# Patient Record
Sex: FEMALE | Race: WHITE | NOT HISPANIC OR LATINO | Employment: OTHER | URBAN - METROPOLITAN AREA
[De-identification: names, ages, dates, MRNs, and addresses within clinical notes are randomized per-mention and may not be internally consistent; named-entity substitution may affect disease eponyms.]

---

## 2017-01-12 ENCOUNTER — APPOINTMENT (OUTPATIENT)
Dept: PHYSICAL THERAPY | Facility: REHABILITATION | Age: 82
End: 2017-01-12
Attending: NURSE PRACTITIONER
Payer: MEDICARE

## 2017-01-17 ENCOUNTER — APPOINTMENT (OUTPATIENT)
Dept: PHYSICAL THERAPY | Facility: REHABILITATION | Age: 82
End: 2017-01-17
Attending: NURSE PRACTITIONER
Payer: MEDICARE

## 2017-01-19 ENCOUNTER — APPOINTMENT (OUTPATIENT)
Dept: PHYSICAL THERAPY | Facility: REHABILITATION | Age: 82
End: 2017-01-19
Attending: NURSE PRACTITIONER
Payer: MEDICARE

## 2017-01-24 ENCOUNTER — APPOINTMENT (OUTPATIENT)
Dept: PHYSICAL THERAPY | Facility: REHABILITATION | Age: 82
End: 2017-01-24
Attending: NURSE PRACTITIONER
Payer: MEDICARE

## 2017-01-26 ENCOUNTER — APPOINTMENT (OUTPATIENT)
Dept: PHYSICAL THERAPY | Facility: REHABILITATION | Age: 82
End: 2017-01-26
Attending: NURSE PRACTITIONER
Payer: MEDICARE

## 2017-01-31 ENCOUNTER — APPOINTMENT (OUTPATIENT)
Dept: PHYSICAL THERAPY | Facility: REHABILITATION | Age: 82
End: 2017-01-31
Attending: NURSE PRACTITIONER
Payer: MEDICARE

## 2017-02-02 ENCOUNTER — APPOINTMENT (OUTPATIENT)
Dept: PHYSICAL THERAPY | Facility: REHABILITATION | Age: 82
End: 2017-02-02
Attending: NURSE PRACTITIONER
Payer: MEDICARE

## 2017-03-20 ENCOUNTER — TELEPHONE (OUTPATIENT)
Dept: MEDICAL GROUP | Facility: MEDICAL CENTER | Age: 82
End: 2017-03-20

## 2017-03-20 DIAGNOSIS — F33.1 MODERATE EPISODE OF RECURRENT MAJOR DEPRESSIVE DISORDER (HCC): ICD-10-CM

## 2017-03-20 NOTE — TELEPHONE ENCOUNTER
1. Caller Name: Herminia                                         Call Back Number: [phone]      Patient approves a detailed voicemail message: yes    Daughter called and is wondering if patient's zoloft can be increased to 1 tablet instead of 1/2 tablet. Patient is having mood swings again and is arguing and fighting frequently with daughter again. Please advise.

## 2017-03-20 NOTE — TELEPHONE ENCOUNTER
It is okay to go to a full 50 mg tablet and a prescription for #30 of the 50 mg tablets were sent to her pharmacy.

## 2017-05-09 ENCOUNTER — OFFICE VISIT (OUTPATIENT)
Dept: MEDICAL GROUP | Facility: MEDICAL CENTER | Age: 82
End: 2017-05-09
Payer: MEDICARE

## 2017-05-09 VITALS
HEART RATE: 77 BPM | DIASTOLIC BLOOD PRESSURE: 74 MMHG | HEIGHT: 65 IN | RESPIRATION RATE: 16 BRPM | WEIGHT: 151 LBS | OXYGEN SATURATION: 97 % | TEMPERATURE: 98.2 F | SYSTOLIC BLOOD PRESSURE: 122 MMHG | BODY MASS INDEX: 25.16 KG/M2

## 2017-05-09 DIAGNOSIS — M54.41 ACUTE BILATERAL LOW BACK PAIN WITH BILATERAL SCIATICA: ICD-10-CM

## 2017-05-09 DIAGNOSIS — M54.42 ACUTE BILATERAL LOW BACK PAIN WITH BILATERAL SCIATICA: ICD-10-CM

## 2017-05-09 DIAGNOSIS — F33.1 MODERATE EPISODE OF RECURRENT MAJOR DEPRESSIVE DISORDER (HCC): ICD-10-CM

## 2017-05-09 PROCEDURE — 99214 OFFICE O/P EST MOD 30 MIN: CPT | Performed by: NURSE PRACTITIONER

## 2017-05-09 PROCEDURE — G8419 CALC BMI OUT NRM PARAM NOF/U: HCPCS | Performed by: NURSE PRACTITIONER

## 2017-05-09 PROCEDURE — G8432 DEP SCR NOT DOC, RNG: HCPCS | Performed by: NURSE PRACTITIONER

## 2017-05-09 PROCEDURE — 1101F PT FALLS ASSESS-DOCD LE1/YR: CPT | Performed by: NURSE PRACTITIONER

## 2017-05-09 PROCEDURE — 1036F TOBACCO NON-USER: CPT | Performed by: NURSE PRACTITIONER

## 2017-05-09 PROCEDURE — 4040F PNEUMOC VAC/ADMIN/RCVD: CPT | Mod: 8P | Performed by: NURSE PRACTITIONER

## 2017-05-09 RX ORDER — HYDROCODONE BITARTRATE AND ACETAMINOPHEN 5; 325 MG/1; MG/1
1 TABLET ORAL 2 TIMES DAILY PRN
Qty: 50 TAB | Refills: 0 | Status: SHIPPED | OUTPATIENT
Start: 2017-05-09 | End: 2017-08-30

## 2017-05-09 RX ORDER — CIPROFLOXACIN 500 MG/1
500 TABLET, FILM COATED ORAL 2 TIMES DAILY
COMMUNITY
End: 2017-06-08

## 2017-05-09 ASSESSMENT — PATIENT HEALTH QUESTIONNAIRE - PHQ9: CLINICAL INTERPRETATION OF PHQ2 SCORE: 0

## 2017-05-09 ASSESSMENT — ENCOUNTER SYMPTOMS: BACK PAIN: 1

## 2017-05-09 NOTE — PROGRESS NOTES
Subjective:      Herminia Jones is a 83 y.o. female who presents with Back Pain            Back Pain    Herminia Jones is here today accompanied by her daughter for back complaints.      1. Acute bilateral low back pain with bilateral sciatica  Patient reports her symptoms started 8 days ago without any history of trauma. The pain affects her lower back bilaterally and sometimes radiates into her gluteal area and sometimes down her legs posteriorly to the knees. Pain is worse with getting up after sitting or certain movements. Her daughter is asking for something stronger for pain other than Tylenol which does not appear to be helping. She is limited on her anti-inflammatory use because of her age. She denies loss of bowel or bladder function. She denies weakness or numbness of extremities.    2. Cystocele, unspecified cystocele location  Patient had been referred to gynecology previously for complaints of what appeared to be a cystocele. She is working with the gynecologist to correct this and apparently may need surgery in the near future. She is currently on Cipro for possible urinary tract infection. She reports no confusion, fever or chills. She apparently had a urine test done through gynecology.    3. 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 She reports no side effects.    Current Outpatient Prescriptions   Medication Sig Dispense Refill   • ciprofloxacin (CIPRO) 500 MG Tab Take 500 mg by mouth 2 times a day.     • Acetaminophen (TYLENOL 8 HOUR ARTHRITIS PAIN PO) Take  by mouth.     • hydrocodone-acetaminophen (NORCO) 5-325 MG Tab per tablet Take 1 Tab by mouth 2 times a day as needed. 50 Tab 0   • sertraline (ZOLOFT) 50 MG Tab Take 1 Tab by mouth every day. 30 Tab 11     No current facility-administered medications for this visit.     Social History   Substance Use Topics   • Smoking status: Never  "Smoker    • Smokeless tobacco: Never Used   • Alcohol Use: No     Family History   Problem Relation Age of Onset   • Stroke Father    • Cancer Sister    • Cancer Brother    • Heart Disease Sister    • Cancer Brother      Past Medical History   Diagnosis Date   • Glaucoma    • Macular degeneration        Review of Systems   Musculoskeletal: Positive for back pain.   All other systems reviewed and are negative.         Objective:     /74 mmHg  Pulse 77  Temp(Src) 36.8 °C (98.2 °F)  Resp 16  Ht 1.651 m (5' 5\")  Wt 68.493 kg (151 lb)  BMI 25.13 kg/m2  SpO2 97%     Physical Exam   Constitutional: She is oriented to person, place, and time. She appears well-developed and well-nourished. No distress.   HENT:   Head: Normocephalic and atraumatic.   Right Ear: External ear normal.   Left Ear: External ear normal.   Nose: Nose normal.   Eyes: Right eye exhibits no discharge. Left eye exhibits no discharge.   Neck: Normal range of motion. Neck supple. No thyromegaly present.   Cardiovascular: Normal rate, regular rhythm and normal heart sounds.  Exam reveals no gallop and no friction rub.    No murmur heard.  Pulmonary/Chest: Effort normal and breath sounds normal. She has no wheezes. She has no rales.   Musculoskeletal: She exhibits no edema or tenderness.        Lumbar back: She exhibits decreased range of motion and pain. She exhibits no tenderness.   Patient demonstrates 5/5 strength of lower extremities bilaterally. No paresthesias.   Neurological: She is alert and oriented to person, place, and time. She displays normal reflexes.   Skin: Skin is warm and dry. No rash noted. She is not diaphoretic.   Psychiatric: She has a normal mood and affect. Her behavior is normal. Judgment and thought content normal.   Nursing note and vitals reviewed.              Assessment/Plan:     1. Acute bilateral low back pain with bilateral sciatica  I suspect patient's symptoms are sciatic and her cystocele may also be playing " a part in this. She will use Tylenol when the pain is not severe but they requested and received a prescription for Norco to use for the stronger pain. I explained that normally this should improve within the next 3 weeks. If it does not improve with treatment through gynecology and time, I recommended we do an MRI and refer her to physiatry. Advised her to go to the emergency room if she develops loss of bowel or bladder control or severe pain.  - hydrocodone-acetaminophen (NORCO) 5-325 MG Tab per tablet; Take 1 Tab by mouth 2 times a day as needed.  Dispense: 50 Tab; Refill: 0    2. Cystocele, unspecified cystocele location  Patient will be following with her gynecologist to see if surgery is needed. She is on Cipro.    3. Moderate episode of recurrent major depressive disorder (CMS-HCC)  Patient reports she is doing well with Zoloft and I will have her continue this.

## 2017-05-09 NOTE — Clinical Note
[facility]  LETY Kulkarni  [address]  [place] [address]  Fax: [phone]   Authorization for Release/Disclosure of   Protected Health Information   Name: HERMINIA JONES : 1934 SSN: [id number]   Address: [facility] [address] [place] [address] Phone:    [phone] (home)    I authorize the entity listed below to release/disclose the PHI below to:   [facility]/LETY Kulkarni and LETY Kulkarni   Provider or Entity Name:     Address   City, State, Zip   Phone:      Fax:     Reason for request: continuity of care   Information to be released:    [  ] LAST COLONOSCOPY,  including any PATH REPORT and follow-up  [  ] LAST FIT/COLOGUARD RESULT [  ] LAST DEXA  [  ] LAST MAMMOGRAM  [  ] LAST PAP  [  ] LAST LABS [  ] RETINA EXAM REPORT  [  ] IMMUNIZATION RECORDS  [  ] Release all info      [  ] Check here and initial the line next to each item to release ALL health information INCLUDING  _____ Care and treatment for drug and / or alcohol abuse  _____ HIV testing, infection status, or AIDS  _____ Genetic Testing    DATES OF SERVICE OR TIME PERIOD TO BE DISCLOSED: _____________  I understand and acknowledge that:  * This Authorization may be revoked at any time by you in writing, except if your health information has already been used or disclosed.  * Your health information that will be used or disclosed as a result of you signing this authorization could be re-disclosed by the recipient. If this occurs, your re-disclosed health information may no longer be protected by State or Federal laws.  * You may refuse to sign this Authorization. Your refusal will not affect your ability to obtain treatment.  * This Authorization becomes effective upon signing and will  on (date) __________.      If no date is indicated, this Authorization will  one (1) year from the signature date.    Name: Herminia Jones    Signature:   Date:     2017       PLEASE FAX REQUESTED  RECORDS BACK TO: [phone]

## 2017-05-09 NOTE — MR AVS SNAPSHOT
"Herminia Jones   2017 1:40 PM   Office Visit   MRN: [id number]    Department:  [address] [place] [address]   Dept Phone:  [phone]    Description:  Female : 1934   Provider:  LETY Kulkarni           Reason for Visit     Back Pain lower back pain going down legs       Allergies as of 2017     No Known Allergies      You were diagnosed with     Acute bilateral low back pain with bilateral sciatica   [6177222]       Cystocele, unspecified cystocele location   [6397601]         Vital Signs     Blood Pressure Pulse Temperature Respirations Height Weight    122/74 mmHg 77 36.8 °C (98.2 °F) 16 1.651 m (5' 5\") 68.493 kg (151 lb)    Body Mass Index Oxygen Saturation Smoking Status             25.13 kg/m2 97% Never Smoker          Basic Information     Date Of Birth Sex Race Ethnicity Preferred Language    1934 Female White Non- English      Problem List              ICD-10-CM Priority Class Noted - Resolved    Moderate episode of recurrent major depressive disorder (CMS-HCC) F33.1   2016 - Present    CKD (chronic kidney disease) N18.9   2016 - Present      Health Maintenance        Date Due Completion Dates    IMM DTaP/Tdap/Td Vaccine (1 - Tdap) 1953 ---    IMM ZOSTER VACCINE 1994 ---    IMM PNEUMOCOCCAL 65+ (ADULT) LOW/MEDIUM RISK SERIES (1 of 2 - PCV13) 1999 ---    BONE DENSITY 2021 (Declined)    Override on 2016: Patient Declined    COLONOSCOPY 2023 (Done)    Override on 2013: Done            Current Immunizations     No immunizations on file.      Below and/or attached are the medications your provider expects you to take. Review all of your home medications and newly ordered medications with your provider and/or pharmacist. Follow medication instructions as directed by your provider and/or pharmacist. Please keep your medication list with you and share with your provider. Update the information when medications are " discontinued, doses are changed, or new medications (including over-the-counter products) are added; and carry medication information at all times in the event of emergency situations     Allergies:  No Known Allergies          Medications  Valid as of: May 09, 2017 -  2:25 PM    Generic Name Brand Name Tablet Size Instructions for use    Acetaminophen   Take  by mouth.        Ciprofloxacin HCl (Tab) CIPRO 500 MG Take 500 mg by mouth 2 times a day.        Hydrocodone-Acetaminophen (Tab) NORCO 5-325 MG Take 1 Tab by mouth 2 times a day as needed.        Sertraline HCl (Tab) ZOLOFT 50 MG Take 1 Tab by mouth every day.        .                 Medicines prescribed today were sent to:     [facility]/PHARMACY #[id number] - [place], [place] - [address]    [address] [facility] [address]    Phone: [phone] Fax: [phone]    Open 24 Hours?: No      Medication refill instructions:       If your prescription bottle indicates you have medication refills left, it is not necessary to call your provider’s office. Please contact your pharmacy and they will refill your medication.    If your prescription bottle indicates you do not have any refills left, you may request refills at any time through one of the following ways: The online [employer] system (except Urgent Care), by calling your provider’s office, or by asking your pharmacy to contact your provider’s office with a refill request. Medication refills are processed only during regular business hours and may not be available until the next business day. Your provider may request additional information or to have a follow-up visit with you prior to refilling your medication.   *Please Note: Medication refills are assigned a new Rx number when refilled electronically. Your pharmacy may indicate that no refills were authorized even though a new prescription for the same medication is available at the pharmacy. Please request the medicine by name with the pharmacy  before contacting your provider for a refill.           Care at Hand Access Code: [email or web address]  Expires: 6/8/2017  2:25 PM    Care at Hand  A secure, online tool to manage your health information     [employer]’s Care at Hand® is a secure, online tool that connects you to your personalized health information from the privacy of your home -- day or night - making it very easy for you to manage your healthcare. Once the activation process is completed, you can even access your medical information using the Care at Hand scott, which is available for free in the Apple Scott store or Google Play store.     Care at Hand provides the following levels of access (as shown below):   My Chart Features   [facility] Primary Care Doctor [facility]  Specialists [facility]  Urgent  Care Non-[facility]  Primary Care  Doctor   Email your healthcare team securely and privately 24/7 X X X    Manage appointments: schedule your next appointment; view details of past/upcoming appointments X      Request prescription refills. X      View recent personal medical records, including lab and immunizations X X X X   View health record, including health history, allergies, medications X X X X   Read reports about your outpatient visits, procedures, consult and ER notes X X X X   See your discharge summary, which is a recap of your hospital and/or ER visit that includes your diagnosis, lab results, and care plan. X X       How to register for Care at Hand:  1. Go to  [email or web address].  2. Click on the Sign Up Now box, which takes you to the New Member Sign Up page. You will need to provide the following information:  a. Enter your Care at Hand Access Code exactly as it appears at the top of this page. (You will not need to use this code after you’ve completed the sign-up process. If you do not sign up before the expiration date, you must request a new code.)   b. Enter your date of birth.   c. Enter your home email address.   d. Click Submit, and follow the next screen’s  instructions.  3. Create a [employer]ust ID. This will be your [employer] login ID and cannot be changed, so think of one that is secure and easy to remember.  4. Create a [employer] password. You can change your password at any time.  5. Enter your Password Reset Question and Answer. This can be used at a later time if you forget your password.   6. Enter your e-mail address. This allows you to receive e-mail notifications when new information is available in [employer].  7. Click Sign Up. You can now view your health information.    For assistance activating your [employer] account, call [phone]

## 2017-05-16 ENCOUNTER — OFFICE VISIT (OUTPATIENT)
Dept: MEDICAL GROUP | Facility: MEDICAL CENTER | Age: 82
End: 2017-05-16
Payer: MEDICARE

## 2017-05-16 VITALS
OXYGEN SATURATION: 98 % | HEIGHT: 65 IN | HEART RATE: 76 BPM | RESPIRATION RATE: 16 BRPM | DIASTOLIC BLOOD PRESSURE: 70 MMHG | WEIGHT: 154 LBS | SYSTOLIC BLOOD PRESSURE: 116 MMHG | TEMPERATURE: 97.8 F | BODY MASS INDEX: 25.66 KG/M2

## 2017-05-16 DIAGNOSIS — R20.2 PARESTHESIA OF BOTH HANDS: ICD-10-CM

## 2017-05-16 DIAGNOSIS — F33.1 MODERATE EPISODE OF RECURRENT MAJOR DEPRESSIVE DISORDER (HCC): ICD-10-CM

## 2017-05-16 DIAGNOSIS — M54.41 ACUTE RIGHT-SIDED LOW BACK PAIN WITH RIGHT-SIDED SCIATICA: ICD-10-CM

## 2017-05-16 DIAGNOSIS — E78.5 DYSLIPIDEMIA: ICD-10-CM

## 2017-05-16 DIAGNOSIS — R73.01 IMPAIRED FASTING GLUCOSE: ICD-10-CM

## 2017-05-16 LAB
HBA1C MFR BLD: 6.7 % (ref ?–5.8)
INT CON NEG: NEGATIVE
INT CON POS: POSITIVE

## 2017-05-16 PROCEDURE — G8432 DEP SCR NOT DOC, RNG: HCPCS | Performed by: NURSE PRACTITIONER

## 2017-05-16 PROCEDURE — G8417 CALC BMI ABV UP PARAM F/U: HCPCS | Performed by: NURSE PRACTITIONER

## 2017-05-16 PROCEDURE — 99214 OFFICE O/P EST MOD 30 MIN: CPT | Performed by: NURSE PRACTITIONER

## 2017-05-16 PROCEDURE — 83036 HEMOGLOBIN GLYCOSYLATED A1C: CPT | Performed by: NURSE PRACTITIONER

## 2017-05-16 PROCEDURE — 4040F PNEUMOC VAC/ADMIN/RCVD: CPT | Mod: 8P | Performed by: NURSE PRACTITIONER

## 2017-05-16 PROCEDURE — 1036F TOBACCO NON-USER: CPT | Performed by: NURSE PRACTITIONER

## 2017-05-16 PROCEDURE — 1101F PT FALLS ASSESS-DOCD LE1/YR: CPT | Performed by: NURSE PRACTITIONER

## 2017-05-16 RX ORDER — LIDOCAINE 50 MG/G
1 PATCH TOPICAL EVERY 24 HOURS
Qty: 30 PATCH | Refills: 2 | Status: SHIPPED | OUTPATIENT
Start: 2017-05-16 | End: 2017-08-30 | Stop reason: SDUPTHER

## 2017-05-16 ASSESSMENT — ENCOUNTER SYMPTOMS
BACK PAIN: 1
TINGLING: 1

## 2017-05-16 ASSESSMENT — PATIENT HEALTH QUESTIONNAIRE - PHQ9: CLINICAL INTERPRETATION OF PHQ2 SCORE: 0

## 2017-05-16 NOTE — MR AVS SNAPSHOT
"        Herminia Jones   2017 9:20 AM   Office Visit   MRN: [id number]    Department:  [address] [place] [address]   Dept Phone:  [phone]    Description:  Female : 1934   Provider:  LETY Kulkarni           Reason for Visit     Follow-Up high glucose/ pain meds not working      Allergies as of 2017     No Known Allergies      You were diagnosed with     Impaired fasting glucose   [790.21.ICD-9-CM]       Dyslipidemia   [455928]       Acute right-sided low back pain with right-sided sciatica   [4761231]       Paresthesia of both hands   [4877325]         Vital Signs     Blood Pressure Pulse Temperature Respirations Height Weight    116/70 mmHg 76 36.6 °C (97.8 °F) 16 1.651 m (5' 5\") 69.854 kg (154 lb)    Body Mass Index Oxygen Saturation Smoking Status             25.63 kg/m2 98% Never Smoker          Basic Information     Date Of Birth Sex Race Ethnicity Preferred Language    1934 Female White Non- English      Your appointments     Aug 16, 2017  9:20 AM   Established Patient with LETY Kulkarni   [facility] [address]    [address]  [facility] [address]  [facility] [address] [phone]           You will be receiving a confirmation call a few days before your appointment from our automated call confirmation system.              Problem List              ICD-10-CM Priority Class Noted - Resolved    Moderate episode of recurrent major depressive disorder (CMS-HCC) F33.1   2016 - Present    CKD (chronic kidney disease) N18.9   2016 - Present    Impaired fasting glucose R73.01   2017 - Present    Dyslipidemia E78.5   2017 - Present    Acute right-sided low back pain with right-sided sciatica M54.41   2017 - Present      Health Maintenance        Date Due Completion Dates    IMM DTaP/Tdap/Td Vaccine (1 - Tdap) 1953 ---    IMM ZOSTER VACCINE 1994 ---    IMM PNEUMOCOCCAL 65+ (ADULT) LOW/MEDIUM RISK SERIES (1 of 2 - PCV13) " 1/18/1999 ---    BONE DENSITY 8/24/2021 8/24/2016 (Declined)    Override on 8/24/2016: Patient Declined    COLONOSCOPY 8/7/2023 8/7/2013 (Done)    Override on 8/7/2013: Done            Results     POCT  A1C      Component    Glycohemoglobin    6.7    Internal Control Negative    Negative    Internal Control Positive    Positive                        Current Immunizations     No immunizations on file.      Below and/or attached are the medications your provider expects you to take. Review all of your home medications and newly ordered medications with your provider and/or pharmacist. Follow medication instructions as directed by your provider and/or pharmacist. Please keep your medication list with you and share with your provider. Update the information when medications are discontinued, doses are changed, or new medications (including over-the-counter products) are added; and carry medication information at all times in the event of emergency situations     Allergies:  No Known Allergies          Medications  Valid as of: May 16, 2017 -  9:24 AM    Generic Name Brand Name Tablet Size Instructions for use    Acetaminophen   Take  by mouth.        Ciprofloxacin HCl (Tab) CIPRO 500 MG Take 500 mg by mouth 2 times a day.        Hydrocodone-Acetaminophen (Tab) NORCO 5-325 MG Take 1 Tab by mouth 2 times a day as needed.        Lidocaine (Patch) LIDODERM 5 % Apply 1 Patch to skin as directed every 24 hours.        MetFORMIN HCl (Tab) GLUCOPHAGE 500 MG Take 1 Tab by mouth every day.        Sertraline HCl (Tab) ZOLOFT 50 MG Take 1 Tab by mouth every day.        .                 Medicines prescribed today were sent to:     [facility]/PHARMACY #[id number] - [place], [place] - [address] [address] [facility]    [address] [facility] [address]    Phone: [phone] Fax: [phone]    Open 24 Hours?: No      Medication refill instructions:       If your prescription bottle indicates you have medication refills left, it is not necessary to  call your provider’s office. Please contact your pharmacy and they will refill your medication.    If your prescription bottle indicates you do not have any refills left, you may request refills at any time through one of the following ways: The online [employer] system (except Urgent Care), by calling your provider’s office, or by asking your pharmacy to contact your provider’s office with a refill request. Medication refills are processed only during regular business hours and may not be available until the next business day. Your provider may request additional information or to have a follow-up visit with you prior to refilling your medication.   *Please Note: Medication refills are assigned a new Rx number when refilled electronically. Your pharmacy may indicate that no refills were authorized even though a new prescription for the same medication is available at the pharmacy. Please request the medicine by name with the pharmacy before contacting your provider for a refill.        Your To Do List     Future Labs/Procedures Complete By Expires    MR-LUMBAR SPINE-W/O  As directed 11/16/2017      Referral     A referral request has been sent to our patient care coordination department. Please allow 3-5 business days for us to process this request and contact you either by phone or mail. If you do not hear from us by the 5th business day, please call us at [phone].           [employer] Access Code: [email or web address]  Expires: 6/8/2017  2:25 PM    [employer]  A secure, online tool to manage your health information     [employer]’s [employer]® is a secure, online tool that connects you to your personalized health information from the privacy of your home -- day or night - making it very easy for you to manage your healthcare. Once the activation process is completed, you can even access your medical information using the [employer] scott, which is available for free in the [employer] store or Google Play store.     [employer]  provides the following levels of access (as shown below):   My Chart Features   [facility] Primary Care Doctor [facility]  Specialists [facility]  Urgent  Care Non-[facility]  Primary Care  Doctor   Email your healthcare team securely and privately 24/7 X X X    Manage appointments: schedule your next appointment; view details of past/upcoming appointments X      Request prescription refills. X      View recent personal medical records, including lab and immunizations X X X X   View health record, including health history, allergies, medications X X X X   Read reports about your outpatient visits, procedures, consult and ER notes X X X X   See your discharge summary, which is a recap of your hospital and/or ER visit that includes your diagnosis, lab results, and care plan. X X       How to register for [employer]:  1. Go to  [email or web address].  2. Click on the Sign Up Now box, which takes you to the New Member Sign Up page. You will need to provide the following information:  a. Enter your [employer] Access Code exactly as it appears at the top of this page. (You will not need to use this code after you’ve completed the sign-up process. If you do not sign up before the expiration date, you must request a new code.)   b. Enter your date of birth.   c. Enter your home email address.   d. Click Submit, and follow the next screen’s instructions.  3. Create a [employer] ID. This will be your [employer] login ID and cannot be changed, so think of one that is secure and easy to remember.  4. Create a [employer] password. You can change your password at any time.  5. Enter your Password Reset Question and Answer. This can be used at a later time if you forget your password.   6. Enter your e-mail address. This allows you to receive e-mail notifications when new information is available in [employer].  7. Click Sign Up. You can now view your health information.    For assistance activating your [employer] account, call [phone]

## 2017-05-16 NOTE — PROGRESS NOTES
Subjective:      Herminia Jones is a 83 y.o. female who presents with Follow-Up            HPI Herminia Jones is a pleasant 83-year-old female here today accompanied by her daughter for follow-up on lab work as well as continuing pain of her back and questions regarding hand numbness.      1. Impaired fasting glucose  Patient recently did lab work for her gynecologist who recently treated her for UTI with Cipro as well as evaluating her for possible cystocele surgery in the future. I received a copy of the labs showing elevated fasting blood sugar 167. Patient is relatively new to this office so I do not have any lab work to compare it to. She reports no polydipsia or polyphagia.    2. Dyslipidemia  Patient's lab work through gynecology also showed a total cholesterol of 223 with mild elevation in LDL of 137 with a good HDL of 53. Thyroid levels were normal.    3. Acute right-sided low back pain with right-sided sciatica  Patient states she continues to have severe low back pain. This started about 2 weeks ago and affects mainly her right lower back and radiates into her right gluteal area. She was given a prescription for Norco because Tylenol was not helping. She is using this about twice a day since the pain started and she was seen here. She denies weakness or numbness of her extremities. She denies loss of bowel or bladder control. Symptoms are worse after getting up from sitting. Her daughter gave her some of her Lidoderm patch which was helpful.    4. Paresthesia of both hands  Patient reports today that she also has had some numbness of the fingers of her hands bilaterally for at least a year. She states this has never been checked.    5. Moderate episode of recurrent major depressive disorder (CMS-HCC)  Patient continues on Zoloft 50 mg which was recently filled for her and she feels this is helpful.    Current Outpatient Prescriptions   Medication Sig Dispense Refill   • metformin (GLUCOPHAGE) 500 MG  "Tab Take 1 Tab by mouth every day. 30 Tab 11   • lidocaine (LIDODERM) 5 % Patch Apply 1 Patch to skin as directed every 24 hours. 30 Patch 2   • ciprofloxacin (CIPRO) 500 MG Tab Take 500 mg by mouth 2 times a day.     • hydrocodone-acetaminophen (NORCO) 5-325 MG Tab per tablet Take 1 Tab by mouth 2 times a day as needed. 50 Tab 0   • sertraline (ZOLOFT) 50 MG Tab Take 1 Tab by mouth every day. 30 Tab 11   • Acetaminophen (TYLENOL 8 HOUR ARTHRITIS PAIN PO) Take  by mouth.       No current facility-administered medications for this visit.     Social History   Substance Use Topics   • Smoking status: Never Smoker    • Smokeless tobacco: Never Used   • Alcohol Use: No     Family History   Problem Relation Age of Onset   • Stroke Father    • Cancer Sister    • Cancer Brother    • Heart Disease Sister    • Cancer Brother      Past Medical History   Diagnosis Date   • Glaucoma    • Macular degeneration        Review of Systems   Musculoskeletal: Positive for back pain.   Neurological: Positive for tingling.   All other systems reviewed and are negative.         Objective:     /70 mmHg  Pulse 76  Temp(Src) 36.6 °C (97.8 °F)  Resp 16  Ht 1.651 m (5' 5\")  Wt 69.854 kg (154 lb)  BMI 25.63 kg/m2  SpO2 98%     Physical Exam   Constitutional: She is oriented to person, place, and time. She appears well-developed and well-nourished. No distress.   HENT:   Head: Normocephalic and atraumatic.   Right Ear: External ear normal.   Left Ear: External ear normal.   Nose: Nose normal.   Eyes: Right eye exhibits no discharge. Left eye exhibits no discharge.   Neck: Normal range of motion. Neck supple. No thyromegaly present.   Cardiovascular: Normal rate, regular rhythm and normal heart sounds.  Exam reveals no gallop and no friction rub.    No murmur heard.  Pulmonary/Chest: Effort normal and breath sounds normal. She has no wheezes. She has no rales.   Musculoskeletal: She exhibits no edema or tenderness.        Lumbar back: " She exhibits decreased range of motion and pain.   Neurological: She is alert and oriented to person, place, and time. She displays normal reflexes.   5/5 strength of lower extremities bilaterally.   Skin: Skin is warm and dry. No rash noted. She is not diaphoretic.   Psychiatric: She has a normal mood and affect. Her behavior is normal. Judgment and thought content normal.   Nursing note and vitals reviewed.              Assessment/Plan:     1. Impaired fasting glucose  Patient's hemoglobin A1c in the office today comes back elevated at 6.7. I will start patient on low-dose metformin 500 mg once a day. Her most recent GFR was 57. She does not want to check blood sugars but she is willing to do a hemoglobin A1c in 3 months. I told her if she watches her diet we may be able to take her off medicine in the future since she is not that high. She does need to watch her carbohydrates and I discussed this with her and her daughter. She will stop the medicine if she develops any side effects such as diarrhea or hypoglycemia.  - POCT  A1C  - metformin (GLUCOPHAGE) 500 MG Tab; Take 1 Tab by mouth every day.  Dispense: 30 Tab; Refill: 11    2. Dyslipidemia  Patient's LDL mildly elevated for an 83-year-old. I told her if it continues high and we need to continue treating her for impaired fasting glucose, we may wish to start a statin in the future.    3. Acute right-sided low back pain with right-sided sciatica  Back pain is persisting and patient is very uncomfortable. She does not appear to have cauda equina symptoms. She has Norco which she is using for the pain and I will add Lidoderm patch. I have referred patient to physiatry and we'll do an MRI assuming her pain does not improve over the next few weeks.  - MR-LUMBAR SPINE-W/O; Future  - REFERRAL TO PHYSIATRY (PMR)  - lidocaine (LIDODERM) 5 % Patch; Apply 1 Patch to skin as directed every 24 hours.  Dispense: 30 Patch; Refill: 2    4. Paresthesia of both hands  Patient  may need EMG studies for this complaint. It appears to have been present for a while.  - REFERRAL TO PHYSIATRY (PMR)    5. Moderate episode of recurrent major depressive disorder (CMS-HCC)  Patient states she is currently happy on Zoloft.

## 2017-05-25 ENCOUNTER — APPOINTMENT (OUTPATIENT)
Dept: RADIOLOGY | Facility: MEDICAL CENTER | Age: 82
End: 2017-05-25
Attending: NURSE PRACTITIONER
Payer: MEDICARE

## 2017-06-01 NOTE — H&P
HISTORY OF PRESENT ILLNESS:  The patient is an 83-year-old white female    4, para 4, status post hysterectomy, BSO whose chief complaint is   pelvic pain and pelvic pressure.  The patient was found to have stage IV   pelvic organ prolapse.  She has grade IV rectocele, grade IV cystocele.  The   patient has failed multiple pessaries to include a large Gellhorn pessary, she   failed a donut pessary and she failed a dish pessary.  The patient has been   treated in the past with pelvic floor rehabilitation by Dr. Helene Jamil.    PAST MEDICAL HISTORY:  Significant for macular degeneration, hyperlipidemia,   mild dementia, anxiety and depression.  The patient has a large capacity   bladder and underwent simple urodynamic testing, which revealed no genuine   stress urinary incontinence, no detrusor contractions, no evidence of ISD and   no stress urinary incontinence.  The patient was asked to drink 32 ounces and   voided 400 mL.  Residual volume was 400 mL.  Her first sensation was at 150   mL.  Her first desire to void was at 250 mL and she had a strong desire to   void at 400 mL.  Her provocative test revealed no leakage with coughing.  Her   standing stress test was negative and her Valsalva was negative both supine,   reclining and standing.  My impression is that the patient has no genuine   stress urinary incontinence.  She has a large capacity bladder.  The patient   has failed 3 pessaries due to a shortened vagina.  Significant for diabetes   mellitus type 2.    PAST SURGICAL HISTORY:  Positive for hysterectomy, BSO.    HABITS:  The patient does not smoke cigarettes.  She does not partake of   alcoholic beverages.  She has never participated in recreational drugs.  She   has never been treated for drug abuse.    FAMILY HISTORY:  Her sister has recently  secondary to breast cancer.    Her father is , age 80, and her mother is , age 78, reasons   unknown.    REVIEW OF SYSTEMS:   Patient complains of impaired eyesight, arthritis, change   in bowel habits, history of urinary tract infection and loss of urine on   coughing, laughing, sneezing and depression.  She notes a change in vision and   change in bowel habits.  She complains of frequent urination.  Her urinalysis   is negative for blood, glucose, nitrites, bilirubin and normal urobilinogen.    She has moderate leukocytes.  Her specific gravity is 1.010, pH of 5.  The   patient complains of joint pain and depression.    PHYSICAL EXAMINATION:  GENERAL:  This is a pleasant, white female who has a history of mild dementia   and is lovingly cared for by her daughter.  VITAL SIGNS:  She is 65 inches tall, 152 pounds, blood pressure 120/64, pulse   71, respirations 20, O2 sat 93%.  GENERAL:  This is an elderly white female who has some difficulty walking.  HEENT:  Head normocephalic without sign of trauma.  SKIN:  No rashes, changes, or cyanosis.  Nevi present.  NECK:  Trachea midline, no thyromegaly.  Decreased range of motion.  LUNGS:  Clear to auscultation.  Normal AP diameter.  HEART:  S1, S2 is normal.  No S3, S4.  No lifts, rubs or heaves.  MUSCULOSKELETAL:  Reveals back pain, no point tenderness, but most likely   degenerative disk disease.  ABDOMEN:  Flat.  Bowel sounds positive.  No hepatomegaly, no splenomegaly, no   tenderness, guarding, rigidity or rebound.  GENITALIA:  Female escutcheon.  Bartholin, urethral and [place]'s glands are   normal.  Lymphatic groin nodes not enlarged.  Urethra, no masses, tenderness,   or scarring.  Vaginal estrogen effect absent.  Bladder grade III cystocele.    Cervix not present, uterus not present status post hysterectomy and BSO.    Rectocele grade IV.  RECTAL:  Normal sphincter tone, hemorrhoids present, large hemorrhoids.  No   rectal mass.  Fecal occult blood is negative.    IMPRESSION:  1.  Grade III-grade IV pelvic organ prolapse.  2.  Status post hysterectomy.  3.  Cystocele, rectocele.  4.   Diabetes mellitus type 2.  5.  Failed pessaries -- failed Gellhorn, failed Donut, failed ring with   support.  6.  Status post history of pelvic floor rehabilitation by Dr. Helene Jamil.  7.  Hyperlipidemia.  8.  Macular degeneration.  9.  Atrophic vaginitis.  10.  Anxiety.  11.  Depression.    DISCUSSION:  I have had a long thorough discussion with the patient and with   her daughter regarding various approaches to care.  We discussed conservative   care, expectant care, treatment with pessaries, repeat pelvic floor   rehabilitation, Kiegel exercises.  The patient is retaining significant urine   with a total capacity of 800 mL.  We discussed colpocleisis.  The patient has   had a hysterectomy and BSO.  Following a long and thorough discussion   regarding the risk, benefits and alternatives to surgery, the patient elects   colpocleisis.  The patient's daughter agrees with her since we have failed   conservative measures.  We discussed the procedure in detail.  We also   discussed the potential of an anterior colporrhaphy, posterior colporrhaphy,   uterosacral suspension.  I have received a medical clearance from OLINDA Kulkarni.  Informed consent was received.  All the patient's questions were   discussed.  We discussed the serious and significant risks to include but not   limited to the risk of anesthesia, infection, bleeding, pneumonia, injury to   the bowel, bladder, ureter or pelvic vessels.  The patient has significant   comorbidities include diabetes mellitus type 2, which places her at increased   risk for potential infection.  All the patient's questions were answered to   her satisfaction.  Informed consent was received.  Informed consent form on 2   occasions was read, discussed, questions answered and signed.       ____________________________________     MD MAURICIO PARKER / FRANCESCA    DD:  05/31/2017 17:44:46  DT:  05/31/2017 20:10:16    D#:  [id number]  Job#:  [id number]    cc: PRITESH CAMARA  APN

## 2017-06-02 ENCOUNTER — TELEPHONE (OUTPATIENT)
Dept: MEDICAL GROUP | Facility: MEDICAL CENTER | Age: 82
End: 2017-06-02

## 2017-06-02 ENCOUNTER — HOSPITAL ENCOUNTER (OUTPATIENT)
Facility: MEDICAL CENTER | Age: 82
End: 2017-06-03
Attending: OBSTETRICS & GYNECOLOGY | Admitting: OBSTETRICS & GYNECOLOGY
Payer: MEDICARE

## 2017-06-02 ENCOUNTER — APPOINTMENT (OUTPATIENT)
Dept: RADIOLOGY | Facility: MEDICAL CENTER | Age: 82
End: 2017-06-02
Attending: OBSTETRICS & GYNECOLOGY
Payer: MEDICARE

## 2017-06-02 DIAGNOSIS — M48.061 SPINAL STENOSIS OF LUMBAR REGION: ICD-10-CM

## 2017-06-02 DIAGNOSIS — S32.10XA CLOSED FRACTURE OF SACRUM, UNSPECIFIED PORTION OF SACRUM, INITIAL ENCOUNTER (HCC): ICD-10-CM

## 2017-06-02 PROBLEM — N81.4 UTERINE PROLAPSE WITHOUT MENTION OF VAGINAL WALL PROLAPSE: Status: ACTIVE | Noted: 2017-06-02

## 2017-06-02 LAB
ALBUMIN SERPL BCP-MCNC: 3.9 G/DL (ref 3.2–4.9)
ALBUMIN/GLOB SERPL: 1 G/DL
ALP SERPL-CCNC: 113 U/L (ref 30–99)
ALT SERPL-CCNC: 20 U/L (ref 2–50)
ANION GAP SERPL CALC-SCNC: 11 MMOL/L (ref 0–11.9)
AST SERPL-CCNC: 21 U/L (ref 12–45)
BASOPHILS # BLD AUTO: 0.9 % (ref 0–1.8)
BASOPHILS # BLD: 0.08 K/UL (ref 0–0.12)
BILIRUB SERPL-MCNC: 0.5 MG/DL (ref 0.1–1.5)
BUN SERPL-MCNC: 17 MG/DL (ref 8–22)
CALCIUM SERPL-MCNC: 9.7 MG/DL (ref 8.5–10.5)
CHLORIDE SERPL-SCNC: 106 MMOL/L (ref 96–112)
CO2 SERPL-SCNC: 19 MMOL/L (ref 20–33)
CREAT SERPL-MCNC: 0.76 MG/DL (ref 0.5–1.4)
EKG IMPRESSION: NORMAL
EOSINOPHIL # BLD AUTO: 0.31 K/UL (ref 0–0.51)
EOSINOPHIL NFR BLD: 3.4 % (ref 0–6.9)
ERYTHROCYTE [DISTWIDTH] IN BLOOD BY AUTOMATED COUNT: 40.8 FL (ref 35.9–50)
GFR SERPL CREATININE-BSD FRML MDRD: >60 ML/MIN/1.73 M 2
GLOBULIN SER CALC-MCNC: 3.9 G/DL (ref 1.9–3.5)
GLUCOSE SERPL-MCNC: 109 MG/DL (ref 65–99)
HCT VFR BLD AUTO: 44.4 % (ref 37–47)
HGB BLD-MCNC: 15.1 G/DL (ref 12–16)
IMM GRANULOCYTES # BLD AUTO: 0.05 K/UL (ref 0–0.11)
IMM GRANULOCYTES NFR BLD AUTO: 0.5 % (ref 0–0.9)
LYMPHOCYTES # BLD AUTO: 3.93 K/UL (ref 1–4.8)
LYMPHOCYTES NFR BLD: 42.9 % (ref 22–41)
MCH RBC QN AUTO: 29.8 PG (ref 27–33)
MCHC RBC AUTO-ENTMCNC: 34 G/DL (ref 33.6–35)
MCV RBC AUTO: 87.7 FL (ref 81.4–97.8)
MONOCYTES # BLD AUTO: 0.6 K/UL (ref 0–0.85)
MONOCYTES NFR BLD AUTO: 6.6 % (ref 0–13.4)
NEUTROPHILS # BLD AUTO: 4.19 K/UL (ref 2–7.15)
NEUTROPHILS NFR BLD: 45.7 % (ref 44–72)
NRBC # BLD AUTO: 0 K/UL
NRBC BLD AUTO-RTO: 0 /100 WBC
PLATELET # BLD AUTO: 274 K/UL (ref 164–446)
PMV BLD AUTO: 9.4 FL (ref 9–12.9)
POTASSIUM SERPL-SCNC: 4.1 MMOL/L (ref 3.6–5.5)
PROT SERPL-MCNC: 7.8 G/DL (ref 6–8.2)
RBC # BLD AUTO: 5.06 M/UL (ref 4.2–5.4)
SODIUM SERPL-SCNC: 136 MMOL/L (ref 135–145)
WBC # BLD AUTO: 9.2 K/UL (ref 4.8–10.8)

## 2017-06-02 PROCEDURE — 160002 HCHG RECOVERY MINUTES (STAT): Performed by: OBSTETRICS & GYNECOLOGY

## 2017-06-02 PROCEDURE — 500901 HCHG PACKING, VAG 2 X-RAY: Performed by: OBSTETRICS & GYNECOLOGY

## 2017-06-02 PROCEDURE — 93005 ELECTROCARDIOGRAM TRACING: CPT | Performed by: OBSTETRICS & GYNECOLOGY

## 2017-06-02 PROCEDURE — 501200: Performed by: OBSTETRICS & GYNECOLOGY

## 2017-06-02 PROCEDURE — 160035 HCHG PACU - 1ST 60 MINS PHASE I: Performed by: OBSTETRICS & GYNECOLOGY

## 2017-06-02 PROCEDURE — 700101 HCHG RX REV CODE 250

## 2017-06-02 PROCEDURE — 700102 HCHG RX REV CODE 250 W/ 637 OVERRIDE(OP): Performed by: OBSTETRICS & GYNECOLOGY

## 2017-06-02 PROCEDURE — 88302 TISSUE EXAM BY PATHOLOGIST: CPT

## 2017-06-02 PROCEDURE — 502240 HCHG MISC OR SUPPLY RC 0272: Performed by: OBSTETRICS & GYNECOLOGY

## 2017-06-02 PROCEDURE — 71010 DX-CHEST-PORTABLE (1 VIEW): CPT

## 2017-06-02 PROCEDURE — 700111 HCHG RX REV CODE 636 W/ 250 OVERRIDE (IP)

## 2017-06-02 PROCEDURE — 93010 ELECTROCARDIOGRAM REPORT: CPT | Performed by: INTERNAL MEDICINE

## 2017-06-02 PROCEDURE — 501330 HCHG SET, CYSTO IRRIG TUBING: Performed by: OBSTETRICS & GYNECOLOGY

## 2017-06-02 PROCEDURE — 80053 COMPREHEN METABOLIC PANEL: CPT

## 2017-06-02 PROCEDURE — G0378 HOSPITAL OBSERVATION PER HR: HCPCS

## 2017-06-02 PROCEDURE — 85025 COMPLETE CBC W/AUTO DIFF WBC: CPT

## 2017-06-02 PROCEDURE — 160039 HCHG SURGERY MINUTES - EA ADDL 1 MIN LEVEL 3: Performed by: OBSTETRICS & GYNECOLOGY

## 2017-06-02 PROCEDURE — A4338 INDWELLING CATHETER LATEX: HCPCS | Performed by: OBSTETRICS & GYNECOLOGY

## 2017-06-02 PROCEDURE — 500892 HCHG PACK, PERI-GYN: Performed by: OBSTETRICS & GYNECOLOGY

## 2017-06-02 PROCEDURE — 501838 HCHG SUTURE GENERAL: Performed by: OBSTETRICS & GYNECOLOGY

## 2017-06-02 PROCEDURE — A9270 NON-COVERED ITEM OR SERVICE: HCPCS | Performed by: OBSTETRICS & GYNECOLOGY

## 2017-06-02 PROCEDURE — 160028 HCHG SURGERY MINUTES - 1ST 30 MINS LEVEL 3: Performed by: OBSTETRICS & GYNECOLOGY

## 2017-06-02 PROCEDURE — 502587 HCHG PACK, D&C: Performed by: OBSTETRICS & GYNECOLOGY

## 2017-06-02 PROCEDURE — 160048 HCHG OR STATISTICAL LEVEL 1-5: Performed by: OBSTETRICS & GYNECOLOGY

## 2017-06-02 PROCEDURE — 160036 HCHG PACU - EA ADDL 30 MINS PHASE I: Performed by: OBSTETRICS & GYNECOLOGY

## 2017-06-02 PROCEDURE — 160009 HCHG ANES TIME/MIN: Performed by: OBSTETRICS & GYNECOLOGY

## 2017-06-02 RX ORDER — FAMOTIDINE 20 MG/1
20 TABLET, FILM COATED ORAL EVERY 12 HOURS
Status: DISCONTINUED | OUTPATIENT
Start: 2017-06-02 | End: 2017-06-03 | Stop reason: HOSPADM

## 2017-06-02 RX ORDER — HYDROMORPHONE HYDROCHLORIDE 4 MG/1
4 TABLET ORAL
Status: DISCONTINUED | OUTPATIENT
Start: 2017-06-02 | End: 2017-06-03 | Stop reason: HOSPADM

## 2017-06-02 RX ORDER — SIMETHICONE 80 MG
80 TABLET,CHEWABLE ORAL EVERY 8 HOURS PRN
Status: DISCONTINUED | OUTPATIENT
Start: 2017-06-02 | End: 2017-06-03 | Stop reason: HOSPADM

## 2017-06-02 RX ORDER — LIDOCAINE HYDROCHLORIDE 10 MG/ML
INJECTION, SOLUTION INFILTRATION; PERINEURAL
Status: DISCONTINUED | OUTPATIENT
Start: 2017-06-02 | End: 2017-06-02 | Stop reason: HOSPADM

## 2017-06-02 RX ORDER — ONDANSETRON 2 MG/ML
4 INJECTION INTRAMUSCULAR; INTRAVENOUS EVERY 6 HOURS PRN
Status: DISCONTINUED | OUTPATIENT
Start: 2017-06-02 | End: 2017-06-03 | Stop reason: HOSPADM

## 2017-06-02 RX ORDER — SODIUM CHLORIDE, SODIUM LACTATE, POTASSIUM CHLORIDE, CALCIUM CHLORIDE 600; 310; 30; 20 MG/100ML; MG/100ML; MG/100ML; MG/100ML
1000 INJECTION, SOLUTION INTRAVENOUS
Status: COMPLETED | OUTPATIENT
Start: 2017-06-02 | End: 2017-06-02

## 2017-06-02 RX ORDER — MORPHINE SULFATE 10 MG/ML
6 INJECTION, SOLUTION INTRAMUSCULAR; INTRAVENOUS
Status: DISCONTINUED | OUTPATIENT
Start: 2017-06-02 | End: 2017-06-03 | Stop reason: HOSPADM

## 2017-06-02 RX ORDER — LIDOCAINE HYDROCHLORIDE 10 MG/ML
INJECTION, SOLUTION INFILTRATION; PERINEURAL
Status: COMPLETED
Start: 2017-06-02 | End: 2017-06-02

## 2017-06-02 RX ORDER — ZOLPIDEM TARTRATE 5 MG/1
5 TABLET ORAL NIGHTLY PRN
Status: DISCONTINUED | OUTPATIENT
Start: 2017-06-02 | End: 2017-06-03 | Stop reason: HOSPADM

## 2017-06-02 RX ORDER — BACITRACIN 50000 [IU]/1
INJECTION, POWDER, FOR SOLUTION INTRAMUSCULAR
Status: COMPLETED
Start: 2017-06-02 | End: 2017-06-02

## 2017-06-02 RX ORDER — BUPIVACAINE HYDROCHLORIDE AND EPINEPHRINE 2.5; 5 MG/ML; UG/ML
INJECTION, SOLUTION EPIDURAL; INFILTRATION; INTRACAUDAL; PERINEURAL
Status: COMPLETED
Start: 2017-06-02 | End: 2017-06-02

## 2017-06-02 RX ORDER — BUPIVACAINE HYDROCHLORIDE AND EPINEPHRINE 2.5; 5 MG/ML; UG/ML
INJECTION, SOLUTION EPIDURAL; INFILTRATION; INTRACAUDAL; PERINEURAL
Status: DISCONTINUED | OUTPATIENT
Start: 2017-06-02 | End: 2017-06-02 | Stop reason: HOSPADM

## 2017-06-02 RX ADMIN — SERTRALINE 50 MG: 50 TABLET, FILM COATED ORAL at 18:07

## 2017-06-02 RX ADMIN — FAMOTIDINE 20 MG: 20 TABLET, FILM COATED ORAL at 20:57

## 2017-06-02 RX ADMIN — SODIUM CHLORIDE, SODIUM LACTATE, POTASSIUM CHLORIDE, CALCIUM CHLORIDE 1000 ML: 600; 310; 30; 20 INJECTION, SOLUTION INTRAVENOUS at 12:19

## 2017-06-02 ASSESSMENT — PAIN SCALES - GENERAL
PAINLEVEL_OUTOF10: 0
PAINLEVEL_OUTOF10: 1

## 2017-06-02 NOTE — IP AVS SNAPSHOT
" Home Care Instructions                                                                                                                  Name:Herminia Jones  Medical Record Number:[id number]  CSN: [id number]    YOB: 1934   Age: 83 y.o.  Sex: female  HT:1.626 m (5' 4\") WT: 66.9 kg (147 lb 7.8 oz)          Admit Date: 6/2/2017     Discharge Date:   Today's Date: 6/3/2017  Attending Doctor:  Espinoza Bryan M.D.                  Allergies:  Review of patient's allergies indicates no known allergies.            Discharge Instructions       Discharge Instructions    Discharged to home by car with relative. Discharged via wheelchair, hospital escort: Yes.  Special equipment needed: Not Applicable    Be sure to schedule a follow-up appointment with your primary care doctor or any specialists as instructed.     Discharge Plan:        I understand that a diet low in cholesterol, fat, and sodium is recommended for good health. Unless I have been given specific instructions below for another diet, I accept this instruction as my diet prescription.   Other diet: Regular    Special Instructions:  See Dr. Bryan if fever develops over 100.4    · Is patient discharged on Warfarin / Coumadin?   No     · Is patient Post Blood Transfusion?  No        ACTIVITY: Rest and take it easy for the first 24 hours.  A responsible adult is recommended to remain with you during that time.  It is normal to feel sleepy.  We encourage you to not do anything that requires balance, judgment or coordination.    MILD FLU-LIKE SYMPTOMS ARE NORMAL. YOU MAY EXPERIENCE GENERALIZED MUSCLE ACHES, THROAT IRRITATION, HEADACHE AND/OR SOME NAUSEA.    FOR 24 HOURS DO NOT:  Drive, operate machinery or run household appliances.  Drink beer or alcoholic beverages.   Make important decisions or sign legal documents.    DIET: To avoid nausea, slowly advance diet as tolerated, avoiding spicy or greasy foods for the first day.  Add more substantial food " to your diet according to your physician's instructions.  Babies can be fed formula or breast milk as soon as they are hungry.  INCREASE FLUIDS AND FIBER TO AVOID CONSTIPATION.    SURGICAL DRESSING/BATHING: *MAY SHOWER TOMORROW.  NO TUB BATHS, HOT TUBS OR SWIMMING UNTIL APPROVED BY PHYSICIAN**    FOLLOW-UP APPOINTMENT:  A follow-up appointment should be arranged with your doctor in *FOLLOW UP WITH DR. HARRIS**; call to schedule.    You should CALL YOUR PHYSICIAN if you develop:  Fever greater than 101 degrees F.  Pain not relieved by medication, or persistent nausea or vomiting.  Excessive bleeding (blood soaking through dressing) or unexpected drainage from the wound.  Extreme redness or swelling around the incision site, drainage of pus or foul smelling drainage.  Inability to urinate or empty your bladder within 8 hours.  Problems with breathing or chest pain.    You should call 911 if you develop problems with breathing or chest pain.  If you are unable to contact your doctor or surgical center, you should go to the nearest emergency room or urgent care center.  Physician's telephone #: *DR. HARRIS [phone]**    If any questions arise, call your doctor.  If your doctor is not available, please feel free to call the Surgical Center at [phone].  The Center is open Monday through Friday from 7AM to 7PM.  You can also call the [place] HOTLINE open 24 hours/day, 7 days/week and speak to a nurse at [phone], or toll free at [phone].    A registered nurse may call you a few days after your surgery to see how you are doing after your procedure.    MEDICATIONS: Resume taking daily medication.  Take prescribed pain medication with food.  If no medication is prescribed, you may take non-aspirin pain medication if needed.  PAIN MEDICATION CAN BE VERY CONSTIPATING.  Take a stool softener or laxative such as senokot, pericolace, or milk of magnesia if needed.    If your physician has prescribed pain  medication that includes Acetaminophen (Tylenol), do not take additional Acetaminophen (Tylenol) while taking the prescribed medication.    Depression / Suicide Risk    As you are discharged from this [facility] facility, it is important to learn how to keep safe from harming yourself.    Recognize the warning signs:  · Abrupt changes in personality, positive or negative- including increase in energy   · Giving away possessions  · Change in eating patterns- significant weight changes-  positive or negative  · Change in sleeping patterns- unable to sleep or sleeping all the time   · Unwillingness or inability to communicate  · Depression  · Unusual sadness, discouragement and loneliness  · Talk of wanting to die  · Neglect of personal appearance   · Rebelliousness- reckless behavior  · Withdrawal from people/activities they love  · Confusion- inability to concentrate     If you or a loved one observes any of these behaviors or has concerns about self-harm, here's what you can do:  · Talk about it- your feelings and reasons for harming yourself  · Remove any means that you might use to hurt yourself (examples: pills, rope, extension cords, firearm)  · Get professional help from the community (Mental Health, Substance Abuse, psychological counseling)  · Do not be alone:Call your Safe Contact- someone whom you trust who will be there for you.  · Call your local CRISIS HOTLINE 645-3257 or 121-067-2448  · Call your local Children's Mobile Crisis Response Team [place] (156) 153-7857 or www.CrowdFlower  · Call the toll free National Suicide Prevention Hotlines   · National Suicide Prevention Lifeline 684-774-KYRE (2114)  National Hope Line Network 800-SUICIDE (204-6328)  [place], Care After  Refer to this sheet in the next few weeks. These instructions provide you with information on caring for yourself after your procedure. Your health care provider may also give you more specific instructions. Your  treatment has been planned according to current medical practices, but problems sometimes occur. Call your health care provider if you have any problems or questions after your procedure.  WHAT TO EXPECT AFTER THE PROCEDURE   After your procedure, it is typical to have the following:  Vaginal discomfort that can be relieved by pain medicines.  Vaginal spotting.  HOME CARE INSTRUCTIONS   Follow your health care provider's instructions regarding diet, rest, driving, and general activities.  Do not lift anything over 5 lb (2.3 kg) until your health care provider approves.  Only take over-the-counter or prescription medicines as directed by your health care provider.  Do not take aspirin. It can cause bleeding.  You may take a laxative as directed by your health care provider.  You may take warm sitz baths 2-3 times a day to reduce swelling and discomfort.  Fill the bathtub half full with warm water.  Sit in the water and open the drain a little.  Turn on the warm water to keep the tub half full. Keep the water running constantly.  Soak in the water for 15-20 minutes.  After the sitz bath, pat the affected area dry first.  Follow up with your health care provider as directed.  SEEK MEDICAL CARE IF:   You have a fever.  You begin to bleed from the wound area.  You develop bloody or painful urination.  You develop abdominal pain.  You have increasing pain in the affected area.  You have redness or swelling in the affected area.  You have heavy drainage or pus coming from the affected area.  You develop a rash.  You think the stitches (sutures) in your wound are breaking.  You have nausea or diarrhea.  You become constipated.  SEEK IMMEDIATE MEDICAL CARE IF:   You develop shortness of breath.  You develop leg or chest pain.  You faint.     This information is not intended to replace advice given to you by your health care provider. Make sure you discuss any questions you have with your health care provider.     Document  Released: 10/08/2014 Document Reviewed: 10/08/2014  eBillme Interactive Patient Education ©2016 [employer] Inc.  ·     Your appointments     Aug 16, 2017  9:20 AM   Established Patient with ELTY Kulkarni   [facility] [address]    [address]  [address]  [place] [id number] [phone]           You will be receiving a confirmation call a few days before your appointment from our automated call confirmation system.            Oct 12, 2017 10:15 AM   New Patient with Emeterio Herzog M.D.   [facility]SI[facility] (--)    [address]  [place] [address] [phone]           Please bring Photo ID, Insurance Cards, All Medication Bottles and copies of any legal documents (such as Living Will, Power of ) If speaking a language besides English please bring an adult . Please arrive 30 minutes prior for check in and registration. You will be receiving a confirmation call a few days before your appointment from our automated call confirmation system.              Follow-up Information     1. Follow up with Espinoza Bryan M.D. In 2 weeks.    Specialty:  Gynecology    Why:  As needed    Contact information    [address] [address]  [phone]           Discharge Medication Instructions:    Below are the medications your physician expects you to take upon discharge:    Review all your home medications and newly ordered medications with your doctor and/or pharmacist. Follow medication instructions as directed by your doctor and/or pharmacist.    Please keep your medication list with you and share with your physician.               Medication List      ASK your doctor about these medications        Instructions    Morning Afternoon Evening Bedtime    ciprofloxacin 500 MG Tabs   Commonly known as:  CIPRO        Take 500 mg by mouth 2 times a day.   Dose:  500 mg                        hydrocodone-acetaminophen 5-325 MG Tabs per tablet      Commonly known as:  NORCO        Take 1 Tab by mouth 2 times a day as needed.   Dose:  1 Tab                        lidocaine 5 % Ptch   Commonly known as:  LIDODERM        Apply 1 Patch to skin as directed every 24 hours.   Dose:  1 Patch                        metformin 500 MG Tabs   Last time this was given:  500 mg on 6/3/2017  7:25 AM   Commonly known as:  GLUCOPHAGE        Take 1 Tab by mouth every day.   Dose:  500 mg                        sertraline 50 MG Tabs   Last time this was given:  50 mg on 6/3/2017  7:25 AM   Commonly known as:  ZOLOFT        Take 1 Tab by mouth every day.   Dose:  50 mg                        TYLENOL 8 HOUR ARTHRITIS PAIN PO        Take  by mouth.                                Instructions           Diet / Nutrition:    Follow any diet instructions given to you by your doctor or the dietician, including how much salt (sodium) you are allowed each day.    If you are overweight, talk to your doctor about a weight reduction plan.    Activity:    Remain physically active following your doctor's instructions about exercise and activity.    Rest often.     Any time you become even a little tired or short of breath, SIT DOWN and rest.    Worsening Symptoms:    Report any of the following signs and symptoms to the doctor's office immediately:    *Pain of jaw, arm, or neck  *Chest pain not relieved by medication                               *Dizziness or loss of consciousness  *Difficulty breathing even when at rest   *More tired than usual                                       *Bleeding drainage or swelling of surgical site  *Swelling of feet, ankles, legs or stomach                 *Fever (>100ºF)  *Pink or blood tinged sputum  *Weight gain (3lbs/day or 5lbs /week)           *Shock from internal defibrillator (if applicable)  *Palpitations or irregular heartbeats                *Cool and/or numb extremities    Stroke Awareness    Common Risk Factors for Stroke include:    Age  Atrial  Fibrillation  Carotid Artery Stenosis  Diabetes Mellitus  Excessive alcohol consumption  High blood pressure  Overweight   Physical inactivity  Smoking    Warning signs and symptoms of a stroke include:    *Sudden numbness or weakness of the face, arm or leg (especially on one side of the body).  *Sudden confusion, trouble speaking or understanding.  *Sudden trouble seeing in one or both eyes.  *Sudden trouble walking, dizziness, loss of balance or coordination.Sudden severe headache with no known cause.    It is very important to get treatment quickly when a stroke occurs. If you experience any of the above warning signs, call 911 immediately.                   Disclaimer         Quit Smoking / Tobacco Use:    I understand the use of any tobacco products increases my chance of suffering from future heart disease or stroke and could cause other illnesses which may shorten my life. Quitting the use of tobacco products is the single most important thing I can do to improve my health. For further information on smoking / tobacco cessation call a Toll Free Quit Line at 1-231.522.8818 (*National Cancer [place]) or 1-537.851.1826 (American Lung Association) or you can access the web based program at www.lungusa.org.    [place] Tobacco Users Help Line:  (432) 208-7714       Toll Free: 1-487.924.9224  Quit Tobacco Program [facility] (417)434-8674    Crisis Hotline:    [place] Crisis Hotline:  3-624-RDLEZQU or 1-246.889.5502    [place] Crisis Hotline:    1-573.312.6272 or 578-242-5122    Discharge Survey:   Thank you for choosing [facility]. We hope we did everything we could to make your hospital stay a pleasant one. You may be receiving a phone survey and we would appreciate your time and participation in answering the questions. Your input is very valuable to us in our efforts to improve our service to our patients and their families.        My signature on this form indicates that:    1. I have  reviewed and understand the above information.  2. My questions regarding this information have been answered to my satisfaction.  3. I have formulated a plan with my discharge nurse to obtain my prescribed medications for home.                  Disclaimer         __________________________________                     __________       ________                       Patient Signature                                                 Date                    Time

## 2017-06-02 NOTE — IP AVS SNAPSHOT
6/3/2017    Herminia Jones  [address] [facility] [address]    Dear Herminia:    [facility] wants to ensure your discharge home is safe and you or your loved ones have had all of your questions answered regarding your care after you leave the hospital.    Below is a list of resources and contact information should you have any questions regarding your hospital stay, follow-up instructions, or active medical symptoms.    Questions or Concerns Regarding… Contact   Medical Questions Related to Your Discharge  (7 days a week, 8am-5pm) Contact a Nurse Care Coordinator   [phone]   Medical Questions Not Related to Your Discharge  (24 hours a day / 7 days a week)  Contact the Nurse Health Line   [phone]    Medications or Discharge Instructions Refer to your discharge packet   or contact your [facility] Primary Care Provider   [phone]   Follow-up Appointment(s) Schedule your appointment via StreetFire   or contact Scheduling [phone]   Billing Review your statement via StreetFire  or contact Billing [phone]   Medical Records Review your records via StreetFire   or contact Medical Records [phone]     You may receive a telephone call within two days of discharge. This call is to make certain you understand your discharge instructions and have the opportunity to have any questions answered. You can also easily access your medical information, test results and upcoming appointments via the StreetFire free online health management tool. You can learn more and sign up at [employer]/StreetFire. For assistance setting up your StreetFire account, please call [phone].    Once again, we want to ensure your discharge home is safe and that you have a clear understanding of any next steps in your care. If you have any questions or concerns, please do not hesitate to contact us, we are here for you. Thank you for choosing [facility] for your healthcare needs.    Sincerely,    Your [facility] Healthcare Team

## 2017-06-02 NOTE — IP AVS SNAPSHOT
Snowman Access Code: [email or web address]  Expires: 6/8/2017  2:25 PM    Your email address is not on file at [facility].  Email Addresses are required for you to sign up for Snowman, please contact [phone] to verify your personal information and to provide your email address prior to attempting to register for Snowman.    Herminia Jones  [address] [place] [address], [place] [address]    Snowman  A secure, online tool to manage your health information     [facility]’s Snowman® is a secure, online tool that connects you to your personalized health information from the privacy of your home -- day or night - making it very easy for you to manage your healthcare. Once the activation process is completed, you can even access your medical information using the Snowman scott, which is available for free in the Apple Scott store or Google Play store.     To learn more about Snowman, visit [email or web address]    There are two levels of access available (as shown below):   My Chart Features  [facility] Primary Care Doctor [facility]  Specialists [facility]  Urgent  Care Non-[facility] Primary Care Doctor   Email your healthcare team securely and privately 24/7 X X X    Manage appointments: schedule your next appointment; view details of past/upcoming appointments X      Request prescription refills. X      View recent personal medical records, including lab and immunizations X X X X   View health record, including health history, allergies, medications X X X X   Read reports about your outpatient visits, procedures, consult and ER notes X X X X   See your discharge summary, which is a recap of your hospital and/or ER visit that includes your diagnosis, lab results, and care plan X X  X     How to register for [employer]:  Once your e-mail address has been verified, follow the following steps to sign up for Snowman.     1. Go to  [email or web address]  2. Click on the Sign Up Now box, which takes you to the New Member Sign Up page. You  will need to provide the following information:  a. Enter your [employer] Access Code exactly as it appears at the top of this page. (You will not need to use this code after you’ve completed the sign-up process. If you do not sign up before the expiration date, you must request a new code.)   b. Enter your date of birth.   c. Enter your home email address.   d. Click Submit, and follow the next screen’s instructions.  3. Create a UShealthrecordt ID. This will be your RealtyAPX login ID and cannot be changed, so think of one that is secure and easy to remember.  4. Create a RealtyAPX password. You can change your password at any time.  5. Enter your Password Reset Question and Answer. This can be used at a later time if you forget your password.   6. Enter your e-mail address. This allows you to receive e-mail notifications when new information is available in RealtyAPX.  7. Click Sign Up. You can now view your health information.    For assistance activating your RealtyAPX account, call [phone]

## 2017-06-02 NOTE — OR NURSING
1507  RECEIVED PATIENT FROM OR.  REPORT FROM DR. CORNELL.  LMA IN PLACE.  RESPIRATIONS ARE EVEN AND UNLABORED.  IBRAHIM TO DD WITH BRIGHT, YELLOW URINE NOTED.  VAGINAL PACKING IS IN PLACE.    1510  LMA DC'D WITHOUT DIFFICULTY.    1525  PATIENT AWAKE.  DENIES PAIN.  TAKING PO WITHOUT DIFFICULTY.    1532  DAUGHTERS HAVEN AND TOM TO BEDSIDE.    1728  PATIENT TRANSFERRED TO [place] [address] BED ONE, VIA GURNEY, ON 02 4 LITERS PER NASAL CANNULA, WITH TRANSPORT.  ALL BELONGINGS WITH PATIENT.  DAUGHTER TOM ALONG FOR TRANSFER.  REPORT TO IVONNE LARA.

## 2017-06-02 NOTE — IP AVS SNAPSHOT
" <p align=\"LEFT\"><IMG SRC=\"//[email or web address]/blob$/[email or web address]\" alt=\"Image\" WIDTH=\"50%\" HEIGHT=\"200\" BORDER=\"\"></p>                   Name:Herminia Jones  Medical Record Number:[id number]  CSN: [id number]    YOB: 1934   Age: 83 y.o.  Sex: female  HT:1.626 m (5' 4\") WT: 66.9 kg (147 lb 7.8 oz)          Admit Date: 6/2/2017     Discharge Date:   Today's Date: 6/3/2017  Attending Doctor:  Espinoza Bryan M.D.                  Allergies:  Review of patient's allergies indicates no known allergies.          Your appointments     Aug 16, 2017  9:20 AM   Established Patient with LETY Kulkarni   [facility] [address]    [address] [facility] [address]  [facility] [address] [phone]           You will be receiving a confirmation call a few days before your appointment from our automated call confirmation system.            Oct 12, 2017 10:15 AM   New Patient with Emeterio Herzog M.D.   [facility] (--)    [address] [facility] [address]  [facility] [address] [phone]           Please bring Photo ID, Insurance Cards, All Medication Bottles and copies of any legal documents (such as Living Will, Power of ) If speaking a language besides English please bring an adult . Please arrive 30 minutes prior for check in and registration. You will be receiving a confirmation call a few days before your appointment from our automated call confirmation system.              Follow-up Information     1. Follow up with Espinoza Bryan M.D. In 2 weeks.    Specialty:  Gynecology    Why:  As needed    Contact information    [address] [id number] [phone]           Medication List      Ask your Physician about these medications        Instructions    ciprofloxacin 500 MG Tabs   Commonly known as:  CIPRO    Take 500 mg by mouth 2 times a day.   Dose:  500 mg       hydrocodone-acetaminophen 5-325 MG Tabs per tablet   Commonly known as:  NORCO    Take 1 " Tab by mouth 2 times a day as needed.   Dose:  1 Tab       lidocaine 5 % Ptch   Commonly known as:  LIDODERM    Apply 1 Patch to skin as directed every 24 hours.   Dose:  1 Patch       metformin 500 MG Tabs   Commonly known as:  GLUCOPHAGE    Take 1 Tab by mouth every day.   Dose:  500 mg       sertraline 50 MG Tabs   Commonly known as:  ZOLOFT    Take 1 Tab by mouth every day.   Dose:  50 mg       TYLENOL 8 HOUR ARTHRITIS PAIN PO    Take  by mouth.

## 2017-06-02 NOTE — OR SURGEON
Immediate Post-Operative Note      PreOp Diagnosis: Pelvic organ prolapse grade 4    PostOp Diagnosis: Pelvic organ prolapse grade 4 Grade 4 cystocele, rectocele and grade 4 enterocele    Procedure(s): COLPOCLESIS, posterior colporrhaphy, perineorrhaphy.   - Wound Class: Clean Contaminated  CYSTOSCOPY - Wound Class: Clean Contaminated    Surgeon(s):  JONATHAN Stanford M.D.    Anesthesiologist/Type of Anesthesia:  Anesthesiologist: Abhinav Fournier M.D./General    Surgical Staff:  Circulator: Harriett Welch R.N.  Scrub Person: Rosemarie Jain    Specimen: Vaginal epithelium    Estimated Blood Loss: 50 ml    Findings: Grade 4 cystocele, rectocele, enterocele    Complications: None        6/2/2017 3:15 PM Espinoza Bryan

## 2017-06-02 NOTE — TELEPHONE ENCOUNTER
I contacted and spoke with the PA at [facility] regarding patient's recent MRI of the lumbar spine done at [facility]. It was felt that patient can go forward with surgery but follow-up with them after this. I contacted patient and shared this information with her. She states she is having no loss of bowel control and back is actually feeling somewhat better and she has no weakness or numbness of the lower extremities.

## 2017-06-03 VITALS
SYSTOLIC BLOOD PRESSURE: 107 MMHG | DIASTOLIC BLOOD PRESSURE: 68 MMHG | RESPIRATION RATE: 17 BRPM | HEIGHT: 64 IN | OXYGEN SATURATION: 99 % | TEMPERATURE: 97.4 F | HEART RATE: 74 BPM | BODY MASS INDEX: 25.18 KG/M2 | WEIGHT: 147.49 LBS

## 2017-06-03 LAB
ANION GAP SERPL CALC-SCNC: 9 MMOL/L (ref 0–11.9)
BASOPHILS # BLD AUTO: 0.2 % (ref 0–1.8)
BASOPHILS # BLD: 0.04 K/UL (ref 0–0.12)
BUN SERPL-MCNC: 16 MG/DL (ref 8–22)
CALCIUM SERPL-MCNC: 9 MG/DL (ref 8.5–10.5)
CHLORIDE SERPL-SCNC: 106 MMOL/L (ref 96–112)
CO2 SERPL-SCNC: 23 MMOL/L (ref 20–33)
CREAT SERPL-MCNC: 0.87 MG/DL (ref 0.5–1.4)
EOSINOPHIL # BLD AUTO: 0.01 K/UL (ref 0–0.51)
EOSINOPHIL NFR BLD: 0.1 % (ref 0–6.9)
ERYTHROCYTE [DISTWIDTH] IN BLOOD BY AUTOMATED COUNT: 42 FL (ref 35.9–50)
GFR SERPL CREATININE-BSD FRML MDRD: >60 ML/MIN/1.73 M 2
GLUCOSE SERPL-MCNC: 128 MG/DL (ref 65–99)
HCT VFR BLD AUTO: 39.8 % (ref 37–47)
HGB BLD-MCNC: 13.5 G/DL (ref 12–16)
IMM GRANULOCYTES # BLD AUTO: 0.07 K/UL (ref 0–0.11)
IMM GRANULOCYTES NFR BLD AUTO: 0.4 % (ref 0–0.9)
LYMPHOCYTES # BLD AUTO: 2.34 K/UL (ref 1–4.8)
LYMPHOCYTES NFR BLD: 13.9 % (ref 22–41)
MCH RBC QN AUTO: 30.2 PG (ref 27–33)
MCHC RBC AUTO-ENTMCNC: 33.9 G/DL (ref 33.6–35)
MCV RBC AUTO: 89 FL (ref 81.4–97.8)
MONOCYTES # BLD AUTO: 1.2 K/UL (ref 0–0.85)
MONOCYTES NFR BLD AUTO: 7.1 % (ref 0–13.4)
NEUTROPHILS # BLD AUTO: 13.15 K/UL (ref 2–7.15)
NEUTROPHILS NFR BLD: 78.3 % (ref 44–72)
NRBC # BLD AUTO: 0 K/UL
NRBC BLD AUTO-RTO: 0 /100 WBC
PLATELET # BLD AUTO: 244 K/UL (ref 164–446)
PMV BLD AUTO: 9.4 FL (ref 9–12.9)
POTASSIUM SERPL-SCNC: 4.1 MMOL/L (ref 3.6–5.5)
RBC # BLD AUTO: 4.47 M/UL (ref 4.2–5.4)
SODIUM SERPL-SCNC: 138 MMOL/L (ref 135–145)
WBC # BLD AUTO: 16.8 K/UL (ref 4.8–10.8)

## 2017-06-03 PROCEDURE — G0378 HOSPITAL OBSERVATION PER HR: HCPCS

## 2017-06-03 PROCEDURE — 85025 COMPLETE CBC W/AUTO DIFF WBC: CPT

## 2017-06-03 PROCEDURE — A9270 NON-COVERED ITEM OR SERVICE: HCPCS | Performed by: OBSTETRICS & GYNECOLOGY

## 2017-06-03 PROCEDURE — 36415 COLL VENOUS BLD VENIPUNCTURE: CPT

## 2017-06-03 PROCEDURE — 80048 BASIC METABOLIC PNL TOTAL CA: CPT

## 2017-06-03 PROCEDURE — 700102 HCHG RX REV CODE 250 W/ 637 OVERRIDE(OP): Performed by: OBSTETRICS & GYNECOLOGY

## 2017-06-03 RX ADMIN — METFORMIN HYDROCHLORIDE 500 MG: 500 TABLET, FILM COATED ORAL at 07:25

## 2017-06-03 RX ADMIN — SERTRALINE 50 MG: 50 TABLET, FILM COATED ORAL at 07:25

## 2017-06-03 RX ADMIN — FAMOTIDINE 20 MG: 20 TABLET, FILM COATED ORAL at 07:25

## 2017-06-03 ASSESSMENT — LIFESTYLE VARIABLES
ALCOHOL_USE: NO
EVER_SMOKED: NEVER

## 2017-06-03 ASSESSMENT — PAIN SCALES - GENERAL
PAINLEVEL_OUTOF10: 0
PAINLEVEL_OUTOF10: 0

## 2017-06-03 NOTE — OP REPORT
DATE OF SERVICE:  06/02/2017    PREOPERATIVE DIAGNOSIS:  Grade IV pelvic organ prolapse.    POSTOPERATIVE DIAGNOSES:  Grade IV pelvic organ prolapse, grade IV cystocele,   grade IV rectocele, grade IV enterocele.    OPERATION:  Vaginal colpocleisis with posterior colporrhaphy and   perineorrhaphy and cystoscopy.    SURGEON:  Espinoza Bryan MD    ASSISTANT:  Marjan Lopez MD    ANESTHESIA:  General endotracheal.    ANESTHESIOLOGIST:  Abhinav Fournier MD    ESTIMATED BLOOD LOSS:  50 mL    DRAINS:  Mckoy to gravity.    ANTIBIOTICS:  Ancef 2 g IV piggyback prior to the operation, gentamicin 80 mg   IV piggyback prior to the operation.      Sequential stockings in place.    FINDINGS:  With the patient placed in the dorsal lithotomy position, the   patient demonstrated grade IV pelvic organ prolapse.  She had previously   undergone a hysterectomy and bilateral salpingo-oophorectomy by history.    There was a grade IV cystocele, grade IV rectocele, and grade IV enterocele.    Cystoscopy revealed bilateral jetting of fluorescent dye from the right   ureteral orifice and the left ureteral orifice.  The patient had undergone   simple urodynamic test prior to surgery revealing a large capacity bladder   holding 800 mL.  Rectal exam revealed no sutures in the rectum.    DESCRIPTION OF PROCEDURE:  Patient was taken to the operating room and placed   on the operating room table where general endotracheal anesthesia was   administered by Dr. Fournier.  The patient was placed in the modified dorsal   lithotomy position in [facility] with sequential stockings.  The patient   was prepped and draped in the usual sterile fashion.  A Mckoy catheter was put   in place.  A timeout was called.  The patient, the operation, her birthdate   and allergies were identified.  There was a grade IV pelvic organ prolapse   with the patient in the dorsal lithotomy position.  A weighted speculum was   placed in the vagina.  The Mckoy bulb was  palpated.  Approximately 1 cm below   the Mckoy bulb on the vaginal epithelium, an Allis was placed.  Allis clamps   were placed at the right and left apex of the vagina.  A suture of 2-0 Vicryl   was placed through the apex on the right side and the left side, and a fourth   Allis clamp was placed in the posterior vagina.  A rebecca was then formed   with the 4 Allis clamps.  The outer margins were marked with a suture on the   right and left side at 3 o'clock and 9 o'clock.  A rebecca shaped incision was   made between the clamps using the marking pen.  Marcaine 0.25% with   epinephrine, a total of 10 mL, was injected in the anterior vaginal wall, and   10 mL was injected in the posterior vaginal wall.  After a total of 20 mL of   0.25% Marcaine with epinephrine in the final aspect of the operation, 1%   lidocaine was used.  Using the [employer] #10 blade, the rebecca shaped   incision was marked.  Using Metzenbaum scissors and tenotomy scissors, the   anterior triangle was excised and then the posterior triangle was excised,   removing vaginal epithelium in the large triangle.  A 2-0 Vicryl was then   placed to approximate the anterior pubocervical fascia.  A 2-0 Vicryl was   placed to approximate in the midline the posterior rectovaginal fascia.  Using   2-0 Vicryl anteriorly, the sides of the triangle on the right and left   pyramid were approximated in the midline.  Separate sutures were placed at the   base of the triangle to approximate the vaginal walls.  Sutures were placed   in the posterior midline after the enterocele had been dissected free.  The   vagina was then approximated in the midline.  There was a low rectocele, and,   therefore, injection with 1% lidocaine was instilled in the midline.  A   perineorrhaphy was also performed.  In the last 3-4 cm, a midline incision was   made.  A rebecca shaped incision was made on the perineum.  The perineal   epithelium was excised, and the superior T  incision was taken to below where   the original colpocleisis was performed.  Excess vagina was removed after   dissecting the posterior rectocele.  A 2-0 Vicryl was used to approximate the   puborectal fascia.  Separate sutures were placed to approximate the vagina in   the posterior compartment.  Vicryl #0 was used to approximate the levator ani   in 3 separate sutures which were tied in the midline.  A running drop stitch   was then performed to tighten the perineorrhaphy.  Cystoscopy was performed   using a 70-degree cystoscope.  Following placement of D5W through the   cystoscope, urine was seen to jet from the right ureteral orifice and the left   ureteral orifice.  We waited, and fluorescein 1.5 mL that had been given IV   was seen to jet from the right ureteral orifice and the left ureteral orifice.    The cystoscope was removed, and the Mckoy catheter was placed.  The needle   and pack count were correct.  The patient was taken to recovery room in   satisfactory condition.      Prior to the operation, in my private office the patient had received informed   consent.  We discussed some serious and significant risks to include, but not   limited to, the risks of anesthesia, infection, bleeding, injury to bowel,   bladder, ureter, or pelvic vessels.  All the patient's questions had been   answered to her satisfaction prior to the operation.  Informed consent form   was read and signed.  Prior to the operation in the hospital, we reviewed the   operation and added anterior colporrhaphy, posterior colporrhaphy and possible   sacrospinous fixation, which was not performed.  Again, all the patient's   questions were answered.       ____________________________________     MD MAURICIO PARKER / FRANCESCA    DD:  06/02/2017 15:27:47  DT:  06/02/2017 18:28:07    D#:  [id number]  Job#:  [id number]    cc: SCOTT CASILLAS MD

## 2017-06-03 NOTE — PROGRESS NOTES
Bedside report received.  Assessment complete.  A&O x 4. Patient calls appropriately.  Numbness to fingers, baseline. Moves all extremities.   Patient up with stand by assist. Bed alarm in place.   Patient denies pain at this time.  Denies N&V. Tolerating full liquid diet.  Surgical incision to vaginal/sheri area, no drainage present. Vaginal packing in place.  Mckoy catheter removed per active order.  Patient denies SOB.  SCD's in place.  Review plan with of care with patient. Call light and personal belongings with in reach. Hourly rounding in place. All needs met at this time.

## 2017-06-03 NOTE — DISCHARGE SUMMARY
HISTORY OF PRESENT ILLNESS:  The patient is an 83-year-old white female    4, para 4, status post hysterectomy and BSO, whose chief complaint is   pelvic pain and pelvic pressure.  The patient was found to have a grade IV   cystocele, rectocele and enterocele with symptomatic uterine prolapse.  The   patient was treated conservatively with the Gellhorn pessary, dish pessary and   donut pessary.  Due to her previous surgery, have total rectal repair.  The   posterior cul-de-sac was shortened and pessaries could not be maintained.    PAST MEDICAL HISTORY:  Significant for macular degeneration, mild dementia,   hyperlipidemia, anxiety and depression.  The patient has a large capacity   bladder.  The patient underwent simple urodynamic testing with no genuine   stress urinary incontinence.  No detrusor instability, no ISD.  The patient   demonstrated genuine stress urinary incontinence.  Patient did not demonstrate   genuine stress urinary incontinence, but did have a large capacity bladder   with a large residual of 400 mL.    PAST SURGICAL HISTORY:  As noted, hysterectomy and BSO.    HABITS:  Patient does not smoke cigarettes nor does she partake of alcoholic   beverages.  The patient has never used recreational drugs.    FAMILY HISTORY:  The patient's sister was recently  secondary to   breast cancer.  Father is  at 80 years and her mother is  at   age 78 years, the reason for which is known to this patient.    REVIEW OF SYSTEMS:  She complains of impaired eyesight, arthritis, change in   bowel habit, history of urinary tract infections and loss of urine on   coughing, laughing, and sneezing.  This could not be demonstrated with a   simple urodynamic test.  Patient does not have urinary tract infection.    PHYSICAL EXAMINATION:  GENERAL:  This is a pleasant white female who is oriented x3.  GENITALIA:  Female escutcheon.  Bartholin, urethral and Tabiona's glands are   normal.  Atrophic  vulva.  LYMPHATIC:  Groin nodes not enlarged.  URETHRA:  No masses, tenderness, or scarring.  Vaginal estrogen effect is   absent.  There is actually grade IV cystocele under general endotracheal   anesthesia.  There is a grade IV rectocele and a large enterocele.  RECTAL:  Normal sphincter tone.    IMPRESSION:  1.  Grade IV pelvic organ prolapse, symptomatic, status post hysterectomy,   status post prolapsed rectum repair, diabetes mellitus type 2.  2.  Failed pessary.  3.  History of pelvic floor rehabilitation performed by Dr. Helene Jamil.  4.  Hyperlipidemia.  5.  Macular degeneration.  6.  Atrophic vaginitis.  7.  Anxiety.  8.  Depression.    HOSPITAL COURSE:  The patient was admitted to [facility].    She underwent exam under anesthesia, colpocleisis with posterior colporrhaphy   and perineorrhaphy and cystoscopy which revealed bilateral jetting of   fluorescein from the right ureteral orifice and left ureteral orifice.  The   patient did well postoperatively.  She will be discharged on Lortab 7.5 mg/325   mg 1 tab orally q. 4-6 hours p.r.n. pain.  Should she wish and _____   controlled by ibuprofen.  She can take ibuprofen 400 mg q. 6 hours p.r.n.   pain.  The patient was placed on ciprofloxacin due to an elevated white count   of 16,000.  Postop period, she will be placed on Cipro 500 mg 1 tab p.o.   b.i.d. for 3 days.  The patient was seen in my office in 2 weeks and 6 weeks.    The patient is asked to call should she have temperature of 104 degrees   Fahrenheit, severe lower abdominal pain, excessive vaginal bleeding, deep vein   thrombosis, blood clots to her legs or pain in her legs.  All the patient's   questions were answered.  I asked the patient to call should she have any   problems whatsoever.       ____________________________________     MD MAURICIO PARKER / FRANCESCA    DD:  06/03/2017 09:47:42  DT:  06/03/2017 11:11:55    D#:  [id number]  Job#:  [id number]    cc: PRITESH CAMARA  APN

## 2017-06-03 NOTE — PROGRESS NOTES
Patient received from PACU. Safety precautions in place. Mckoy hanging to gravity. Hourly rounding in place.

## 2017-06-03 NOTE — PROGRESS NOTES
Assumed care at 1900.     Pt is A&O x 4.  Pain 0/10.    Nausea denies  Tolerating Diet  full liquid  Wound vag/sheri area    Drainage none on pads, packing in place  Urine output: clear yellow to selby  LBM 6/1/17    Flatus: no  Bowel sounds: none yet  Heart: wnl  Lungs:  wnl     x1 assist to transfer  Mobility:  Sitting up in bed, watching TV  SCD's on    Bed alarm on    Bed in lowest position and locked.  Upper side rails up to assist pt with positioning.      Pt resting comfortably now.  Review plan of care with patient  Call light within reach  Hourly rounds in place  All needs met at this time

## 2017-06-03 NOTE — DISCHARGE INSTRUCTIONS
Discharge Instructions    Discharged to home by car with relative. Discharged via wheelchair, hospital escort: Yes.  Special equipment needed: Not Applicable    Be sure to schedule a follow-up appointment with your primary care doctor or any specialists as instructed.     Discharge Plan:        I understand that a diet low in cholesterol, fat, and sodium is recommended for good health. Unless I have been given specific instructions below for another diet, I accept this instruction as my diet prescription.   Other diet: Regular    Special Instructions:  See Dr. Harris if fever develops over 100.4    · Is patient discharged on Warfarin / Coumadin?   No     · Is patient Post Blood Transfusion?  No        ACTIVITY: Rest and take it easy for the first 24 hours.  A responsible adult is recommended to remain with you during that time.  It is normal to feel sleepy.  We encourage you to not do anything that requires balance, judgment or coordination.    MILD FLU-LIKE SYMPTOMS ARE NORMAL. YOU MAY EXPERIENCE GENERALIZED MUSCLE ACHES, THROAT IRRITATION, HEADACHE AND/OR SOME NAUSEA.    FOR 24 HOURS DO NOT:  Drive, operate machinery or run household appliances.  Drink beer or alcoholic beverages.   Make important decisions or sign legal documents.    DIET: To avoid nausea, slowly advance diet as tolerated, avoiding spicy or greasy foods for the first day.  Add more substantial food to your diet according to your physician's instructions.  Babies can be fed formula or breast milk as soon as they are hungry.  INCREASE FLUIDS AND FIBER TO AVOID CONSTIPATION.    SURGICAL DRESSING/BATHING: *MAY SHOWER TOMORROW.  NO TUB BATHS, HOT TUBS OR SWIMMING UNTIL APPROVED BY PHYSICIAN**    FOLLOW-UP APPOINTMENT:  A follow-up appointment should be arranged with your doctor in *FOLLOW UP WITH DR. HARRIS**; call to schedule.    You should CALL YOUR PHYSICIAN if you develop:  Fever greater than 101 degrees F.  Pain not relieved by medication, or  persistent nausea or vomiting.  Excessive bleeding (blood soaking through dressing) or unexpected drainage from the wound.  Extreme redness or swelling around the incision site, drainage of pus or foul smelling drainage.  Inability to urinate or empty your bladder within 8 hours.  Problems with breathing or chest pain.    You should call 911 if you develop problems with breathing or chest pain.  If you are unable to contact your doctor or surgical center, you should go to the nearest emergency room or urgent care center.  Physician's telephone #: *DR. HARRIS [phone]**    If any questions arise, call your doctor.  If your doctor is not available, please feel free to call the Surgical Center at [phone].  The Center is open Monday through Friday from 7AM to 7PM.  You can also call the [employer] HOTLINE open 24 hours/day, 7 days/week and speak to a nurse at [phone], or toll free at [phone].    A registered nurse may call you a few days after your surgery to see how you are doing after your procedure.    MEDICATIONS: Resume taking daily medication.  Take prescribed pain medication with food.  If no medication is prescribed, you may take non-aspirin pain medication if needed.  PAIN MEDICATION CAN BE VERY CONSTIPATING.  Take a stool softener or laxative such as senokot, pericolace, or milk of magnesia if needed.    If your physician has prescribed pain medication that includes Acetaminophen (Tylenol), do not take additional Acetaminophen (Tylenol) while taking the prescribed medication.    Depression / Suicide Risk    As you are discharged from this [facility] facility, it is important to learn how to keep safe from harming yourself.    Recognize the warning signs:  · Abrupt changes in personality, positive or negative- including increase in energy   · Giving away possessions  · Change in eating patterns- significant weight changes-  positive or negative  · Change in sleeping patterns- unable to sleep  or sleeping all the time   · Unwillingness or inability to communicate  · Depression  · Unusual sadness, discouragement and loneliness  · Talk of wanting to die  · Neglect of personal appearance   · Rebelliousness- reckless behavior  · Withdrawal from people/activities they love  · Confusion- inability to concentrate     If you or a loved one observes any of these behaviors or has concerns about self-harm, here's what you can do:  · Talk about it- your feelings and reasons for harming yourself  · Remove any means that you might use to hurt yourself (examples: pills, rope, extension cords, firearm)  · Get professional help from the community (Mental Health, Substance Abuse, psychological counseling)  · Do not be alone:Call your Safe Contact- someone whom you trust who will be there for you.  · Call your local CRISIS HOTLINE 398-0521 or 324-718-6393  · Call your local Children's Mobile Crisis Response Team [place] (376) 113-0006 or www.[employer]  · Call the toll free National Suicide Prevention Hotlines   · National Suicide Prevention Lifeline 912-082-XGQN (1129)  National Hope Line Network 800-SUICIDE (229-2220)  Colpocleisis, Care After  Refer to this sheet in the next few weeks. These instructions provide you with information on caring for yourself after your procedure. Your health care provider may also give you more specific instructions. Your treatment has been planned according to current medical practices, but problems sometimes occur. Call your health care provider if you have any problems or questions after your procedure.  WHAT TO EXPECT AFTER THE PROCEDURE   After your procedure, it is typical to have the following:  Vaginal discomfort that can be relieved by pain medicines.  Vaginal spotting.  HOME CARE INSTRUCTIONS   Follow your health care provider's instructions regarding diet, rest, driving, and general activities.  Do not lift anything over 5 lb (2.3 kg) until your health care provider  approves.  Only take over-the-counter or prescription medicines as directed by your health care provider.  Do not take aspirin. It can cause bleeding.  You may take a laxative as directed by your health care provider.  You may take warm sitz baths 2-3 times a day to reduce swelling and discomfort.  Fill the bathtub half full with warm water.  Sit in the water and open the drain a little.  Turn on the warm water to keep the tub half full. Keep the water running constantly.  Soak in the water for 15-20 minutes.  After the sitz bath, pat the affected area dry first.  Follow up with your health care provider as directed.  SEEK MEDICAL CARE IF:   You have a fever.  You begin to bleed from the wound area.  You develop bloody or painful urination.  You develop abdominal pain.  You have increasing pain in the affected area.  You have redness or swelling in the affected area.  You have heavy drainage or pus coming from the affected area.  You develop a rash.  You think the stitches (sutures) in your wound are breaking.  You have nausea or diarrhea.  You become constipated.  SEEK IMMEDIATE MEDICAL CARE IF:   You develop shortness of breath.  You develop leg or chest pain.  You faint.     This information is not intended to replace advice given to you by your health care provider. Make sure you discuss any questions you have with your health care provider.     Document Released: 10/08/2014 Document Reviewed: 10/08/2014  [employer] Interactive Patient Education ©2016 [employer]  ·

## 2017-06-03 NOTE — CARE PLAN
Problem: Safety  Goal: Will remain free from injury  Outcome: PROGRESSING AS EXPECTED  Patient free from accidental injury at this time. Patient calls appropriately. Safety precautions in place. Hourly rounding in place.     Problem: Fluid Volume:  Goal: Will maintain balanced intake and output  Outcome: PROGRESSING AS EXPECTED  Patient has adequate urine output. Patient encouraged to intake fluids.

## 2017-06-03 NOTE — PROGRESS NOTES
Patient discharged to home with daughter. All lines removed. Discharge instructions and prescription reviewed and understanding verbalized. Patient states they will fill prescriptions. Patient states they will return to emergency if discussed symptoms arise. Patient left via walking with this RN escort.

## 2017-06-08 ENCOUNTER — OFFICE VISIT (OUTPATIENT)
Dept: MEDICAL GROUP | Facility: MEDICAL CENTER | Age: 82
End: 2017-06-08
Payer: MEDICARE

## 2017-06-08 VITALS
TEMPERATURE: 97.2 F | HEIGHT: 64 IN | HEART RATE: 74 BPM | BODY MASS INDEX: 25.44 KG/M2 | DIASTOLIC BLOOD PRESSURE: 62 MMHG | SYSTOLIC BLOOD PRESSURE: 116 MMHG | WEIGHT: 149 LBS | OXYGEN SATURATION: 96 % | RESPIRATION RATE: 16 BRPM

## 2017-06-08 DIAGNOSIS — S32.10XA CLOSED FRACTURE OF SACRUM, UNSPECIFIED PORTION OF SACRUM, INITIAL ENCOUNTER (HCC): ICD-10-CM

## 2017-06-08 DIAGNOSIS — N81.4 UTERINE PROLAPSE WITHOUT MENTION OF VAGINAL WALL PROLAPSE: ICD-10-CM

## 2017-06-08 DIAGNOSIS — M54.41 ACUTE RIGHT-SIDED LOW BACK PAIN WITH RIGHT-SIDED SCIATICA: ICD-10-CM

## 2017-06-08 PROCEDURE — 0518F FALL PLAN OF CARE DOCD: CPT | Mod: 8P | Performed by: NURSE PRACTITIONER

## 2017-06-08 PROCEDURE — G8432 DEP SCR NOT DOC, RNG: HCPCS | Performed by: NURSE PRACTITIONER

## 2017-06-08 PROCEDURE — 99213 OFFICE O/P EST LOW 20 MIN: CPT | Performed by: NURSE PRACTITIONER

## 2017-06-08 PROCEDURE — G8417 CALC BMI ABV UP PARAM F/U: HCPCS | Performed by: NURSE PRACTITIONER

## 2017-06-08 PROCEDURE — 4040F PNEUMOC VAC/ADMIN/RCVD: CPT | Mod: 8P | Performed by: NURSE PRACTITIONER

## 2017-06-08 PROCEDURE — 1100F PTFALLS ASSESS-DOCD GE2>/YR: CPT | Performed by: NURSE PRACTITIONER

## 2017-06-08 PROCEDURE — 3288F FALL RISK ASSESSMENT DOCD: CPT | Mod: 8P | Performed by: NURSE PRACTITIONER

## 2017-06-08 PROCEDURE — 1036F TOBACCO NON-USER: CPT | Performed by: NURSE PRACTITIONER

## 2017-06-08 ASSESSMENT — PATIENT HEALTH QUESTIONNAIRE - PHQ9: CLINICAL INTERPRETATION OF PHQ2 SCORE: 0

## 2017-06-08 ASSESSMENT — ENCOUNTER SYMPTOMS: BACK PAIN: 1

## 2017-06-08 NOTE — PROGRESS NOTES
Subjective:      Herminia Jones is a 83 y.o. female who presents with Results            HPI Herminia Jones is here today accompanied by her daughter for follow-up post surgery and for continued back pain.      1. Acute right-sided low back pain with right-sided sciatica  Patient states she fell in December and had been having right-sided low back pain with occasional sciatica since then. She had an x-ray of the lumbar spine done in December which showed degenerative disc disease but no fractures. She did have an MRI done because of continuing complaints of pain and the results from [facility] done on 6/2/17 showed disc protrusions and disc bulging.    Patient reports she still has pain across her lower back which goes sometimes into her right leg but it actually is improved since she had her recent bladder surgery. She still has pain for which she will use Norco 5 mg about once a day. She was recently prescribed Lortab 7.5 mg after surgery but has not started these yet although they were filled. She denies loss of bowel control. She denies weakness or numbness in the extremities and is able to walk without assistive device. She was referred a few months ago to physiatry but does not have an appointment until October.    2. Closed fracture of sacrum, unspecified portion of sacrum, initial encounter (CMS-[facility])  On patient's recent MRI did show bilateral sacral fractures. I contacted [facility] [place] regarding the results and was recommended that after surgery she come in for evaluation and a urgent referral was placed. Apparently she has not made an appointment as of yet. She is not complaining of hip pain or instability. There was no fracturing of the femur and patient feels this probably occurred after her fall in December.    3. Uterine prolapse without mention of vaginal wall prolapse  Patient states she feels much better post surgery with Dr. Bryan.    Current Outpatient Prescriptions  "  Medication Sig Dispense Refill   • metformin (GLUCOPHAGE) 500 MG Tab Take 1 Tab by mouth every day. 30 Tab 11   • lidocaine (LIDODERM) 5 % Patch Apply 1 Patch to skin as directed every 24 hours. 30 Patch 2   • Acetaminophen (TYLENOL 8 HOUR ARTHRITIS PAIN PO) Take  by mouth.     • hydrocodone-acetaminophen (NORCO) 5-325 MG Tab per tablet Take 1 Tab by mouth 2 times a day as needed. 50 Tab 0   • sertraline (ZOLOFT) 50 MG Tab Take 1 Tab by mouth every day. 30 Tab 11     No current facility-administered medications for this visit.       Social History   Substance Use Topics   • Smoking status: Never Smoker    • Smokeless tobacco: Never Used   • Alcohol Use: No     Current Outpatient Prescriptions   Medication Sig Dispense Refill   • metformin (GLUCOPHAGE) 500 MG Tab Take 1 Tab by mouth every day. 30 Tab 11   • lidocaine (LIDODERM) 5 % Patch Apply 1 Patch to skin as directed every 24 hours. 30 Patch 2   • Acetaminophen (TYLENOL 8 HOUR ARTHRITIS PAIN PO) Take  by mouth.     • hydrocodone-acetaminophen (NORCO) 5-325 MG Tab per tablet Take 1 Tab by mouth 2 times a day as needed. 50 Tab 0   • sertraline (ZOLOFT) 50 MG Tab Take 1 Tab by mouth every day. 30 Tab 11     No current facility-administered medications for this visit.     Family History   Problem Relation Age of Onset   • Stroke Father    • Cancer Sister    • Cancer Brother    • Heart Disease Sister    • Cancer Brother      Past Medical History   Diagnosis Date   • Glaucoma    • Macular degeneration    • Arthritis    • Urinary incontinence    • Diabetes (CMS-[facility])    • Dental disorder    • Urinary bladder disorder    • Psychiatric problem      depression       Review of Systems   Musculoskeletal: Positive for back pain.   All other systems reviewed and are negative.         Objective:     /62 mmHg  Pulse 74  Temp(Src) 36.2 °C (97.2 °F)  Resp 16  Ht 1.626 m (5' 4.02\")  Wt 67.586 kg (149 lb)  BMI 25.56 kg/m2  SpO2 96%     Physical Exam   Constitutional: " She is oriented to person, place, and time. She appears well-developed and well-nourished. No distress.   HENT:   Head: Normocephalic and atraumatic.   Right Ear: External ear normal.   Left Ear: External ear normal.   Nose: Nose normal.   Eyes: Right eye exhibits no discharge. Left eye exhibits no discharge.   Neck: Normal range of motion. Neck supple. No thyromegaly present.   Cardiovascular: Normal rate, regular rhythm and normal heart sounds.  Exam reveals no gallop and no friction rub.    No murmur heard.  Pulmonary/Chest: Effort normal and breath sounds normal. She has no wheezes. She has no rales.   Musculoskeletal: She exhibits no edema or tenderness.   5/5 strength of lower extremities bilaterally. Patient points to her mid to right lower back as to where the pain occurs and sometimes down the right leg to the knee. She is able to ambulate stiffly but without assistance. She appears in no distress.   Neurological: She is alert and oriented to person, place, and time. She displays normal reflexes.   Skin: Skin is warm and dry. No rash noted. She is not diaphoretic.   Psychiatric: She has a normal mood and affect. Her behavior is normal. Judgment and thought content normal.   Nursing note and vitals reviewed.              Assessment/Plan:     1. Acute right-sided low back pain with right-sided sciatica  Patient was given the phone number for [facility] where she was recently referred to see if she can get in quicker. If they did not take her insurance we will keep her appointment with [facility] physiatry in October but I will try to get her in somewhere else sooner when daughter contacts me. She will continue with her pain medications.    2. Closed fracture of sacrum, unspecified portion of sacrum, initial encounter (CMS-[facility])  I spoke with the PA at [facility] and it was recommended she follow up after her surgery and we will try to facilitate this and referral had previously been placed.    3.  Uterine prolapse without mention of vaginal wall prolapse  Patient reports much improvement post surgery and no problems with fever or dysuria.

## 2017-06-08 NOTE — MR AVS SNAPSHOT
"        Herminia Jones   2017 9:00 AM   Office Visit   MRN: [id number]    Department:  [address] [place] [address]   Dept Phone:  [phone]    Description:  Female : 1934   Provider:  LETY Kulkarni           Reason for Visit     Results MRI       Allergies as of 2017     No Known Allergies      You were diagnosed with     Acute right-sided low back pain with right-sided sciatica   [1014034]       Closed fracture of sacrum, unspecified portion of sacrum, initial encounter (CMS-[facility])   [9283365]       Uterine prolapse without mention of vaginal wall prolapse   [618.1.ICD-9-CM]         Vital Signs     Blood Pressure Pulse Temperature Respirations Height Weight    116/62 mmHg 74 36.2 °C (97.2 °F) 16 1.626 m (5' 4.02\") 67.586 kg (149 lb)    Body Mass Index Oxygen Saturation Smoking Status             25.56 kg/m2 96% Never Smoker          Basic Information     Date Of Birth Sex Race Ethnicity Preferred Language    1934 Female White Non- English      Your appointments     Aug 16, 2017  9:20 AM   Established Patient with LETY Kulkarni   [facility] [address]    [address]  [address]  [facility] [address] [phone]           You will be receiving a confirmation call a few days before your appointment from our automated call confirmation system.            Oct 12, 2017 10:15 AM   New Patient with Emeterio Herzog M.D.   [facility] (--)    [address]  [facility] [address] [phone]           Please bring Photo ID, Insurance Cards, All Medication Bottles and copies of any legal documents (such as Living Will, Power of ) If speaking a language besides English please bring an adult . Please arrive 30 minutes prior for check in and registration. You will be receiving a confirmation call a few days before your appointment from our automated call confirmation system.              Problem List             "    ICD-10-CM Priority Class Noted - Resolved    Moderate episode of recurrent major depressive disorder (CMS-HCC) F33.1   12/13/2016 - Present    CKD (chronic kidney disease) N18.9   12/13/2016 - Present    Impaired fasting glucose R73.01   5/16/2017 - Present    Dyslipidemia E78.5   5/16/2017 - Present    Acute right-sided low back pain with right-sided sciatica M54.41   5/16/2017 - Present    Uterine prolapse without mention of vaginal wall prolapse N81.4   6/2/2017 - Present    Closed fracture of sacrum (CMS-HCC) S32.10XA   6/8/2017 - Present      Health Maintenance        Date Due Completion Dates    IMM DTaP/Tdap/Td Vaccine (1 - Tdap) 1/18/1953 ---    IMM ZOSTER VACCINE 1/18/1994 ---    IMM PNEUMOCOCCAL 65+ (ADULT) LOW/MEDIUM RISK SERIES (1 of 2 - PCV13) 1/18/1999 ---    BONE DENSITY 8/24/2021 8/24/2016 (Declined)    Override on 8/24/2016: Patient Declined    COLONOSCOPY 8/7/2023 8/7/2013 (Done)    Override on 8/7/2013: Done            Current Immunizations     No immunizations on file.      Below and/or attached are the medications your provider expects you to take. Review all of your home medications and newly ordered medications with your provider and/or pharmacist. Follow medication instructions as directed by your provider and/or pharmacist. Please keep your medication list with you and share with your provider. Update the information when medications are discontinued, doses are changed, or new medications (including over-the-counter products) are added; and carry medication information at all times in the event of emergency situations     Allergies:  No Known Allergies          Medications  Valid as of: June 08, 2017 -  9:38 AM    Generic Name Brand Name Tablet Size Instructions for use    Acetaminophen   Take  by mouth.        Hydrocodone-Acetaminophen (Tab) NORCO 5-325 MG Take 1 Tab by mouth 2 times a day as needed.        Lidocaine (Patch) LIDODERM 5 % Apply 1 Patch to skin as directed every 24 hours.         MetFORMIN HCl (Tab) GLUCOPHAGE 500 MG Take 1 Tab by mouth every day.        Sertraline HCl (Tab) ZOLOFT 50 MG Take 1 Tab by mouth every day.        .                 Medicines prescribed today were sent to:     [facility] #[id number] - [facility] [place] - [address]    [address] [facility] [address]    Phone: [phone] Fax: [phone]    Open 24 Hours?: No      Medication refill instructions:       If your prescription bottle indicates you have medication refills left, it is not necessary to call your provider’s office. Please contact your pharmacy and they will refill your medication.    If your prescription bottle indicates you do not have any refills left, you may request refills at any time through one of the following ways: The online [employer] system (except Urgent Care), by calling your provider’s office, or by asking your pharmacy to contact your provider’s office with a refill request. Medication refills are processed only during regular business hours and may not be available until the next business day. Your provider may request additional information or to have a follow-up visit with you prior to refilling your medication.   *Please Note: Medication refills are assigned a new Rx number when refilled electronically. Your pharmacy may indicate that no refills were authorized even though a new prescription for the same medication is available at the pharmacy. Please request the medicine by name with the pharmacy before contacting your provider for a refill.           [employer] Access Code: [email or web address]  Expires: 6/8/2017  2:25 PM    [employer]  A secure, online tool to manage your health information     [employer]’s [employer]® is a secure, online tool that connects you to your personalized health information from the privacy of your home -- day or night - making it very easy for you to manage your healthcare. Once the activation process is completed, you can even access your medical  information using the Link To Media scott, which is available for free in the Apple Scott store or Google Play store.     Link To Media provides the following levels of access (as shown below):   My Chart Features   [facility] Doctor [facility]  Specialists [facility]  Urgent  Care Non-[facility]  Primary Care  Doctor   Email your healthcare team securely and privately 24/7 X X X    Manage appointments: schedule your next appointment; view details of past/upcoming appointments X      Request prescription refills. X      View recent personal medical records, including lab and immunizations X X X X   View health record, including health history, allergies, medications X X X X   Read reports about your outpatient visits, procedures, consult and ER notes X X X X   See your discharge summary, which is a recap of your hospital and/or ER visit that includes your diagnosis, lab results, and care plan. X X       How to register for Link To Media:  1. Go to  [email or web address].  2. Click on the Sign Up Now box, which takes you to the New Member Sign Up page. You will need to provide the following information:  a. Enter your Link To Media Access Code exactly as it appears at the top of this page. (You will not need to use this code after you’ve completed the sign-up process. If you do not sign up before the expiration date, you must request a new code.)   b. Enter your date of birth.   c. Enter your home email address.   d. Click Submit, and follow the next screen’s instructions.  3. Create a Link To Media ID. This will be your Link To Media login ID and cannot be changed, so think of one that is secure and easy to remember.  4. Create a Link To Media password. You can change your password at any time.  5. Enter your Password Reset Question and Answer. This can be used at a later time if you forget your password.   6. Enter your e-mail address. This allows you to receive e-mail notifications when new information is available in Link To Media.  7. Click Sign Up. You can now  view your health information.    For assistance activating your [employer] account, call [phone]

## 2017-07-10 ENCOUNTER — TELEPHONE (OUTPATIENT)
Dept: MEDICAL GROUP | Facility: MEDICAL CENTER | Age: 82
End: 2017-07-10

## 2017-07-10 DIAGNOSIS — F33.1 MODERATE EPISODE OF RECURRENT MAJOR DEPRESSIVE DISORDER (HCC): ICD-10-CM

## 2017-07-10 RX ORDER — SERTRALINE HYDROCHLORIDE 100 MG/1
100 TABLET, FILM COATED ORAL DAILY
Qty: 30 TAB | Refills: 11 | Status: SHIPPED | OUTPATIENT
Start: 2017-07-10 | End: 2018-01-05 | Stop reason: SDUPTHER

## 2017-07-10 NOTE — TELEPHONE ENCOUNTER
1. Caller Name: Herminia Daughter                                            Call Back Number: [phone]    Patient approves a detailed voicemail message: N\A      I spoke with pt daughter Herminia, and she was wondering if you could increase the strength of the patient RX  for Sertraline  Because lately patient has been more depressed and mik according to daughter. i mention to her that this might need a visit....... Can you change the prescription without patient coming in to the office?

## 2017-07-14 ENCOUNTER — TELEPHONE (OUTPATIENT)
Dept: MEDICAL GROUP | Facility: MEDICAL CENTER | Age: 82
End: 2017-07-14

## 2017-07-14 DIAGNOSIS — M54.41 ACUTE RIGHT-SIDED LOW BACK PAIN WITH RIGHT-SIDED SCIATICA: ICD-10-CM

## 2017-07-14 NOTE — TELEPHONE ENCOUNTER
1. Caller Name: [facility]                                          Call Back Number: [phone]  [phone]      Patient approves a detailed voicemail message: yes    Called to asked if you could please send a referral/order  for physical therapy  To them. Thank you

## 2017-08-29 ENCOUNTER — APPOINTMENT (OUTPATIENT)
Dept: MEDICAL GROUP | Facility: MEDICAL CENTER | Age: 82
End: 2017-08-29
Payer: MEDICARE

## 2017-08-30 ENCOUNTER — OFFICE VISIT (OUTPATIENT)
Dept: MEDICAL GROUP | Facility: MEDICAL CENTER | Age: 82
End: 2017-08-30
Payer: MEDICARE

## 2017-08-30 VITALS
DIASTOLIC BLOOD PRESSURE: 64 MMHG | RESPIRATION RATE: 16 BRPM | OXYGEN SATURATION: 95 % | BODY MASS INDEX: 25.39 KG/M2 | SYSTOLIC BLOOD PRESSURE: 100 MMHG | WEIGHT: 148 LBS | HEART RATE: 84 BPM | TEMPERATURE: 98.9 F

## 2017-08-30 DIAGNOSIS — R73.01 IMPAIRED FASTING GLUCOSE: ICD-10-CM

## 2017-08-30 DIAGNOSIS — E78.5 DYSLIPIDEMIA: ICD-10-CM

## 2017-08-30 DIAGNOSIS — M54.41 ACUTE RIGHT-SIDED LOW BACK PAIN WITH RIGHT-SIDED SCIATICA: ICD-10-CM

## 2017-08-30 DIAGNOSIS — N81.4 UTERINE PROLAPSE WITHOUT MENTION OF VAGINAL WALL PROLAPSE: ICD-10-CM

## 2017-08-30 DIAGNOSIS — N18.2 CKD (CHRONIC KIDNEY DISEASE), STAGE 2 (MILD): ICD-10-CM

## 2017-08-30 PROCEDURE — 99214 OFFICE O/P EST MOD 30 MIN: CPT | Performed by: NURSE PRACTITIONER

## 2017-08-30 RX ORDER — LIDOCAINE 50 MG/G
1 PATCH TOPICAL EVERY 24 HOURS
Qty: 30 PATCH | Refills: 2 | Status: SHIPPED | OUTPATIENT
Start: 2017-08-30 | End: 2017-11-30

## 2017-08-30 ASSESSMENT — ENCOUNTER SYMPTOMS: BACK PAIN: 1

## 2017-08-30 NOTE — PROGRESS NOTES
Subjective:      Herminia Jones is a 83 y.o. female who presents with Follow-Up (routine f/u)            HPI Herminia JonesIs here today accompanied by her granddaughter and great great grandchild for follow-up on a number of issues.      1. Acute right-sided low back pain with right-sided sciatica  Patient had been complaining of much back pain at her last visit in May and she was referred to [facility] but never followed up. She reports that her back is feeling better and some of her pain may have been related to a uterine prolapse which was repaired. She states she still has pain in the lower back and has been using her daughter's Lidoderm patch for help. The pain is worse with prolonged bending or stooping. She denies weakness of the extremities or loss of bowel or bladder function.    2. CKD (chronic kidney disease), stage 2 (mild)  Patient has history of decreased GFR but it appears that her last 2 readings have been normal.    3. Impaired fasting glucose  Patient currently on metformin 500 mg once a day and her last hemoglobin A1c was in the upper 6 range. She reports no hypoglycemia.    4. Dyslipidemia  Cholesterol levels have only been slightly elevated and she is not on a statin. She is due for lab work.    5. Uterine prolapse without mention of vaginal wall prolapse  Patient reports that her pelvic pain is much improved since having uterine surgery and she is not having incontinence anymore.      Social History   Substance Use Topics   • Smoking status: Never Smoker   • Smokeless tobacco: Never Used   • Alcohol use No     Current Outpatient Prescriptions   Medication Sig Dispense Refill   • lidocaine (LIDODERM) 5 % Patch Apply 1 Patch to skin as directed every 24 hours. 30 Patch 2   • sertraline (ZOLOFT) 100 MG Tab Take 1 Tab by mouth every day. 30 Tab 11   • metformin (GLUCOPHAGE) 500 MG Tab Take 1 Tab by mouth every day. 30 Tab 11   • Acetaminophen (TYLENOL 8 HOUR ARTHRITIS PAIN PO) Take   by mouth.       No current facility-administered medications for this visit.      Past Medical History:   Diagnosis Date   • Arthritis    • Dental disorder    • Diabetes (CMS-HCC)    • Glaucoma    • Macular degeneration    • Psychiatric problem     depression   • Urinary bladder disorder    • Urinary incontinence      Family History   Problem Relation Age of Onset   • Stroke Father    • Cancer Sister    • Cancer Brother    • Heart Disease Sister    • Cancer Brother        Review of Systems   Musculoskeletal: Positive for back pain.   All other systems reviewed and are negative.         Objective:     /64   Pulse 84   Temp 37.2 °C (98.9 °F)   Resp 16   Wt 67.1 kg (148 lb)   SpO2 95%   BMI 25.39 kg/m²      Physical Exam   Constitutional: She is oriented to person, place, and time. She appears well-developed and well-nourished. No distress.   HENT:   Head: Normocephalic and atraumatic.   Right Ear: External ear normal.   Left Ear: External ear normal.   Nose: Nose normal.   Eyes: Right eye exhibits no discharge. Left eye exhibits no discharge.   Neck: Normal range of motion. Neck supple. No thyromegaly present.   Cardiovascular: Normal rate, regular rhythm and normal heart sounds.  Exam reveals no gallop and no friction rub.    No murmur heard.  Pulmonary/Chest: Effort normal and breath sounds normal. She has no wheezes. She has no rales.   Musculoskeletal: She exhibits no edema or tenderness.        Lumbar back: She exhibits decreased range of motion and pain.   Neurological: She is alert and oriented to person, place, and time. She displays normal reflexes.   Skin: Skin is warm and dry. No rash noted. She is not diaphoretic.   Psychiatric: She has a normal mood and affect. Her behavior is normal. Judgment and thought content normal.   Nursing note and vitals reviewed.              Assessment/Plan:     1. Acute right-sided low back pain with right-sided sciatica  Patient is doing well with her  granddaughters Lidoderm patch and I will try to get patient one prescription for herself to use. I did explain that this sometimes is not covered by insurance and she will use Tylenol otherwise. I also advised her to follow up with [facility] as I referred her 3 months ago and they will look at other methods to help treat her back pain. She states she was unable to afford the physical therapy co-pays.  - lidocaine (LIDODERM) 5 % Patch; Apply 1 Patch to skin as directed every 24 hours.  Dispense: 30 Patch; Refill: 2    2. CKD (chronic kidney disease), stage 2 (mild)  Most recent GFR readings have been normal and I will recheck levels.  - COMP METABOLIC PANEL; Future  - TSH; Future    3. Impaired fasting glucose  I would like to keep her hemoglobin A1c below 7.5 based on her age and comorbidities and it has been doing well up until recently.  - COMP METABOLIC PANEL; Future  - TSH; Future  - HEMOGLOBIN A1C; Future    4. Dyslipidemia  Cholesterol levels will be checked to see if she needs statin.  - LIPID PROFILE; Future    5. Uterine prolapse without mention of vaginal wall prolapse  Patient reports much improvement since surgery in June.

## 2017-11-30 ENCOUNTER — APPOINTMENT (OUTPATIENT)
Dept: RADIOLOGY | Facility: MEDICAL CENTER | Age: 82
DRG: 482 | End: 2017-11-30
Attending: EMERGENCY MEDICINE
Payer: MEDICARE

## 2017-11-30 ENCOUNTER — HOSPITAL ENCOUNTER (OUTPATIENT)
Dept: RADIOLOGY | Facility: MEDICAL CENTER | Age: 82
End: 2017-11-30
Attending: OBSTETRICS & GYNECOLOGY
Payer: MEDICARE

## 2017-11-30 ENCOUNTER — RESOLUTE PROFESSIONAL BILLING HOSPITAL PROF FEE (OUTPATIENT)
Dept: HOSPITALIST | Facility: MEDICAL CENTER | Age: 82
End: 2017-11-30
Payer: MEDICARE

## 2017-11-30 ENCOUNTER — HOSPITAL ENCOUNTER (INPATIENT)
Facility: MEDICAL CENTER | Age: 82
LOS: 3 days | DRG: 482 | End: 2017-12-03
Attending: EMERGENCY MEDICINE | Admitting: HOSPITALIST
Payer: MEDICARE
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
## 2017-11-30 DIAGNOSIS — S72.002A CLOSED FRACTURE OF NECK OF LEFT FEMUR, INITIAL ENCOUNTER (HCC): ICD-10-CM

## 2017-11-30 DIAGNOSIS — M84.352A STRESS FRACTURE OF LEFT FEMUR, INITIAL ENCOUNTER: ICD-10-CM

## 2017-11-30 LAB
ANION GAP SERPL CALC-SCNC: 11 MMOL/L (ref 0–11.9)
BASOPHILS # BLD AUTO: 0.8 % (ref 0–1.8)
BASOPHILS # BLD: 0.08 K/UL (ref 0–0.12)
BUN SERPL-MCNC: 20 MG/DL (ref 8–22)
CALCIUM SERPL-MCNC: 10.1 MG/DL (ref 8.5–10.5)
CHLORIDE SERPL-SCNC: 104 MMOL/L (ref 96–112)
CO2 SERPL-SCNC: 21 MMOL/L (ref 20–33)
CREAT SERPL-MCNC: 0.78 MG/DL (ref 0.5–1.4)
EOSINOPHIL # BLD AUTO: 0.44 K/UL (ref 0–0.51)
EOSINOPHIL NFR BLD: 4.4 % (ref 0–6.9)
ERYTHROCYTE [DISTWIDTH] IN BLOOD BY AUTOMATED COUNT: 46.2 FL (ref 35.9–50)
GFR SERPL CREATININE-BSD FRML MDRD: >60 ML/MIN/1.73 M 2
GLUCOSE SERPL-MCNC: 98 MG/DL (ref 65–99)
HCT VFR BLD AUTO: 45.2 % (ref 37–47)
HGB BLD-MCNC: 15.3 G/DL (ref 12–16)
IMM GRANULOCYTES # BLD AUTO: 0.08 K/UL (ref 0–0.11)
IMM GRANULOCYTES NFR BLD AUTO: 0.8 % (ref 0–0.9)
INR PPP: 1.02 (ref 0.87–1.13)
LYMPHOCYTES # BLD AUTO: 2.67 K/UL (ref 1–4.8)
LYMPHOCYTES NFR BLD: 26.8 % (ref 22–41)
MCH RBC QN AUTO: 29.2 PG (ref 27–33)
MCHC RBC AUTO-ENTMCNC: 33.8 G/DL (ref 33.6–35)
MCV RBC AUTO: 86.3 FL (ref 81.4–97.8)
MONOCYTES # BLD AUTO: 0.91 K/UL (ref 0–0.85)
MONOCYTES NFR BLD AUTO: 9.1 % (ref 0–13.4)
NEUTROPHILS # BLD AUTO: 5.8 K/UL (ref 2–7.15)
NEUTROPHILS NFR BLD: 58.1 % (ref 44–72)
NRBC # BLD AUTO: 0 K/UL
NRBC BLD AUTO-RTO: 0 /100 WBC
PLATELET # BLD AUTO: 222 K/UL (ref 164–446)
PMV BLD AUTO: 9.5 FL (ref 9–12.9)
POTASSIUM SERPL-SCNC: 4 MMOL/L (ref 3.6–5.5)
PROTHROMBIN TIME: 13.1 SEC (ref 12–14.6)
RBC # BLD AUTO: 5.24 M/UL (ref 4.2–5.4)
SODIUM SERPL-SCNC: 136 MMOL/L (ref 135–145)
WBC # BLD AUTO: 10 K/UL (ref 4.8–10.8)

## 2017-11-30 PROCEDURE — 93005 ELECTROCARDIOGRAM TRACING: CPT | Performed by: EMERGENCY MEDICINE

## 2017-11-30 PROCEDURE — 85025 COMPLETE CBC W/AUTO DIFF WBC: CPT

## 2017-11-30 PROCEDURE — 85610 PROTHROMBIN TIME: CPT

## 2017-11-30 PROCEDURE — 700111 HCHG RX REV CODE 636 W/ 250 OVERRIDE (IP): Performed by: EMERGENCY MEDICINE

## 2017-11-30 PROCEDURE — 73552 X-RAY EXAM OF FEMUR 2/>: CPT | Mod: LT

## 2017-11-30 PROCEDURE — 80048 BASIC METABOLIC PNL TOTAL CA: CPT

## 2017-11-30 PROCEDURE — 99223 1ST HOSP IP/OBS HIGH 75: CPT | Performed by: HOSPITALIST

## 2017-11-30 PROCEDURE — 770006 HCHG ROOM/CARE - MED/SURG/GYN SEMI*

## 2017-11-30 PROCEDURE — 99285 EMERGENCY DEPT VISIT HI MDM: CPT

## 2017-11-30 PROCEDURE — 72170 X-RAY EXAM OF PELVIS: CPT

## 2017-11-30 PROCEDURE — 36415 COLL VENOUS BLD VENIPUNCTURE: CPT

## 2017-11-30 PROCEDURE — 700105 HCHG RX REV CODE 258: Performed by: EMERGENCY MEDICINE

## 2017-11-30 PROCEDURE — 96374 THER/PROPH/DIAG INJ IV PUSH: CPT

## 2017-11-30 PROCEDURE — 71010 DX-CHEST-PORTABLE (1 VIEW): CPT

## 2017-11-30 PROCEDURE — 96361 HYDRATE IV INFUSION ADD-ON: CPT

## 2017-11-30 RX ORDER — SERTRALINE HYDROCHLORIDE 100 MG/1
100 TABLET, FILM COATED ORAL
Status: DISCONTINUED | OUTPATIENT
Start: 2017-12-01 | End: 2017-12-03 | Stop reason: HOSPADM

## 2017-11-30 RX ORDER — MORPHINE SULFATE 4 MG/ML
2 INJECTION, SOLUTION INTRAMUSCULAR; INTRAVENOUS ONCE
Status: COMPLETED | OUTPATIENT
Start: 2017-11-30 | End: 2017-11-30

## 2017-11-30 RX ORDER — BISACODYL 10 MG
10 SUPPOSITORY, RECTAL RECTAL
Status: DISCONTINUED | OUTPATIENT
Start: 2017-11-30 | End: 2017-12-03 | Stop reason: HOSPADM

## 2017-11-30 RX ORDER — OXYCODONE HYDROCHLORIDE 5 MG/1
2.5 TABLET ORAL
Status: DISCONTINUED | OUTPATIENT
Start: 2017-11-30 | End: 2017-12-01

## 2017-11-30 RX ORDER — MORPHINE SULFATE 4 MG/ML
2 INJECTION, SOLUTION INTRAMUSCULAR; INTRAVENOUS
Status: DISCONTINUED | OUTPATIENT
Start: 2017-11-30 | End: 2017-12-03 | Stop reason: HOSPADM

## 2017-11-30 RX ORDER — SODIUM CHLORIDE 9 MG/ML
1000 INJECTION, SOLUTION INTRAVENOUS ONCE
Status: COMPLETED | OUTPATIENT
Start: 2017-11-30 | End: 2017-12-01

## 2017-11-30 RX ORDER — HEPARIN SODIUM 5000 [USP'U]/ML
5000 INJECTION, SOLUTION INTRAVENOUS; SUBCUTANEOUS EVERY 8 HOURS
Status: DISCONTINUED | OUTPATIENT
Start: 2017-12-01 | End: 2017-12-01

## 2017-11-30 RX ORDER — OXYCODONE HYDROCHLORIDE 5 MG/1
5 TABLET ORAL
Status: DISCONTINUED | OUTPATIENT
Start: 2017-11-30 | End: 2017-12-01

## 2017-11-30 RX ORDER — POLYETHYLENE GLYCOL 3350 17 G/17G
1 POWDER, FOR SOLUTION ORAL
Status: DISCONTINUED | OUTPATIENT
Start: 2017-11-30 | End: 2017-12-03 | Stop reason: HOSPADM

## 2017-11-30 RX ORDER — ONDANSETRON 2 MG/ML
4 INJECTION INTRAMUSCULAR; INTRAVENOUS EVERY 4 HOURS PRN
Status: DISCONTINUED | OUTPATIENT
Start: 2017-11-30 | End: 2017-12-01

## 2017-11-30 RX ORDER — AMOXICILLIN 250 MG
2 CAPSULE ORAL 2 TIMES DAILY
Status: DISCONTINUED | OUTPATIENT
Start: 2017-12-01 | End: 2017-12-03 | Stop reason: HOSPADM

## 2017-11-30 RX ORDER — SODIUM CHLORIDE 9 MG/ML
INJECTION, SOLUTION INTRAVENOUS CONTINUOUS
Status: DISCONTINUED | OUTPATIENT
Start: 2017-12-01 | End: 2017-12-02

## 2017-11-30 RX ORDER — VITS A,C,E/LUTEIN/MINERALS 300MCG-200
1 TABLET ORAL DAILY
COMMUNITY
End: 2018-04-16

## 2017-11-30 RX ORDER — ACETAMINOPHEN 325 MG/1
650 TABLET ORAL EVERY 6 HOURS PRN
Status: DISCONTINUED | OUTPATIENT
Start: 2017-11-30 | End: 2017-12-03 | Stop reason: HOSPADM

## 2017-11-30 RX ORDER — ONDANSETRON 4 MG/1
4 TABLET, ORALLY DISINTEGRATING ORAL EVERY 4 HOURS PRN
Status: DISCONTINUED | OUTPATIENT
Start: 2017-11-30 | End: 2017-12-03 | Stop reason: HOSPADM

## 2017-11-30 RX ADMIN — SODIUM CHLORIDE 1000 ML: 9 INJECTION, SOLUTION INTRAVENOUS at 23:17

## 2017-11-30 RX ADMIN — MORPHINE SULFATE 2 MG: 4 INJECTION INTRAVENOUS at 23:17

## 2017-11-30 ASSESSMENT — PAIN SCALES - WONG BAKER: WONGBAKER_NUMERICALRESPONSE: HURTS JUST A LITTLE BIT

## 2017-11-30 ASSESSMENT — PAIN SCALES - GENERAL
PAINLEVEL_OUTOF10: 8
PAINLEVEL_OUTOF10: 2
PAINLEVEL_OUTOF10: 0

## 2017-12-01 ENCOUNTER — APPOINTMENT (OUTPATIENT)
Dept: RADIOLOGY | Facility: MEDICAL CENTER | Age: 82
DRG: 482 | End: 2017-12-01
Attending: ORTHOPAEDIC SURGERY
Payer: MEDICARE

## 2017-12-01 PROBLEM — E11.9 DIABETES MELLITUS, TYPE II (HCC): Status: ACTIVE | Noted: 2017-12-01

## 2017-12-01 PROBLEM — S72.90XA FEMUR FRACTURE (HCC): Status: ACTIVE | Noted: 2017-11-30

## 2017-12-01 PROBLEM — F32.A DEPRESSION: Status: ACTIVE | Noted: 2017-12-01

## 2017-12-01 LAB
EKG IMPRESSION: NORMAL
GLUCOSE BLD-MCNC: 122 MG/DL (ref 65–99)
GLUCOSE BLD-MCNC: 93 MG/DL (ref 65–99)
GLUCOSE BLD-MCNC: 99 MG/DL (ref 65–99)

## 2017-12-01 PROCEDURE — 160035 HCHG PACU - 1ST 60 MINS PHASE I: Performed by: ORTHOPAEDIC SURGERY

## 2017-12-01 PROCEDURE — 96361 HYDRATE IV INFUSION ADD-ON: CPT

## 2017-12-01 PROCEDURE — 700102 HCHG RX REV CODE 250 W/ 637 OVERRIDE(OP): Performed by: INTERNAL MEDICINE

## 2017-12-01 PROCEDURE — 160029 HCHG SURGERY MINUTES - 1ST 30 MINS LEVEL 4: Performed by: ORTHOPAEDIC SURGERY

## 2017-12-01 PROCEDURE — 700105 HCHG RX REV CODE 258: Performed by: HOSPITALIST

## 2017-12-01 PROCEDURE — 160048 HCHG OR STATISTICAL LEVEL 1-5: Performed by: ORTHOPAEDIC SURGERY

## 2017-12-01 PROCEDURE — 700102 HCHG RX REV CODE 250 W/ 637 OVERRIDE(OP): Performed by: ORTHOPAEDIC SURGERY

## 2017-12-01 PROCEDURE — 700112 HCHG RX REV CODE 229: Performed by: ORTHOPAEDIC SURGERY

## 2017-12-01 PROCEDURE — C1713 ANCHOR/SCREW BN/BN,TIS/BN: HCPCS | Performed by: ORTHOPAEDIC SURGERY

## 2017-12-01 PROCEDURE — 700111 HCHG RX REV CODE 636 W/ 250 OVERRIDE (IP)

## 2017-12-01 PROCEDURE — 770001 HCHG ROOM/CARE - MED/SURG/GYN PRIV*

## 2017-12-01 PROCEDURE — 700111 HCHG RX REV CODE 636 W/ 250 OVERRIDE (IP): Performed by: ORTHOPAEDIC SURGERY

## 2017-12-01 PROCEDURE — 73502 X-RAY EXAM HIP UNI 2-3 VIEWS: CPT | Mod: LT

## 2017-12-01 PROCEDURE — 160002 HCHG RECOVERY MINUTES (STAT): Performed by: ORTHOPAEDIC SURGERY

## 2017-12-01 PROCEDURE — 700102 HCHG RX REV CODE 250 W/ 637 OVERRIDE(OP): Performed by: HOSPITALIST

## 2017-12-01 PROCEDURE — 99233 SBSQ HOSP IP/OBS HIGH 50: CPT | Performed by: INTERNAL MEDICINE

## 2017-12-01 PROCEDURE — A9270 NON-COVERED ITEM OR SERVICE: HCPCS | Performed by: ORTHOPAEDIC SURGERY

## 2017-12-01 PROCEDURE — A6402 STERILE GAUZE <= 16 SQ IN: HCPCS | Performed by: ORTHOPAEDIC SURGERY

## 2017-12-01 PROCEDURE — 160036 HCHG PACU - EA ADDL 30 MINS PHASE I: Performed by: ORTHOPAEDIC SURGERY

## 2017-12-01 PROCEDURE — 160041 HCHG SURGERY MINUTES - EA ADDL 1 MIN LEVEL 4: Performed by: ORTHOPAEDIC SURGERY

## 2017-12-01 PROCEDURE — 160009 HCHG ANES TIME/MIN: Performed by: ORTHOPAEDIC SURGERY

## 2017-12-01 PROCEDURE — 500891 HCHG PACK, ORTHO MAJOR: Performed by: ORTHOPAEDIC SURGERY

## 2017-12-01 PROCEDURE — 501838 HCHG SUTURE GENERAL: Performed by: ORTHOPAEDIC SURGERY

## 2017-12-01 PROCEDURE — A9270 NON-COVERED ITEM OR SERVICE: HCPCS | Performed by: HOSPITALIST

## 2017-12-01 PROCEDURE — 0QH734Z INSERTION OF INTERNAL FIXATION DEVICE INTO LEFT UPPER FEMUR, PERCUTANEOUS APPROACH: ICD-10-PCS | Performed by: ORTHOPAEDIC SURGERY

## 2017-12-01 PROCEDURE — 82962 GLUCOSE BLOOD TEST: CPT

## 2017-12-01 DEVICE — SCREW CANNULATED 32MM THREAD 7.3MM X 85MM (3TX3=9): Type: IMPLANTABLE DEVICE | Site: HIP | Status: FUNCTIONAL

## 2017-12-01 DEVICE — SCREW CANNULATED 32MM THREAD 7.3MM X 90MM (3TX3=9): Type: IMPLANTABLE DEVICE | Site: HIP | Status: FUNCTIONAL

## 2017-12-01 DEVICE — SCREW CANNULATED 32MM THREAD 7.3MM X 80MM (3TX3=9): Type: IMPLANTABLE DEVICE | Site: HIP | Status: FUNCTIONAL

## 2017-12-01 RX ORDER — HALOPERIDOL 5 MG/ML
1 INJECTION INTRAMUSCULAR EVERY 6 HOURS PRN
Status: DISCONTINUED | OUTPATIENT
Start: 2017-12-01 | End: 2017-12-02

## 2017-12-01 RX ORDER — HYDROMORPHONE HYDROCHLORIDE 2 MG/ML
INJECTION, SOLUTION INTRAMUSCULAR; INTRAVENOUS; SUBCUTANEOUS
Status: COMPLETED
Start: 2017-12-01 | End: 2017-12-01

## 2017-12-01 RX ORDER — BISACODYL 10 MG
10 SUPPOSITORY, RECTAL RECTAL
Status: DISCONTINUED | OUTPATIENT
Start: 2017-12-01 | End: 2017-12-02

## 2017-12-01 RX ORDER — DOCUSATE SODIUM 100 MG/1
100 CAPSULE, LIQUID FILLED ORAL 2 TIMES DAILY
Status: DISCONTINUED | OUTPATIENT
Start: 2017-12-01 | End: 2017-12-03 | Stop reason: HOSPADM

## 2017-12-01 RX ORDER — DEXTROSE MONOHYDRATE 25 G/50ML
25 INJECTION, SOLUTION INTRAVENOUS
Status: DISCONTINUED | OUTPATIENT
Start: 2017-12-01 | End: 2017-12-02

## 2017-12-01 RX ORDER — KETOROLAC TROMETHAMINE 30 MG/ML
15 INJECTION, SOLUTION INTRAMUSCULAR; INTRAVENOUS EVERY 6 HOURS
Status: DISCONTINUED | OUTPATIENT
Start: 2017-12-01 | End: 2017-12-03 | Stop reason: HOSPADM

## 2017-12-01 RX ORDER — OXYCODONE HYDROCHLORIDE 10 MG/1
10 TABLET ORAL
Status: DISCONTINUED | OUTPATIENT
Start: 2017-12-01 | End: 2017-12-03 | Stop reason: HOSPADM

## 2017-12-01 RX ORDER — DEXAMETHASONE SODIUM PHOSPHATE 4 MG/ML
4 INJECTION, SOLUTION INTRA-ARTICULAR; INTRALESIONAL; INTRAMUSCULAR; INTRAVENOUS; SOFT TISSUE
Status: DISCONTINUED | OUTPATIENT
Start: 2017-12-01 | End: 2017-12-02

## 2017-12-01 RX ORDER — POLYETHYLENE GLYCOL 3350 17 G/17G
1 POWDER, FOR SOLUTION ORAL 2 TIMES DAILY PRN
Status: DISCONTINUED | OUTPATIENT
Start: 2017-12-01 | End: 2017-12-02

## 2017-12-01 RX ORDER — AMOXICILLIN 250 MG
1 CAPSULE ORAL
Status: DISCONTINUED | OUTPATIENT
Start: 2017-12-01 | End: 2017-12-02

## 2017-12-01 RX ORDER — AMOXICILLIN 250 MG
1 CAPSULE ORAL NIGHTLY
Status: DISCONTINUED | OUTPATIENT
Start: 2017-12-01 | End: 2017-12-03 | Stop reason: HOSPADM

## 2017-12-01 RX ORDER — ENEMA 19; 7 G/133ML; G/133ML
1 ENEMA RECTAL
Status: DISCONTINUED | OUTPATIENT
Start: 2017-12-01 | End: 2017-12-02

## 2017-12-01 RX ORDER — SCOLOPAMINE TRANSDERMAL SYSTEM 1 MG/1
1 PATCH, EXTENDED RELEASE TRANSDERMAL
Status: DISCONTINUED | OUTPATIENT
Start: 2017-12-01 | End: 2017-12-02

## 2017-12-01 RX ORDER — ACETAMINOPHEN 325 MG/1
650 TABLET ORAL EVERY 6 HOURS
Status: DISCONTINUED | OUTPATIENT
Start: 2017-12-01 | End: 2017-12-03 | Stop reason: HOSPADM

## 2017-12-01 RX ORDER — ONDANSETRON 2 MG/ML
4 INJECTION INTRAMUSCULAR; INTRAVENOUS EVERY 4 HOURS PRN
Status: DISCONTINUED | OUTPATIENT
Start: 2017-12-01 | End: 2017-12-03 | Stop reason: HOSPADM

## 2017-12-01 RX ORDER — OXYCODONE HYDROCHLORIDE 5 MG/1
5 TABLET ORAL
Status: DISCONTINUED | OUTPATIENT
Start: 2017-12-01 | End: 2017-12-03 | Stop reason: HOSPADM

## 2017-12-01 RX ORDER — HYDROMORPHONE HYDROCHLORIDE 2 MG/ML
0.5 INJECTION, SOLUTION INTRAMUSCULAR; INTRAVENOUS; SUBCUTANEOUS
Status: DISCONTINUED | OUTPATIENT
Start: 2017-12-01 | End: 2017-12-03 | Stop reason: HOSPADM

## 2017-12-01 RX ORDER — DIPHENHYDRAMINE HYDROCHLORIDE 50 MG/ML
25 INJECTION INTRAMUSCULAR; INTRAVENOUS EVERY 6 HOURS PRN
Status: DISCONTINUED | OUTPATIENT
Start: 2017-12-01 | End: 2017-12-02

## 2017-12-01 RX ORDER — HEPARIN SODIUM 5000 [USP'U]/ML
5000 INJECTION, SOLUTION INTRAVENOUS; SUBCUTANEOUS EVERY 8 HOURS
Status: DISCONTINUED | OUTPATIENT
Start: 2017-12-01 | End: 2017-12-01

## 2017-12-01 RX ADMIN — HYDROMORPHONE HYDROCHLORIDE 0.5 MG: 2 INJECTION INTRAMUSCULAR; INTRAVENOUS; SUBCUTANEOUS at 15:39

## 2017-12-01 RX ADMIN — ACETAMINOPHEN 650 MG: 325 TABLET, FILM COATED ORAL at 19:38

## 2017-12-01 RX ADMIN — OXYCODONE HYDROCHLORIDE 5 MG: 5 TABLET ORAL at 00:16

## 2017-12-01 RX ADMIN — SODIUM CHLORIDE: 9 INJECTION, SOLUTION INTRAVENOUS at 19:42

## 2017-12-01 RX ADMIN — HYDROMORPHONE HYDROCHLORIDE 0.5 MG: 2 INJECTION INTRAMUSCULAR; INTRAVENOUS; SUBCUTANEOUS at 15:27

## 2017-12-01 RX ADMIN — FENTANYL CITRATE 50 MCG: 50 INJECTION, SOLUTION INTRAMUSCULAR; INTRAVENOUS at 15:05

## 2017-12-01 RX ADMIN — FENTANYL CITRATE 50 MCG: 50 INJECTION, SOLUTION INTRAMUSCULAR; INTRAVENOUS at 14:58

## 2017-12-01 RX ADMIN — OXYCODONE HYDROCHLORIDE 5 MG: 5 TABLET ORAL at 07:46

## 2017-12-01 RX ADMIN — STANDARDIZED SENNA CONCENTRATE AND DOCUSATE SODIUM 2 TABLET: 8.6; 5 TABLET, FILM COATED ORAL at 09:24

## 2017-12-01 RX ADMIN — OXYCODONE HYDROCHLORIDE 5 MG: 5 TABLET ORAL at 11:09

## 2017-12-01 RX ADMIN — DOCUSATE SODIUM 100 MG: 100 CAPSULE ORAL at 21:14

## 2017-12-01 RX ADMIN — CEFAZOLIN SODIUM 1 G: 1 INJECTION, SOLUTION INTRAVENOUS at 21:14

## 2017-12-01 RX ADMIN — STANDARDIZED SENNA CONCENTRATE AND DOCUSATE SODIUM 1 TABLET: 8.6; 5 TABLET, FILM COATED ORAL at 21:14

## 2017-12-01 RX ADMIN — SERTRALINE 100 MG: 100 TABLET, FILM COATED ORAL at 22:53

## 2017-12-01 RX ADMIN — STANDARDIZED SENNA CONCENTRATE AND DOCUSATE SODIUM 2 TABLET: 8.6; 5 TABLET, FILM COATED ORAL at 02:14

## 2017-12-01 RX ADMIN — SODIUM CHLORIDE: 9 INJECTION, SOLUTION INTRAVENOUS at 02:14

## 2017-12-01 RX ADMIN — SERTRALINE 100 MG: 100 TABLET, FILM COATED ORAL at 02:14

## 2017-12-01 RX ADMIN — OXYCODONE HYDROCHLORIDE 5 MG: 5 TABLET ORAL at 19:38

## 2017-12-01 RX ADMIN — KETOROLAC TROMETHAMINE 15 MG: 30 INJECTION, SOLUTION INTRAMUSCULAR at 19:38

## 2017-12-01 RX ADMIN — OXYCODONE HYDROCHLORIDE 5 MG: 5 TABLET ORAL at 04:32

## 2017-12-01 ASSESSMENT — PAIN SCALES - GENERAL
PAINLEVEL_OUTOF10: 5
PAINLEVEL_OUTOF10: 6
PAINLEVEL_OUTOF10: 3
PAINLEVEL_OUTOF10: 0
PAINLEVEL_OUTOF10: 5
PAINLEVEL_OUTOF10: 6
PAINLEVEL_OUTOF10: 5
PAINLEVEL_OUTOF10: 5
PAINLEVEL_OUTOF10: 6
PAINLEVEL_OUTOF10: 5
PAINLEVEL_OUTOF10: 5
PAINLEVEL_OUTOF10: 0

## 2017-12-01 ASSESSMENT — ENCOUNTER SYMPTOMS
SPUTUM PRODUCTION: 0
NAUSEA: 0
CONSTIPATION: 0
ABDOMINAL PAIN: 0
SHORTNESS OF BREATH: 0
CHILLS: 0
BLOOD IN STOOL: 0
DIARRHEA: 0
BLURRED VISION: 0
PND: 0
FALLS: 1
HEADACHES: 0
HEMOPTYSIS: 0
DIZZINESS: 0
PALPITATIONS: 0
WEAKNESS: 1
DEPRESSION: 1
VOMITING: 0
HEARTBURN: 0
FLANK PAIN: 0
FEVER: 0
DOUBLE VISION: 0
COUGH: 0
DIAPHORESIS: 0

## 2017-12-01 ASSESSMENT — PAIN SCALES - WONG BAKER: WONGBAKER_NUMERICALRESPONSE: DOESN'T HURT AT ALL

## 2017-12-01 ASSESSMENT — LIFESTYLE VARIABLES
ALCOHOL_USE: NO
EVER_SMOKED: YES

## 2017-12-01 ASSESSMENT — PATIENT HEALTH QUESTIONNAIRE - PHQ9
SUM OF ALL RESPONSES TO PHQ9 QUESTIONS 1 AND 2: 0
2. FEELING DOWN, DEPRESSED, IRRITABLE, OR HOPELESS: NOT AT ALL
1. LITTLE INTEREST OR PLEASURE IN DOING THINGS: NOT AT ALL
SUM OF ALL RESPONSES TO PHQ QUESTIONS 1-9: 0

## 2017-12-01 NOTE — PROGRESS NOTES
[facility] Hospitalist Progress Note    Date of Service: 2017    Chief Complaint  83 y.o. female admitted 2017 with fall at home c/b left femoral fracture.     Interval Problem Update  Patient seen and evaluated on rounds.   Going to OR for surgical correction.   No acute complaints.   Pain is controlled.   All questions / concerns from patient / family perspective answered.     Consultants/Specialty  Orthopedics    Disposition  Home with [facility] vs SNF  Will place a SNF referral for choice evaluation        Review of Systems   Constitutional: Positive for malaise/fatigue. Negative for chills, diaphoresis and fever.   HENT: Negative for hearing loss and tinnitus.    Eyes: Negative for blurred vision and double vision.   Respiratory: Negative for cough, hemoptysis, sputum production and shortness of breath.    Cardiovascular: Negative for chest pain, palpitations and PND.   Gastrointestinal: Negative for abdominal pain, blood in stool, constipation, diarrhea, heartburn, melena, nausea and vomiting.   Genitourinary: Negative for dysuria, flank pain, frequency, hematuria and urgency.   Musculoskeletal: Positive for falls and joint pain.   Skin: Negative for itching and rash.   Neurological: Positive for weakness. Negative for dizziness and headaches.   Psychiatric/Behavioral: Positive for depression. Negative for suicidal ideas.      Physical Exam  Laboratory/Imaging   Hemodynamics  Temp (24hrs), Av.8 °C (98.3 °F), Min:36.3 °C (97.3 °F), Max:37.3 °C (99.1 °F)   Temperature: 37.2 °C (98.9 °F)  Pulse  Av.1  Min: 60  Max: 85    Blood Pressure : 129/59, NIBP: 125/80      Respiratory      Respiration: 16, Pulse Oximetry: 96 %             Fluids  No intake or output data in the 24 hours ending 17 1307    Nutrition  Orders Placed This Encounter   Procedures   • DIET NPO     Standing Status:   Standing     Number of Occurrences:   1     Order Specific Question:   Restrict to:     Answer:   Sips with  Medications [3]     Physical Exam   Constitutional: She is oriented to person, place, and time. She appears well-developed and well-nourished. No distress.   HENT:   Head: Normocephalic.   Mouth/Throat: Oropharynx is clear and moist. No oropharyngeal exudate.   Eyes: Conjunctivae are normal. Pupils are equal, round, and reactive to light. No scleral icterus.   Neck: No JVD present.   Cardiovascular: Normal rate, regular rhythm and normal heart sounds.  Exam reveals no gallop and no friction rub.    No murmur heard.  Pulmonary/Chest: Breath sounds normal. No stridor. No respiratory distress. She has no wheezes. She has no rales.   Abdominal: Soft. Bowel sounds are normal. She exhibits no distension. There is no tenderness. There is no rebound and no guarding.   Musculoskeletal: Normal range of motion. She exhibits edema, tenderness and deformity.   Left femoral fracture.    Neurological: She is alert and oriented to person, place, and time. No cranial nerve deficit.   Skin: Skin is warm and dry. She is not diaphoretic.   Psychiatric: She has a normal mood and affect. Her behavior is normal. Judgment and thought content normal.       Recent Labs      11/30/17   2308   WBC  10.0   RBC  5.24   HEMOGLOBIN  15.3   HEMATOCRIT  45.2   MCV  86.3   MCH  29.2   MCHC  33.8   RDW  46.2   PLATELETCT  222   MPV  9.5     Recent Labs      11/30/17   2308   SODIUM  136   POTASSIUM  4.0   CHLORIDE  104   CO2  21   GLUCOSE  98   BUN  20   CREATININE  0.78   CALCIUM  10.1     Recent Labs      11/30/17   2308   INR  1.02                  Assessment/Plan     Femur fracture (CMS-[facility])- (present on admission)   Assessment & Plan    Acute Left proximal femoral neck fracture.  Patient is NPO. Plans for surgical interventions in place for today.   Continue pain control.   Weight bearing status per orthopedics team  PT/OT  DVT PPx with SCD and SC lovenox  Post acute placement as clinically appropriate  Check vitamin D levels  Outpatient DEXA  with PCP following discharge          Depression- (present on admission)   Assessment & Plan    Sertraline        Diabetes mellitus, type II (CMS-HCC)- (present on admission)   Assessment & Plan    Controlled. No manifestations.   Holding oral HG agents.   SSI during hospital stay.             Reviewed items::  Labs reviewed, Medications reviewed and Radiology images reviewed  Mckoy catheter::  No Mckoy  DVT prophylaxis pharmacological::  Enoxaparin (Lovenox)  DVT prophylaxis - mechanical:  SCDs  Ulcer Prophylaxis::  Not indicated

## 2017-12-01 NOTE — CONSULTS
DATE OF SERVICE:  12/01/2017    HISTORY OF PRESENT ILLNESS:  The patient is an 83-year-old female, who has   history of diabetes, depression, and some possibly dementia, according to   daughter, she apparently fell last Sunday on some carpet holding a baby.  She   presents with increasing left hip pain.    PAST MEDICAL HISTORY:  Includes arthritis, diabetes, glaucoma, macular   degeneration, depression, possible dementia.    ALLERGIES:  No known drug allergies.    PAST SURGICAL HISTORY:  Includes abdominal hysterectomy.    MEDICATIONS:  Include metformin, multivitamin, Zoloft.    PHYSICAL EXAMINATION:  VITAL SIGNS:  Afebrile, vital signs stable, oxygen 90% on room air.  GENERAL:  She is alert.  CARDIOVASCULAR:  Regular rate and rhythm.  ABDOMEN:  Soft.  EXTREMITIES:  Left lower extremity, she is holding in a slightly flexed.    Distally, she does have good pedal pulses and neurovascularly intact distally.    RADIOGRAPHS:  Show evidence of an impacted femoral neck fracture, left hip.    IMPRESSION AND PLAN:  Left proximal femur impacted femoral neck fracture.    PLAN:  NPO after midnight.  We will anticipate a percutaneous screw fixation,   and I will pass this off to my orthopedic traumatology partner in the morning.       ____________________________________     MD JAY COLEY / FRANCESCA    DD:  12/01/2017 06:29:14  DT:  12/01/2017 06:54:30    D#:  [id number]  Job#:  [id number]   Yes

## 2017-12-01 NOTE — DISCHARGE PLANNING
TCN met with patient and daughter at bedside to discuss their transitional care options. Patient lives with her daughter who is herb to to care for her 24/7. TCN offered information on SNF, HH and OP therapy if needed. Currently patient and daughter feel they will be able to safely manage at home after surgery. They may be interested in HH pending therapy recommendations. TCN following as needed.

## 2017-12-01 NOTE — PROGRESS NOTES
Pt arrived to floor on gurney via pt transport. Pt A&Ox4.  Pt complaining of 5/10 pain to L hip, pt given pain medication in ED. Pt requesting to use the bedpan. Pt voided on bedpan, see flowsheets for details.      2 RN skin check performed with MARCO Perez.  Pt has blanchable redness to sacrum. Redness to R groin area, blanchable. No open wounds, bruises, or other skin problems noted.

## 2017-12-01 NOTE — THERAPY
Occupational Therapy Contact Note    OT order received, pt awaiting surgery. Will hold until post op and attempt as appropriate.    Meli Sales, OTR/L

## 2017-12-01 NOTE — CARE PLAN
Problem: Safety  Goal: Will remain free from injury  Outcome: PROGRESSING AS EXPECTED  Call light within reach, pt calls for assistance at all times. Bed alarm in place for safety. Bed in low position and locked, upper bedside rails up, proper mobility signs placed, personal possessions within reach, treaded socks on. Hourly rounding in place.          Problem: Venous Thromboembolism (VTW)/Deep Vein Thrombosis (DVT) Prevention:  Goal: Patient will participate in Venous Thrombosis (VTE)/Deep Vein Thrombosis (DVT)Prevention Measures  Outcome: PROGRESSING AS EXPECTED  SCD to RLE, pt refusing to wear SCD to LLE due to pain.

## 2017-12-01 NOTE — ED NOTES
Pt sent to ER for left femoral neck fx after GLF on Sunday. Pt tripped over a blanket. No LOC. Pt advised to return to triage nurse for any changes or concerns.

## 2017-12-01 NOTE — DISCHARGE PLANNING
Pt currently in surgery. SW to follow pt/therpay notes to determine if choice needs to be made for SNF or HH.

## 2017-12-01 NOTE — RESPIRATORY CARE
COPD EDUCATION by COPD CLINICAL EDUCATOR  12/1/2017 at 7:00 AM by Paola Rendon     Patient reviewed by COPD education team. Patient does not qualify for COPD program.

## 2017-12-01 NOTE — H&P
DATE OF ADMISSION:  11/30/2017.    PRIMARY CARE PHYSICIAN:  OLINDA Gerard.    CHIEF COMPLAINT:  Fall at home and pain.    HISTORY OF PRESENT ILLNESS:  Patient is an 83-year-old female who has had a   history of diabetes and depression and possibly dementia according to her   daughter, she presents to the hospital complaining of hip pain.  Apparently,   the patient had a fall on some carpet this past Sunday.  Since that time   period she has had decreased ambulation secondary to pain and finally she   decided to come to the emergency department because the pain was not   improving.  Here, she was found to have a left proximal femur neck fracture.    She denies any palpitations, lightheadedness prior to the event.    REVIEW OF SYSTEMS:  As per HPI.  All other systems have been reviewed and are   negative.    ALLERGIES:  No known drug allergies.    PAST MEDICAL HISTORY:  1.  Arthritis.  2.  Diabetes.  3.  Glaucoma.  4.  Macular degeneration.  5.  Urinary incontinence.  6.  Depression.  7.  Possible dementia.    FAMILY HISTORY:  Has been reviewed and is not pertinent to her current   presentation.    SOCIAL HISTORY:  Negative for tobacco, drugs, or alcohol use.    PAST SURGICAL HISTORY:  1.  She has had a cystoscopy.  2.  Abdominal hysterectomy.  3.  Tonsillectomy.  4.  Eye surgery.    HOME MEDICATIONS:  1.  Metformin 500 mg daily.  2.  Multivitamin daily.  3.  Zoloft 100 mg daily.    PHYSICAL EXAMINATION:  VITAL SIGNS:  Blood pressure is 121/67, pulse of 85, respirations 18,   temperature 37, oxygen saturation 90% on room air, and weight is 65.77   kilograms.  GENERAL:  Patient is pleasant, resting comfortably, in mild distress secondary   to pain.  HEENT:  Moist mucous membranes, oropharyngeal exudates.  EYES:  EOMI, PERRLA.  NECK:  No lymphadenopathy, no JVD.  CARDIOVASCULAR:  Regular rate and rhythm.  No murmurs.  LUNGS:  Clear to auscultation bilaterally.  No rales, rhonchi, or wheezes.  ABDOMEN:  Positive  bowel sounds, soft, nontender, nondistended.  NEUROLOGIC:  She is awake, alert, and oriented to person, place, time, and   situation.  MUSCULOSKELETAL:  She has left hip tenderness and decreased range of motion   secondary to pain.  EXTREMITIES:  No clubbing, cyanosis, or edema.    LABORATORY DATA:  WBC is 10, hemoglobin of 15.3, hematocrit 45.2, and   platelets 222,000.  Sodium 136, potassium 4, BUN is 20, and creatinine 0.78.    ASSESSMENT AND PLAN:  Patient is an 83-year-old female that presents with left   hip pain.  1.  Left proximal femur neck fracture.  Orthopedic surgery has been consulted   and plan is for surgery in the morning.  She will be kept n.p.o.  We will put   her on fall precautions.  We will treat her with intravenous fluids, and   intravenous and oral pain medication.  She will need physical therapy and   occupational therapy as well.  We will start her on deep venous thrombosis   prophylaxis also.  2.  History of depression.  3.  History of diabetes.  We are going to hold her metformin.  Her glucose   levels appear to be normal for now.  4.  She is a full code.       ____________________________________     MD EMILIA Syed / FRANCESCA    DD:  12/01/2017 01:15:19  DT:  12/01/2017 03:35:20    D#:  [id number]  Job#:  [id number]

## 2017-12-01 NOTE — ED PROVIDER NOTES
ED PROVIDER NOTE     Scribed for Mihir Tineo M.D. by Bhavesh Arreguin. 11/30/2017, 10:42 PM.    CHIEF COMPLAINT  Chief Complaint   Patient presents with   • Hip Pain       HPI    Primary care provider: LETY Kulkarni  Means of arrival: Walk in  History obtained from: Patient's mother  History limited by: None    Herminiawilmer Jones is a 83 y.o. female with a history of diabetes who presents with hip pain onset prior to arrival. The patient reports pain is worse on the left hip than the right. She was sent to the ED for evaluation of a left femoral neck fracture. The patient's daughter reports the patient was involved in a ground level fall 4 days ago. She tripped over a blanket while putting her grandson to bed. The patient landed on her side. She did not experience loss of consciousness or head trauma. The patient's daughter reports the patient has been taking Tylenol and Ibuprofen for pain since the fall. She states she is not on any blood thinners and has no history of DVT's. The patient claims she had surgery about 2 months ago for a prolapsed bladder. She denies vomiting or hematuria. The patient has no history of falls. She does not identify any alleviating factors, worsened with ambulation with her walker.    REVIEW OF SYSTEMS  Constitutional: Negative for fever or chills.   HENT: Negative for nosebleeds or sore throat.    Eyes: Negative for vision changes or discharge.   Respiratory: Negative for cough or shortness of breath.    Cardiovascular: Negative for chest pain or palpitations.   Gastrointestinal: Negative for nausea, vomiting, abdominal pain.   Genitourinary: Negative for dysuria or flank pain.   Musculoskeletal: Positive left hip pain, no back pain  Skin: Negative for itching or rash.   Neurological: Negative for sensory or motor changes.   Endo/Heme/Allergies: Negative for weight changes or hives.   Psychiatric/Behavioral: Negative for depression or suicidal ideas.   C.    PAST MEDICAL  "HISTORY   has a past medical history of Arthritis; Dental disorder; Diabetes (CMS-HCC); Glaucoma; Macular degeneration; Psychiatric problem; Urinary bladder disorder; and Urinary incontinence.    PAST FAMILY HISTORY  Family History   Problem Relation Age of Onset   • Stroke Father    • Cancer Sister    • Cancer Brother    • Heart Disease Sister    • Cancer Brother        SOCIAL HISTORY  Social History     Social History Main Topics   • Smoking status: Never Smoker   • Smokeless tobacco: Never Used   • Alcohol use No   • Drug use: No   • Sexual activity: Not Currently       SURGICAL HISTORY   has a past surgical history that includes abdominal hysterectomy total; lefort colpoclesis (6/2/2017); and cystoscopy (6/2/2017).    CURRENT MEDICATIONS  Home Medications     Reviewed by Terry Winn (Pharmacy Tech) on 11/30/17 at 2354  Med List Status: Complete   Medication Last Dose Status   metformin (GLUCOPHAGE) 500 MG Tab 11/29/2017 Active   Multiple Vitamins-Minerals (OCUVITE-LUTEIN) Tab 11/30/2017 Active   sertraline (ZOLOFT) 100 MG Tab 11/29/2017 Active                ALLERGIES  No Known Allergies    PHYSICAL EXAM  VITAL SIGNS: /67   Pulse 69   Temp 37 °C (98.6 °F)   Resp 18   Ht 1.626 m (5' 4\")   Wt 65.8 kg (145 lb)   SpO2 99%   BMI 24.89 kg/m²    Pulse ox interpretation: On room air, I interpret this pulse ox as normal.  Constitutional: Well developed, well nourished.Appears uncomfortable but she is in no acute distress.  HEENT: Normocephalic, atraumatic. Posterior pharynx clear, mucous membranes moist. No hemotympanum, Hutchins's, or raccoon's.  Eyes:  EOMI. Normal sclera.  Neck: Supple, Full range of motion, nontender.  Chest/Pulmonary: Clear to ausculation bilaterally, no wheezes or rhonchi.  Cardiovascular: Regular rate and rhythm, no murmur.   Abdomen: Soft, nontender, no rebound, guarding, or masses.  Back: No CVA tenderness, nontender midline along the C, T and L-spine, no step " offs.  Musculoskeletal: Left hip tenderness, ROM limited secondary to pain, No deformity, no edema. No bruising.   Neuro: Clear speech, normal coordination, cranial nerves II-XII grossly intact.  Psych: Normal mood and affect.  Skin: No rashes, warm and dry.    DIAGNOSTIC STUDIES / PROCEDURES    LABS & EKG  Results for orders placed or performed during the hospital encounter of 11/30/17   CBC WITH DIFFERENTIAL   Result Value Ref Range    WBC 10.0 4.8 - 10.8 K/uL    RBC 5.24 4.20 - 5.40 M/uL    Hemoglobin 15.3 12.0 - 16.0 g/dL    Hematocrit 45.2 37.0 - 47.0 %    MCV 86.3 81.4 - 97.8 fL    MCH 29.2 27.0 - 33.0 pg    MCHC 33.8 33.6 - 35.0 g/dL    RDW 46.2 35.9 - 50.0 fL    Platelet Count 222 164 - 446 K/uL    MPV 9.5 9.0 - 12.9 fL    Neutrophils-Polys 58.10 44.00 - 72.00 %    Lymphocytes 26.80 22.00 - 41.00 %    Monocytes 9.10 0.00 - 13.40 %    Eosinophils 4.40 0.00 - 6.90 %    Basophils 0.80 0.00 - 1.80 %    Immature Granulocytes 0.80 0.00 - 0.90 %    Nucleated RBC 0.00 /100 WBC    Neutrophils (Absolute) 5.80 2.00 - 7.15 K/uL    Lymphs (Absolute) 2.67 1.00 - 4.80 K/uL    Monos (Absolute) 0.91 (H) 0.00 - 0.85 K/uL    Eos (Absolute) 0.44 0.00 - 0.51 K/uL    Baso (Absolute) 0.08 0.00 - 0.12 K/uL    Immature Granulocytes (abs) 0.08 0.00 - 0.11 K/uL    NRBC (Absolute) 0.00 K/uL   BASIC METABOLIC PANEL   Result Value Ref Range    Sodium 136 135 - 145 mmol/L    Potassium 4.0 3.6 - 5.5 mmol/L    Chloride 104 96 - 112 mmol/L    Co2 21 20 - 33 mmol/L    Glucose 98 65 - 99 mg/dL    Bun 20 8 - 22 mg/dL    Creatinine 0.78 0.50 - 1.40 mg/dL    Calcium 10.1 8.5 - 10.5 mg/dL    Anion Gap 11.0 0.0 - 11.9   PROTHROMBIN TIME   Result Value Ref Range    PT 13.1 12.0 - 14.6 sec    INR 1.02 0.87 - 1.13   ESTIMATED GFR   Result Value Ref Range    GFR If African American >60 >60 mL/min/1.73 m 2    GFR If Non African American >60 >60 mL/min/1.73 m 2   EKG (ER)   Result Value Ref Range    Report       [facility] Emergency  Dept.    Test Date:  2017  Pt Name:    JAS LANE             Department: ER  MRN:        [id number]                      Room:       [facility]  Gender:     F                            Technician: [id number]  :        [id number]                   Requested By:MIHIR FALCON  Order #:    [id number]                    Reading MD: Mihir Falcon MD    Measurements  Intervals                                Axis  Rate:       146                          P:          76  DE:         132                          QRS:        92  QRSD:       154                          T:          262  QT:         316  QTc:        493    Interpretive Statements  Normal sinus rhythm, no STEMI or strain or dysrhythmia, computer incorrectly  calculated rate at 146 I count a rate of approximately 80  ARTIFACT IN LEAD(S) I,II,III,aVR,aVL,aVF,V1,V2,V3,V4,V5  Compared to ECG 2017 11:31:41      Electronically Signed On 2017 3:29:44 PST by Mihir Falcon MD         EKG  I personally reviewed the patient's EKG in the absence of a cardiologist and my findings are as follows:     Indication: Fall    I disagree with computer's interpretation  Interpretation: Rate: approximately 80, Rhythm: Sinus Rhythm, Intervals:  otherwise normal, ST Segments: No ST elevations or depression, No concerning inversion, Shaky baseline due to tremor.  Compared with prior EKG from 17 which indicates no change    Impression: Stable EKG    RADIOLOGY  DX-PELVIS-1 OR 2 VIEWS   Final Result      LEFT subcapital femoral neck fracture      DX-CHEST-PORTABLE (1 VIEW)   Final Result      1.  No acute cardiac or pulmonary abnormalities are identified.   2.  Emphysema   3.  Atherosclerosis          COURSE & MEDICAL DECISION MAKING    This is a 83 y.o. female who presents withLeft hip pain for the last 4 days after mechanical ground level fall. Outpatient x-ray shows left femoral neck fracture. Afebrile, vital signs stable.    Differential Diagnosis  includes but is not limited to:  Fracture, dislocation, contusion, anemia, dysrhythmia, neurovascular injury    ED Course:  10:52 PM - Patient seen and evaluated at bedside. Ordered PTT, BMP, and CBC, EKG, DX-Chest, DX-Pelvis, and DX-Femur. Treated with morphine 2mg. She was additionally given 1L NS for possible dehydration.    X-ray shows a left femoral neck fracture, no distal femoral fracture.    11:26 PM - [facility].    11:29 PM - Consult with Dr. Cano, Orthopedics,  who agrees to see the patient in the morning. Requested admission to medicine.    11:30 PM - Paged Hospitalist.    11:34 PM - Consult with Dr. Cantor, Hospitalist, who agrees to admit the patient.    Pain well-controlled after parenteral morphine, the patient is hemodynamically stable for transfer to the floor in guarded condition. Good pulse in both lower extremities, sensation intact, doubt neurovascular injury. Soft abdomen, no chest wall or thorax tenderness, doubt rib fracture, hemopneumothorax, or solid injury of the abdomen. Lab stable with no evidence of anemia, acidosis, or coagulopathy.      Medications   sertraline (ZOLOFT) tablet 100 mg (100 mg Oral Given 12/1/17 0214)   senna-docusate (PERICOLACE or SENOKOT S) 8.6-50 MG per tablet 2 Tab (2 Tabs Oral Given 12/1/17 0214)     And   polyethylene glycol/lytes (MIRALAX) PACKET 1 Packet (not administered)     And   magnesium hydroxide (MILK OF MAGNESIA) suspension 30 mL (not administered)     And   bisacodyl (DULCOLAX) suppository 10 mg (not administered)   NS infusion ( Intravenous New Bag 12/1/17 0214)   ondansetron (ZOFRAN) syringe/vial injection 4 mg (not administered)   ondansetron (ZOFRAN ODT) dispertab 4 mg (not administered)   oxycodone immediate-release (ROXICODONE) tablet 2.5 mg (not administered)     And   oxycodone immediate-release (ROXICODONE) tablet 5 mg (5 mg Oral Given 12/1/17 0016)     And   morphine (pf) 4 mg/ml injection 2 mg (not administered)   acetaminophen (TYLENOL)  tablet 650 mg (not administered)   heparin injection 5,000 Units (not administered)   NS infusion 1,000 mL (0 mL Intravenous Stopped 12/1/17 7742)   morphine (pf) 4 mg/ml injection 2 mg (2 mg Intravenous Given 11/30/17 0471)     FINAL IMPRESSION  1. Closed fracture of neck of left femur, initial encounter (CMS-[facility])    2. Fall, initial encounter      -ADMIT-    Pertinent Labs & Imaging studies reviewed and verified by myself, as well as nursing notes and the patient's past medical, family, and social histories (See chart for details).    Results, exam findings, clinical impression, presumed diagnosis, treatment options, and need for admit were discussed with the patient and family, and they verbalized understanding, agreed with, and appreciated the plan of care.    Bhvaesh ZAVALA ([place]), am scribing for, and in the presence of, Mihir Tieno M.D..    Electronically signed by: Bhavesh Arreguin ([place]), 11/30/2017    IMihir M.D. personally performed the services described in this documentation, as scribed by Bhavesh Arreguin in my presence, and it is both accurate and complete.    The note accurately reflects work and decisions made by me.  Mihir Tineo  12/1/2017  3:32 AM

## 2017-12-01 NOTE — ASSESSMENT & PLAN NOTE
Acute Left proximal femoral neck fracture.  Patient is NPO. Plans for surgical interventions in place for today.   Continue pain control.   Weight bearing status per orthopedics team  PT/OT  DVT PPx with SCD and SC lovenox  Post acute placement as clinically appropriate  Vitamin d supplementation  Outpatient DEXA with PCP following discharge

## 2017-12-01 NOTE — ED NOTES
Med rec completed per pt and daughter at bedside  Allergies reviewed - NKDA  No ABX in last 30 days

## 2017-12-02 LAB
25(OH)D3 SERPL-MCNC: 11 NG/ML (ref 30–100)
ANION GAP SERPL CALC-SCNC: 7 MMOL/L (ref 0–11.9)
BUN SERPL-MCNC: 14 MG/DL (ref 8–22)
CALCIUM SERPL-MCNC: 9.2 MG/DL (ref 8.5–10.5)
CHLORIDE SERPL-SCNC: 106 MMOL/L (ref 96–112)
CO2 SERPL-SCNC: 24 MMOL/L (ref 20–33)
CREAT SERPL-MCNC: 0.73 MG/DL (ref 0.5–1.4)
ERYTHROCYTE [DISTWIDTH] IN BLOOD BY AUTOMATED COUNT: 45.1 FL (ref 35.9–50)
GFR SERPL CREATININE-BSD FRML MDRD: >60 ML/MIN/1.73 M 2
GLUCOSE BLD-MCNC: 85 MG/DL (ref 65–99)
GLUCOSE BLD-MCNC: 88 MG/DL (ref 65–99)
GLUCOSE SERPL-MCNC: 94 MG/DL (ref 65–99)
HCT VFR BLD AUTO: 40.9 % (ref 37–47)
HGB BLD-MCNC: 13.8 G/DL (ref 12–16)
MAGNESIUM SERPL-MCNC: 2.1 MG/DL (ref 1.5–2.5)
MCH RBC QN AUTO: 29.3 PG (ref 27–33)
MCHC RBC AUTO-ENTMCNC: 33.7 G/DL (ref 33.6–35)
MCV RBC AUTO: 86.8 FL (ref 81.4–97.8)
PHOSPHATE SERPL-MCNC: 3.1 MG/DL (ref 2.5–4.5)
PLATELET # BLD AUTO: 215 K/UL (ref 164–446)
PMV BLD AUTO: 9.5 FL (ref 9–12.9)
POTASSIUM SERPL-SCNC: 3.8 MMOL/L (ref 3.6–5.5)
RBC # BLD AUTO: 4.71 M/UL (ref 4.2–5.4)
SODIUM SERPL-SCNC: 137 MMOL/L (ref 135–145)
WBC # BLD AUTO: 9.3 K/UL (ref 4.8–10.8)

## 2017-12-02 PROCEDURE — 97161 PT EVAL LOW COMPLEX 20 MIN: CPT | Performed by: PHYSICAL THERAPIST

## 2017-12-02 PROCEDURE — 99232 SBSQ HOSP IP/OBS MODERATE 35: CPT | Performed by: INTERNAL MEDICINE

## 2017-12-02 PROCEDURE — 700102 HCHG RX REV CODE 250 W/ 637 OVERRIDE(OP): Performed by: HOSPITALIST

## 2017-12-02 PROCEDURE — A9270 NON-COVERED ITEM OR SERVICE: HCPCS | Performed by: HOSPITALIST

## 2017-12-02 PROCEDURE — 85027 COMPLETE CBC AUTOMATED: CPT

## 2017-12-02 PROCEDURE — 700111 HCHG RX REV CODE 636 W/ 250 OVERRIDE (IP): Performed by: ORTHOPAEDIC SURGERY

## 2017-12-02 PROCEDURE — 700102 HCHG RX REV CODE 250 W/ 637 OVERRIDE(OP): Performed by: ORTHOPAEDIC SURGERY

## 2017-12-02 PROCEDURE — G8978 MOBILITY CURRENT STATUS: HCPCS | Mod: CJ | Performed by: PHYSICAL THERAPIST

## 2017-12-02 PROCEDURE — 36415 COLL VENOUS BLD VENIPUNCTURE: CPT

## 2017-12-02 PROCEDURE — A9270 NON-COVERED ITEM OR SERVICE: HCPCS | Performed by: ORTHOPAEDIC SURGERY

## 2017-12-02 PROCEDURE — 83735 ASSAY OF MAGNESIUM: CPT

## 2017-12-02 PROCEDURE — 80048 BASIC METABOLIC PNL TOTAL CA: CPT

## 2017-12-02 PROCEDURE — 84100 ASSAY OF PHOSPHORUS: CPT

## 2017-12-02 PROCEDURE — 700105 HCHG RX REV CODE 258: Performed by: HOSPITALIST

## 2017-12-02 PROCEDURE — 700102 HCHG RX REV CODE 250 W/ 637 OVERRIDE(OP): Performed by: INTERNAL MEDICINE

## 2017-12-02 PROCEDURE — 700112 HCHG RX REV CODE 229: Performed by: ORTHOPAEDIC SURGERY

## 2017-12-02 PROCEDURE — 82962 GLUCOSE BLOOD TEST: CPT

## 2017-12-02 PROCEDURE — 770001 HCHG ROOM/CARE - MED/SURG/GYN PRIV*

## 2017-12-02 PROCEDURE — 82306 VITAMIN D 25 HYDROXY: CPT

## 2017-12-02 PROCEDURE — A9270 NON-COVERED ITEM OR SERVICE: HCPCS | Performed by: INTERNAL MEDICINE

## 2017-12-02 PROCEDURE — G8979 MOBILITY GOAL STATUS: HCPCS | Mod: CI | Performed by: PHYSICAL THERAPIST

## 2017-12-02 RX ORDER — ERGOCALCIFEROL 1.25 MG/1
50000 CAPSULE ORAL
Status: DISCONTINUED | OUTPATIENT
Start: 2017-12-02 | End: 2017-12-03 | Stop reason: HOSPADM

## 2017-12-02 RX ADMIN — ACETAMINOPHEN 650 MG: 325 TABLET, FILM COATED ORAL at 06:14

## 2017-12-02 RX ADMIN — STANDARDIZED SENNA CONCENTRATE AND DOCUSATE SODIUM 2 TABLET: 8.6; 5 TABLET, FILM COATED ORAL at 09:18

## 2017-12-02 RX ADMIN — CEFAZOLIN SODIUM 1 G: 1 INJECTION, SOLUTION INTRAVENOUS at 06:14

## 2017-12-02 RX ADMIN — ACETAMINOPHEN 650 MG: 325 TABLET, FILM COATED ORAL at 02:03

## 2017-12-02 RX ADMIN — SODIUM CHLORIDE: 9 INJECTION, SOLUTION INTRAVENOUS at 09:19

## 2017-12-02 RX ADMIN — KETOROLAC TROMETHAMINE 15 MG: 30 INJECTION, SOLUTION INTRAMUSCULAR at 02:04

## 2017-12-02 RX ADMIN — ERGOCALCIFEROL 50000 UNITS: 1.25 CAPSULE ORAL at 11:33

## 2017-12-02 RX ADMIN — ENOXAPARIN SODIUM 40 MG: 100 INJECTION SUBCUTANEOUS at 02:04

## 2017-12-02 RX ADMIN — DOCUSATE SODIUM 100 MG: 100 CAPSULE ORAL at 09:18

## 2017-12-02 RX ADMIN — KETOROLAC TROMETHAMINE 15 MG: 30 INJECTION, SOLUTION INTRAMUSCULAR at 21:35

## 2017-12-02 RX ADMIN — SERTRALINE 100 MG: 100 TABLET, FILM COATED ORAL at 20:27

## 2017-12-02 RX ADMIN — ACETAMINOPHEN 650 MG: 325 TABLET, FILM COATED ORAL at 20:27

## 2017-12-02 RX ADMIN — ACETAMINOPHEN 650 MG: 325 TABLET, FILM COATED ORAL at 13:32

## 2017-12-02 RX ADMIN — KETOROLAC TROMETHAMINE 15 MG: 30 INJECTION, SOLUTION INTRAMUSCULAR at 16:23

## 2017-12-02 RX ADMIN — KETOROLAC TROMETHAMINE 15 MG: 30 INJECTION, SOLUTION INTRAMUSCULAR at 09:19

## 2017-12-02 ASSESSMENT — LIFESTYLE VARIABLES: DO YOU DRINK ALCOHOL: NO

## 2017-12-02 ASSESSMENT — PATIENT HEALTH QUESTIONNAIRE - PHQ9
SUM OF ALL RESPONSES TO PHQ9 QUESTIONS 1 AND 2: 0
1. LITTLE INTEREST OR PLEASURE IN DOING THINGS: NOT AT ALL
SUM OF ALL RESPONSES TO PHQ QUESTIONS 1-9: 0
2. FEELING DOWN, DEPRESSED, IRRITABLE, OR HOPELESS: NOT AT ALL

## 2017-12-02 ASSESSMENT — ENCOUNTER SYMPTOMS
HEARTBURN: 0
SHORTNESS OF BREATH: 0
WEAKNESS: 1
VOMITING: 0
DIARRHEA: 0
NAUSEA: 0
CONSTIPATION: 0
HEADACHES: 0
PND: 0
DIZZINESS: 0
BLURRED VISION: 0
DIAPHORESIS: 0
FLANK PAIN: 0
PALPITATIONS: 0
ABDOMINAL PAIN: 0
SPUTUM PRODUCTION: 0
FALLS: 1
DOUBLE VISION: 0
HEMOPTYSIS: 0
DEPRESSION: 1
BLOOD IN STOOL: 0
CHILLS: 0
FEVER: 0
COUGH: 0

## 2017-12-02 ASSESSMENT — COGNITIVE AND FUNCTIONAL STATUS - GENERAL
SUGGESTED CMS G CODE MODIFIER MOBILITY: CI
MOBILITY SCORE: 23
CLIMB 3 TO 5 STEPS WITH RAILING: A LITTLE

## 2017-12-02 ASSESSMENT — GAIT ASSESSMENTS
ASSISTIVE DEVICE: FRONT WHEEL WALKER
GAIT LEVEL OF ASSIST: SUPERVISED
DEVIATION: DECREASED HEEL STRIKE;DECREASED TOE OFF
DISTANCE (FEET): 400

## 2017-12-02 ASSESSMENT — PAIN SCALES - GENERAL
PAINLEVEL_OUTOF10: 0
PAINLEVEL_OUTOF10: 5

## 2017-12-02 ASSESSMENT — PAIN SCALES - WONG BAKER: WONGBAKER_NUMERICALRESPONSE: DOESN'T HURT AT ALL

## 2017-12-02 NOTE — CARE PLAN
Problem: Communication  Goal: The ability to communicate needs accurately and effectively will improve  Outcome: PROGRESSING AS EXPECTED  Reoriented pt to environment as needed; white board updated with [facility] staff and return time. Pt notified of hourly rounding and unit routine.    Appropriate signs in place at doorway for pt. Pt allowed time to ask questions; no questions at this time.    Problem: Mobility  Goal: Risk for activity intolerance will decrease  Outcome: PROGRESSING AS EXPECTED  Pt is 1A with FWW. Pt OOB on POD #0. Pt sitting up in bed and up in bathroom during NOC shift; pt tolerated it well. Rest periods encouraged routinely and when needed. Ambulation and range of motion promoted. Call light used appropriately.

## 2017-12-02 NOTE — PROGRESS NOTES
Family states plan is for pt to come home with them as they have some one there 24/7 as well as all equipment.

## 2017-12-02 NOTE — PROGRESS NOTES
Returned to room from surgery.  Awake, A&O, VSS.  O2 in place at 2lpm via NC.  Left hip dressing is CDI, ice pack in place.  Rates pain level as 3/10.  Denies nausea, diabetic diet order placed.  SCD placed to RLE.  Placed SCD to LLE but patient stated it increased the pain level and requested the SCD to be removed.  Family is at bedside, patient updated on POC, patient verbalizes understanding.

## 2017-12-02 NOTE — OP REPORT
DATE OF SERVICE:  12/01/2017    PREOPERATIVE DIAGNOSIS:  Left femoral neck fracture.    POSTOPERATIVE DIAGNOSIS:  Left femoral neck fracture.    PROCEDURE:  Closed reduction and percutaneous pin fixation, left femoral neck.    SURGEON:  Abhinav Orona MD.    ASSISTANT:  Lance Yusuf PA-C    ESTIMATED BLOOD LOSS:  None.    INDICATIONS:  This is an 83-year-old female status post a fall, during which   she sustained a minimally displaced femoral neck fracture.  Risks and benefits   of percutaneous pinning were discussed at length versus arthroplasty were   discussed which include, but not limited to bleeding, infection, neurovascular   damage, pain, stiffness, malunion, nonunion, DVT, PE, MI, stroke, and death.    They understand all these risks and wish to proceed.    DESCRIPTION OF PROCEDURE:  Patient was sedated with LMA anesthesia and   administered preoperative antibiotics.  She was placed on the fracture table   and the left hip was prepped and draped in the usual sterile fashion.  A 3 OIC   partially threaded 7-0 cannulated screws were inserted in an inverted   triangular fashion according to standardized techniques.  Good bite was   achieved on all screws.  All screws were checked and found of appropriate   length and out of the joint.  Wounds were irrigated, closed with staples.    Sterile dressings were applied.  Patient tolerated the procedure well.    POSTOPERATIVE PLAN:  The patient will be weightbearing as tolerated, admitted   under medicine service for perioperative antibiotics, DVT prophylaxis, and   pain control.       ____________________________________     ABHINAV ORONA MD [facility] / N[facility]    DD:  12/01/2017 14:23:45  DT:  12/01/2017 16:45:44    D#:  [id number]  Job#:  [id number]

## 2017-12-02 NOTE — PROGRESS NOTES
"Patient seen and examined    Blood pressure 117/52, pulse 72, temperature 36.5 °C (97.7 °F), resp. rate 16, height 1.626 m (5' 4\"), weight 65.8 kg (145 lb), SpO2 95 %, not currently breastfeeding.    Recent Labs      11/30/17   2308  12/02/17   0255   WBC  10.0  9.3   RBC  5.24  4.71   HEMOGLOBIN  15.3  13.8   HEMATOCRIT  45.2  40.9   MCV  86.3  86.8   MCH  29.2  29.3   MCHC  33.8  33.7   RDW  46.2  45.1   PLATELETCT  222  215   MPV  9.5  9.5       No acute distress  Dressing clean dry and intact  Neurovascularly intact    POD#1 S/P CRPP Left hip    Plan:  DVT Prophylaxis- TEDS/SCDs, Lovenox  Weight Bearing Status-WBAT  PT/OT  Antibiotics: 24 hrs post op  Case Coordination          "

## 2017-12-02 NOTE — CARE PLAN
Problem: Safety  Goal: Will remain free from falls    Intervention: Assess risk factors for falls  Fall risk assessment completed.       Problem: Pain Management  Goal: Pain level will decrease to patient's comfort goal    Intervention: Follow pain managment plan developed in collaboration with patient and Interdisciplinary Team  Medicated for pain per mar.

## 2017-12-02 NOTE — PROGRESS NOTES
Pt A&O x 4. Family at bedside during change of shift. PRN pain medication given per MAR. Pt on 2 L NC with no shortness of breath. LLE dressing DCI. Pt uses call light appropriately. Personal belongings and call light within reach. OOB on POD #2.  and SCDs on. Ice utilized. Discussed POC, safety, and mobility; pt has no questions at this time. Bed in lowest position with treaded socks on pt. Hourly rounding in place.

## 2017-12-02 NOTE — PROGRESS NOTES
Bedside report received from Kadi LARA.  Pt a/a/o with no c/o at this time, oxygen by NC, IV infusing as needed, dressing to lt hip is intact, fall precautions in place, pt calls for assist. Patient is ambulating with FWW.  Pt is planning for rehab/snf at this time.

## 2017-12-02 NOTE — PROGRESS NOTES
[facility] Hospitalist Progress Note    Date of Service: 2017    Chief Complaint  83 y.o. female admitted 2017 with fall at home c/b left femoral fracture.     Interval Problem Update  Patient seen and evaluated on rounds.   S/p surgical correction yesterday.   No acute complaints.   Pain is controlled.   All questions / concerns from patient / family perspective answered.   Post operative imaging discussed with patient / family    Consultants/Specialty  Orthopedics    Disposition  Home with [facility] vs SNF  Will place a SNF referral for choice evaluation        Review of Systems   Constitutional: Positive for malaise/fatigue. Negative for chills, diaphoresis and fever.   HENT: Negative for hearing loss and tinnitus.    Eyes: Negative for blurred vision and double vision.   Respiratory: Negative for cough, hemoptysis, sputum production and shortness of breath.    Cardiovascular: Negative for chest pain, palpitations and PND.   Gastrointestinal: Negative for abdominal pain, blood in stool, constipation, diarrhea, heartburn, melena, nausea and vomiting.   Genitourinary: Negative for dysuria, flank pain, frequency, hematuria and urgency.   Musculoskeletal: Positive for falls and joint pain.   Skin: Negative for itching and rash.   Neurological: Positive for weakness. Negative for dizziness and headaches.   Psychiatric/Behavioral: Positive for depression. Negative for suicidal ideas.      Physical Exam  Laboratory/Imaging   Hemodynamics  Temp (24hrs), Av.7 °C (98.1 °F), Min:36.2 °C (97.1 °F), Max:37.4 °C (99.4 °F)   Temperature: 36.6 °C (97.9 °F)  Pulse  Av  Min: 58  Max: 85 Heart Rate (Monitored): 82  Blood Pressure : 115/65, NIBP: 100/52      Respiratory      Respiration: 16, Pulse Oximetry: 90 %             Fluids    Intake/Output Summary (Last 24 hours) at 17 1013  Last data filed at 17 0600   Gross per 24 hour   Intake             3752 ml   Output                0 ml   Net             3752 ml        Nutrition  Orders Placed This Encounter   Procedures   • DIET ORDER     Standing Status:   Standing     Number of Occurrences:   1     Order Specific Question:   Diet:     Answer:   Diabetic [3]     Order Specific Question:   Texture/Fiber modifications:     Answer:   Dysphagia 3(Mechanical Soft)specify fluid consistency(question 6) [3]     Order Specific Question:   Consistency/Fluid modifications:     Answer:   Thin Liquids [3]     Physical Exam   Constitutional: She is oriented to person, place, and time. She appears well-developed and well-nourished. No distress.   HENT:   Head: Normocephalic.   Mouth/Throat: Oropharynx is clear and moist. No oropharyngeal exudate.   Eyes: Conjunctivae are normal. Pupils are equal, round, and reactive to light. No scleral icterus.   Neck: No JVD present.   Cardiovascular: Normal rate, regular rhythm and normal heart sounds.  Exam reveals no gallop and no friction rub.    No murmur heard.  Pulmonary/Chest: Breath sounds normal. No stridor. No respiratory distress. She has no wheezes. She has no rales.   Abdominal: Soft. Bowel sounds are normal. She exhibits no distension. There is no tenderness. There is no rebound and no guarding.   Musculoskeletal: Normal range of motion. She exhibits edema, tenderness and deformity.   Left femoral fracture.    Neurological: She is alert and oriented to person, place, and time. No cranial nerve deficit.   Skin: Skin is warm and dry. She is not diaphoretic.   Psychiatric: She has a normal mood and affect. Her behavior is normal. Judgment and thought content normal.       Recent Labs      11/30/17 2308 12/02/17   0255   WBC  10.0  9.3   RBC  5.24  4.71   HEMOGLOBIN  15.3  13.8   HEMATOCRIT  45.2  40.9   MCV  86.3  86.8   MCH  29.2  29.3   MCHC  33.8  33.7   RDW  46.2  45.1   PLATELETCT  222  215   MPV  9.5  9.5     Recent Labs      11/30/17 2308 12/02/17   0255   SODIUM  136  137   POTASSIUM  4.0  3.8   CHLORIDE  104  106   CO2  21   24   GLUCOSE  98  94   BUN  20  14   CREATININE  0.78  0.73   CALCIUM  10.1  9.2     Recent Labs      11/30/17   2308   INR  1.02                  Assessment/Plan     Femur fracture (CMS-HCC)- (present on admission)   Assessment & Plan    Acute Left proximal femoral neck fracture.  Patient is NPO. Plans for surgical interventions in place for today.   Continue pain control.   Weight bearing status per orthopedics team  PT/OT  DVT PPx with SCD and SC lovenox  Post acute placement as clinically appropriate  Vitamin d supplementation  Outpatient DEXA with PCP following discharge          Depression- (present on admission)   Assessment & Plan    Sertraline        Diabetes mellitus, type II (CMS-HCC)- (present on admission)   Assessment & Plan    Controlled. No manifestations.   Holding oral HG agents.   SSI during hospital stay.             Reviewed items::  Labs reviewed, Medications reviewed and Radiology images reviewed  Mckoy catheter::  No Mckoy  DVT prophylaxis pharmacological::  Enoxaparin (Lovenox)  DVT prophylaxis - mechanical:  SCDs  Ulcer Prophylaxis::  Not indicated

## 2017-12-02 NOTE — PROGRESS NOTES
Awake, A&O, VSS.  NPO since MN, awaiting surgery.  IVF infusing, medicated for pain.  SCD in place to RLE.  Using bedpan to void.  O2 in place at 2lpm.  Family is at bedside.  POC discussed, pt agrees and verbalizes understanding.  Hourly rounding in place, call light is within reach.  Assessment completed as charted.

## 2017-12-03 VITALS
BODY MASS INDEX: 26.38 KG/M2 | DIASTOLIC BLOOD PRESSURE: 65 MMHG | HEART RATE: 64 BPM | TEMPERATURE: 98.6 F | RESPIRATION RATE: 16 BRPM | OXYGEN SATURATION: 95 % | SYSTOLIC BLOOD PRESSURE: 120 MMHG | WEIGHT: 154.54 LBS | HEIGHT: 64 IN

## 2017-12-03 PROCEDURE — 90670 PCV13 VACCINE IM: CPT | Performed by: INTERNAL MEDICINE

## 2017-12-03 PROCEDURE — 99239 HOSP IP/OBS DSCHRG MGMT >30: CPT | Performed by: INTERNAL MEDICINE

## 2017-12-03 PROCEDURE — 97110 THERAPEUTIC EXERCISES: CPT

## 2017-12-03 PROCEDURE — 700111 HCHG RX REV CODE 636 W/ 250 OVERRIDE (IP): Performed by: ORTHOPAEDIC SURGERY

## 2017-12-03 PROCEDURE — G8989 SELF CARE D/C STATUS: HCPCS | Mod: CJ

## 2017-12-03 PROCEDURE — 90662 IIV NO PRSV INCREASED AG IM: CPT | Performed by: INTERNAL MEDICINE

## 2017-12-03 PROCEDURE — G8987 SELF CARE CURRENT STATUS: HCPCS | Mod: CJ

## 2017-12-03 PROCEDURE — 3E0234Z INTRODUCTION OF SERUM, TOXOID AND VACCINE INTO MUSCLE, PERCUTANEOUS APPROACH: ICD-10-PCS | Performed by: INTERNAL MEDICINE

## 2017-12-03 PROCEDURE — 700102 HCHG RX REV CODE 250 W/ 637 OVERRIDE(OP): Performed by: ORTHOPAEDIC SURGERY

## 2017-12-03 PROCEDURE — 700111 HCHG RX REV CODE 636 W/ 250 OVERRIDE (IP): Performed by: INTERNAL MEDICINE

## 2017-12-03 PROCEDURE — 97530 THERAPEUTIC ACTIVITIES: CPT

## 2017-12-03 PROCEDURE — A9270 NON-COVERED ITEM OR SERVICE: HCPCS | Performed by: ORTHOPAEDIC SURGERY

## 2017-12-03 PROCEDURE — 97165 OT EVAL LOW COMPLEX 30 MIN: CPT

## 2017-12-03 PROCEDURE — 90471 IMMUNIZATION ADMIN: CPT

## 2017-12-03 PROCEDURE — G8988 SELF CARE GOAL STATUS: HCPCS | Mod: CJ

## 2017-12-03 RX ORDER — ERGOCALCIFEROL 1.25 MG/1
50000 CAPSULE ORAL
Qty: 7 CAP | Refills: 0 | Status: SHIPPED | OUTPATIENT
Start: 2017-12-09 | End: 2017-12-08

## 2017-12-03 RX ORDER — HYDROCODONE BITARTRATE AND ACETAMINOPHEN 5; 325 MG/1; MG/1
1-2 TABLET ORAL EVERY 4 HOURS PRN
Qty: 20 TAB | Refills: 0 | Status: SHIPPED | OUTPATIENT
Start: 2017-12-03 | End: 2017-12-08

## 2017-12-03 RX ADMIN — KETOROLAC TROMETHAMINE 15 MG: 30 INJECTION, SOLUTION INTRAMUSCULAR at 10:50

## 2017-12-03 RX ADMIN — PNEUMOCOCCAL 13-VALENT CONJUGATE VACCINE 0.5 ML: 2.2; 2.2; 2.2; 2.2; 2.2; 4.4; 2.2; 2.2; 2.2; 2.2; 2.2; 2.2; 2.2 INJECTION, SUSPENSION INTRAMUSCULAR at 10:55

## 2017-12-03 RX ADMIN — INFLUENZA A VIRUSA/MICHIGAN/45/2015 X-275 (H1N1) ANTIGEN (FORMALDEHYDE INACTIVATED), INFLUENZA A VIRUS A/HONG KONG/4801/2014 X-263B (H3N2) ANTIGEN (FORMALDEHYDE INACTIVATED), AND INFLUENZA B VIRUS B/BRISBANE/60/2008 ANTIGEN (FORMALDEHYDE INACTIVATED) 0.5 ML: 60; 60; 60 INJECTION, SUSPENSION INTRAMUSCULAR at 10:57

## 2017-12-03 RX ADMIN — ENOXAPARIN SODIUM 40 MG: 100 INJECTION SUBCUTANEOUS at 02:25

## 2017-12-03 RX ADMIN — ACETAMINOPHEN 650 MG: 325 TABLET, FILM COATED ORAL at 08:42

## 2017-12-03 RX ADMIN — ACETAMINOPHEN 650 MG: 325 TABLET, FILM COATED ORAL at 02:25

## 2017-12-03 RX ADMIN — KETOROLAC TROMETHAMINE 15 MG: 30 INJECTION, SOLUTION INTRAMUSCULAR at 03:30

## 2017-12-03 ASSESSMENT — COGNITIVE AND FUNCTIONAL STATUS - GENERAL
SUGGESTED CMS G CODE MODIFIER DAILY ACTIVITY: CI
HELP NEEDED FOR BATHING: A LITTLE
DAILY ACTIVITIY SCORE: 23
MOBILITY SCORE: 23
CLIMB 3 TO 5 STEPS WITH RAILING: A LITTLE
SUGGESTED CMS G CODE MODIFIER MOBILITY: CI

## 2017-12-03 ASSESSMENT — GAIT ASSESSMENTS
DISTANCE (FEET): 300
DEVIATION: OTHER (COMMENT)
ASSISTIVE DEVICE: FRONT WHEEL WALKER
GAIT LEVEL OF ASSIST: SUPERVISED

## 2017-12-03 ASSESSMENT — ACTIVITIES OF DAILY LIVING (ADL): TOILETING: INDEPENDENT

## 2017-12-03 NOTE — DISCHARGE PLANNING
Medical Social Work    Referral:     Intervention: SW met with pt at bedside and she is agreeable to HH; pt made choice for [facility]. Referral faxed to [facility].    Plan: SW to remain available.

## 2017-12-03 NOTE — PROGRESS NOTES
Pt discharged per MD Ortiz orders. Discharge instructions reviewed with pt and all questions answered, signed copy in chart. Prescriptions given to pt.

## 2017-12-03 NOTE — DISCHARGE INSTRUCTIONS
*Follow up with Dr. Askew in 10-14 for suture removal  *Weight bearing as tolerated                 *Activity as tolerated  *Use assistive device for all activity  *Continue exercises provided by physical therapy  *Elevate leg as needed  *Ice as needed (20 minutes every 1-2 hours)  *Ok to shower with incision covered  *No soaking of the incision; no baths, hot tubs, or swimming until cleared by doctor         *No driving until cleared by doctor  *Take medications as prescribed by doctor  *Call doctor’s office with any questions or concerns     Discharge Instructions    Discharged to home by car with relative. Discharged via wheelchair, hospital escort: Yes.  Special equipment needed: Walker    Be sure to schedule a follow-up appointment with your primary care doctor or any specialists as instructed.     Discharge Plan:   Diet Plan: Discussed  Activity Level: Discussed  Confirmed Follow up Appointment: Patient to Call and Schedule Appointment  Confirmed Symptoms Management: Discussed  Medication Reconciliation Updated: Yes  Pneumococcal Vaccine Given - only chart on this line when given: Given (See MAR)  Influenza Vaccine Indication: Indicated: 65 years and older  Influenza Vaccine Given - only chart on this line when given: Influenza Vaccine Given (See MAR)    I understand that a diet low in cholesterol, fat, and sodium is recommended for good health. Unless I have been given specific instructions below for another diet, I accept this instruction as my diet prescription.   Other diet: regular    Special Instructions: Discharge instructions for the Orthopedic Patient    Follow up with Primary Care Physician within 2 weeks of discharge to home, regarding:  Review of medications and diagnostic testing.  Surveillance for medical complications.  Workup and treatment of osteoporosis, if appropriate.     -Is this a Joint Replacement patient? No    -Is this patient being discharged with medication to prevent blood  clots?  No    · Is patient discharged on Warfarin / Coumadin?   No     · Is patient Post Blood Transfusion?  No    Depression / Suicide Risk    As you are discharged from this [facility] facility, it is important to learn how to keep safe from harming yourself.    Recognize the warning signs:  · Abrupt changes in personality, positive or negative- including increase in energy   · Giving away possessions  · Change in eating patterns- significant weight changes-  positive or negative  · Change in sleeping patterns- unable to sleep or sleeping all the time   · Unwillingness or inability to communicate  · Depression  · Unusual sadness, discouragement and loneliness  · Talk of wanting to die  · Neglect of personal appearance   · Rebelliousness- reckless behavior  · Withdrawal from people/activities they love  · Confusion- inability to concentrate     If you or a loved one observes any of these behaviors or has concerns about self-harm, here's what you can do:  · Talk about it- your feelings and reasons for harming yourself  · Remove any means that you might use to hurt yourself (examples: pills, rope, extension cords, firearm)  · Get professional help from the community (Mental Health, Substance Abuse, psychological counseling)  · Do not be alone:Call your Safe Contact- someone whom you trust who will be there for you.  · Call your local CRISIS HOTLINE 581-3257 or 551-354-8167  · Call your local Children's Mobile Crisis Response Team [place] (752) 713-7147 or www.[employer]  · Call the toll free National Suicide Prevention Hotlines   · National Suicide Prevention Lifeline 057-750-SZZA (5759)  · National Hope Line Network 800-SUICIDE (640-2106)        Incision Care  An incision is when a surgeon cuts into your body. After surgery, the incision needs to be cared for properly to prevent infection.   HOW TO CARE FOR YOUR INCISION  · Take medicines only as directed by your health care provider.  · There are  many different ways to close and cover an incision, including stitches, skin glue, and adhesive strips. Follow your health care provider's instructions on:  ¨ Incision care.  ¨ Bandage (dressing) changes and removal.  ¨ Incision closure removal.  · Do not take baths, swim, or use a hot tub until your health care provider approves. You may shower as directed by your health care provider.  · Resume your normal diet and activities as directed.  · Use anti-itch medicine (such as an antihistamine) as directed by your health care provider. The incision may itch while it is healing. Do not pick or scratch at the incision.  · Drink enough fluid to keep your urine clear or pale yellow.  SEEK MEDICAL CARE IF:   · You have drainage, redness, swelling, or pain at your incision site.  · You have muscle aches, chills, or a general ill feeling.  · You notice a bad smell coming from the incision or dressing.  · Your incision edges separate after the sutures, staples, or skin adhesive strips have been removed.  · You have persistent nausea or vomiting.  · You have a fever.  · You are dizzy.  SEEK IMMEDIATE MEDICAL CARE IF:   · You have a rash.  · You faint.  · You have difficulty breathing.  MAKE SURE YOU:   · Understand these instructions.  · Will watch your condition.  · Will get help right away if you are not doing well or get worse.     This information is not intended to replace advice given to you by your health care provider. Make sure you discuss any questions you have with your health care provider.     Document Released: 07/07/2006 Document Revised: 01/08/2016 Document Reviewed: 02/11/2015  [employer] Interactive Patient Education ©2016 [employer]

## 2017-12-03 NOTE — THERAPY
"Physical Therapy Treatment completed.   Bed Mobility:  Supine to Sit: Supervised  Transfers: Sit to Stand: Supervised  Gait: Level Of Assist: Supervised with Front-Wheel Walker       Plan of Care: Patient with no further skilled PT needs in the acute care setting at this time and Patient demonstrates safety with mobility in this environment at this time.   Discharge Recommendations: Equipment: No Equipment Needed. Post-acute therapy Discharge to home with outpatient or home health for additional skilled therapy services.    Pt doing extremely well with functional mobility at this time. Pt performing all mobility at SPV level. Pt able to ambulate short community distances with FWW, SPV gait no overt LOB or gait deviations present. Pt educated on home exercise program. and reminded to use ice on L hip as needed/tolerated. Pt has met all goals for PT and no longer demonstrates need for skilled PT intervention while in the acute care setting. Pt clear for DC home    See \"Rehab Therapy-Acute\" Patient Summary Report for complete documentation.       "

## 2017-12-03 NOTE — CARE PLAN
Problem: Safety  Goal: Will remain free from injury    Intervention: Provide assistance with mobility  Pt is able to safely use FWW with standby assistance. Pt uses call light appropriately when needing assistance.       Problem: Pain Management  Goal: Pain level will decrease to patient's comfort goal    Intervention: Follow pain managment plan developed in collaboration with patient and Interdisciplinary Team  Pain well controlled with scheduled tylenol and toradol.

## 2017-12-03 NOTE — DISCHARGE SUMMARY
C O N F I D E N T I A L I N F O R M A T I O N   -------------------------------------------------------------------------------------------------------------------   DISCHARGE SUMMARY    Patient ID:  Herminia Jones  [id number]  83 y.o.female  1/18/1934    Admit date: 11/30/2017    Discharge date and time: 12/03/17    Admitting Physician: eDlia Cantor M.D.    Discharge Physician: Rocky Ortiz    CODE STATUS: FULL CODE    Admission Diagnoses: Left displaced femoral neck fracture (CMS-[facility])    Discharge Diagnoses:     Femur fracture (CMS-[facility])    Diabetes mellitus, type II (CMS-HCC)    Depression      Admission Condition: poor    Discharged Condition: good    Indication for Admission: Hip fracture    Hospital Course: 83 female admitted with LEFT subcapital femoral neck fracture post fall 11/30/2017. Patient was taken to the OR on 12/01/2017 by Dr Askew 12/01/2017 and Closed reduction and percutaneous pin fixation, left femoral neck performed. Post operatively patient was admitted to the hospital. Pain control achieved. Patient with strong family support and wanting to go home. Cleared by PT/OT and orthopedics for discharge home. Found to have vitamin d deficiency and aggressive replacement initiated. Recommend f/u PCP for ongoing monitoring of vitamin d levels and considering DEXA in outpatient setting. Patient very eager to be discharged home 12/03/17. Advised SW to arrange for [facility]. Discharge planning discussed with the patient. Patient to follow up orthopedics in outpatient clinic. Patient is discharged home in stable condition at this time.       Consults: otherpedic surgery    Significant Studies:   DX-PORTABLE FLUOROSCOPY < 1 HOUR   Final Result         Portable fluoroscopy utilized for 1 minute 12 seconds.      DX-HIP-UNILATERAL-W/O PELVIS-2/3 VIEWS LEFT   Final Result      Portable intraoperative imaging with findings as described above.      DX-PELVIS-1 OR 2 VIEWS   Final Result      LEFT subcapital  femoral neck fracture      DX-CHEST-PORTABLE (1 VIEW)   Final Result      1.  No acute cardiac or pulmonary abnormalities are identified.   2.  Emphysema   3.  Atherosclerosis          Procedures Performed While Hospitalized: None    Operations During Hospitalization: Closed reduction and percutaneous pin fixation, left femoral neck.    Disposition: Home    Patient Instructions: Given  Activity: activity as tolerated  Wound Care: as directed  Diet:   Orders Placed This Encounter   Procedures   • DIET ORDER     Standing Status:   Standing     Number of Occurrences:   1     Order Specific Question:   Diet:     Answer:   Diabetic [3]     Order Specific Question:   Texture/Fiber modifications:     Answer:   Ground Meat [4]     Order Specific Question:   Consistency/Fluid modifications:     Answer:   Thin Liquids [3]         Medications at discharge:   Herminia Jones   Home Medication Instructions DEDRA:[id number]    Printed on:12/03/17 9332   Medication Information                      hydrocodone-acetaminophen (NORCO) 5-325 MG Tab per tablet  Take 1-2 Tabs by mouth every four hours as needed (Hip pain).             metformin (GLUCOPHAGE) 500 MG Tab  Take 1 Tab by mouth every day.             Multiple Vitamins-Minerals (OCUVITE-LUTEIN) Tab  Take 1 tablet by mouth every day.             sertraline (ZOLOFT) 100 MG Tab  Take 1 Tab by mouth every day.             vitamin D, Ergocalciferol, (DRISDOL) 61698 units Cap capsule  Take 1 Cap by mouth every 7 days for 7 doses.                 Opioid prescription history checked: N/A. Limited supply given.     Follow-up with PCP / Orthopedics in 7 days.    Time spend preparing discharge: 35 minutes. This included face to face with the patient, medication reconciliation, care co ordination with RN / Orthopedics involved in patient care and discussion and co ordination with case management.     Signed:  Rocky Ortiz  12/3/2017  2:32 PM

## 2017-12-03 NOTE — CARE PLAN
Problem: Safety  Goal: Will remain free from injury    Intervention: Provide assistance with mobility  Call light within reach, pt calls for assistance at all times.  Bed in low position and locked, upper bedside rails, proper mobility signs placed, personal possessions within reach, treaded socks on.  Hourly rounding in place.                    Problem: Pain Management  Goal: Pain level will decrease to patient's comfort goal    Intervention: Follow pain managment plan developed in collaboration with patient and Interdisciplinary Team  Pain meds given as needed per orders.

## 2017-12-03 NOTE — THERAPY
"Occupational Therapy Evaluation completed.   Functional Status:  Modified independent to independent for self-care with FWW.  Safe to d/c home with family   Plan of Care: Patient with no further skilled OT needs in the acute care setting at this time  Discharge Recommendations:  Equipment: No Equipment Needed. Post-acute therapy Discharge to home with outpatient or home health for additional skilled therapy services.    See \"Rehab Therapy-Acute\" Patient Summary Report for complete documentation.    "

## 2017-12-03 NOTE — PROGRESS NOTES
"   Orthopaedic Progress Note    Interval changes:  Patient doing well post op  Cleared by ortho and therapy for DC home pending medicine clearance    ROS - Patient denies any new issues.  Pain well controlled.    Blood pressure 120/65, pulse 64, temperature 37 °C (98.6 °F), resp. rate 16, height 1.626 m (5' 4\"), weight 70.1 kg (154 lb 8.7 oz), SpO2 95 %, not currently breastfeeding.      Patient seen and examined  No acute distress  Breathing non labored  RRR  Left hip Surgical dressing is clean, dry, and intact. Patient clearly fires tibialis anterior, EHL, and gastrocnemius/soleus. Sensation is intact to light touch throughout superficial peroneal, deep peroneal, tibial, saphenous, and sural nerve distributions. Strong and palpable 2+ dorsalis pedis and posterior tibial pulses with capillary refill less than 2 seconds. No lower leg tenderness or discomfort.        Recent Labs      11/30/17   2308  12/02/17   0255   WBC  10.0  9.3   RBC  5.24  4.71   HEMOGLOBIN  15.3  13.8   HEMATOCRIT  45.2  40.9   MCV  86.3  86.8   MCH  29.2  29.3   MCHC  33.8  33.7   RDW  46.2  45.1   PLATELETCT  222  215   MPV  9.5  9.5       Active Hospital Problems    Diagnosis   • Femur fracture (CMS-[facility]) [S72.90XA]     Priority: High   • Diabetes mellitus, type II (CMS-[facility]) [E11.9]     Priority: Low   • Depression [F32.9]     Priority: Low       Assessment/Plan:  Cleared by ortho and therapy for DC home pending medicine clearance  POD#2 S/P:  Closed reduction and percutaneous pin fixation, left femoral neck.  Wt bearing status - WBAT  Wound care/Drains - dressing changed every other day by nursing  Future Procedures - none planned  Lovenox: Start 12/2, Duration-until ambulatory > 150'  Sutures/Staples out- 10-14 days post operatively  PT/OT-initiated  Antibiotics: completed  DVT Prophylaxis- TEDS/SCDs/Foot pumps  Mckoy-none  Case Coordination for Discharge Planning - Disposition home   "

## 2017-12-03 NOTE — PROGRESS NOTES
Pt A&O x 4. PRN pain medication given per MAR. Pt on RA with no shortness of breath. LLE dressing DCI. Pt uses call light appropriately. Personal belongings and call light within reach.  and SCDs on. Urinary frequency and urgency throughout NOC shift. OOB to bathroom several times SBA with FWW. Discussed POC, safety, and mobility; pt has no questions at this time. Bed in lowest position with treaded socks on pt. Hourly rounding in place.

## 2017-12-03 NOTE — FACE TO FACE
Face to Face Supporting Documentation - Home Health    The encounter with this patient was in whole or in part the primary reason for home health admission.    Date of encounter:   Patient:                    MRN:                       YOB: 2017  Herminia Jones  [id number]  1/18/1934     Home health to see patient for:  Skilled Nursing care for assessment, interventions & education, Wound Care, Registered dietitian consult, Medical social work consult, Home health aide, Physical Therapy evaluation and treatment, Occupational therapy evaluation and treatment and Speech Language Pathology evaluation and treatment    Skilled need for:  Surgical Aftercare Post hip surgery    Skilled nursing interventions to include:  Wound Care    Homebound status evidenced by:  Need the aid of supportive devices such as crutches, canes, wheelchairs or walkers, Require the use of special transportation or Needs the assistance of another person in order to leave the home. Leaving home requires a considerable and taxing effort. There is a normal inability to leave the home.    Community Physician to provide follow up care: LETY Kulkarni     Optional Interventions? No      I certify the face to face encounter for this home health care referral meets the CMS requirements and the encounter/clinical assessment with the patient was, in whole, or in part, for the medical condition(s) listed above, which is the primary reason for home health care. Based on my clinical findings: the service(s) are medically necessary, support the need for home health care, and the homebound criteria are met.  I certify that this patient has had a face to face encounter by myself.  Rocky Ortiz M.D. - NPI: [id number]

## 2017-12-03 NOTE — DISCHARGE PLANNING
Received choice form from [facility] at 1447.  Referral sent to [facility] on 12-03-17 at 1440.

## 2017-12-03 NOTE — CARE PLAN
"Problem: Venous Thromboembolism (VTW)/Deep Vein Thrombosis (DVT) Prevention:  Goal: Patient will participate in Venous Thrombosis (VTE)/Deep Vein Thrombosis (DVT)Prevention Measures  Outcome: PROGRESSING AS EXPECTED  SCDs on RLE; refused on LLE due to making pt \"uncomfortable\", education provided and pt continues to refuse. Lovenox used for pharmacologic prophylaxis. No s/s of DVT/VTE at this time. Will continue to monitor.     Problem: Pain Management  Goal: Pain level will decrease to patient's comfort goal  Outcome: PROGRESSING AS EXPECTED  Pt assessed for pain level routinely. Declines pain. Pt educated about around-the-clock px management and non-pharmacologic techniques. Pt verbalized understanding.      "

## 2017-12-08 ENCOUNTER — OFFICE VISIT (OUTPATIENT)
Dept: MEDICAL GROUP | Facility: MEDICAL CENTER | Age: 82
End: 2017-12-08
Payer: MEDICARE

## 2017-12-08 VITALS
DIASTOLIC BLOOD PRESSURE: 62 MMHG | WEIGHT: 142 LBS | SYSTOLIC BLOOD PRESSURE: 122 MMHG | RESPIRATION RATE: 16 BRPM | OXYGEN SATURATION: 92 % | BODY MASS INDEX: 24.37 KG/M2 | HEART RATE: 80 BPM

## 2017-12-08 DIAGNOSIS — Z91.81 RISK FOR FALLS: ICD-10-CM

## 2017-12-08 DIAGNOSIS — N18.2 STAGE 2 CHRONIC KIDNEY DISEASE: ICD-10-CM

## 2017-12-08 DIAGNOSIS — R09.A2 GLOBUS SENSATION: ICD-10-CM

## 2017-12-08 DIAGNOSIS — Z00.00 MEDICARE ANNUAL WELLNESS VISIT, SUBSEQUENT: ICD-10-CM

## 2017-12-08 DIAGNOSIS — N81.4 UTERINE PROLAPSE: ICD-10-CM

## 2017-12-08 DIAGNOSIS — E55.9 VITAMIN D DEFICIENCY: ICD-10-CM

## 2017-12-08 DIAGNOSIS — E11.9 TYPE 2 DIABETES MELLITUS WITHOUT COMPLICATION, WITHOUT LONG-TERM CURRENT USE OF INSULIN (HCC): ICD-10-CM

## 2017-12-08 DIAGNOSIS — S72.002D CLOSED FRACTURE OF NECK OF LEFT FEMUR WITH ROUTINE HEALING, SUBSEQUENT ENCOUNTER: ICD-10-CM

## 2017-12-08 DIAGNOSIS — E78.5 DYSLIPIDEMIA: ICD-10-CM

## 2017-12-08 DIAGNOSIS — F33.1 MODERATE EPISODE OF RECURRENT MAJOR DEPRESSIVE DISORDER (HCC): ICD-10-CM

## 2017-12-08 DIAGNOSIS — M89.9 BONE DISEASE: ICD-10-CM

## 2017-12-08 DIAGNOSIS — F33.42 RECURRENT MAJOR DEPRESSIVE DISORDER, IN FULL REMISSION (HCC): ICD-10-CM

## 2017-12-08 PROBLEM — R73.01 IMPAIRED FASTING GLUCOSE: Status: RESOLVED | Noted: 2017-05-16 | Resolved: 2017-12-08

## 2017-12-08 PROBLEM — F32.A DEPRESSION: Status: RESOLVED | Noted: 2017-12-01 | Resolved: 2017-12-08

## 2017-12-08 PROBLEM — M54.41 ACUTE RIGHT-SIDED LOW BACK PAIN WITH RIGHT-SIDED SCIATICA: Status: RESOLVED | Noted: 2017-05-16 | Resolved: 2017-12-08

## 2017-12-08 PROBLEM — S32.10XA CLOSED FRACTURE OF SACRUM (HCC): Status: RESOLVED | Noted: 2017-06-08 | Resolved: 2017-12-08

## 2017-12-08 PROCEDURE — 99213 OFFICE O/P EST LOW 20 MIN: CPT | Mod: 25 | Performed by: NURSE PRACTITIONER

## 2017-12-08 PROCEDURE — G0439 PPPS, SUBSEQ VISIT: HCPCS | Mod: 25 | Performed by: NURSE PRACTITIONER

## 2017-12-08 RX ORDER — RANITIDINE 150 MG/1
150 TABLET ORAL 2 TIMES DAILY
Qty: 60 TAB | Refills: 11 | Status: ON HOLD | OUTPATIENT
Start: 2017-12-08 | End: 2018-04-21

## 2017-12-08 RX ORDER — CHOLECALCIFEROL (VITAMIN D3) 1250 MCG
1 CAPSULE ORAL
Qty: 4 CAP | Refills: 2 | Status: SHIPPED | OUTPATIENT
Start: 2017-12-08 | End: 2018-02-28 | Stop reason: SDUPTHER

## 2017-12-08 ASSESSMENT — PATIENT HEALTH QUESTIONNAIRE - PHQ9: CLINICAL INTERPRETATION OF PHQ2 SCORE: 0

## 2017-12-08 NOTE — PROGRESS NOTES
CC: Patient is here today for annual Medicare wellness visit as well as hospital follow-up for hip fracture, vitamin D deficiency and new problem with globus sensation.    HPI:   Herminia presents today with the following.    1. Medicare annual wellness visit, subsequent  Screening performed below.    2. Closed fracture of neck of left femur with routine healing, subsequent encounter  The patient suffered a ground-level fall at home and went to the emergency room on November 30 and was found to have a left femoral neck fracture. She had subsequent surgical intervention and states she saw her surgeon yesterday for follow-up and suture removal. She states she is doing well with her walker. She is not scheduled for physical therapy until after she returns from a trip to [place] this month. She is having no pain or evidence of infection she states. She was scheduled for a bone density scan a few months ago but never followed through.    3. Bone disease  Patient was advised from the hospital to do a bone density scan to check for osteoporosis and started on medication if necessary.    4. Vitamin D deficiency  Patient's vitamin D levels were low at 11 and it appears she was started on 50,000 units of vitamin D daily for a week which she just stopped. She is wondering if she should take more medicine and she is not on calcium.    5. Globus sensation  Patient reports occasional episodes of a feeling like something is caught in her throat when she swallows. It does not happen on a regular basis and typically it resolves on its own. She has no associated nausea, vomiting or abdominal pain. She does not take anything for acid reflux.    6. Type 2 diabetes mellitus without complication, without long-term current use of insulin (CMS-[facility])  Previous hemoglobin A1c was in the 6 range on her once a day metformin. Her blood sugars in the hospital were running in the normal range and she is due for lab work.    7. Recurrent major  depressive disorder, in full remission (CMS-[facility])  Patient continues on Zoloft and daughter who was with patient today wants her to continue this.    8. Uterine prolapse without mention of vaginal wall prolapse  Patient reports no change in status.    9. Dyslipidemia  Cholesterol levels have been mildly elevated but not in need of medicine.    10. Stage 2 chronic kidney disease  Most recent GFR readings have been improved.    11. Risk for falls  Patient now using walker for mobility.      Depression Screening    Little interest or pleasure in doing things?  0 - not at all  Feeling down, depressed , or hopeless? 0 - not at all  Patient Health Questionnaire Score: 0     If depressive symptoms identified deferred to follow up visit unless specifically addressed in assessment and plan.    Interpretation of PHQ-9 Total Score   Score Severity   1-4 No Depression   5-9 Mild Depression   10-14 Moderate Depression   15-19 Moderately Severe Depression   20-27 Severe Depression    Screening for Cognitive Impairment    Three Minute Recall (apple, watch, tessy)  3/3    Draw clock face with all 12 numbers set to the hand to show 10 minutes past 11 o'clock  0    Cognitive concerns identified deferred for follow up unless specifically addressed in assessment and plan.    Fall Risk Assessment    Has the patient had two or more falls in the last year or any fall with injury in the last year?  Yes    Safety Assessment    Throw rugs on floor.  Yes  Handrails on all stairs.  Yes  Good lighting in all hallways.  Yes  Difficulty hearing.  No  Patient counseled about all safety risks that were identified.    Functional Assessment ADLs    Are there any barriers preventing you from cooking for yourself or meeting nutritional needs?  Yes.    Are there any barriers preventing you from driving safely or obtaining transportation?  Yes.    Are there any barriers preventing you from using a telephone or calling for help?  Yes.    Are there any  barriers preventing you from shopping?  Yes.    Are there any barriers preventing you from taking care of your own finances?  Yes.    Are there any barriers preventing you from managing your medications?  Yes.    Are currently engaging any exercise or physical activity?  Yes.       Health Maintenance Summary                DIABETES MONOFILAMENT / LE EXAM Overdue 7/18/1934     URINE ACR / MICROALBUMIN Overdue 1/18/1952     IMM DTaP/Tdap/Td Vaccine Overdue 1/18/1953     IMM ZOSTER VACCINE Overdue 1/18/1994     RETINAL SCREENING Overdue 6/13/2017      Done 6/13/2016 REFERRAL FOR RETINAL SCREENING EXAM    A1C SCREENING Overdue 11/16/2017      Done 5/16/2017 POCT A1C    FASTING LIPID PROFILE Next Due 5/9/2018      Done 5/9/2017 LIPID PROFILE    SERUM CREATININE Next Due 12/2/2018      Done 12/2/2017 BASIC METABOLIC PANEL     Patient has more history with this topic...    IMM PNEUMOCOCCAL 65+ (ADULT) LOW/MEDIUM RISK SERIES Next Due 12/3/2018      Done 12/3/2017 Imm Admin: Pneumococcal Conjugate Vaccine (Prevnar/PCV-13)    BONE DENSITY Next Due 8/24/2021      Patient Declined 8/24/2016     COLONOSCOPY Next Due 8/7/2023      Done 8/7/2013           Patient Care Team:  LETY Kulkarni as PCP - General (Family Medicine)  Vinny Delgado M.D. as Consulting Physician (Ophthalmology)        Patient Active Problem List    Diagnosis Date Noted   • Femur fracture (CMS-HCC) 11/30/2017     Priority: High   • Type 2 diabetes mellitus without complication, without long-term current use of insulin (CMS-HCC) 12/01/2017     Priority: Low   • Recurrent major depressive disorder, in full remission (CMS-HCC) 12/08/2017   • Vitamin D deficiency 12/08/2017   • Risk for falls 12/08/2017   • Uterine prolapse without mention of vaginal wall prolapse 06/02/2017   • Dyslipidemia 05/16/2017   • Moderate episode of recurrent major depressive disorder (CMS-HCC) 12/13/2016   • CKD (chronic kidney disease) 12/13/2016       Current Outpatient  Prescriptions   Medication Sig Dispense Refill   • Cholecalciferol (VITAMIN D3) 73944 units Cap Take 1 Each by mouth every 7 days. 4 Cap 2   • ranitidine (ZANTAC) 150 MG Tab Take 1 Tab by mouth 2 times a day. 60 Tab 11   • Multiple Vitamins-Minerals (OCUVITE-LUTEIN) Tab Take 1 tablet by mouth every day.     • sertraline (ZOLOFT) 100 MG Tab Take 1 Tab by mouth every day. 30 Tab 11   • metformin (GLUCOPHAGE) 500 MG Tab Take 1 Tab by mouth every day. 30 Tab 11     No current facility-administered medications for this visit.          Allergies as of 12/08/2017   • (No Known Allergies)        ROS: As per HPI.    /62   Pulse 80   Resp 16   Wt 64.4 kg (142 lb)   SpO2 92%   BMI 24.37 kg/m²     Physical Exam:  Gen:         Alert and oriented, No apparent distress.  Neck:        No Lymphadenopathy or Bruits.  Lungs:     Clear to auscultation bilaterally  CV:          Regular rate and rhythm. No murmurs, rubs or gallops.  Abd:         Soft non tender, non distended. Normal active bowel sounds.  No  Hepatosplenomegaly, No pulsatile masses.                   Ext:          No clubbing, cyanosis, edema.  MS:          Patient using walker and no other assistance for mobility.    Assessment and Plan.   83 y.o. female with the following issues.    1. Medicare annual wellness visit, subsequent  Annual wellness topics discussed, review of chronic medical problems completed    - Initial Wellness Visit - Includes PPPS ()    2. Closed fracture of neck of left femur with routine healing, subsequent encounter  Patient reports she is healing well post surgery and has seen her orthopedist for follow-up care. She will be going to physical therapy after she returns from a trip to [place] this month. I recommended getting up during the plane flight to move around. She is to do her exercises until she gets into physical therapy. She is not requesting pain medicine.    3. Bone disease  Patient will be sent for bone density scan to  evaluate if she needs bisphosphonates. I recommended she take calcium daily with her vitamin D weekly.  - DS-BONE DENSITY STUDY (DEXA); Future    4. Vitamin D deficiency  Patient will be switching to weekly vitamin D and then I will recheck levels in 3 months to see if she just needs to continue with daily low-dose.  - Cholecalciferol (VITAMIN D3) 79232 units Cap; Take 1 Each by mouth every 7 days.  Dispense: 4 Cap; Refill: 2    5. Globus sensation  I reviewed possible causes for this including stricture. She is changing insurances soon and does not want to go to GI at this point. I will have her try Zantac and if that does not work she will need a referral to gastroenterology for an EGD.  - ranitidine (ZANTAC) 150 MG Tab; Take 1 Tab by mouth 2 times a day.  Dispense: 60 Tab; Refill: 11    6. Type 2 diabetes mellitus without complication, without long-term current use of insulin (CMS-HCC)  I will recheck blood sugars with lab work ordered a few months ago. Her daughter states she will do it when she returns in January. She may be able to come off metformin in the future.    7. Recurrent major depressive disorder, in full remission (CMS-HCC)  Patient reports she is doing well with Zoloft and has no side effects.    8. Uterine prolapse without mention of vaginal wall prolapse  Patient to follow-up with gynecology or urology if needed.    9. Dyslipidemia  This is one of the lab work pending for her in January.    10. Stage 2 chronic kidney disease  Kidney function has been improved.    11. Risk for falls    - Patient identified as fall risk.  Appropriate orders and counseling given.

## 2018-01-05 DIAGNOSIS — F33.1 MODERATE EPISODE OF RECURRENT MAJOR DEPRESSIVE DISORDER (HCC): ICD-10-CM

## 2018-01-05 RX ORDER — SERTRALINE HYDROCHLORIDE 100 MG/1
150 TABLET, FILM COATED ORAL DAILY
Qty: 45 TAB | Refills: 11 | Status: SHIPPED | OUTPATIENT
Start: 2018-01-05 | End: 2018-04-16

## 2018-01-15 ENCOUNTER — APPOINTMENT (OUTPATIENT)
Dept: RADIOLOGY | Facility: MEDICAL CENTER | Age: 83
End: 2018-01-15
Attending: NURSE PRACTITIONER
Payer: MEDICARE

## 2018-02-26 DIAGNOSIS — R73.01 IMPAIRED FASTING GLUCOSE: ICD-10-CM

## 2018-02-28 ENCOUNTER — OFFICE VISIT (OUTPATIENT)
Dept: MEDICAL GROUP | Facility: MEDICAL CENTER | Age: 83
End: 2018-02-28
Payer: MEDICARE

## 2018-02-28 VITALS
OXYGEN SATURATION: 96 % | HEART RATE: 76 BPM | WEIGHT: 141 LBS | HEIGHT: 64 IN | RESPIRATION RATE: 16 BRPM | DIASTOLIC BLOOD PRESSURE: 72 MMHG | BODY MASS INDEX: 24.07 KG/M2 | SYSTOLIC BLOOD PRESSURE: 112 MMHG

## 2018-02-28 DIAGNOSIS — S72.002D CLOSED FRACTURE OF NECK OF LEFT FEMUR WITH ROUTINE HEALING, SUBSEQUENT ENCOUNTER: ICD-10-CM

## 2018-02-28 DIAGNOSIS — Z78.0 MENOPAUSE: ICD-10-CM

## 2018-02-28 DIAGNOSIS — E55.9 VITAMIN D DEFICIENCY: ICD-10-CM

## 2018-02-28 DIAGNOSIS — N81.10 CYSTOCELE, UNSPECIFIED (CODE): ICD-10-CM

## 2018-02-28 DIAGNOSIS — E11.9 TYPE 2 DIABETES MELLITUS WITHOUT COMPLICATION, WITHOUT LONG-TERM CURRENT USE OF INSULIN (HCC): ICD-10-CM

## 2018-02-28 LAB
HBA1C MFR BLD: 6.4 % (ref ?–5.8)
INT CON NEG: NEGATIVE
INT CON POS: POSITIVE

## 2018-02-28 PROCEDURE — 99213 OFFICE O/P EST LOW 20 MIN: CPT | Performed by: NURSE PRACTITIONER

## 2018-02-28 PROCEDURE — 83036 HEMOGLOBIN GLYCOSYLATED A1C: CPT | Performed by: NURSE PRACTITIONER

## 2018-02-28 RX ORDER — CHOLECALCIFEROL (VITAMIN D3) 1250 MCG
1 CAPSULE ORAL
Qty: 4 CAP | Refills: 2 | Status: SHIPPED | OUTPATIENT
Start: 2018-02-28 | End: 2018-06-19 | Stop reason: SDUPTHER

## 2018-02-28 NOTE — PROGRESS NOTES
Subjective:      Herminia Jones is a 84 y.o. female who presents with Follow-Up (6 month) and Medication Refill (vitamin D)        CC: Patient brought in today by her daughter for follow-up on diabetes and need for vitamin D replacement medicine.    HPI Herminia Jones      1. Type 2 diabetes mellitus without complication, without long-term current use of insulin (CMS-[facility])  Patient's hemoglobin A1c has been running well and she is only on low-dose metformin 500 mg daily. I had told her if her hemoglobin A1c drops below 6 we might be able to take her off medicine. However, it is currently 6.4 on medicine. GFR in December was greater than 60. She reports no hypoglycemia.    2. Vitamin D deficiency  Patient had vitamin D level below 10 and December and would like to continue on the weekly vitamin D. She states she is also taking calcium.    3. Cystocele, unspecified (CODE)  Patient apparently has bladder sling surgery scheduled for April. She had previous GYN surgery but apparently after her fall months ago and affected the outcome.    4. Menopause  I have been trying to get patient to do bone density scan because of her age and because of her recent femur fracture but her daughter thought she did not need to go forward with this because she was told the femur bone did not show osteoporosis.    5. Closed fracture of neck of left femur with routine healing, subsequent encounter  Patient reports she has had no problems with her left hip since having fever surgery repair.  Social History   Substance Use Topics   • Smoking status: Never Smoker   • Smokeless tobacco: Never Used   • Alcohol use No     Current Outpatient Prescriptions   Medication Sig Dispense Refill   • Cholecalciferol (VITAMIN D3) 28701 units Cap Take 1 Each by mouth every 7 days. 4 Cap 2   • metFORMIN (GLUCOPHAGE) 500 MG Tab TAKE 1 TAB BY MOUTH EVERY DAY. 30 Tab 7   • sertraline (ZOLOFT) 100 MG Tab Take 1.5 Tabs by mouth every day. 45 Tab 11   •  "ranitidine (ZANTAC) 150 MG Tab Take 1 Tab by mouth 2 times a day. 60 Tab 11   • Multiple Vitamins-Minerals (OCUVITE-LUTEIN) Tab Take 1 tablet by mouth every day.       No current facility-administered medications for this visit.      Past Medical History:   Diagnosis Date   • Arthritis    • Dental disorder    • Diabetes (CMS-HCC)    • Glaucoma    • Macular degeneration    • Psychiatric problem     depression   • Urinary bladder disorder    • Urinary incontinence      Family History   Problem Relation Age of Onset   • Stroke Father    • Cancer Sister    • Cancer Brother    • Heart Disease Sister    • Cancer Brother        Review of Systems   All other systems reviewed and are negative.         Objective:     /72   Pulse 76   Resp 16   Ht 1.626 m (5' 4\")   Wt 64 kg (141 lb)   SpO2 96%   BMI 24.20 kg/m²      Physical Exam   Constitutional: She is oriented to person, place, and time. She appears well-developed and well-nourished. No distress.   HENT:   Head: Normocephalic and atraumatic.   Right Ear: External ear normal.   Left Ear: External ear normal.   Nose: Nose normal.   Eyes: Right eye exhibits no discharge. Left eye exhibits no discharge.   Neck: Normal range of motion. Neck supple. No thyromegaly present.   Cardiovascular: Normal rate, regular rhythm and normal heart sounds.  Exam reveals no gallop and no friction rub.    No murmur heard.  Pulmonary/Chest: Effort normal and breath sounds normal. She has no wheezes. She has no rales.   Musculoskeletal: She exhibits no edema or tenderness.   Neurological: She is alert and oriented to person, place, and time. She displays normal reflexes.   Skin: Skin is warm and dry. No rash noted. She is not diaphoretic.   Psychiatric: She has a normal mood and affect. Her behavior is normal. Judgment and thought content normal.   Nursing note and vitals reviewed.              Assessment/Plan:     1. Type 2 diabetes mellitus without complication, without long-term " current use of insulin (CMS-[facility])  Hemoglobin A1c is 6.4 on low-dose metformin and GFR is normal so I will keep her on the medicine. Patient does eat a lot of bread and I advised her that if she can bring her blood sugars down by decreasing her carbohydrates, I may be able to take her off metformin in the future. She currently has no problems with the medicine.  - POCT  A1C    2. Vitamin D deficiency  I will have patient continue on vitamin D and then she should have retesting done in a few months to see if it needs to be continued.  - Cholecalciferol (VITAMIN D3) 92299 units Cap; Take 1 Each by mouth every 7 days.  Dispense: 4 Cap; Refill: 2    3. Cystocele, unspecified (CODE)  Patient scheduled for surgery with gynecology in April.    4. Menopause  I continue to recommend a bone density scan to see if patient has osteoporosis which contributed to her recent fracture. I explained about the use of bisphosphonate medication to prevent future fractures by building up the bones. She will continue on calcium and vitamin D.  - DS-BONE DENSITY STUDY (DEXA); Future    5. Closed fracture of neck of left femur with routine healing, subsequent encounter  Patient reports she has healed well and is able to walk without assistance. I would like to check bone density.  - DS-BONE DENSITY STUDY (DEXA); Future

## 2018-03-01 DIAGNOSIS — E55.9 VITAMIN D DEFICIENCY: ICD-10-CM

## 2018-03-01 RX ORDER — CHOLECALCIFEROL (VITAMIN D3) 1250 MCG
1 CAPSULE ORAL
Qty: 4 CAP | Refills: 2 | Status: SHIPPED | OUTPATIENT
Start: 2018-03-01 | End: 2018-04-16

## 2018-03-19 ENCOUNTER — HOSPITAL ENCOUNTER (OUTPATIENT)
Dept: RADIOLOGY | Facility: MEDICAL CENTER | Age: 83
End: 2018-03-19
Attending: NURSE PRACTITIONER
Payer: MEDICARE

## 2018-03-19 DIAGNOSIS — S72.002D CLOSED FRACTURE OF NECK OF LEFT FEMUR WITH ROUTINE HEALING, SUBSEQUENT ENCOUNTER: ICD-10-CM

## 2018-03-19 DIAGNOSIS — Z78.0 MENOPAUSE: ICD-10-CM

## 2018-03-19 PROCEDURE — 77080 DXA BONE DENSITY AXIAL: CPT

## 2018-04-10 ENCOUNTER — HOSPITAL ENCOUNTER (OUTPATIENT)
Dept: LAB | Facility: MEDICAL CENTER | Age: 83
End: 2018-04-10
Attending: NURSE PRACTITIONER
Payer: MEDICARE

## 2018-04-10 DIAGNOSIS — N18.2 STAGE 2 CHRONIC KIDNEY DISEASE: ICD-10-CM

## 2018-04-10 DIAGNOSIS — E78.5 DYSLIPIDEMIA: ICD-10-CM

## 2018-04-10 DIAGNOSIS — R73.01 IMPAIRED FASTING GLUCOSE: ICD-10-CM

## 2018-04-10 LAB
ALBUMIN SERPL BCP-MCNC: 4.3 G/DL (ref 3.2–4.9)
ALBUMIN/GLOB SERPL: 1.4 G/DL
ALP SERPL-CCNC: 41 U/L (ref 30–99)
ALT SERPL-CCNC: 12 U/L (ref 2–50)
ANION GAP SERPL CALC-SCNC: 5 MMOL/L (ref 0–11.9)
AST SERPL-CCNC: 15 U/L (ref 12–45)
BILIRUB SERPL-MCNC: 0.4 MG/DL (ref 0.1–1.5)
BUN SERPL-MCNC: 28 MG/DL (ref 8–22)
CALCIUM SERPL-MCNC: 10.1 MG/DL (ref 8.5–10.5)
CHLORIDE SERPL-SCNC: 104 MMOL/L (ref 96–112)
CHOLEST SERPL-MCNC: 297 MG/DL (ref 100–199)
CO2 SERPL-SCNC: 28 MMOL/L (ref 20–33)
CREAT SERPL-MCNC: 1.23 MG/DL (ref 0.5–1.4)
EST. AVERAGE GLUCOSE BLD GHB EST-MCNC: 140 MG/DL
GLOBULIN SER CALC-MCNC: 3 G/DL (ref 1.9–3.5)
GLUCOSE SERPL-MCNC: 127 MG/DL (ref 65–99)
HBA1C MFR BLD: 6.5 % (ref 0–5.6)
HDLC SERPL-MCNC: 63 MG/DL
LDLC SERPL CALC-MCNC: 188 MG/DL
POTASSIUM SERPL-SCNC: 4.3 MMOL/L (ref 3.6–5.5)
PROT SERPL-MCNC: 7.3 G/DL (ref 6–8.2)
SODIUM SERPL-SCNC: 137 MMOL/L (ref 135–145)
TRIGL SERPL-MCNC: 229 MG/DL (ref 0–149)
TSH SERPL DL<=0.005 MIU/L-ACNC: 4.03 UIU/ML (ref 0.38–5.33)

## 2018-04-10 PROCEDURE — 80053 COMPREHEN METABOLIC PANEL: CPT

## 2018-04-10 PROCEDURE — 84443 ASSAY THYROID STIM HORMONE: CPT

## 2018-04-10 PROCEDURE — 36415 COLL VENOUS BLD VENIPUNCTURE: CPT

## 2018-04-10 PROCEDURE — 80061 LIPID PANEL: CPT

## 2018-04-10 PROCEDURE — 83036 HEMOGLOBIN GLYCOSYLATED A1C: CPT

## 2018-04-16 ENCOUNTER — APPOINTMENT (OUTPATIENT)
Dept: ADMISSIONS | Facility: MEDICAL CENTER | Age: 83
End: 2018-04-16
Attending: OBSTETRICS & GYNECOLOGY
Payer: MEDICARE

## 2018-04-16 DIAGNOSIS — Z01.812 PRE-OPERATIVE LABORATORY EXAMINATION: ICD-10-CM

## 2018-04-16 DIAGNOSIS — Z01.810 PRE-OPERATIVE CARDIOVASCULAR EXAMINATION: ICD-10-CM

## 2018-04-16 LAB
APTT PPP: 26.1 SEC (ref 24.7–36)
BASOPHILS # BLD AUTO: 0.8 % (ref 0–1.8)
BASOPHILS # BLD: 0.08 K/UL (ref 0–0.12)
EKG IMPRESSION: NORMAL
EOSINOPHIL # BLD AUTO: 0.38 K/UL (ref 0–0.51)
EOSINOPHIL NFR BLD: 3.7 % (ref 0–6.9)
ERYTHROCYTE [DISTWIDTH] IN BLOOD BY AUTOMATED COUNT: 48.1 FL (ref 35.9–50)
HCT VFR BLD AUTO: 46.2 % (ref 37–47)
HGB BLD-MCNC: 15.4 G/DL (ref 12–16)
IMM GRANULOCYTES # BLD AUTO: 0.08 K/UL (ref 0–0.11)
IMM GRANULOCYTES NFR BLD AUTO: 0.8 % (ref 0–0.9)
INR PPP: 1.08 (ref 0.87–1.13)
LYMPHOCYTES # BLD AUTO: 3.45 K/UL (ref 1–4.8)
LYMPHOCYTES NFR BLD: 33.8 % (ref 22–41)
MCH RBC QN AUTO: 28.9 PG (ref 27–33)
MCHC RBC AUTO-ENTMCNC: 33.3 G/DL (ref 33.6–35)
MCV RBC AUTO: 86.7 FL (ref 81.4–97.8)
MONOCYTES # BLD AUTO: 0.74 K/UL (ref 0–0.85)
MONOCYTES NFR BLD AUTO: 7.2 % (ref 0–13.4)
NEUTROPHILS # BLD AUTO: 5.48 K/UL (ref 2–7.15)
NEUTROPHILS NFR BLD: 53.7 % (ref 44–72)
NRBC # BLD AUTO: 0 K/UL
NRBC BLD-RTO: 0 /100 WBC
PLATELET # BLD AUTO: 229 K/UL (ref 164–446)
PMV BLD AUTO: 9.7 FL (ref 9–12.9)
PROTHROMBIN TIME: 13.7 SEC (ref 12–14.6)
RBC # BLD AUTO: 5.33 M/UL (ref 4.2–5.4)
WBC # BLD AUTO: 10.2 K/UL (ref 4.8–10.8)

## 2018-04-16 PROCEDURE — 85025 COMPLETE CBC W/AUTO DIFF WBC: CPT

## 2018-04-16 PROCEDURE — 85730 THROMBOPLASTIN TIME PARTIAL: CPT

## 2018-04-16 PROCEDURE — 36415 COLL VENOUS BLD VENIPUNCTURE: CPT

## 2018-04-16 PROCEDURE — 93010 ELECTROCARDIOGRAM REPORT: CPT | Performed by: INTERNAL MEDICINE

## 2018-04-16 PROCEDURE — 85610 PROTHROMBIN TIME: CPT

## 2018-04-16 PROCEDURE — 93005 ELECTROCARDIOGRAM TRACING: CPT

## 2018-04-16 RX ORDER — SERTRALINE HYDROCHLORIDE 100 MG/1
150 TABLET, FILM COATED ORAL
COMMUNITY
End: 2018-06-11

## 2018-04-18 NOTE — H&P
HISTORY OF PRESENT ILLNESS:  The patient is an 84-year-old white female    4, para 4 whose chief complaint is loss of urine with coughing,   laughing, and sneezing.  The patient has a history of pelvic organ prolapse,   grade IV, status post hysterectomy, bilateral salpingo-oophorectomy.    PAST MEDICAL HISTORY:  Significant for diabetes mellitus type 2, history of   pelvic floor rehabilitation, hyperlipidemia, macular degeneration,   colpocleisis, atrophic vaginitis, retinopathy, mild dementia, anxiety and   depression.    PAST SURGICAL HISTORY:  Significant for hysterectomy, bilateral   salpingo-oophorectomy colpocleisis and perineorrhaphy.    ALLERGIES:  No known allergies.    CURRENT MEDICATION:  Sertraline 50 mg 1 tab orally daily, Premarin vaginal   cream 0.5 g twice weekly, Tylenol 1 tab orally daily.    HABITS:  The patient does not smoke cigarettes nor does she partake of   alcoholic beverages.  The patient has never used recreational drugs.    FAMILY HISTORY:  The patient's sister is  secondary to breast cancer.    Her father is  at age 80, and her mother is  age 78, reasons   unknown.    REVIEW OF SYSTEMS:  Patient complains of impaired eyesight, arthritis, change   in bowel habits, history of urinary tract infection, and loss of urine on   coughing, laughing and sneezing.  She complains of frequent urination.  Her   urinalysis is negative for urinary tract infection.    PHYSICAL EXAMINATION:  GENERAL:  This is a pleasant white female who has a history of mild dementia   and she is cared for by her daughter.  VITAL SIGNS:  The patient is 65 inches tall, 153 pounds, blood pressure   120/60, pulse 71, respirations 20, O2 sat on room air 93%.  HEENT:  This is an elderly white female.  Head normocephalic without sign of   trauma.  SKIN:  No rashes, changes, or cyanosis.  NECK:  Supple.  Full range of motion.  Trachea midline.  No thyromegaly.  LUNGS:  Clear to auscultation and  percussion.  HEART:  S1, S2 is normal.  No S3, S4.  No lifts, rubs or heaves.  BREASTS:  Reveal no lumps, masses, nipple discharge, supraclavicular   adenopathy, or axial adenopathy.  ABDOMEN:  Flat.  Bowel sounds positive.  No hepatomegaly.  No splenomegaly.    No tenderness, guarding, rigidity or rebound.  MUSCULOSKELETAL:  No joint pain, weakness or point tenderness.  GENITALIA:  Female escutcheon.  B[place]olin, urethral and [place] glands are   normal.  Lymphatic groin nodes are not enlarged.  Vagina:  Normal, no lesions.    Vaginal estrogen is absent.  Bladder:  No fullness, masses or tenderness.    Cystocele:  Grade III.  Cervix:  Not present.  Uterus:  Not present.    Rectocele:  Grade II.    LABORATORY DATA:  Urine is negative for blood glucose, trace protein, negative   nitrites, moderate leukocytes, negative ketones, negative bilirubin, normal   urobilinogen. Specific gravity 1.010 and pH 5.    IMPRESSION:  1.  Genuine stress urinary incontinence.  2.  Recurrent cystocele, _____ of the vaginal apex.    RECOMMENDATION:  We have discussed the risks, benefits, alternatives to   transobturator tape procedure, anterior colporrhaphy, and sacrospinous   fixation.  We discussed some serious and significant risks to include but not   limited to the risks of anesthesia, infection, bleeding, injury to the bowel,   bladder, ureter, or pelvic vessels.  We discussed the risk of deep vein   thrombosis, pulmonary emboli, and even death.  Informed consent was received.    All the patient's questions were answered to her satisfaction.  We described   transobturator tape procedure in detail, anterior colporrhaphy and   sacrospinous fixation in detail.       ____________________________________     MD MAURICIO PARKER / FRANCESCA    DD:  04/17/2018 16:38:49  DT:  04/17/2018 17:16:22    D#:  [id number]  Job#:  [id number]

## 2018-04-20 ENCOUNTER — HOSPITAL ENCOUNTER (OUTPATIENT)
Facility: MEDICAL CENTER | Age: 83
End: 2018-04-21
Attending: OBSTETRICS & GYNECOLOGY | Admitting: OBSTETRICS & GYNECOLOGY
Payer: MEDICARE

## 2018-04-20 LAB — GLUCOSE BLD-MCNC: 125 MG/DL (ref 65–99)

## 2018-04-20 PROCEDURE — 501838 HCHG SUTURE GENERAL: Performed by: OBSTETRICS & GYNECOLOGY

## 2018-04-20 PROCEDURE — A4338 INDWELLING CATHETER LATEX: HCPCS | Performed by: OBSTETRICS & GYNECOLOGY

## 2018-04-20 PROCEDURE — 87077 CULTURE AEROBIC IDENTIFY: CPT

## 2018-04-20 PROCEDURE — 96376 TX/PRO/DX INJ SAME DRUG ADON: CPT | Mod: XU

## 2018-04-20 PROCEDURE — 700102 HCHG RX REV CODE 250 W/ 637 OVERRIDE(OP)

## 2018-04-20 PROCEDURE — 82962 GLUCOSE BLOOD TEST: CPT

## 2018-04-20 PROCEDURE — 700111 HCHG RX REV CODE 636 W/ 250 OVERRIDE (IP)

## 2018-04-20 PROCEDURE — C1771 REP DEV, URINARY, W/SLING: HCPCS | Performed by: OBSTETRICS & GYNECOLOGY

## 2018-04-20 PROCEDURE — 700111 HCHG RX REV CODE 636 W/ 250 OVERRIDE (IP): Performed by: OBSTETRICS & GYNECOLOGY

## 2018-04-20 PROCEDURE — 160009 HCHG ANES TIME/MIN: Performed by: OBSTETRICS & GYNECOLOGY

## 2018-04-20 PROCEDURE — A9270 NON-COVERED ITEM OR SERVICE: HCPCS | Performed by: OBSTETRICS & GYNECOLOGY

## 2018-04-20 PROCEDURE — 500886 HCHG PACK, LAPAROSCOPY: Performed by: OBSTETRICS & GYNECOLOGY

## 2018-04-20 PROCEDURE — 87186 SC STD MICRODIL/AGAR DIL: CPT

## 2018-04-20 PROCEDURE — 501579 HCHG TROCAR, STEP 5MM: Performed by: OBSTETRICS & GYNECOLOGY

## 2018-04-20 PROCEDURE — 501577 HCHG TROCAR, STEP 11MM: Performed by: OBSTETRICS & GYNECOLOGY

## 2018-04-20 PROCEDURE — 700101 HCHG RX REV CODE 250

## 2018-04-20 PROCEDURE — 160029 HCHG SURGERY MINUTES - 1ST 30 MINS LEVEL 4: Performed by: OBSTETRICS & GYNECOLOGY

## 2018-04-20 PROCEDURE — 700105 HCHG RX REV CODE 258: Performed by: OBSTETRICS & GYNECOLOGY

## 2018-04-20 PROCEDURE — 500892 HCHG PACK, PERI-GYN: Performed by: OBSTETRICS & GYNECOLOGY

## 2018-04-20 PROCEDURE — 500854 HCHG NEEDLE, INSUFFLATION FOR STEP: Performed by: OBSTETRICS & GYNECOLOGY

## 2018-04-20 PROCEDURE — 87086 URINE CULTURE/COLONY COUNT: CPT

## 2018-04-20 PROCEDURE — 501330 HCHG SET, CYSTO IRRIG TUBING: Performed by: OBSTETRICS & GYNECOLOGY

## 2018-04-20 PROCEDURE — 160036 HCHG PACU - EA ADDL 30 MINS PHASE I: Performed by: OBSTETRICS & GYNECOLOGY

## 2018-04-20 PROCEDURE — 160035 HCHG PACU - 1ST 60 MINS PHASE I: Performed by: OBSTETRICS & GYNECOLOGY

## 2018-04-20 PROCEDURE — 500257: Performed by: OBSTETRICS & GYNECOLOGY

## 2018-04-20 PROCEDURE — G0378 HOSPITAL OBSERVATION PER HR: HCPCS

## 2018-04-20 PROCEDURE — 96374 THER/PROPH/DIAG INJ IV PUSH: CPT | Mod: XU

## 2018-04-20 PROCEDURE — A9270 NON-COVERED ITEM OR SERVICE: HCPCS

## 2018-04-20 PROCEDURE — 160041 HCHG SURGERY MINUTES - EA ADDL 1 MIN LEVEL 4: Performed by: OBSTETRICS & GYNECOLOGY

## 2018-04-20 PROCEDURE — 160002 HCHG RECOVERY MINUTES (STAT): Performed by: OBSTETRICS & GYNECOLOGY

## 2018-04-20 PROCEDURE — 700102 HCHG RX REV CODE 250 W/ 637 OVERRIDE(OP): Performed by: OBSTETRICS & GYNECOLOGY

## 2018-04-20 PROCEDURE — 110454 HCHG SHELL REV 250: Performed by: OBSTETRICS & GYNECOLOGY

## 2018-04-20 PROCEDURE — 160048 HCHG OR STATISTICAL LEVEL 1-5: Performed by: OBSTETRICS & GYNECOLOGY

## 2018-04-20 DEVICE — SLING TRANSOBTURATOR CURVED SYSTEM: Type: IMPLANTABLE DEVICE | Site: VAGINA | Status: FUNCTIONAL

## 2018-04-20 RX ORDER — ACETAMINOPHEN 325 MG/1
650 TABLET ORAL EVERY 6 HOURS
Status: DISCONTINUED | OUTPATIENT
Start: 2018-04-20 | End: 2018-04-21 | Stop reason: HOSPADM

## 2018-04-20 RX ORDER — SODIUM CHLORIDE 9 MG/ML
INJECTION, SOLUTION INTRAVENOUS CONTINUOUS
Status: DISCONTINUED | OUTPATIENT
Start: 2018-04-20 | End: 2018-04-21 | Stop reason: HOSPADM

## 2018-04-20 RX ORDER — GENTAMICIN SULFATE 40 MG/ML
INJECTION, SOLUTION INTRAMUSCULAR; INTRAVENOUS
Status: COMPLETED
Start: 2018-04-20 | End: 2018-04-20

## 2018-04-20 RX ORDER — BUPIVACAINE HYDROCHLORIDE AND EPINEPHRINE 2.5; 5 MG/ML; UG/ML
INJECTION, SOLUTION EPIDURAL; INFILTRATION; INTRACAUDAL; PERINEURAL
Status: DISCONTINUED | OUTPATIENT
Start: 2018-04-20 | End: 2018-04-20 | Stop reason: HOSPADM

## 2018-04-20 RX ORDER — ONDANSETRON 2 MG/ML
4 INJECTION INTRAMUSCULAR; INTRAVENOUS EVERY 6 HOURS PRN
Status: DISCONTINUED | OUTPATIENT
Start: 2018-04-20 | End: 2018-04-21 | Stop reason: HOSPADM

## 2018-04-20 RX ORDER — EPINEPHRINE 1 MG/ML
INJECTION INTRAMUSCULAR; INTRAVENOUS; SUBCUTANEOUS
Status: COMPLETED
Start: 2018-04-20 | End: 2018-04-20

## 2018-04-20 RX ORDER — CEFAZOLIN SODIUM 2 G/100ML
2 INJECTION, SOLUTION INTRAVENOUS EVERY 8 HOURS
Status: COMPLETED | OUTPATIENT
Start: 2018-04-20 | End: 2018-04-21

## 2018-04-20 RX ORDER — OXYCODONE HYDROCHLORIDE 5 MG/1
5 TABLET ORAL
Status: DISCONTINUED | OUTPATIENT
Start: 2018-04-20 | End: 2018-04-21 | Stop reason: HOSPADM

## 2018-04-20 RX ORDER — GENTAMICIN SULFATE 40 MG/ML
INJECTION, SOLUTION INTRAMUSCULAR; INTRAVENOUS
Status: DISCONTINUED | OUTPATIENT
Start: 2018-04-20 | End: 2018-04-20 | Stop reason: HOSPADM

## 2018-04-20 RX ORDER — CELECOXIB 200 MG/1
200 CAPSULE ORAL
Status: DISCONTINUED | OUTPATIENT
Start: 2018-04-21 | End: 2018-04-21 | Stop reason: HOSPADM

## 2018-04-20 RX ORDER — BUPIVACAINE HYDROCHLORIDE 2.5 MG/ML
INJECTION, SOLUTION EPIDURAL; INFILTRATION; INTRACAUDAL
Status: COMPLETED
Start: 2018-04-20 | End: 2018-04-20

## 2018-04-20 RX ORDER — SIMETHICONE 80 MG
80 TABLET,CHEWABLE ORAL EVERY 8 HOURS PRN
Status: DISCONTINUED | OUTPATIENT
Start: 2018-04-20 | End: 2018-04-21 | Stop reason: HOSPADM

## 2018-04-20 RX ORDER — FAMOTIDINE 20 MG/1
20 TABLET, FILM COATED ORAL 2 TIMES DAILY
Status: DISCONTINUED | OUTPATIENT
Start: 2018-04-20 | End: 2018-04-21 | Stop reason: HOSPADM

## 2018-04-20 RX ORDER — MORPHINE SULFATE 4 MG/ML
4 INJECTION, SOLUTION INTRAMUSCULAR; INTRAVENOUS
Status: DISCONTINUED | OUTPATIENT
Start: 2018-04-20 | End: 2018-04-21 | Stop reason: HOSPADM

## 2018-04-20 RX ADMIN — FENTANYL CITRATE 50 MCG: 50 INJECTION, SOLUTION INTRAMUSCULAR; INTRAVENOUS at 10:15

## 2018-04-20 RX ADMIN — SODIUM CHLORIDE: 9 INJECTION, SOLUTION INTRAVENOUS at 14:54

## 2018-04-20 RX ADMIN — FAMOTIDINE 20 MG: 20 TABLET, FILM COATED ORAL at 21:16

## 2018-04-20 RX ADMIN — ACETAMINOPHEN 650 MG: 325 TABLET, FILM COATED ORAL at 23:54

## 2018-04-20 RX ADMIN — CEFAZOLIN SODIUM 2 G: 2 INJECTION, SOLUTION INTRAVENOUS at 23:54

## 2018-04-20 RX ADMIN — CEFAZOLIN SODIUM 2 G: 2 INJECTION, SOLUTION INTRAVENOUS at 16:42

## 2018-04-20 RX ADMIN — SERTRALINE 150 MG: 100 TABLET, FILM COATED ORAL at 21:16

## 2018-04-20 RX ADMIN — METFORMIN HYDROCHLORIDE 500 MG: 500 TABLET, FILM COATED ORAL at 14:54

## 2018-04-20 RX ADMIN — ACETAMINOPHEN 650 MG: 325 TABLET, FILM COATED ORAL at 16:42

## 2018-04-20 RX ADMIN — HYDROCODONE BITARTRATE AND ACETAMINOPHEN 30 ML: 2.5; 108 SOLUTION ORAL at 10:00

## 2018-04-20 ASSESSMENT — PAIN SCALES - GENERAL
PAINLEVEL_OUTOF10: 2
PAINLEVEL_OUTOF10: 10
PAINLEVEL_OUTOF10: 2
PAINLEVEL_OUTOF10: 10
PAINLEVEL_OUTOF10: 7
PAINLEVEL_OUTOF10: 0
PAINLEVEL_OUTOF10: 5
PAINLEVEL_OUTOF10: 3
PAINLEVEL_OUTOF10: 0
PAINLEVEL_OUTOF10: 2
PAINLEVEL_OUTOF10: 2
PAINLEVEL_OUTOF10: 0
PAINLEVEL_OUTOF10: 0
PAINLEVEL_OUTOF10: 2
PAINLEVEL_OUTOF10: 0

## 2018-04-20 ASSESSMENT — PATIENT HEALTH QUESTIONNAIRE - PHQ9
3. TROUBLE FALLING OR STAYING ASLEEP OR SLEEPING TOO MUCH: NOT AT ALL
SUM OF ALL RESPONSES TO PHQ9 QUESTIONS 1 AND 2: 1
6. FEELING BAD ABOUT YOURSELF - OR THAT YOU ARE A FAILURE OR HAVE LET YOURSELF OR YOUR FAMILY DOWN: SEVERAL DAYS
8. MOVING OR SPEAKING SO SLOWLY THAT OTHER PEOPLE COULD HAVE NOTICED. OR THE OPPOSITE, BEING SO FIGETY OR RESTLESS THAT YOU HAVE BEEN MOVING AROUND A LOT MORE THAN USUAL: NOT AT ALL
9. THOUGHTS THAT YOU WOULD BE BETTER OFF DEAD, OR OF HURTING YOURSELF: NOT AT ALL
1. LITTLE INTEREST OR PLEASURE IN DOING THINGS: NOT AT ALL
4. FEELING TIRED OR HAVING LITTLE ENERGY: NOT AT ALL
2. FEELING DOWN, DEPRESSED, IRRITABLE, OR HOPELESS: SEVERAL DAYS
5. POOR APPETITE OR OVEREATING: NOT AT ALL
SUM OF ALL RESPONSES TO PHQ QUESTIONS 1-9: 2
7. TROUBLE CONCENTRATING ON THINGS, SUCH AS READING THE NEWSPAPER OR WATCHING TELEVISION: NOT AT ALL

## 2018-04-20 ASSESSMENT — LIFESTYLE VARIABLES
DO YOU DRINK ALCOHOL: NO
EVER_SMOKED: NEVER
ALCOHOL_USE: NO

## 2018-04-20 NOTE — OR NURSING
09-Received from OR via [place]. S/P bladder sling, A & P repair.  Bedside report received from RN. And Anesthesiologist.    1000-pain level 10/10 bilat lower extrem. Hydrocodone 15mg given for pain. Sips of water given.    1015-continues to complain of bilat leg pain 10/10.  Fent. 50mcg. Given. Daughter brought to bedside.    1100-b p 75/44, bolus given. Asymptomatic. Pain level 2/10. Resting soundly.    1130-bp 85/44    1145-bp down to 78/40. Dr. Harrell notified. Repeat fluid bolus of 500cc. Hold narcotics for now. Pt is asymptomatic. Resting soundly, denies pain.    1200-Dr. Bryan and Dr. Harrell to bedside. No new orders received.     1236-resting soundly. bp 83/63  Plan to admit. Waiting for room to be cleaned.    1314-report called to MARCO Mota.    1331-to Room T 403/1 via [facility] with transport.

## 2018-04-20 NOTE — CARE PLAN
Problem: Urinary Elimination:  Goal: Ability to reestablish a normal urinary elimination pattern will improve  Outcome: PROGRESSING SLOWER THAN EXPECTED  Mckoy in place for urologic surgery  Pt experiencing stress incontinence PTA  Bladder sling operation done with Dr Bryan 4/20    Problem: Safety  Goal: Will remain free from injury  Outcome: PROGRESSING AS EXPECTED  Bed low and locked, call light and belongings in reach, hourly rounding in place, high fall risk per anahi chong, bed alarm on, room near nurses station

## 2018-04-20 NOTE — PROGRESS NOTES
Pt on unit from PACU  Ambulated from gurney to bed with cane and 1 person assist  --------------------------------------------------------------  2 RN skin check complete:    Sacrum red but blanching   No other areas of redness or concern noted  --------------------------------------------------------------------  Admit profile complete   No vaccines indicated at this time    ---------------------------------------------------------------------  83 yo F  Full Code  NKA  Admit 4/20- urinary stress incontinence  4/20- bladder sling with Dr Bryan    Assessment done    A+Ox4    RA    Active BS x4  LBM 4/20 PTA  Orders to advance diet as tolerated to regular, clear liquid diet ordered    20g PIV in R FA, NS running at 10ml/hr    Foly in place for urologic surgery  Peripad in place for vaginal spotting    Bed low and locked, call light and belongings in reach, hourly rounding in place, pt calls appropriately for assistance    Discussed POC    All needs met at this time

## 2018-04-20 NOTE — OP REPORT
DATE OF SERVICE:  04/20/2018    PREOPERATIVE DIAGNOSES:  Genuine stress urinary incontinence, grade III   cystocele and rectocele.    POSTOPERATIVE DIAGNOSES:  Genuine stress urinary incontinence,  stricture of   the urethra, grade III cystocele, and rectocele, grade II.    OPERATION:  Transobturator tape procedure, [place] Obtryx, anterior   colporrhaphy, posterior colporrhaphy, Cystoscopy    SURGEON:  Espinoza Bryan MD    ASSISTANT:  Isabell Olguin MD    OBSERVOR:  Latia De La Cruz MD    ESTIMATED BLOOD LOSS:  100 mL OF Ancef 2 g IV piggyback prior to the   operation, gentamicin 80 mg and 1000 mL used as bladder irrigation.    DRAINS:  Mckoy to gravity.    PROPHYLAXIS:  Sequential stockings in place.    FINDINGS:  Exam under anesthesia revealed a grade III cystocele.  Urinalysis,   culture and sensitivity were obtained during the operation and sent for   evaluation and gentamicin 80 mg and 1000 mL normal saline were placed on 3   occasions within the bladder.  Cystoscopy revealed bilateral jetting of the   right ureteral orifice and left ureteral orifice.  There was no mesh in the   bladder.  There were no sutures in the bladder.  The bladder revealed no   chronic infection, but there was significant debris.  There was an anterior   urethral abrasion at 12  o'clock.    OPERATION:  The patient was taken to the operating room and placed on the   operating room table where general endotracheal anesthesia was administered.    The patient was placed in modified dorsal lithotomy position, prepped and   draped in the usual sterile fashion.  A time-out was called.  The patient and   the operation, her birthdate and allergies were identified and confirmed.    Following the time-out, exam under anesthesia was performed.  A weighted   speculum was placed in the vagina.  There is a grade III cystocele.  Prior to   the operation due to concern of cloudy urine, a cystoscopy was performed to   rule out any chronic  infection.  With Dr. De La Cruz, board certified urologist, present, we felt that the   procedure could be performed.  A urine culture was obtained and sent for   routine microscopic culture and sensitivity, gentamicin 80 mg and 1000 mL of   saline was instilled into the bladder.  The bladder was then emptied with the   Mckoy in place.  The bulb was palpated.  The mid urethral space was marked   with a marking pen.  The obturator space was palpated in the intracrural fold   on the left side below the adductor longus at the level of the clitoris and   marking of 1.5 cm was made on the right side and on the left side and the   intercrural fold at the level of the clitoral montague below the adductor longus.    Injection with 0.25% Marcaine with epinephrine was instilled and the   suburethral area and also in the intercrural fold where incisions were   previously marked.  A 1.5 cm incision below the adductor longus was made on   the right side and left side.  A 1.5 cm incision was made in the suburethral   space approximately 1.5 to 2 cm below the urethra.  Sharp dissection was   performed laterally to the region of the pubis placing the finger in the   incision on the patient's left side.  The [place] Obtryx needle was delivered   through the skin incision angled at 45 degrees and delivered through the   vaginal incision.  Care was made to make sure that the needle had not pierced   the vaginal sulcus.  The [place] Obtryx tape was attached and delivered through   the original incision.  The same procedure was carried out on the right side   with a finger in the incision.  The [place] Obtryx needle was delivered through   the skin obturator space at a 45-degree angle and into the vagina.  The mesh   was attached and delivered through the skin incision.  A #8 Hegar was placed   below the midpoint and the blue midline spacer was cut and removed.  The   cellophane was then delivered by placing tension upwards.  Extra    transobturator tape was cut at the level of the skin incision.  Cystoscopy was   again performed.  There was no transobturator tape within the vagina.  It was   noted that there was a small abrasion at 12 o'clock of the urethra secondary   to the stenosis.  The cystoscope was removed and the urethra was dilated using   a #8 urethral dilator.  The Mckoy catheter was then replaced.  Attention was   focused on the cystocele repair approximately 1 cm below the suburethral   repair.  An Allis was placed on the apex of the vagina.  Marcaine 0.25% with   epinephrine was instilled into the anterior vagina.  A linear incision was   made between the 2 Allis clamps and the cystocele was dissected sharply.  A   2-0 Vicryl was used to approximate the fascia.  Mattress sutures were placed   x4 reducing the cystocele.  A second level of mattress sutures were placed to   again approximate the fascia in the midline.  The vaginal skin incision was   closed with 2-0 Vicryl in a running suture.  Attention was placed on the   posterior colporrhaphy.  The perineum was injected with 0.25% Marcaine.  A   rebecac-shaped incision was made and the incision was T'd approximately 4 cm   superior to the introitus.  The rectocele was dissected free.  The fascia was   approximated in the midline using 2-0 Vicryl.  The vagina was closed with 2-0   Vicryl.  A running drop stitch was elected not to perform a sacrospinous   fixation due to the good reduction in the cystocele and to concern that the   angle of the vagina may be altered to actually to decrease the recurrence of   cystocele.  Cystoscopy was again performed.  Urine was seen to jet from the   right ureteral orifice and the left ureteral orifice.  There were no sutures   in the bladder.  There were no diverticula.  There are no calculi.  The   bladder was emptied and gentamicin 80 mL in 1000 mL of normal saline was   instilled into the bladder.  The Mckoy catheter was replaced and clamped  and   the clamp will be taken off in the recovery room allowing gentamicin to act   within the bladder locally.  A rectal exam was performed.  There were no   sutures in the rectum.  The needle and pack count were correct.  The patient   was taken to recovery room in satisfactory condition.  Prior to the operation   in my private office, we had discussed some serious and significant risks to   include but not limited to the risks of anesthesia, infection, bleeding,   injury to the bowel, bladder, ureter, or pelvic vessels.  Informed consent had   been received.  All the patient's questions were answered to her   satisfaction.  The questions of her family were also answered to their   satisfaction.  We will keep the patient overnight and continue IV antibiotics.    The patient will be discharged with a Mckoy catheter, which will be removed   in my office in 3 days.       ____________________________________     FELIPE HARRIS MD    RWR / NTS    DD:  04/20/2018 09:43:40  DT:  04/20/2018 10:57:44    D#:  [id number]  Job#:  [id number]    cc: Isabell Olguin MD, PRITESH PRAKASH

## 2018-04-20 NOTE — OR SURGEON
Immediate Post OP Note    PreOp Diagnosis: Stress urinary Incontinence, Cystocele, Rectocele    PostOp Diagnosis: BERNADETTE. Cystocele, Rectocele, Urethral stricture    Procedure(s):  BLADDER SLING FEMALE- TOT - Wound Class: Clean Contaminated  ANTERIOR REPAIR- COLPHORRHAPHY, Posterior colporrhaphy, Cystoscopy- Wound Class: Clean Contaminated- Wound Class: Clean Contaminated    Surgeon(s):  JONATHAN Stanford M.D. Christine Lajeunesse, M.D.    Anesthesiologist/Type of Anesthesia:  Anesthesiologist: Dean Harrell M.D./General    Surgical Staff:  Circulator: Mercedes Collier R.N.  Relief Scrub: Ita Escalona  Scrub Person: Mihir Richardson R.N.; Kay Landa; Giselle Ayala    Specimens removed if any:  * No specimens in log *    Estimated Blood Loss: 100 ml    Findings: Grade 3 cystocele, Grade 2 rectocele, Bilateral jetting of urine from the right and left ureter, no mesh or sutures in the bladder, stenotic urethra.    Complications: None        4/20/2018 9:55 AM Espinoza Bryan M.D.

## 2018-04-21 VITALS
BODY MASS INDEX: 23.32 KG/M2 | DIASTOLIC BLOOD PRESSURE: 47 MMHG | HEART RATE: 74 BPM | RESPIRATION RATE: 16 BRPM | HEIGHT: 65 IN | OXYGEN SATURATION: 96 % | SYSTOLIC BLOOD PRESSURE: 90 MMHG | WEIGHT: 139.99 LBS | TEMPERATURE: 98.2 F

## 2018-04-21 LAB
ANION GAP SERPL CALC-SCNC: 7 MMOL/L (ref 0–11.9)
APPEARANCE UR: CLEAR
BACTERIA #/AREA URNS HPF: ABNORMAL /HPF
BASOPHILS # BLD AUTO: 0.3 % (ref 0–1.8)
BASOPHILS # BLD: 0.05 K/UL (ref 0–0.12)
BILIRUB UR QL STRIP.AUTO: NEGATIVE
BUN SERPL-MCNC: 15 MG/DL (ref 8–22)
CALCIUM SERPL-MCNC: 8.9 MG/DL (ref 8.5–10.5)
CHLORIDE SERPL-SCNC: 105 MMOL/L (ref 96–112)
CO2 SERPL-SCNC: 25 MMOL/L (ref 20–33)
COLOR UR: YELLOW
CREAT SERPL-MCNC: 1.01 MG/DL (ref 0.5–1.4)
EOSINOPHIL # BLD AUTO: 0.16 K/UL (ref 0–0.51)
EOSINOPHIL NFR BLD: 1 % (ref 0–6.9)
EPI CELLS #/AREA URNS HPF: ABNORMAL /HPF
ERYTHROCYTE [DISTWIDTH] IN BLOOD BY AUTOMATED COUNT: 48.1 FL (ref 35.9–50)
GLUCOSE SERPL-MCNC: 137 MG/DL (ref 65–99)
GLUCOSE UR STRIP.AUTO-MCNC: NEGATIVE MG/DL
HCT VFR BLD AUTO: 42.9 % (ref 37–47)
HGB BLD-MCNC: 14 G/DL (ref 12–16)
HYALINE CASTS #/AREA URNS LPF: ABNORMAL /LPF
IMM GRANULOCYTES # BLD AUTO: 0.07 K/UL (ref 0–0.11)
IMM GRANULOCYTES NFR BLD AUTO: 0.4 % (ref 0–0.9)
KETONES UR STRIP.AUTO-MCNC: NEGATIVE MG/DL
LEUKOCYTE ESTERASE UR QL STRIP.AUTO: ABNORMAL
LYMPHOCYTES # BLD AUTO: 2.47 K/UL (ref 1–4.8)
LYMPHOCYTES NFR BLD: 15.4 % (ref 22–41)
MCH RBC QN AUTO: 28.2 PG (ref 27–33)
MCHC RBC AUTO-ENTMCNC: 32.6 G/DL (ref 33.6–35)
MCV RBC AUTO: 86.5 FL (ref 81.4–97.8)
MICRO URNS: ABNORMAL
MONOCYTES # BLD AUTO: 1.24 K/UL (ref 0–0.85)
MONOCYTES NFR BLD AUTO: 7.8 % (ref 0–13.4)
NEUTROPHILS # BLD AUTO: 12 K/UL (ref 2–7.15)
NEUTROPHILS NFR BLD: 75.1 % (ref 44–72)
NITRITE UR QL STRIP.AUTO: NEGATIVE
NRBC # BLD AUTO: 0 K/UL
NRBC BLD-RTO: 0 /100 WBC
PH UR STRIP.AUTO: 6.5 [PH]
PLATELET # BLD AUTO: 198 K/UL (ref 164–446)
PMV BLD AUTO: 9.5 FL (ref 9–12.9)
POTASSIUM SERPL-SCNC: 3.9 MMOL/L (ref 3.6–5.5)
PROT UR QL STRIP: NEGATIVE MG/DL
RBC # BLD AUTO: 4.96 M/UL (ref 4.2–5.4)
RBC # URNS HPF: ABNORMAL /HPF
RBC UR QL AUTO: ABNORMAL
RENAL EPI CELLS #/AREA URNS HPF: ABNORMAL /HPF
SODIUM SERPL-SCNC: 137 MMOL/L (ref 135–145)
SP GR UR STRIP.AUTO: 1
UROBILINOGEN UR STRIP.AUTO-MCNC: 0.2 MG/DL
WBC # BLD AUTO: 16 K/UL (ref 4.8–10.8)
WBC #/AREA URNS HPF: ABNORMAL /HPF

## 2018-04-21 PROCEDURE — A9270 NON-COVERED ITEM OR SERVICE: HCPCS | Performed by: OBSTETRICS & GYNECOLOGY

## 2018-04-21 PROCEDURE — 700102 HCHG RX REV CODE 250 W/ 637 OVERRIDE(OP): Performed by: OBSTETRICS & GYNECOLOGY

## 2018-04-21 PROCEDURE — 36415 COLL VENOUS BLD VENIPUNCTURE: CPT

## 2018-04-21 PROCEDURE — 80048 BASIC METABOLIC PNL TOTAL CA: CPT

## 2018-04-21 PROCEDURE — 81001 URINALYSIS AUTO W/SCOPE: CPT

## 2018-04-21 PROCEDURE — G0378 HOSPITAL OBSERVATION PER HR: HCPCS

## 2018-04-21 PROCEDURE — 85025 COMPLETE CBC W/AUTO DIFF WBC: CPT

## 2018-04-21 RX ORDER — CELECOXIB 200 MG/1
200 CAPSULE ORAL
Qty: 30 CAP | Refills: 1 | Status: SHIPPED | OUTPATIENT
Start: 2018-04-22 | End: 2018-06-19 | Stop reason: SDUPTHER

## 2018-04-21 RX ORDER — ACETAMINOPHEN 325 MG/1
650 TABLET ORAL EVERY 6 HOURS
Qty: 30 TAB | Refills: 0 | Status: SHIPPED | OUTPATIENT
Start: 2018-04-21 | End: 2018-07-24

## 2018-04-21 RX ORDER — ACETAMINOPHEN 325 MG/1
650 TABLET ORAL EVERY 4 HOURS PRN
COMMUNITY
End: 2018-07-24

## 2018-04-21 RX ORDER — CELECOXIB 200 MG/1
200 CAPSULE ORAL DAILY
COMMUNITY
End: 2018-07-24

## 2018-04-21 RX ADMIN — OXYCODONE HYDROCHLORIDE 5 MG: 5 TABLET ORAL at 05:15

## 2018-04-21 RX ADMIN — CELECOXIB 200 MG: 200 CAPSULE ORAL at 08:01

## 2018-04-21 RX ADMIN — METFORMIN HYDROCHLORIDE 500 MG: 500 TABLET, FILM COATED ORAL at 08:01

## 2018-04-21 RX ADMIN — FAMOTIDINE 20 MG: 20 TABLET, FILM COATED ORAL at 08:01

## 2018-04-21 RX ADMIN — ACETAMINOPHEN 650 MG: 325 TABLET, FILM COATED ORAL at 05:15

## 2018-04-21 ASSESSMENT — PAIN SCALES - GENERAL
PAINLEVEL_OUTOF10: 1
PAINLEVEL_OUTOF10: 0
PAINLEVEL_OUTOF10: 4

## 2018-04-21 ASSESSMENT — LIFESTYLE VARIABLES: EVER_SMOKED: NEVER

## 2018-04-21 NOTE — RESPIRATORY CARE
COPD EDUCATION by COPD CLINICAL EDUCATOR  4/21/2018 at 6:55 AM by Sachi Herrera     Patient reviewed by COPD education team. Patient does not qualify for COPD program.

## 2018-04-21 NOTE — DISCHARGE INSTRUCTIONS
Discharge Instructions    Discharged to home by car with relative. Discharged via wheelchair, hospital escort: Yes.  Special equipment needed: Cane    Be sure to schedule a follow-up appointment with your primary care doctor or any specialists as instructed.     Discharge Plan:   Diet Plan: Discussed  Activity Level: Discussed  Confirmed Follow up Appointment: Patient to Call and Schedule Appointment  Confirmed Symptoms Management: Discussed  Medication Reconciliation Updated: Yes  Influenza Vaccine Indication: Not indicated: Previously immunized this influenza season and > 8 years of age  Influenza Vaccine Given - only chart on this line when given: Influenza Vaccine Given (See MAR)    I understand that a diet low in cholesterol, fat, and sodium is recommended for good health. Unless I have been given specific instructions below for another diet, I accept this instruction as my diet prescription.   Other diet: bland    Special Instructions: see instructions    Is patient discharged on Warfarin / Coumadin?   No     Depression / Suicide Risk    As you are discharged from this [facility] facility, it is important to learn how to keep safe from harming yourself.    Recognize the warning signs:  Abrupt changes in personality, positive or negative- including increase in energy   Giving away possessions  Change in eating patterns- significant weight changes-  positive or negative  Change in sleeping patterns- unable to sleep or sleeping all the time   Unwillingness or inability to communicate  Depression  Unusual sadness, discouragement and loneliness  Talk of wanting to die  Neglect of personal appearance   Rebelliousness- reckless behavior  Withdrawal from people/activities they love  Confusion- inability to concentrate     If you or a loved one observes any of these behaviors or has concerns about self-harm, here's what you can do:  Talk about it- your feelings and reasons for harming yourself  Remove any means that  you might use to hurt yourself (examples: pills, rope, extension cords, firearm)  Get professional help from the community (Mental Health, Substance Abuse, psychological counseling)  Do not be alone:Call your Safe Contact- someone whom you trust who will be there for you.  Call your local CRISIS HOTLINE 858-3477 or 291-350-3276  Call your local Children's Mobile Crisis Response Team [place] (799) 482-7685 or www.[employer]  Call the toll free National Suicide Prevention Hotlines   National Suicide Prevention Lifeline 836-914-IDNL (3514)  National Hope Line Network 800-SUICIDE (892-6319)      ACTIVITY: Rest and take it easy for the first 24 hours.  A responsible adult is recommended to remain with you during that time.  It is normal to feel sleepy.  We encourage you to not do anything that requires balance, judgment or coordination.    MILD FLU-LIKE SYMPTOMS ARE NORMAL. YOU MAY EXPERIENCE GENERALIZED MUSCLE ACHES, THROAT IRRITATION, HEADACHE AND/OR SOME NAUSEA.    FOR 24 HOURS DO NOT:  Drive, operate machinery or run household appliances.  Drink beer or alcoholic beverages.   Make important decisions or sign legal documents.    SPECIAL INSTRUCTIONS: see sheet    DIET: To avoid nausea, slowly advance diet as tolerated, avoiding spicy or greasy foods for the first day.  Add more substantial food to your diet according to your physician's instructions.  Babies can be fed formula or breast milk as soon as they are hungry.  INCREASE FLUIDS AND FIBER TO AVOID CONSTIPATION.    SURGICAL DRESSING/BATHING: see insttructions    FOLLOW-UP APPOINTMENT:  A follow-up appointment should be arranged with your doctor in 2; call to schedule.    You should CALL YOUR PHYSICIAN if you develop:  Fever greater than 101 degrees F.  Pain not relieved by medication, or persistent nausea or vomiting.  Excessive bleeding (blood soaking through dressing) or unexpected drainage from the wound.  Extreme redness or swelling around the  incision site, drainage of pus or foul smelling drainage.  Inability to urinate or empty your bladder within 8 hours.  Problems with breathing or chest pain.    You should call 911 if you develop problems with breathing or chest pain.  If you are unable to contact your doctor or surgical center, you should go to the nearest emergency room or urgent care center.  Physician's telephone #: [phone]    If any questions arise, call your doctor.  If your doctor is not available, please feel free to call the Surgical Center at [phone].  The Center is open Monday through Friday from 7AM to 7PM.  You can also call the [employer] HOTLINE open 24 hours/day, 7 days/week and speak to a nurse at [phone], or toll free at [phone].    A registered nurse may call you a few days after your surgery to see how you are doing after your procedure.    MEDICATIONS: Resume taking daily medication.  Take prescribed pain medication with food.  If no medication is prescribed, you may take non-aspirin pain medication if needed.  PAIN MEDICATION CAN BE VERY CONSTIPATING.  Take a stool softener or laxative such as senokot, pericolace, or milk of magnesia if needed.    Prescription given for tylenol.  Last pain medication given at 08.    If your physician has prescribed pain medication that includes Acetaminophen (Tylenol), do not take additional Acetaminophen (Tylenol) while taking the prescribed medication.    Depression / Suicide Risk    As you are discharged from this [facility] facility, it is important to learn how to keep safe from harming yourself.    Recognize the warning signs:  · Abrupt changes in personality, positive or negative- including increase in energy   · Giving away possessions  · Change in eating patterns- significant weight changes-  positive or negative  · Change in sleeping patterns- unable to sleep or sleeping all the time   · Unwillingness or inability to communicate  · Depression  · Unusual  sadness, discouragement and loneliness  · Talk of wanting to die  · Neglect of personal appearance   · Rebelliousness- reckless behavior  · Withdrawal from people/activities they love  · Confusion- inability to concentrate     If you or a loved one observes any of these behaviors or has concerns about self-harm, here's what you can do:  · Talk about it- your feelings and reasons for harming yourself  · Remove any means that you might use to hurt yourself (examples: pills, rope, extension cords, firearm)  · Get professional help from the community (Mental Health, Substance Abuse, psychological counseling)  · Do not be alone:Call your Safe Contact- someone whom you trust who will be there for you.  · Call your local CRISIS HOTLINE 154-5031 or 025-065-0619  · Call your local Children's Mobile Crisis Response Team [place] (236) 880-0295 or www.23press  · Call the toll free National Suicide Prevention Hotlines   · National Suicide Prevention Lifeline 743-636-YHQF (9435)  · National Hope Line Network 800-SUICIDE (491-0563)

## 2018-04-21 NOTE — PROGRESS NOTES
UA to lab.  Ready for d/c.  Leelee to go with pt. Instructions to daughter.  rx to pt and daughter.. Has appt to return to dr. Juares. No bm yet.  Vs stable.

## 2018-04-21 NOTE — DISCHARGE SUMMARY
HISTORY OF PRESENT ILLNESS:  The patient is an 84-year-old white female    4, para 4 who underwent transobturator tape procedure, anterior   colporrhaphy, and posterior colporrhaphy for reasons of genuine stress urinary   incontinence.  The patient has a history of pelvic organ prolapse.  She was   found to have a grade III cystocele and grade II rectocele.    PAST MEDICAL HISTORY:  Significant for diabetes mellitus type 2,   hyperlipidemia, macular degeneration, retinopathy, mild dementia, anxiety, and   depression.    PAST SURGICAL HISTORY:  Significant for hysterectomy and BSO, colpocleisis,   and perineorrhaphy.    ALLERGIES:  No known allergies.    CURRENT MEDICATIONS:  Sertraline 50 mg 1 tab orally daily, Premarin vaginal   cream 0.5 mg twice weekly per vagina, and Tylenol 1 tab orally daily.    HABITS:  The patient does not smoke cigarettes nor does she partake of   alcoholic beverages.  The patient has never used recreational drugs.    FAMILY HISTORY:  Patient's sister is  secondary to breast cancer.  Her   father is  at age 80 years and her mother is  at age 78   years, reasons unknown.    REVIEW OF SYSTEMS:  The patient had complained of multiple urinary tract   infections, loss of urine on coughing, laughing, sneezing, and vulvar   irritation.  She complained of frequent urination.    PHYSICAL EXAMINATION:  GENERAL:  This is a pleasant elderly white female, in no apparent distress.  PERTINENT PHYSICAL FINDINGS:  There was a grade III cystocele and a grade II   rectocele.    LABORATORY DATA:  EKG, sinus rhythm, right bundle-branch block.    IMPRESSION:  Genuine stress urinary incontinence, recurrent cystocele, grade   III and rectocele, grade II.    HOSPITAL COURSE AND FOLLOWUP:  The patient underwent a transobturator tape   procedure with placement of a [place] Obtryx mesh.  She underwent an anterior   colporrhaphy and posterior colporrhaphy.  The patient had been placed on  Ancef   2 g IV piggyback.  The patient was observed overnight due to low blood   pressure.  She was continued on Ancef 2 g q. 8 hours due to cloudy.    Urinalysis for routine microscopic culture and sensitivity had been requested   and had been obtained at the initiation of the surgery.  The patient will be   discharged on Keflex 500 mg 1 tab p.o. q.i.d. for 7 days.  I am awaiting   results of the urine culture and sensitivity.  Prior to the operation in my   private office, we had discussed some serious and significant risks to   include, but not limited to the risk of anesthesia, infection, bleeding,   injury to the bowel, bladder, ureter, or pelvic, vein thrombosis, pulmonary   emboli, stroke, and even death.  Informed consent had been received.  All the   patient's questions had been answered to her satisfaction.       ____________________________________     MD MAURICIO PARKER / FRANCESCA    DD:  04/21/2018 09:24:15  DT:  04/21/2018 10:33:04    D#:  [id number]  Job#:  [id number]

## 2018-04-21 NOTE — PROGRESS NOTES
Report received.  Assumed Care.  Pt in bed, 403 1. AOx4, responds appropriately.  Denies pain, SOB.  Plan of care discussed.  Explained importance of calling before getting OOB and pt verbalizes understanding.  Call light and belongings within reach, treaded slipper socks on, bed alarm in use, bed in lowest position.  Will monitor frequently.

## 2018-04-21 NOTE — CARE PLAN
Problem: Urinary Elimination:  Goal: Ability to reestablish a normal urinary elimination pattern will improve    Intervention: Assess and monitor for signs and symptoms of urinary retention  Pt has Leelee, will continue to monitor out put and characteristics of urine through out shift.       Problem: Bowel/Gastric:  Goal: Normal bowel function is maintained or improved    Intervention: Educate patient and significant other/support system about diet, fluid intake, medications and activity to promote bowel function  Educated pt to increase fluid intake to promote bowel function.

## 2018-04-22 LAB
BACTERIA UR CULT: ABNORMAL
BACTERIA UR CULT: ABNORMAL
SIGNIFICANT IND 70042: ABNORMAL
SITE SITE: ABNORMAL
SOURCE SOURCE: ABNORMAL

## 2018-05-09 ENCOUNTER — PATIENT OUTREACH (OUTPATIENT)
Dept: HEALTH INFORMATION MANAGEMENT | Facility: OTHER | Age: 83
End: 2018-05-09

## 2018-05-09 NOTE — PROGRESS NOTES
1. Attempt #: 1 - DID NOT SPEAK DIRECTLY WITH THE PATIENT. SCHEDULED AWV WITH PT'S DAUGHTER    2. [facility] Verified: yes    3. Verify PCP: yes    4. Care Team Updated:       •   DME Company (gait device, O2, CPAP, etc.): NO       •   Other Specialists (eye doctor, derm, GYN, cardiology, endo, etc): NO    5.  Reviewed/Updated the following with patient:       •   Communication Preference Obtained? YES       •   Preferred Pharmacy? NO       •   Preferred Lab? NO       •   Family History (document living status of immediate family members and if + hx of cancer, diabetes, hypertension, hyperlipidemia, heart attack, stroke) NO    6. [employer] Activation: already active    7. [employer] [place]: no    8. Annual Wellness Visit Scheduling  Scheduling Status:Scheduled      9. Care Gap Scheduling (Attempt to Schedule EACH Overdue Care Gap!)     Health Maintenance Due   Topic Date Due   • DIABETES MONOFILAMENT / LE EXAM  [birth year]/18/1934   • URINE ACR / MICROALBUMIN  01/18/1952   • IMM DTaP/Tdap/Td Vaccine (1 - Tdap) 01/18/1953   • RETINAL SCREENING  06/13/2017        Scheduled patient for Annual Wellness Visit    10. Patient was advised: “This is a free wellness visit. The provider will screen for medical conditions to help you stay healthy. If you have other concerns to address you may be asked to discuss these at a separate visit or there may be an additional fee.”     11. Patient was informed to arrive 15 min prior to their scheduled appointment and bring in their medication bottles.

## 2018-06-11 ENCOUNTER — OFFICE VISIT (OUTPATIENT)
Dept: MEDICAL GROUP | Facility: MEDICAL CENTER | Age: 83
End: 2018-06-11
Payer: MEDICARE

## 2018-06-11 VITALS
SYSTOLIC BLOOD PRESSURE: 100 MMHG | DIASTOLIC BLOOD PRESSURE: 62 MMHG | WEIGHT: 143 LBS | TEMPERATURE: 97.7 F | HEART RATE: 78 BPM | HEIGHT: 64 IN | OXYGEN SATURATION: 93 % | RESPIRATION RATE: 16 BRPM | BODY MASS INDEX: 24.41 KG/M2

## 2018-06-11 DIAGNOSIS — R09.A2 GLOBUS SENSATION: ICD-10-CM

## 2018-06-11 DIAGNOSIS — E78.5 DYSLIPIDEMIA: ICD-10-CM

## 2018-06-11 DIAGNOSIS — E55.9 VITAMIN D DEFICIENCY: ICD-10-CM

## 2018-06-11 DIAGNOSIS — N18.2 STAGE 2 CHRONIC KIDNEY DISEASE: ICD-10-CM

## 2018-06-11 DIAGNOSIS — E11.9 TYPE 2 DIABETES MELLITUS WITHOUT COMPLICATION, WITHOUT LONG-TERM CURRENT USE OF INSULIN (HCC): ICD-10-CM

## 2018-06-11 DIAGNOSIS — Z91.81 RISK FOR FALLS: ICD-10-CM

## 2018-06-11 DIAGNOSIS — F33.42 RECURRENT MAJOR DEPRESSIVE DISORDER, IN FULL REMISSION (HCC): ICD-10-CM

## 2018-06-11 DIAGNOSIS — F33.41 RECURRENT MAJOR DEPRESSIVE DISORDER, IN PARTIAL REMISSION (HCC): ICD-10-CM

## 2018-06-11 DIAGNOSIS — N81.4 UTERINE PROLAPSE: ICD-10-CM

## 2018-06-11 DIAGNOSIS — Z00.00 MEDICARE ANNUAL WELLNESS VISIT, SUBSEQUENT: ICD-10-CM

## 2018-06-11 PROBLEM — S72.90XA FEMUR FRACTURE (HCC): Status: RESOLVED | Noted: 2017-11-30 | Resolved: 2018-06-11

## 2018-06-11 PROCEDURE — G0439 PPPS, SUBSEQ VISIT: HCPCS | Performed by: NURSE PRACTITIONER

## 2018-06-11 RX ORDER — SERTRALINE HYDROCHLORIDE 100 MG/1
200 TABLET, FILM COATED ORAL DAILY
Qty: 60 TAB | Refills: 11 | Status: SHIPPED | OUTPATIENT
Start: 2018-06-11 | End: 2018-07-24 | Stop reason: SDUPTHER

## 2018-06-11 RX ORDER — ATORVASTATIN CALCIUM 20 MG/1
20 TABLET, FILM COATED ORAL DAILY
Qty: 30 TAB | Refills: 11 | Status: SHIPPED | OUTPATIENT
Start: 2018-06-11 | End: 2018-12-06 | Stop reason: SDUPTHER

## 2018-06-11 RX ORDER — RANITIDINE 150 MG/1
150 TABLET ORAL 2 TIMES DAILY
COMMUNITY
End: 2018-06-19 | Stop reason: SDUPTHER

## 2018-06-11 ASSESSMENT — ACTIVITIES OF DAILY LIVING (ADL): BATHING_REQUIRES_ASSISTANCE: 0

## 2018-06-11 ASSESSMENT — ENCOUNTER SYMPTOMS: GENERAL WELL-BEING: GOOD

## 2018-06-11 ASSESSMENT — PATIENT HEALTH QUESTIONNAIRE - PHQ9
SUM OF ALL RESPONSES TO PHQ QUESTIONS 1-9: 3
CLINICAL INTERPRETATION OF PHQ2 SCORE: 1
5. POOR APPETITE OR OVEREATING: 0 - NOT AT ALL

## 2018-06-11 NOTE — PROGRESS NOTES
Chief Complaint   Patient presents with   • Annual Wellness Visit         HPI:  Herminia is a 84 y.o. here for Medicare Annual Wellness Visit      1. Medicare annual wellness visit, subsequent  Screening performed below.    2. Type 2 diabetes mellitus without complication, without long-term current use of insulin ([facility])  Patient's most recent hemoglobin A1c was in the mid 6 range and she continues with her metformin.    3. Stage 2 chronic kidney disease  GFR has remained stable in the 50s range.    4. Dyslipidemia  Patient not currently on a statin and her most recent cholesterol levels come back showing a very high LDL in the 170s range with elevated total cholesterol.    5. Uterine prolapse without mention of vaginal wall prolapse  Patient had recent surgery and states she does not have to follow up any longer with gynecology.    6. Recurrent major depressive disorder, in full remission ([facility])  Patient on Zoloft 150 mg daily but her daughter is requesting that the dosage be increased. She states patient gets angry very easily and still has depression issues. She has tolerated the 150 mg without issue.    7. Vitamin D deficiency  Patient using her over-the-counter vitamin D.    8. Risk for falls  Patient uses walker for mobility.      9. Globus sensation  Patient on ranitidine but her daughter notices that sometimes when she eats she will choke for a few seconds. Patient states is mainly with meats. She does not report any increased indigestion or abdominal pain. She denies cough or shortness of breath. She has no history of CVA.    Patient Active Problem List    Diagnosis Date Noted   • Femur fracture ([facility]) 11/30/2017     Priority: High   • Type 2 diabetes mellitus without complication, without long-term current use of insulin ([facility]) 12/01/2017     Priority: Low   • Recurrent major depressive disorder, in full remission ([facility]) 12/08/2017   • Vitamin D deficiency 12/08/2017   • Risk for falls 12/08/2017   • Uterine  prolapse without mention of vaginal wall prolapse 06/02/2017   • Dyslipidemia 05/16/2017   • Moderate episode of recurrent major depressive disorder (HCC) 12/13/2016   • CKD (chronic kidney disease) 12/13/2016       Current Outpatient Prescriptions   Medication Sig Dispense Refill   • raNITidine (ZANTAC) 150 MG Tab Take 150 mg by mouth 2 times a day.     • celecoxib (CELEBREX) 200 MG Cap Take 1 Cap by mouth every morning with breakfast. 30 Cap 1   • acetaminophen (TYLENOL) 325 MG Tab Take 650 mg by mouth every four hours as needed.     • Non Formulary Request every day. OCUVITE DROPS BOTH EYES DAILY     • CALCIUM PO Take 600 mg by mouth 2 Times a Day.     • metFORMIN (GLUCOPHAGE) 500 MG Tab Take 500 mg by mouth every bedtime.     • sertraline (ZOLOFT) 100 MG Tab Take 150 mg by mouth every bedtime.     • Cholecalciferol (VITAMIN D3) 65626 units Cap Take 1 Each by mouth every 7 days. (Patient taking differently: Take 1 Each by mouth every 7 days. Patient takes takes on Monday.) 4 Cap 2   • acetaminophen (TYLENOL) 325 MG Tab Take 2 Tabs by mouth every 6 hours. 30 Tab 0   • celecoxib (CELEBREX) 200 MG Cap Take 200 mg by mouth every day.       No current facility-administered medications for this visit.         Patient is taking medications as noted in medication list.  Current supplements as per medication list.     Allergies: Patient has no known allergies.    Current social contact/activities: Visits with friends and family     Is patient current with immunizations? No, due for TDAP. Patient is interested in receiving NONE today.    She  reports that she has never smoked. She has never used smokeless tobacco. She reports that she does not drink alcohol or use drugs.  Counseling given: Not Answered        DPA/Advanced directive: Patient has Living Will on file.     ROS:    Gait: Uses a cane   Ostomy: no   Other tubes: no   Amputations: no   Chronic oxygen use no   Last eye exam 1/2018   Wears hearing aids: no   :  Denies any urinary leakage during the last 6 months      Screening:        Depression Screening    Little interest or pleasure in doing things?  0 - not at all  Feeling down, depressed, or hopeless? 1 - several days  Trouble falling or staying asleep, or sleeping too much?  0 - not at all  Feeling tired or having little energy?  0 - not at all  Poor appetite or overeating?  0 - not at all  Feeling bad about yourself - or that you are a failure or have let yourself or your family down? 1 - several days  Trouble concentrating on things, such as reading the newspaper or watching television? 0 - not at all  Moving or speaking so slowly that other people could have noticed.  Or the opposite - being so fidgety or restless that you have been moving around a lot more than usual?  0 - not at all  Thoughts that you would be better off dead, or of hurting yourself?  1 - several days  Patient Health Questionnaire Score: 3      If depressive symptoms identified deferred to follow up visit unless specifically addressed in assessment and plan.    Interpretation of PHQ-9 Total Score   Score Severity   1-4 No Depression   5-9 Mild Depression   10-14 Moderate Depression   15-19 Moderately Severe Depression   20-27 Severe Depression      Screening for Cognitive Impairment    Three Minute Recall (leader, season, table)  3/3 Leader Season Table  Draw clock face with all 12 numbers and set the hands to show 10 past 11.  No    If cognitive concerns identified, deferred for follow up unless specifically addressed in assessment and plan.    Fall Risk Assessment    Has the patient had two or more falls in the last year or any fall with injury in the last year?  Yes  If fall risk identified, deferred for follow up unless specifically addressed in assessment and plan.      Safety Assessment    Throw rugs on floor.  No  Handrails on all stairs.  Yes  Good lighting in all hallways.  Yes  Difficulty hearing.  No  Patient counseled about all  safety risks that were identified.    Functional Assessment ADLs    Are there any barriers preventing you from cooking for yourself or meeting nutritional needs?  No.    Are there any barriers preventing you from driving safely or obtaining transportation?  Yes. Pt daughter does most of the driving  Are there any barriers preventing you from using a telephone or calling for help?  No.    Are there any barriers preventing you from shopping?  No.    Are there any barriers preventing you from taking care of your own finances?  No.    Are there any barriers preventing you from managing your medications?    No.    Are there any barriers preventing you from showering, bathing or dressing yourself?  No.    Are you currently engaging in any exercise or physical activity?  Yes.  Pt reports walking around the house  What is your perception of your health?  Good.    Health Maintenance Summary                DIABETES MONOFILAMENT / LE EXAM Overdue 7/18/1934     URINE ACR / MICROALBUMIN Overdue 1/18/1952     IMM DTaP/Tdap/Td Vaccine Overdue 1/18/1953     RETINAL SCREENING Overdue 6/13/2017      Done 6/13/2016 REFERRAL FOR RETINAL SCREENING EXAM    A1C SCREENING Next Due 10/10/2018      Done 4/10/2018 HEMOGLOBIN A1C      Patient has more history with this topic...    IMM PNEUMOCOCCAL 65+ (ADULT) LOW/MEDIUM RISK SERIES Next Due 12/3/2018      Done 12/3/2017 Imm Admin: Pneumococcal Conjugate Vaccine (Prevnar/PCV-13)    Annual Wellness Visit Next Due 12/9/2018      Done 12/8/2017 Visit Dx: Medicare annual wellness visit, subsequent    FASTING LIPID PROFILE Next Due 4/10/2019      Done 4/10/2018 LIPID PROFILE      Patient has more history with this topic...    SERUM CREATININE Next Due 4/21/2019      Done 4/21/2018 BASIC METABOLIC PANEL      Patient has more history with this topic...    BONE DENSITY Next Due 3/19/2023      Done 3/19/2018 DS-BONE DENSITY STUDY (DEXA)     Patient has more history with this topic...    COLONOSCOPY Next  Due 8/7/2023      Done 8/7/2013           Patient Care Team:  LEYT Kulkarni as PCP - General (Family Medicine)  Vinny Delgado M.D. as Consulting Physician (Ophthalmology)      Social History   Substance Use Topics   • Smoking status: Never Smoker   • Smokeless tobacco: Never Used   • Alcohol use No     Family History   Problem Relation Age of Onset   • Stroke Father    • Cancer Sister    • Cancer Brother    • Heart Disease Sister    • Cancer Brother      She  has a past medical history of Arthritis; Bowel habit changes; Dental disorder; Diabetes (HCC); Glaucoma; Gynecological disorder; Heart burn; Macular degeneration; Psychiatric problem; Snoring; Urinary bladder disorder; and Urinary incontinence.   Past Surgical History:   Procedure Laterality Date   • BLADDER SLING FEMALE N/A 4/20/2018    Procedure: BLADDER SLING FEMALE- TOT;  Surgeon: Espinoza Bryan M.D.;  Location: SURGERY SAME DAY [facility];  Service: Gynecology   • ANTERIOR REPAIR N/A 4/20/2018    Procedure: ANTERIOR REPAIR- COLPHORRHAPHY;  Surgeon: Espinoza Bryan M.D.;  Location: SURGERY SAME DAY [facility];  Service: Gynecology   • PELVISCOPY N/A 4/20/2018    Procedure: PELVISCOPY- FOR SACRAL SPINOUS FIXATION;  Surgeon: Espinoza Bryan M.D.;  Location: SURGERY SAME DAY [facility];  Service: Gynecology   • HIP CANNULATED SCREW Left 12/1/2017    Procedure: HIP CANNULATED SCREW;  Surgeon: Abhinav Askew M.D.;  Location: SURGERY [facility];  Service: Orthopedics   • LEFORT COLPOCLESIS  6/2/2017    Procedure: LEFORT COLPOCLESIS, perineorrhaphy, posterior colporraphy;  Surgeon: Espinoza Bryan M.D.;  Location: SURGERY SAME DAY [facility];  Service:    • CYSTOSCOPY  6/2/2017    Procedure: CYSTOSCOPY;  Surgeon: Espinoza Bryan M.D.;  Location: SURGERY SAME DAY [facility];  Service:    • ABDOMINAL HYSTERECTOMY TOTAL           Exam:     There were no vitals taken for this visit. There is no height or weight on file to  calculate BMI.    Hearing good.    Dentition upper and lower dentures  Alert, oriented in no acute distress.  Eye contact is good, speech goal directed, affect calm      Assessment and Plan. The following treatment and monitoring plan is recommended:          1. Medicare annual wellness visit, subsequent  Annual wellness topics discussed, review of chronic medical problems completed      2. Type 2 diabetes mellitus without complication, without long-term current use of insulin (HCC)  Patient will continue with same dosage of metformin and do repeat lab work in 3 months. Kidney function is above 45.  - Diabetic Monofilament LE Exam  - Microalbumin Creat Ratio Urine; Future  - HEMOGLOBIN A1C; Future    3. Stage 2 chronic kidney disease  Patient's GFR remains stable.    4. Dyslipidemia  I spoke with daughter about the pros and cons of Lipitor and with her diabetes and high LDL, I suggested we start this to prevent heart attack and stroke. She will start on 20 mg and only stop the medicine if she develops myalgias or other side effects. I will recheck labs in 3 months.  - atorvastatin (LIPITOR) 20 MG Tab; Take 1 Tab by mouth every day.  Dispense: 30 Tab; Refill: 11    5. Uterine prolapse without mention of vaginal wall prolapse  Apparently stable and no further follow-up needed with gynecology.      6. Vitamin D deficiency  Patient will continue with over-the-counter vitamin D    7. Risk for falls  Patient will use her walker.  - Patient identified as fall risk.  Appropriate orders and counseling given.    8. Recurrent major depressive disorder, in partial remission (HCC)  I increased patient's Zoloft to 200 mg per daughter's request but advised her to watch patient carefully and decreased down to 150 mg again if there are any side effects. She declined referral to psychiatry.  - sertraline (ZOLOFT) 100 MG Tab; Take 2 Tabs by mouth every day.  Dispense: 60 Tab; Refill: 11    10. Globus sensation  Will refer patient to  speech therapy to see if further evaluation is needed by ENT and she will continue on her ranitidine. She was advised to chew well to prevent choking.  - REFERRAL TO SPEECH THERAPY Reason for Therapy: Eval/Treat/Report    Services suggested: No services needed at this time  Health Care Screening recommendations as per orders if indicated.  Referrals offered: PT/OT/Nutrition counseling/Behavioral Health/Smoking cessation as per orders if indicated.    Discussion today about general wellness and lifestyle habits:    · Prevent falls and reduce trip hazards; Cautioned about securing or removing rugs.  · Have a working fire alarm and carbon monoxide detector;   · Engage in regular physical activity and social activities       Follow-up: No Follow-up on file.

## 2018-06-12 DIAGNOSIS — E11.9 TYPE 2 DIABETES MELLITUS WITHOUT COMPLICATION, WITHOUT LONG-TERM CURRENT USE OF INSULIN (HCC): ICD-10-CM

## 2018-06-19 DIAGNOSIS — E55.9 VITAMIN D DEFICIENCY: ICD-10-CM

## 2018-06-19 RX ORDER — RANITIDINE 150 MG/1
150 TABLET ORAL 2 TIMES DAILY
Qty: 60 TAB | Refills: 0 | Status: SHIPPED | OUTPATIENT
Start: 2018-06-19 | End: 2018-12-22 | Stop reason: SDUPTHER

## 2018-06-19 RX ORDER — CHOLECALCIFEROL (VITAMIN D3) 1250 MCG
1 CAPSULE ORAL
Qty: 4 CAP | Refills: 0 | Status: SHIPPED | OUTPATIENT
Start: 2018-06-19 | End: 2018-10-01 | Stop reason: SDUPTHER

## 2018-06-19 RX ORDER — CELECOXIB 200 MG/1
200 CAPSULE ORAL
Qty: 30 CAP | Refills: 0 | Status: SHIPPED | OUTPATIENT
Start: 2018-06-19 | End: 2019-10-04

## 2018-07-24 ENCOUNTER — OFFICE VISIT (OUTPATIENT)
Dept: MEDICAL GROUP | Facility: MEDICAL CENTER | Age: 83
End: 2018-07-24
Payer: MEDICARE

## 2018-07-24 VITALS
DIASTOLIC BLOOD PRESSURE: 68 MMHG | HEIGHT: 63 IN | SYSTOLIC BLOOD PRESSURE: 106 MMHG | TEMPERATURE: 97.3 F | HEART RATE: 83 BPM | BODY MASS INDEX: 25.34 KG/M2 | RESPIRATION RATE: 16 BRPM | OXYGEN SATURATION: 95 % | WEIGHT: 143 LBS

## 2018-07-24 DIAGNOSIS — R42 DIZZINESS: ICD-10-CM

## 2018-07-24 DIAGNOSIS — R29.898 WEAKNESS OF BOTH LOWER EXTREMITIES: ICD-10-CM

## 2018-07-24 DIAGNOSIS — E11.9 TYPE 2 DIABETES MELLITUS WITHOUT COMPLICATION, WITHOUT LONG-TERM CURRENT USE OF INSULIN (HCC): ICD-10-CM

## 2018-07-24 DIAGNOSIS — M85.80 OSTEOPENIA, UNSPECIFIED LOCATION: ICD-10-CM

## 2018-07-24 DIAGNOSIS — F33.41 RECURRENT MAJOR DEPRESSIVE DISORDER, IN PARTIAL REMISSION (HCC): ICD-10-CM

## 2018-07-24 PROCEDURE — 99213 OFFICE O/P EST LOW 20 MIN: CPT | Performed by: NURSE PRACTITIONER

## 2018-07-24 RX ORDER — SERTRALINE HYDROCHLORIDE 100 MG/1
150 TABLET, FILM COATED ORAL DAILY
Qty: 60 TAB | Refills: 11
Start: 2018-07-24 | End: 2019-10-09 | Stop reason: SDUPTHER

## 2018-07-24 ASSESSMENT — ENCOUNTER SYMPTOMS
DIZZINESS: 1
MYALGIAS: 1
DEPRESSION: 1

## 2018-07-24 NOTE — PROGRESS NOTES
Subjective:      Herminia Jones is a 84 y.o. female who presents with Loss Of Balance        CC: Patient is brought in by her daughter/caregiver today for dizziness concerns.    HPI.Herminia Jones      1. Dizziness  Patient's daughter states approximate 5 weeks ago patient started having issues with dizziness. She was in [place] at the time and had one episode when she became dizzy and fell to the ground but did not lose consciousness. She has had 2 episodes since coming home which were similar where she would get up or get out of a vehicle and become dizzy and fall. She was not confused and was able to get back up with assistance and move around. She has no history of previous dizziness and uses a cane for mobility. She has had no headache, speech changes, confusion, nausea or vomiting. Symptoms tend to be worse with getting up quickly or head movements.    2. Weakness of both lower extremities  Patient has had problems with her hips and weakness of her lower legs and her daughter would like her to try physical therapy. She is able to get up with minimal assist.    3. Osteopenia, unspecified location  Most recent bone density scan showed osteopenia without osteoporosis.    4. Recurrent major depressive disorder, in partial remission (HCC)  Patient's daughter had asked for increase in her Zoloft dosage due to depression and it was written for 200 mg but her daughter states the pharmacy did not receive this and she is still treating with 150 mg.    5. Type 2 diabetes mellitus without complication, without long-term current use of insulin (HCC)  Last hemoglobin A1c was good at 6.5 on low-dose metformin 500 mg.      Social History   Substance Use Topics   • Smoking status: Never Smoker   • Smokeless tobacco: Never Used   • Alcohol use No     Current Outpatient Prescriptions   Medication Sig Dispense Refill   • sertraline (ZOLOFT) 100 MG Tab Take 1.5 Tabs by mouth every day. 60 Tab 11   • celecoxib (CELEBREX)  "200 MG Cap Take 1 Cap by mouth every morning with breakfast. 30 Cap 0   • metFORMIN (GLUCOPHAGE) 500 MG Tab Take 1 Tab by mouth every bedtime. 30 Tab 0   • raNITidine (ZANTAC) 150 MG Tab Take 1 Tab by mouth 2 times a day. 60 Tab 0   • Cholecalciferol (VITAMIN D3) 10594 units Cap Take 1 Each by mouth every 7 days. 4 Cap 0   • atorvastatin (LIPITOR) 20 MG Tab Take 1 Tab by mouth every day. 30 Tab 11   • Non Formulary Request every day. OCUVITE DROPS BOTH EYES DAILY     • CALCIUM PO Take 600 mg by mouth 2 Times a Day.       No current facility-administered medications for this visit.      Family History   Problem Relation Age of Onset   • Stroke Father    • Cancer Sister    • Cancer Brother    • Heart Disease Sister    • Cancer Brother      Past Medical History:   Diagnosis Date   • Arthritis    • Bowel habit changes    • Dental disorder     dentures   • Diabetes (HCC)     oral medication   • Glaucoma    • Gynecological disorder    • Heart burn    • Macular degeneration    • Psychiatric problem     depression   • Snoring    • Urinary bladder disorder    • Urinary incontinence        Review of Systems   Musculoskeletal: Positive for myalgias.   Neurological: Positive for dizziness.   Psychiatric/Behavioral: Positive for depression.   All other systems reviewed and are negative.         Objective:     /68   Pulse 83   Temp 36.3 °C (97.3 °F)   Resp 16   Ht 1.6 m (5' 3\")   Wt 64.9 kg (143 lb)   SpO2 95%   BMI 25.33 kg/m²      Physical Exam   Constitutional: She is oriented to person, place, and time. She appears well-developed and well-nourished. No distress.   HENT:   Head: Normocephalic and atraumatic.   Right Ear: External ear normal.   Left Ear: External ear normal.   Nose: Nose normal.   Eyes: Right eye exhibits no discharge. Left eye exhibits no discharge.   Neck: Normal range of motion. Neck supple. No thyromegaly present.   Cardiovascular: Normal rate, regular rhythm and normal heart sounds.  Exam " reveals no gallop and no friction rub.    No murmur heard.  Pulmonary/Chest: Effort normal and breath sounds normal. She has no wheezes. She has no rales.   Musculoskeletal: She exhibits no edema or tenderness.   Neurological: She is alert and oriented to person, place, and time. She displays normal reflexes. She displays a negative Romberg sign. Coordination and gait normal.   Patient able to get up off the exam table and walk with just came through the halls with no ataxia. She is alert and able to answer questions.   Skin: Skin is warm and dry. No rash noted. She is not diaphoretic.   Psychiatric: She has a normal mood and affect. Her behavior is normal. Judgment and thought content normal.   Nursing note and vitals reviewed.              Assessment/Plan:     1. Dizziness  Patient having 4-5 weeks of dizziness. It is possibly orthostatic since her blood pressure tends to run on the lower end of normal and she is not on antihypertensive medicine. I advised getting up slowly from chair or bed and consider using her walker instead of her cane. She is to keep hydrated and may consider coffee or other stimulant to bring her blood pressure up. I will have her do a CT of the head and refer her to ENT assuming symptoms continue.  - REFERRAL TO ENT  - CT-HEAD W/O; Future    2. Weakness of both lower extremities  Patient's daughter would like her to start physical therapy and this will be ordered. She also is pending occupational therapy for swallowing evaluation.  - REFERRAL TO PHYSICAL THERAPY Reason for Therapy: Eval/Treat/Report    3. Osteopenia, unspecified location  Patient reminded to be careful to prevent fractures and take calcium and vitamin D.    4. Recurrent major depressive disorder, in partial remission (HCC)  Patient still taking the 1-1/2 tablets of Zoloft and I advised continuing on this dosage if possible.  - sertraline (ZOLOFT) 100 MG Tab; Take 1.5 Tabs by mouth every day.  Dispense: 60 Tab; Refill:  11    5. Type 2 diabetes mellitus without complication, without long-term current use of insulin (HCC)  Most recent hemoglobin A1c shows no hypoglycemia and good control of diabetes with low-dose metformin.

## 2018-07-30 ENCOUNTER — HOSPITAL ENCOUNTER (OUTPATIENT)
Dept: RADIOLOGY | Facility: MEDICAL CENTER | Age: 83
End: 2018-07-30
Attending: NURSE PRACTITIONER
Payer: MEDICARE

## 2018-07-30 DIAGNOSIS — R42 DIZZINESS: ICD-10-CM

## 2018-07-30 PROCEDURE — 70450 CT HEAD/BRAIN W/O DYE: CPT

## 2018-08-14 ENCOUNTER — PHYSICAL THERAPY (OUTPATIENT)
Dept: PHYSICAL THERAPY | Facility: REHABILITATION | Age: 83
End: 2018-08-14
Attending: NURSE PRACTITIONER
Payer: MEDICARE

## 2018-08-14 DIAGNOSIS — R29.898 WEAKNESS OF BOTH LOWER EXTREMITIES: ICD-10-CM

## 2018-08-14 PROCEDURE — 97110 THERAPEUTIC EXERCISES: CPT

## 2018-08-14 PROCEDURE — 97163 PT EVAL HIGH COMPLEX 45 MIN: CPT

## 2018-08-14 ASSESSMENT — BALANCE ASSESSMENTS
WEIGHT SHIFT SITTING: GOOD
BALANCE - SITTING STATIC: NORMAL
WEIGHT SHIFT STANDING: FAIR
BALANCE - STANDING STATIC: FAIR
BALANCE - STANDING DYNAMIC: FAIR -
BALANCE - SITTING DYNAMIC: NORMAL

## 2018-08-14 ASSESSMENT — ENCOUNTER SYMPTOMS
PAIN SCALE AT HIGHEST: 3
PAIN LOCATION: LEFT LATERAL HIP AND GROIN
PAIN TIMING: IN THE EVENING
QUALITY: ACHING
PAIN SCALE AT LOWEST: 0
PAIN SCALE: 0

## 2018-08-14 NOTE — OP THERAPY EVALUATION
Outpatient Physical Therapy  INITIAL NEUROLOGICAL EVALUATION    [facility] [address]  [address]  [place] [address]  Phone:  [phone]  Fax:  [phone]    Date of Evaluation: 08/14/2018    Patient: Herminia Jones  YOB: 1934  MRN: [id number]     Referring Provider: LETY Kulkarni [facility] [address]  [place] [address]   Referring Diagnosis Weakness of both lower extremities [R29.898]     Time Calculation             Physical Therapy Occurrence Codes    Date physical therapy care plan established or reviewed:  8/14/18   Date physical therapy treatment started:  8/14/18          Chief Complaint: No chief complaint on file.    Visit Diagnoses     ICD-10-CM   1. Weakness of both lower extremities R29.898       Subjective    Past Medical History:   Diagnosis Date   • Arthritis    • Bowel habit changes    • Dental disorder     dentures   • Diabetes (HCC)     oral medication   • Glaucoma    • Gynecological disorder    • Heart burn    • Macular degeneration    • Psychiatric problem     depression   • Snoring    • Urinary bladder disorder    • Urinary incontinence      Past Surgical History:   Procedure Laterality Date   • BLADDER SLING FEMALE N/A 4/20/2018    Procedure: BLADDER SLING FEMALE- TOT;  Surgeon: Espinoza Bryan M.D.;  Location: SURGERY SAME DAY [facility];  Service: Gynecology   • ANTERIOR REPAIR N/A 4/20/2018    Procedure: ANTERIOR REPAIR- COLPHORRHAPHY;  Surgeon: Espinoza Bryan M.D.;  Location: SURGERY SAME DAY [facility];  Service: Gynecology   • PELVISCOPY N/A 4/20/2018    Procedure: PELVISCOPY- FOR SACRAL SPINOUS FIXATION;  Surgeon: Espinoza Bryan M.D.;  Location: SURGERY SAME DAY [facility];  Service: Gynecology   • HIP CANNULATED SCREW Left 12/1/2017    Procedure: HIP CANNULATED SCREW;  Surgeon: Abhinav Askew M.D.;  Location: [facility];  Service: Orthopedics   • LEFORT COLPOCLESIS  6/2/2017     Procedure: LEFORT COLPOCLESIS, perineorrhaphy, posterior colporraphy;  Surgeon: Espinoza Bryan M.D.;  Location: SURGERY SAME DAY [facility];  Service:    • CYSTOSCOPY  6/2/2017    Procedure: CYSTOSCOPY;  Surgeon: Espinoza Bryan M.D.;  Location: SURGERY SAME DAY [facility];  Service:    • ABDOMINAL HYSTERECTOMY TOTAL       Social History   Substance Use Topics   • Smoking status: Never Smoker   • Smokeless tobacco: Never Used   • Alcohol use No     Family and Occupational History     Social History   • Marital status:      Spouse name: N/A   • Number of children: N/A   • Years of education: N/A       Objective    Exercises/Treatment  Time-based treatments/modalities:          Assessment/Response/Plan    Functional Limitation G-Codes and Severity Modifiers      Current:     Goal:         Referring provider co-signature:  I have reviewed this plan of care and my co-signature certifies the need for services.  Certification Dates:   From ***     To ***    Physician Signature: ________________________________ Date: ______________

## 2018-08-14 NOTE — OP THERAPY EVALUATION
Outpatient Physical Therapy  INITIAL NEUROLOGICAL EVALUATION    [facility] [address].  [address]  [place] [address]  Phone:  [phone]  Fax:  [phone]    Date of Evaluation: 2018    Patient: Herminia Jones  YOB: 1934  MRN: [id number]     Referring Provider: LETY Kulkarnigle [facility] [address]  [place] [address]   Referring Diagnosis Weakness of both lower extremities [R29.898]     Time Calculation  Start time: 1000  Stop time: 1100 Time Calculation (min): 60 minutes     Physical Therapy Occurrence Codes    Date of onset of impairment:  17   Date physical therapy care plan established or reviewed:  18   Date physical therapy treatment started:  18          Chief Complaint: No chief complaint on file.    Visit Diagnoses     ICD-10-CM   1. Weakness of both lower extremities R29.898       Subjective:   History of Present Illness:     Date of onset:  2017    Date of surgery:  2017    Mechanism of injury:  S/P fall 17 fractured left hip with S/P ORIF 2018.  Multiple insideous falls since then with one closed head injury but no significant injury.  Last fall 3 weeks ago.   +Macular degeneration  + dementia, leg cramps at night.  Denies dizziness, loss of consciousness, double vision  Activity Level:  Walks around the house with SPC or FWW.  Otherwise sedentary and sitting most of the day.  ADLS:  Independent ; requires assist with cooking and housework.  No previous PT after hip fx.     CT scan:  1. No CT evidence of acute infarct, hemorrhage or mass.  2. Moderate global parenchymal atrophy. Chronic small vessel ischemic changes.  Prior level of function:  Walking without assistive device community distance  Headaches:  no headaches  Sleep disturbance:  Not disrupted  Pain:     Current pain ratin    At best pain ratin    At worst pain rating:  3    Location:  Left lateral hip and groin     Quality:  Aching    Pain timing:  In the evening    Alleviating factors: topical pain cream, tyelonol.    Exacerbated by: lying on it.    Progression:  Improving  Social Support:     Lives in:  One-story house    Lives with:  Adult children  Treatments:     None    Patient Goals:     Patient goals for therapy:  Increased strength, independence with ADLs/IADLs, decreased pain and improved balance    Other patient goals:  Walk without FWW      Past Medical History:   Diagnosis Date   • Arthritis    • Bowel habit changes    • Dental disorder     dentures   • Diabetes (HCC)     oral medication   • Glaucoma    • Gynecological disorder    • Heart burn    • Macular degeneration    • Psychiatric problem     depression   • Snoring    • Urinary bladder disorder    • Urinary incontinence      Past Surgical History:   Procedure Laterality Date   • BLADDER SLING FEMALE N/A 4/20/2018    Procedure: BLADDER SLING FEMALE- TOT;  Surgeon: Espinoza Bryan M.D.;  Location: SURGERY SAME DAY [facility];  Service: Gynecology   • ANTERIOR REPAIR N/A 4/20/2018    Procedure: ANTERIOR REPAIR- COLPHORRHAPHY;  Surgeon: Espinoza Bryan M.D.;  Location: SURGERY SAME DAY [facility];  Service: Gynecology   • PELVISCOPY N/A 4/20/2018    Procedure: PELVISCOPY- FOR SACRAL SPINOUS FIXATION;  Surgeon: Espinoza Bryan M.D.;  Location: SURGERY SAME DAY [facility];  Service: Gynecology   • HIP CANNULATED SCREW Left 12/1/2017    Procedure: HIP CANNULATED SCREW;  Surgeon: Abhinav Askew M.D.;  Location: [facility];  Service: Orthopedics   • LEFORT COLPOCLESIS  6/2/2017    Procedure: LEFORT COLPOCLESIS, perineorrhaphy, posterior colporraphy;  Surgeon: Espinoza Bryan M.D.;  Location: SURGERY SAME DAY [facility];  Service:    • CYSTOSCOPY  6/2/2017    Procedure: CYSTOSCOPY;  Surgeon: Espinoza Bryan M.D.;  Location: SURGERY SAME DAY [facility];  Service:    • ABDOMINAL HYSTERECTOMY TOTAL       Social History   Substance Use  Topics   • Smoking status: Never Smoker   • Smokeless tobacco: Never Used   • Alcohol use No     Family and Occupational History     Social History   • Marital status:      Spouse name: N/A   • Number of children: N/A   • Years of education: N/A       Objective:   Active Range of Motion:   Lower extremity (left):     Hip abduction: Below functional limits    Hip external rotation: Within functional limits    HIp internal rotation: Below functional limits  Lower extremity (right):     All right lower extremity active range of motion: All within functional limits      Strength:   Lower extremity (left):     Hip flexion: 4-    Hip extension: 3-    Hip abduction: 3-    Hip external rotation: 3+    Knee extension: 4-    Ankle dorsiflexion: 5  Lower extremity (right):     Hip flexion: 5    Hip extension: 4    Hip abduction: 4    Hip external rotation: 5    Knee extension: 4    Ankle dorsiflexion: 5    Vision/Perception:     Visual tracking: impaired    Convergence: impaired    Visual acuity: impaired    Visual scanning: impaired    Vision/Perception Comments:   Impaired peripheral vision      Postural Control (Balance)     Sitting (static): Normal    Sitting (dynamic): Normal    Standing (static): Fair    Standing (dynamic): Fair -    Weight shift (sitting): Good    Weight shift (standing): Fair        Therapeutic Exercises (CPT 57597):     1. Bridge, x 10    2. Supine clam, orange band x 10    3. Sit to stand, x 10    4. Gait training with FWW 1 x 150 feet @ IND      Time-based treatments/modalities:  Gait training minutes (CPT 01186): 5 minutes  Therapeutic exercise minutes (CPT 49895): 10 minutes       Assessment, Response and Plan:   Assessment details:  Patient presents with significant fall history, impaired gait,  balance and bilateral LE weakness.  Patient s/p left hip fx/ORIF secondary to fall last year.   Patient never received PT resulting in significant weakness left LE.  Patient has mild dementia and  macular degneration which may limit learning and mild carryover at home.  Patient requires cueing to use FWW safely for gait and tranfers.  Patient will benefit from skilled PT to meet goals stated below and optimize functional mobility.  Barriers to therapy:  Vision and cognition  Prognosis: good    Goals:   Short Term Goals:   Patient able to sit to stand x 5 < 25 seconds  Patient able to walk and transfer  with correct techinque using  + feet  Short term goal time span:  2-4 weeks      Long Term Goals:    Patient able to sit to stand x 5 < 20 sec  Patient able to ambulate 600 + feet on community surfaces:  Sidewalk, pavement, carpet and hard floors Mod I  Long term goal time span:  4-6 weeks    Plan:   Therapy options:  Physical therapy treatment to continue  Planned therapy interventions:  E Stim Unattended (CPT 36301), Manual Therapy (CPT 09768), Gait Training (CPT 77857), Therapeutic Activities (CPT 47592), Therapeutic Exercise (CPT 06291) and Neuromuscular Re-education (CPT 88031)  Frequency:  2x week  Duration in weeks:  6  Duration in visits:  12  Discussed with:  Patient and family      Functional Limitation G-Codes and Severity Modifiers  PT Functional Assessment Tool Used: 5 x sit to stand  PT Functional Assessment Score: 34 sec  WOMAC Grand Total: 42.71   Current:     Goal:         Referring provider co-signature:  I have reviewed this plan of care and my co-signature certifies the need for services.  Certification Dates:   From 8/14/2018 To 9/25/2018    Physician Signature: ________________________________ Date: ______________

## 2018-08-14 NOTE — OP THERAPY EVALUATION
Outpatient Physical Therapy  INITIAL EVALUATION    [facility] Physical Therapy [address]  [address]  [place] [address]  Phone:  [phone]  Fax:  [phone]    Date of Evaluation: 08/14/2018    Patient: Herminia Jones  YOB: 1934  MRN: [id number]     Referring Provider: LETY Kulkarni [facility] [address]  [place] [address]   Referring Diagnosis Weakness of both lower extremities [R29.898]     Time Calculation             Physical Therapy Occurrence Codes    Date physical therapy care plan established or reviewed:  8/14/18   Date physical therapy treatment started:  8/14/18          Chief Complaint: No chief complaint on file.    Visit Diagnoses     ICD-10-CM   1. Weakness of both lower extremities R29.898         Subjective    Past Medical History:   Diagnosis Date   • Arthritis    • Bowel habit changes    • Dental disorder     dentures   • Diabetes (HCC)     oral medication   • Glaucoma    • Gynecological disorder    • Heart burn    • Macular degeneration    • Psychiatric problem     depression   • Snoring    • Urinary bladder disorder    • Urinary incontinence      Past Surgical History:   Procedure Laterality Date   • BLADDER SLING FEMALE N/A 4/20/2018    Procedure: BLADDER SLING FEMALE- TOT;  Surgeon: Espinoza Bryan M.D.;  Location: SURGERY SAME DAY [facility];  Service: Gynecology   • ANTERIOR REPAIR N/A 4/20/2018    Procedure: ANTERIOR REPAIR- COLPHORRHAPHY;  Surgeon: Espinoza Bryan M.D.;  Location: SURGERY SAME DAY [facility];  Service: Gynecology   • PELVISCOPY N/A 4/20/2018    Procedure: PELVISCOPY- FOR SACRAL SPINOUS FIXATION;  Surgeon: Espinoza Bryan M.D.;  Location: SURGERY SAME DAY [facility];  Service: Gynecology   • HIP CANNULATED SCREW Left 12/1/2017    Procedure: HIP CANNULATED SCREW;  Surgeon: Abhinav Askew M.D.;  Location: [facility];  Service: Orthopedics   • LEFORT COLPOCLESIS  6/2/2017    Procedure:  LEFORT COLPOCLESIS, perineorrhaphy, posterior colporraphy;  Surgeon: Espinoza Bryan M.D.;  Location: SURGERY SAME DAY [facility];  Service:    • CYSTOSCOPY  6/2/2017    Procedure: CYSTOSCOPY;  Surgeon: Espinoza Bryan M.D.;  Location: SURGERY SAME DAY [facility];  Service:    • ABDOMINAL HYSTERECTOMY TOTAL       Social History   Substance Use Topics   • Smoking status: Never Smoker   • Smokeless tobacco: Never Used   • Alcohol use No     Family and Occupational History     Social History   • Marital status:      Spouse name: N/A   • Number of children: N/A   • Years of education: N/A       Objective    Exercises/Treatment  Time-based treatments/modalities:          Assessment/Response/Plan    Functional Limitation G-Codes and Severity Modifiers      Current:     Goal:       Referring provider co-signature:  I have reviewed this plan of care and my co-signature certifies the need for services.  Certification Dates:   From ***     To ***    Physician Signature: ________________________________ Date: ______________

## 2018-08-20 ENCOUNTER — PHYSICAL THERAPY (OUTPATIENT)
Dept: PHYSICAL THERAPY | Facility: REHABILITATION | Age: 83
End: 2018-08-20
Attending: NURSE PRACTITIONER
Payer: MEDICARE

## 2018-08-20 DIAGNOSIS — R29.898 WEAKNESS OF BOTH LOWER EXTREMITIES: ICD-10-CM

## 2018-08-20 PROCEDURE — 97116 GAIT TRAINING THERAPY: CPT

## 2018-08-20 PROCEDURE — 97110 THERAPEUTIC EXERCISES: CPT

## 2018-08-20 NOTE — OP THERAPY DAILY TREATMENT
Outpatient Physical Therapy  DAILY TREATMENT     [facility] Physical [address]  [address]  [place] [address]  Phone:  [phone]  Fax:  [phone]    Date: 08/20/2018    Patient: Herminia Jones  YOB: 1934  MRN: [id number]     Time Calculation  Start time: 1100  Stop time: 1150 Time Calculation (min): 50 minutes     Chief Complaint: weakness bilateral LE  Visit #: 2    SUBJECTIVE:  Patient present with daughter who says she cant remember her exercises and has not seen her try them at home.  Patient cannot recall home program    OBJECTIVE:  Current objective measures:           Therapeutic Exercises (CPT 87636):     1. Bridge on ball, x 30    2. Hamstring curl/roll out on ball, x 30    3. Standing hip flex, abd, ext, x 15    4. Standing heel raises, x 20    5. Clamshells sidelying no resistance, 1 x 30    6. SL assisted repeated hip extension , 1 x 30    Therapeutic Treatments and Modalities:     1. Gait Training (CPT 37565), 2 x 150 feet with fww focu on heel first contact, walker positioning and safety    Time-based treatments/modalities:  Gait training minutes (CPT 94584): 10 minutes  Therapeutic exercise minutes (CPT 19778): 30 minutes       Pain rating before treatment: 0  Pain rating after treatment: 0    ASSESSMENT:   Response to treatment: good tolerance to there ex, pain only with AROM hip abduction.  Demonstrated improve Sit to stand post hip ROM     PLAN/RECOMMENDATIONS:   Plan for treatment: therapy treatment to continue next visit.  Planned interventions for next visit: continue with current treatment.

## 2018-08-22 ENCOUNTER — PHYSICAL THERAPY (OUTPATIENT)
Dept: PHYSICAL THERAPY | Facility: REHABILITATION | Age: 83
End: 2018-08-22
Attending: NURSE PRACTITIONER
Payer: MEDICARE

## 2018-08-22 DIAGNOSIS — R29.898 WEAKNESS OF BOTH LOWER EXTREMITIES: ICD-10-CM

## 2018-08-22 PROCEDURE — 97110 THERAPEUTIC EXERCISES: CPT

## 2018-08-22 NOTE — OP THERAPY DAILY TREATMENT
"  Outpatient Physical Therapy  DAILY TREATMENT     [facility] Physical Therapy [address].  [address]  [place] [address]  Phone:  [phone]  Fax:  [phone]    Date: 08/22/2018    Patient: Herminia Jones  YOB: 1934  MRN: [id number]     Time Calculation  Start time: 1200  Stop time: 1231 Time Calculation (min): 31 minutes     Chief Complaint: Weakness    Visit #: 3    SUBJECTIVE:  No new complaints.  Pt states her HOs are helping her remember to do the HEP.  \"I do all of them\"     OBJECTIVE:      Therapeutic Exercises (CPT 72472):     1. NuStep, L1 x 5 min with goal to stay above 65 spm    2. Hamstring curl/roll out on ball, x 30    3. Ball bridge, x20 with 5\" hold    4. SL clam, x 20    5. SL hip abd, x 20    6. Gait x 300 feet with focus on heel strike and fwd gaze, using FWW    7. Step taps , x 20 ea side to 6\" step    8. Airex balance, standard stance with HTs and with EC x 1 min trials ea.       Time-based treatments/modalities:  Therapeutic exercise minutes (CPT 08187): 31 minutes       ASSESSMENT:   Response to treatment: Pt seeming to have better carryover from last visit to this session.  Does recall gait cues given previously.  Limited endurance in L SLS and poor balance EC on complaint surfaces.     PLAN/RECOMMENDATIONS:   Plan for treatment: therapy treatment to continue next visit.  Planned interventions for next visit: continue with current treatment.      "

## 2018-08-23 ENCOUNTER — PATIENT OUTREACH (OUTPATIENT)
Dept: HEALTH INFORMATION MANAGEMENT | Facility: OTHER | Age: 83
End: 2018-08-23

## 2018-08-23 ENCOUNTER — APPOINTMENT (OUTPATIENT)
Dept: PHYSICAL THERAPY | Facility: REHABILITATION | Age: 83
End: 2018-08-23
Attending: NURSE PRACTITIONER
Payer: MEDICARE

## 2018-08-27 ENCOUNTER — PHYSICAL THERAPY (OUTPATIENT)
Dept: PHYSICAL THERAPY | Facility: REHABILITATION | Age: 83
End: 2018-08-27
Attending: NURSE PRACTITIONER
Payer: MEDICARE

## 2018-08-27 DIAGNOSIS — R29.898 WEAKNESS OF BOTH LOWER EXTREMITIES: ICD-10-CM

## 2018-08-27 PROCEDURE — 97116 GAIT TRAINING THERAPY: CPT

## 2018-08-27 PROCEDURE — 97110 THERAPEUTIC EXERCISES: CPT

## 2018-08-27 NOTE — OP THERAPY DAILY TREATMENT
"  Outpatient Physical Therapy  DAILY TREATMENT     [facility] Physical [address].  [address]  [place] [address]  Phone:  [phone]  Fax:  [phone]    Date: 08/27/2018    Patient: Herminia Jones  YOB: 1934  MRN: [id number]     Time Calculation  Start time: 1100  Stop time: 1145 Time Calculation (min): 45 minutes     Chief Complaint:   Visit #: 4    SUBJECTIVE:  Been walking without assistive device at home secondary difficult to use Fww on gravel.   No falls    OBJECTIVE:  Current objective measures:           Therapeutic Exercises (CPT 87512):     1. NuStep, L5 10 min    2. Sit to stand, x 15     3. Ball bridge, x20 with 5\" hold    4. Dynadisc DL standing @ cg assist, 4 x 1 min    5. Shuttle 5 cords DL/4 cord SL, x 30 each    Therapeutic Treatments and Modalities:     1. Gait Training (CPT 43504), 1 x 500 feet without assistive device @ SBA-cg assist; 1 x 150 ft with fww @ Indep with cueing for upright posture    Time-based treatments/modalities:  Gait training minutes (CPT 85101): 10 minutes  Therapeutic exercise minutes (CPT 73846): 35 minutes  Neuromusc re-ed, balance, coor, post minutes (CPT 60275): 0 minutes       Pain rating before treatment: 0  Pain rating after treatment: 0    ASSESSMENT:   Response to treatment: decreased stance and foot clearance right with gait without assistive device.  Emphasized the importance of using FWW with gait at home to reduce fall risk.  Patient states that it is too erci for fww outside but that she was open to trying the SPC.      PLAN/RECOMMENDATIONS:   Plan for treatment: therapy treatment to continue next visit.  Planned interventions for next visit: continue with current treatment.      "

## 2018-08-30 ENCOUNTER — APPOINTMENT (OUTPATIENT)
Dept: PHYSICAL THERAPY | Facility: REHABILITATION | Age: 83
End: 2018-08-30
Attending: NURSE PRACTITIONER
Payer: MEDICARE

## 2018-09-04 ENCOUNTER — PHYSICAL THERAPY (OUTPATIENT)
Dept: PHYSICAL THERAPY | Facility: REHABILITATION | Age: 83
End: 2018-09-04
Attending: NURSE PRACTITIONER
Payer: MEDICARE

## 2018-09-04 DIAGNOSIS — R29.898 WEAKNESS OF BOTH LOWER EXTREMITIES: ICD-10-CM

## 2018-09-04 PROCEDURE — 97110 THERAPEUTIC EXERCISES: CPT

## 2018-09-04 NOTE — OP THERAPY DAILY TREATMENT
Outpatient Physical Therapy  DAILY TREATMENT     [facility] Physical Therapy [address]  [address]  [place] [address]  Phone:  [phone]  Fax:  [phone]    Date: 09/04/2018    Patient: Herminia Jones  YOB: 1934  MRN: [id number]     Time Calculation  Start time: 1433  Stop time: 1501 Time Calculation (min): 28 minutes     Chief Complaint: Weakness    Visit #: 5    SUBJECTIVE:  Dtr and pt feel she is doing well at home.  Has been able to do HEP.  No concerns regarding strength or endurance.  Was able to go camping this past weekend and using the FWW went well.     OBJECTIVE:      Therapeutic Exercises (CPT 99893):     1. NuStep, L5 10 min    2. Heel raises, x 15     3. Standing march, x 15 ea    4. 3W hip series, x 10 ea way, bilat    5. Shuttle, squat: 6C x 30    6. Airex, EC balance with CGA x 1 min, HTs x 1 min.       Time-based treatments/modalities:  Therapeutic exercise minutes (CPT 13700): 28 minutes     ASSESSMENT:   Response to treatment: Pt appears to be progressing well with LE strength, endurance and HEP compliance. Will hold treatment with pt to focus on HEP.  Follow up in 2-3 weeks and expect d/c.      PLAN/RECOMMENDATIONS:   Plan for treatment: therapy treatment to continue next visit.  Planned interventions for next visit: continue with current treatment.

## 2018-09-10 NOTE — PROGRESS NOTES
1. Attempt #:Final    2. WebIZ Checked & Epic Updated: Yes  3. HealthConnect Verified: yes  4. Verify PCP: yes    5. Communication Preference Obtained: not able to address/ appt was scheduled with pt's daughter and she was in a hurry.    6. Diabetes Visit Scheduling  Scheduling Status:Scheduled      7. Care Gap Scheduling (Attempt to Schedule EACH Overdue Care Gap!)    Health Maintenance Due    Topic Date Due   • URINE ACR / MICROALBUMIN  01/18/1952 / Lab Orders in chart already   • IMM HEP B VACCINE (1 of 3 - Risk 3-dose series) 01/18/1953   • IMM DTaP/Tdap/Td Vaccine (1 - Tdap) 01/18/1953 /will discuss with PCP first.   • IMM ZOSTER VACCINES (1 of 2) 01/18/1984   • RETINAL SCREENING  06/13/2017/ Already has an appt scheduled for Nov.   • IMM INFLUENZA (1) 09/01/2018        8. Patient was directed to Health and Wellness Website: no  9. Screened for Food [employer] Prescription? no  10. [employer] Activation: already active  11. [employer] Scott: no  12. Virtual Visits: no  13. Opt In to Text Messages: no   n/a

## 2018-09-28 ENCOUNTER — PHYSICAL THERAPY (OUTPATIENT)
Dept: PHYSICAL THERAPY | Facility: REHABILITATION | Age: 83
End: 2018-09-28
Attending: NURSE PRACTITIONER
Payer: MEDICARE

## 2018-09-28 ENCOUNTER — HOSPITAL ENCOUNTER (OUTPATIENT)
Dept: LAB | Facility: MEDICAL CENTER | Age: 83
End: 2018-09-28
Attending: NURSE PRACTITIONER
Payer: MEDICARE

## 2018-09-28 DIAGNOSIS — R29.898 WEAKNESS OF BOTH LOWER EXTREMITIES: ICD-10-CM

## 2018-09-28 DIAGNOSIS — E11.9 TYPE 2 DIABETES MELLITUS WITHOUT COMPLICATION, WITHOUT LONG-TERM CURRENT USE OF INSULIN (HCC): ICD-10-CM

## 2018-09-28 LAB
CREAT UR-MCNC: 159.7 MG/DL
EST. AVERAGE GLUCOSE BLD GHB EST-MCNC: 148 MG/DL
HBA1C MFR BLD: 6.8 % (ref 0–5.6)
MICROALBUMIN UR-MCNC: 53.1 MG/DL
MICROALBUMIN/CREAT UR: 332 MG/G (ref 0–30)

## 2018-09-28 PROCEDURE — 97110 THERAPEUTIC EXERCISES: CPT

## 2018-09-28 PROCEDURE — 83036 HEMOGLOBIN GLYCOSYLATED A1C: CPT

## 2018-09-28 PROCEDURE — 36415 COLL VENOUS BLD VENIPUNCTURE: CPT

## 2018-09-28 PROCEDURE — 82043 UR ALBUMIN QUANTITATIVE: CPT

## 2018-09-28 PROCEDURE — 82570 ASSAY OF URINE CREATININE: CPT

## 2018-09-28 PROCEDURE — 97530 THERAPEUTIC ACTIVITIES: CPT

## 2018-09-28 NOTE — OP THERAPY DAILY TREATMENT
"  Outpatient Physical Therapy  DAILY TREATMENT     [facility] Physical Therapy [address].  [address]  [place] [address]  Phone:  [phone]  Fax:  [phone]    Date: 2018    Patient: Herminia Jones  YOB: 1934  MRN: [id number]     Time Calculation  Start time: 1130  Stop time: 1201 Time Calculation (min): 31 minutes     Chief Complaint: Loss Of Balance and Weakness    Visit #: 6    SUBJECTIVE:  Pt reports she feels like she is doing ok.  States she is doing her HEP, but dtr reports she is not.  Dtr tries to encourage her \"but it is a fight.\" Does best when she can encourage her with an upcoming PT appt.     OBJECTIVE: Showed difficulty in STS transition in the lobby    Current objective measures: 6 min walk= 517 feet.   PT Functional Assessment Tool Used: 5x STS  PT Functional Assessment Score: 27 sec    CHILDS/56.       Therapeutic Exercises (CPT 62996):     1. NuStep, L4 x 10 min    Therapeutic Treatments and Modalities:     1. Therapeutic Activities (CPT 45616), Standardized testing as above. Habitual use of knees to push off table during STS, cuing required to correct. 6 min walk done with Fww.     Time-based treatments/modalities:  Therapeutic exercise minutes (CPT 74648): 10 minutes  Therapeutic activity minutes (CPT 77088): 20 minutes       ASSESSMENT:   Response to treatment: Pt shows decreased strength/endurance compared to previous visit, but still better than IE.  Dtr reporting limited compliance with HEP and resistance to dtr's encouragement.  Feel she would be best suited to continue just once every 3-4 weeks for 2 additional visits to ensure an accountability check point and improve compliance.       PLAN/RECOMMENDATIONS:   Plan for treatment: therapy treatment to continue next visit.  Planned interventions for next visit: continue with current treatment.      "

## 2018-10-01 DIAGNOSIS — E55.9 VITAMIN D DEFICIENCY: ICD-10-CM

## 2018-10-01 RX ORDER — CHOLECALCIFEROL (VITAMIN D3) 1250 MCG
1 CAPSULE ORAL
Qty: 4 CAP | Refills: 5 | Status: SHIPPED | OUTPATIENT
Start: 2018-10-01 | End: 2019-03-25 | Stop reason: SDUPTHER

## 2018-10-02 ENCOUNTER — OFFICE VISIT (OUTPATIENT)
Dept: MEDICAL GROUP | Facility: MEDICAL CENTER | Age: 83
End: 2018-10-02
Payer: MEDICARE

## 2018-10-02 VITALS
BODY MASS INDEX: 25.52 KG/M2 | RESPIRATION RATE: 16 BRPM | OXYGEN SATURATION: 95 % | HEART RATE: 77 BPM | DIASTOLIC BLOOD PRESSURE: 64 MMHG | SYSTOLIC BLOOD PRESSURE: 114 MMHG | HEIGHT: 63 IN | WEIGHT: 144 LBS

## 2018-10-02 DIAGNOSIS — N18.2 STAGE 2 CHRONIC KIDNEY DISEASE: ICD-10-CM

## 2018-10-02 DIAGNOSIS — Z23 INFLUENZA VACCINE NEEDED: ICD-10-CM

## 2018-10-02 DIAGNOSIS — R80.9 MICROALBUMINURIA: ICD-10-CM

## 2018-10-02 DIAGNOSIS — E11.9 TYPE 2 DIABETES MELLITUS WITHOUT COMPLICATION, WITHOUT LONG-TERM CURRENT USE OF INSULIN (HCC): ICD-10-CM

## 2018-10-02 DIAGNOSIS — F33.42 RECURRENT MAJOR DEPRESSIVE DISORDER, IN FULL REMISSION (HCC): ICD-10-CM

## 2018-10-02 PROCEDURE — 99213 OFFICE O/P EST LOW 20 MIN: CPT | Mod: 25 | Performed by: NURSE PRACTITIONER

## 2018-10-02 PROCEDURE — G0008 ADMIN INFLUENZA VIRUS VAC: HCPCS | Performed by: NURSE PRACTITIONER

## 2018-10-02 PROCEDURE — 90662 IIV NO PRSV INCREASED AG IM: CPT | Performed by: NURSE PRACTITIONER

## 2018-10-02 NOTE — PROGRESS NOTES
Subjective:      Herminia Jones is a 84 y.o. female who presents with Follow-Up (diabetes)        CC: Patient is here today accompanied by her daughter/caregiver for follow-up on recent lab work for her diabetes, chronic kidney disease, and depression.    HPI Herminia Jones      1. Type 2 diabetes mellitus without complication, without long-term current use of insulin (HCC)  Patient on low-dose metformin 500 mg once a day and her hemoglobin A1c is within acceptable range for her age at 6.8. She has had no hypoglycemia. Her last GFR was at 52.    2. Recurrent major depressive disorder, in full remission (HCC)  Patient is currently on Zoloft 150 mg and her daughter once this even higher because patient sometimes becomes angry and abrupt. She has never shown behavioral problems in the office during visits. She has declined referral to psychiatry in the past.    3. Microalbuminuria  Urine shows microalbuminuria with a ratio of 332. Patient is not on an ace/ARB because of hypotension.    4. Stage 2 chronic kidney disease  GFR has been low in the past but most recently was at 52. Caregiver states patient is on antibiotics currently through her gynecologist for possible UTI although patient is asymptomatic.    5. Influenza vaccine needed  Patient due for vaccine.  Social History   Substance Use Topics   • Smoking status: Never Smoker   • Smokeless tobacco: Never Used   • Alcohol use No     Current Outpatient Prescriptions   Medication Sig Dispense Refill   • sertraline (ZOLOFT) 100 MG Tab Take 1.5 Tabs by mouth every day. 60 Tab 11   • celecoxib (CELEBREX) 200 MG Cap Take 1 Cap by mouth every morning with breakfast. 30 Cap 0   • metFORMIN (GLUCOPHAGE) 500 MG Tab Take 1 Tab by mouth every bedtime. 30 Tab 0   • raNITidine (ZANTAC) 150 MG Tab Take 1 Tab by mouth 2 times a day. 60 Tab 0   • atorvastatin (LIPITOR) 20 MG Tab Take 1 Tab by mouth every day. 30 Tab 11   • Cholecalciferol (VITAMIN D3) 93444 units Cap  "TAKE 1 EACH BY MOUTH EVERY 7 DAYS. 4 Cap 5   • Non Formulary Request every day. OCUVITE DROPS BOTH EYES DAILY     • CALCIUM PO Take 600 mg by mouth 2 Times a Day.       No current facility-administered medications for this visit.      Family History   Problem Relation Age of Onset   • Stroke Father    • Cancer Sister    • Cancer Brother    • Heart Disease Sister    • Cancer Brother      Past Medical History:   Diagnosis Date   • Arthritis    • Bowel habit changes    • Dental disorder     dentures   • Diabetes (HCC)     oral medication   • Glaucoma    • Gynecological disorder    • Heart burn    • Macular degeneration    • Psychiatric problem     depression   • Snoring    • Urinary bladder disorder    • Urinary incontinence        Review of Systems   All other systems reviewed and are negative.         Objective:     /64 (BP Location: Right arm, Patient Position: Sitting, BP Cuff Size: Adult)   Pulse 77   Resp 16   Ht 1.6 m (5' 3\")   Wt 65.3 kg (144 lb)   SpO2 95%   BMI 25.51 kg/m²      Physical Exam   Constitutional: She is oriented to person, place, and time. She appears well-developed and well-nourished. No distress.   HENT:   Head: Normocephalic and atraumatic.   Right Ear: External ear normal.   Left Ear: External ear normal.   Nose: Nose normal.   Eyes: Right eye exhibits no discharge. Left eye exhibits no discharge.   Neck: Normal range of motion. Neck supple. No thyromegaly present.   Cardiovascular: Normal rate, regular rhythm and normal heart sounds.  Exam reveals no gallop and no friction rub.    No murmur heard.  Pulmonary/Chest: Effort normal and breath sounds normal. She has no wheezes. She has no rales.   Musculoskeletal: She exhibits no edema or tenderness.   Neurological: She is alert and oriented to person, place, and time. She displays normal reflexes.   Skin: Skin is warm and dry. No rash noted. She is not diaphoretic.   Psychiatric: She has a normal mood and affect. Her behavior is " normal. Judgment and thought content normal.   Patient pleasant and able to answer many questions in the office today although her daughter provides most of the information.   Nursing note and vitals reviewed.              Assessment/Plan:     1. Type 2 diabetes mellitus without complication, without long-term current use of insulin (HCC)  Hemoglobin A1c is good at 6.8. I explained to her that because of her age, we have room for movement upwards if necessary with her hemoglobin A1c. I will keep her on the metformin for now unless her GFR goes down. She is not reporting any problems with diarrhea.  - COMP METABOLIC PANEL; Future  - HEMOGLOBIN A1C; Future    2. Recurrent major depressive disorder, in full remission (HCC)  Patient is already on Zoloft 150 mg so I told daughter I do not feel comfortable increasing it to a higher dosage at her age. I did offer to refer her to neurology or psychiatry but she declined.    3. Microalbuminuria  Patient will keep hydrated and we will continue to monitor. Her blood pressure is always running on the low end of normal and she has had problems with dizziness so I am reluctant to put her on an ARB.    4. Stage 2 chronic kidney disease  It has remained stable and I will check it again before her next visit.    5. Influenza vaccine needed  I have placed the below orders and discussed them with an approved delegating provider. The MA is performing the below orders under the direction of Dr. Armas    - Influenza Vaccine, High Dose (65+ Only)

## 2018-10-22 ENCOUNTER — PHYSICAL THERAPY (OUTPATIENT)
Dept: PHYSICAL THERAPY | Facility: REHABILITATION | Age: 83
End: 2018-10-22
Attending: NURSE PRACTITIONER
Payer: MEDICARE

## 2018-10-22 DIAGNOSIS — R29.898 WEAKNESS OF BOTH LOWER EXTREMITIES: ICD-10-CM

## 2018-10-22 PROCEDURE — 97110 THERAPEUTIC EXERCISES: CPT

## 2018-10-22 PROCEDURE — 97530 THERAPEUTIC ACTIVITIES: CPT

## 2018-10-22 ASSESSMENT — BALANCE ASSESSMENTS
SITTING TO STANDING: 4
PLACE ALTERNATE FOOT ON STEP OR STOOL WHILE STANDING UNSUPPORTED: 3
TURN 360 DEGREES: 3
STANDING TO SITTING: 4
STANDING UNSUPPORTED WITH EYES CLOSED: 4
STANDING ON ONE LEG: 1
STANDING UNSUPPORTED ONE FOOT IN FRONT: 2
SITTING UNSUPPORTED: 4
LOOK OVER LEFT AND RIGHT SHOULDERS WHILE STANDING: 3
LONG VERSION TOTAL SCORE (MAX 56): 46
PICK UP OBJECT FROM THE FLOOR FROM A STANDING POSITION: 3
TRANSFERS: 4
REACHING FORWARD WITH OUTSTRETCHED ARM WHILE STANDING: 3
STANDING UNSUPPORTED WITH FEET TOGETHER: 4
LONG VERSION TOTAL SCORE (MAX 56): 46
STANDING UNSUPPORTED: 4

## 2018-10-22 NOTE — OP THERAPY DISCHARGE SUMMARY
Outpatient Physical Therapy  DISCHARGE SUMMARY NOTE      [facility] [address]  [address]  [place] [address]  Phone:  [phone]  Fax:  [phone]    Date of Visit: 10/22/2018    Patient: Herminia Jones  YOB: 1934  MRN: [id number]     Referring Provider: LETY Kulkarni [place] [facility] [address]  [place] [address]   Referring Diagnosis Other symptoms and signs involving the musculoskeletal system [R29.898]     Physical Therapy Occurrence Codes    Date of onset of impairment:  11/25/17   Date physical therapy care plan established or reviewed:  8/14/18   Date physical therapy treatment started:  8/14/18          Functional Limitation G-Codes and Severity Modifiers  Childs Balance Total Score (0-56): 46     Your patient is being discharged from Physical Therapy with the following comments:   · Goals met    Comments:  Ms. Jones was seen for 6 PT sessions treating her general imbalance and weakness.  Progress has been slow due to limited pt motivation and engagement with HEP.  She does show slight improvements in balance on the CHILDS and endurance on the 6 min walk test.  She reports indep with HEP, but dtr states compliance is limited and pt does not take to family encouragement well.  I do not feel further PT services would provide additional or sustainable benefit at this time.     Recommendations:  D/C to HEP.     Sophie Dozier, PT, DPT    Date: 10/22/2018

## 2018-11-20 ENCOUNTER — HOSPITAL ENCOUNTER (OUTPATIENT)
Dept: RADIOLOGY | Facility: MEDICAL CENTER | Age: 83
End: 2018-11-20
Attending: SPECIALIST
Payer: MEDICARE

## 2018-11-20 DIAGNOSIS — R13.14 PHARYNGOESOPHAGEAL DYSPHAGIA: ICD-10-CM

## 2018-11-20 DIAGNOSIS — R13.10 DYSPHAGIA, IDIOPATHIC: ICD-10-CM

## 2018-11-20 PROCEDURE — G8996 SWALLOW CURRENT STATUS: HCPCS | Mod: CI

## 2018-11-20 PROCEDURE — 74230 X-RAY XM SWLNG FUNCJ C+: CPT

## 2018-11-20 PROCEDURE — G8998 SWALLOW D/C STATUS: HCPCS | Mod: CI

## 2018-11-20 PROCEDURE — G8997 SWALLOW GOAL STATUS: HCPCS | Mod: CI

## 2018-11-20 PROCEDURE — 92611 MOTION FLUOROSCOPY/SWALLOW: CPT

## 2018-11-21 NOTE — OP THERAPY EVALUATION
[facility]  Speech-Language Pathology Department  Modified Barium Swallow Study Summary    Patient: Herminia Jones     Date of Evaluation: 11/20/18    Referring MD: Dr. Alton Devlin; Fax: [phone]    Radiologist:  Dr. Michaud    Patient History/Reason for Referral: Pt was referred for MBS due to 'dysphagia, unspecified type.' Pt with PMHx notable for psychiatric problems, heartburn, glaucoma, diabetes, arthritis, dental disorder and kidney disease.    Pt arrived accompanied by her daughter/caretaker. Pt ambulated with walker. Pt denied swallow difficulties; however, Pt's daughter reported that the Pt often coughs, chokes and has change in vocal quality when eating/drinking. Pt's daughter reported that the Pt has had swallowing difficulties for multiple years, and sometimes pills will get stuck in the Pt's throat. Pt denied hx of PNA, odynophagia; however, endorsed xerostomia.     The Pt and the Pt's daughter commented that the Pt's eyesight is deteriorating. Pt endorsed some balance difficulties, and 'disequilibrium.'     Oral Mechanism Exam:   -Dentition: Intact; denture plates top/bottom  -Labial: WFL  -Lingual: WFL  -Palatal Elevation: WFL  -Laryngeal Elevation: Reduced  -Cough: Adequate  -Vocal Quality: Normal    Procedure Results:  The examination was conducted with videofluoroscopy from a   Lateral and Anterior view.  The following textures were presented:   Pudding, Cookie and Thin Liquid     Structural Abnormalities: Possible CP bar/muscle spasm noted    The following observations were made:       Oral Phase Thin Liquids  Puree Solid   Anterior spillage      Residue in oral cavity after the swallow      Prolonged mastication      Loss of bolus control      Piecemeal deglutition      Slow oral transit time      Premature spillage into the valleculae      Premature spillage into the pyriform sinus         Other Observations:            Pharyngeal Phase Thin Liquids  Puree Solid    Delayed triggering of the pharyngeal swallow      Absent initiation of swallow      Residue on tongue base (BOT) X  Trace      Residue on posterior pharyngeal wall (PPW)      Residue in valleculae X  Trace      Residue in pyriform sinus      Penetration      Aspiration        Other Observations:  - Pt with early airway/vestibular closure observed during liquid trials    Penetration Aspiration Scale:   Score of 1: Neither penetration nor aspiration  Score of 2-5: Penetration  Score of 6-8: Aspiration    1: Material does not enter airway  2: Material enters airway, but remains above vocal folds; Ejected from airway; no stasis  3: Material remains above vocal folds; visible stasis remains  4: Material contacts vocal folds, but is ejected; no stasis  5: Material contacts vocal folds, and is not ejected; visible stasis remains  6: Material passes glottis, but is ejected from airway; No visible subglottic stasis  7: Material passes glottis, but is not ejected from airway; visible subglottic stasis despite patient’s response  8: Material passes glottis, and is not ejected; visible subglottic stasis; Absent patient response                          Esophageal Phase: Mid-esophageal retention with retrograde flow below UES                                Impressions:  No penetration nor aspiration appreciated during today's exam. Oral swallow phase WFL. Pharyngeal dysphagia characterized by trace base of tongue residue for thin liquids and trace vallecular residue for thin liquids. Esophageal swallow phase characterized by mid-esophageal retention with retrograde flow below UES.    Pt with possibly under-managed reflux, as evidenced by possible CP bar noted during exam, as well as mid-esophageal retention with retrograde flow below UES. Pt denied knowledge of heartburn/acid reflux symptoms.        Recommendations: Diet:    Regular        Liquid:     Thin                                         Medications:   Whole with  liquid wash (one at a time)          Compensatory Strategies:   Upright 90 degrees for PO intake, Small bites/sips, Slow feeding rate, Avoid problematic foods, Remain upright for >30-min after eating/drinking.        Your patient may benefit from a referral for:   F/u with GI for possibly under-managed reflux symptoms            Thank you for the referral.    For further questions, please call [phone].       Jane Herrera, SLP

## 2018-12-06 DIAGNOSIS — E78.5 DYSLIPIDEMIA: ICD-10-CM

## 2018-12-06 RX ORDER — ATORVASTATIN CALCIUM 20 MG/1
20 TABLET, FILM COATED ORAL DAILY
Qty: 90 TAB | Refills: 3 | Status: SHIPPED | OUTPATIENT
Start: 2018-12-06 | End: 2019-04-02 | Stop reason: SDUPTHER

## 2018-12-07 ENCOUNTER — HOSPITAL ENCOUNTER (OUTPATIENT)
Dept: RADIOLOGY | Facility: MEDICAL CENTER | Age: 83
End: 2018-12-07
Attending: OBSTETRICS & GYNECOLOGY
Payer: MEDICARE

## 2018-12-07 DIAGNOSIS — N20.0 CALCULUS OF KIDNEY: ICD-10-CM

## 2018-12-07 PROCEDURE — 76775 US EXAM ABDO BACK WALL LIM: CPT

## 2018-12-23 RX ORDER — RANITIDINE 150 MG/1
TABLET ORAL
Qty: 180 TAB | Refills: 3 | Status: SHIPPED | OUTPATIENT
Start: 2018-12-23 | End: 2019-10-09 | Stop reason: SDUPTHER

## 2019-01-02 ENCOUNTER — HOSPITAL ENCOUNTER (OUTPATIENT)
Dept: LAB | Facility: MEDICAL CENTER | Age: 84
End: 2019-01-02
Attending: NURSE PRACTITIONER
Payer: MEDICARE

## 2019-01-02 PROCEDURE — 87086 URINE CULTURE/COLONY COUNT: CPT

## 2019-01-03 LAB
AMBIGUOUS DTTM AMBI4: NORMAL
SIGNIFICANT IND 70042: NORMAL
SITE SITE: NORMAL
SOURCE SOURCE: NORMAL

## 2019-01-05 LAB
BACTERIA UR CULT: NORMAL
SIGNIFICANT IND 70042: NORMAL
SITE SITE: NORMAL
SOURCE SOURCE: NORMAL

## 2019-01-15 ENCOUNTER — HOSPITAL ENCOUNTER (OUTPATIENT)
Dept: LAB | Facility: MEDICAL CENTER | Age: 84
End: 2019-01-15
Attending: NURSE PRACTITIONER
Payer: MEDICARE

## 2019-01-15 PROCEDURE — 87086 URINE CULTURE/COLONY COUNT: CPT

## 2019-01-15 PROCEDURE — 87077 CULTURE AEROBIC IDENTIFY: CPT

## 2019-01-15 PROCEDURE — 87186 SC STD MICRODIL/AGAR DIL: CPT

## 2019-01-16 LAB
AMBIGUOUS DTTM AMBI4: NORMAL
SIGNIFICANT IND 70042: NORMAL
SITE SITE: NORMAL
SOURCE SOURCE: NORMAL

## 2019-01-29 ENCOUNTER — HOSPITAL ENCOUNTER (OUTPATIENT)
Dept: LAB | Facility: MEDICAL CENTER | Age: 84
End: 2019-01-29
Attending: UROLOGY
Payer: MEDICARE

## 2019-01-29 DIAGNOSIS — F33.1 MODERATE EPISODE OF RECURRENT MAJOR DEPRESSIVE DISORDER (HCC): ICD-10-CM

## 2019-01-29 PROCEDURE — 81001 URINALYSIS AUTO W/SCOPE: CPT

## 2019-01-29 PROCEDURE — 87186 SC STD MICRODIL/AGAR DIL: CPT | Mod: 91

## 2019-01-29 PROCEDURE — 87077 CULTURE AEROBIC IDENTIFY: CPT | Mod: 91

## 2019-01-29 PROCEDURE — 87086 URINE CULTURE/COLONY COUNT: CPT

## 2019-01-29 RX ORDER — SERTRALINE HYDROCHLORIDE 100 MG/1
150 TABLET, FILM COATED ORAL DAILY
Qty: 45 TAB | Refills: 9 | Status: ON HOLD | OUTPATIENT
Start: 2019-01-29 | End: 2019-09-26 | Stop reason: SDUPTHER

## 2019-01-30 LAB
AMBIGUOUS DTTM AMBI4: NORMAL
AMBIGUOUS DTTM AMBI4: NORMAL
APPEARANCE UR: ABNORMAL
BACTERIA #/AREA URNS HPF: ABNORMAL /HPF
BILIRUB UR QL STRIP.AUTO: NEGATIVE
COLOR UR: YELLOW
EPI CELLS #/AREA URNS HPF: ABNORMAL /HPF
GLUCOSE UR STRIP.AUTO-MCNC: NEGATIVE MG/DL
KETONES UR STRIP.AUTO-MCNC: NEGATIVE MG/DL
LEUKOCYTE ESTERASE UR QL STRIP.AUTO: ABNORMAL
MICRO URNS: ABNORMAL
NITRITE UR QL STRIP.AUTO: POSITIVE
PH UR STRIP.AUTO: 5 [PH]
PROT UR QL STRIP: NEGATIVE MG/DL
RBC # URNS HPF: ABNORMAL /HPF
RBC UR QL AUTO: ABNORMAL
SIGNIFICANT IND 70042: NORMAL
SITE SITE: NORMAL
SOURCE SOURCE: NORMAL
SP GR UR STRIP.AUTO: 1.02
UROBILINOGEN UR STRIP.AUTO-MCNC: 0.2 MG/DL
WBC #/AREA URNS HPF: ABNORMAL /HPF

## 2019-02-01 LAB
BACTERIA UR CULT: ABNORMAL
SIGNIFICANT IND 70042: ABNORMAL
SITE SITE: ABNORMAL
SOURCE SOURCE: ABNORMAL

## 2019-02-06 ENCOUNTER — HOSPITAL ENCOUNTER (OUTPATIENT)
Dept: LAB | Facility: MEDICAL CENTER | Age: 84
End: 2019-02-06
Attending: UROLOGY
Payer: MEDICARE

## 2019-02-06 LAB
APPEARANCE UR: ABNORMAL
BACTERIA #/AREA URNS HPF: ABNORMAL /HPF
BILIRUB UR QL STRIP.AUTO: NEGATIVE
BUN SERPL-MCNC: 26 MG/DL (ref 8–22)
COLOR UR: YELLOW
CREAT SERPL-MCNC: 1.21 MG/DL (ref 0.5–1.4)
EPI CELLS #/AREA URNS HPF: ABNORMAL /HPF
GLUCOSE UR STRIP.AUTO-MCNC: NEGATIVE MG/DL
HYALINE CASTS #/AREA URNS LPF: ABNORMAL /LPF
KETONES UR STRIP.AUTO-MCNC: NEGATIVE MG/DL
LEUKOCYTE ESTERASE UR QL STRIP.AUTO: ABNORMAL
MICRO URNS: ABNORMAL
NITRITE UR QL STRIP.AUTO: NEGATIVE
PH UR STRIP.AUTO: 5.5 [PH]
PROT UR QL STRIP: 30 MG/DL
RBC # URNS HPF: ABNORMAL /HPF
RBC UR QL AUTO: ABNORMAL
SP GR UR STRIP.AUTO: 1.02
UROBILINOGEN UR STRIP.AUTO-MCNC: 0.2 MG/DL
WBC #/AREA URNS HPF: ABNORMAL /HPF

## 2019-02-06 PROCEDURE — 87077 CULTURE AEROBIC IDENTIFY: CPT

## 2019-02-06 PROCEDURE — 81001 URINALYSIS AUTO W/SCOPE: CPT

## 2019-02-06 PROCEDURE — 84520 ASSAY OF UREA NITROGEN: CPT

## 2019-02-06 PROCEDURE — 87086 URINE CULTURE/COLONY COUNT: CPT

## 2019-02-06 PROCEDURE — 82565 ASSAY OF CREATININE: CPT

## 2019-02-06 PROCEDURE — 36415 COLL VENOUS BLD VENIPUNCTURE: CPT

## 2019-02-06 PROCEDURE — 87186 SC STD MICRODIL/AGAR DIL: CPT

## 2019-02-26 ENCOUNTER — HOSPITAL ENCOUNTER (OUTPATIENT)
Dept: RADIOLOGY | Facility: MEDICAL CENTER | Age: 84
End: 2019-02-26
Attending: UROLOGY
Payer: MEDICARE

## 2019-02-26 DIAGNOSIS — N32.3 DIVERTICULUM OF BLADDER: ICD-10-CM

## 2019-02-26 PROCEDURE — 72192 CT PELVIS W/O DYE: CPT

## 2019-02-26 PROCEDURE — 700117 HCHG RX CONTRAST REV CODE 255: Performed by: UROLOGY

## 2019-02-26 RX ADMIN — IOHEXOL 25 ML: 300 INJECTION, SOLUTION INTRAVENOUS at 09:04

## 2019-03-04 ENCOUNTER — HOSPITAL ENCOUNTER (OUTPATIENT)
Dept: LAB | Facility: MEDICAL CENTER | Age: 84
End: 2019-03-04
Attending: UROLOGY
Payer: MEDICARE

## 2019-03-04 LAB
APPEARANCE UR: ABNORMAL
BACTERIA #/AREA URNS HPF: ABNORMAL /HPF
COLOR UR: YELLOW
EPI CELLS #/AREA URNS HPF: NEGATIVE /HPF
GLUCOSE UR STRIP.AUTO-MCNC: NEGATIVE MG/DL
HYALINE CASTS #/AREA URNS LPF: ABNORMAL /LPF
KETONES UR STRIP.AUTO-MCNC: NEGATIVE MG/DL
LEUKOCYTE ESTERASE UR QL STRIP.AUTO: ABNORMAL
MICRO URNS: ABNORMAL
NITRITE UR QL STRIP.AUTO: POSITIVE
PH UR STRIP.AUTO: 6.5 [PH]
PROT UR QL STRIP: 30 MG/DL
RBC # URNS HPF: ABNORMAL /HPF
RBC UR QL AUTO: ABNORMAL
SP GR UR STRIP.AUTO: 1.01
WBC #/AREA URNS HPF: ABNORMAL /HPF

## 2019-03-04 PROCEDURE — 87086 URINE CULTURE/COLONY COUNT: CPT

## 2019-03-04 PROCEDURE — 81001 URINALYSIS AUTO W/SCOPE: CPT

## 2019-03-04 PROCEDURE — 87186 SC STD MICRODIL/AGAR DIL: CPT

## 2019-03-04 PROCEDURE — 87077 CULTURE AEROBIC IDENTIFY: CPT

## 2019-03-05 LAB
AMBIGUOUS DTTM AMBI4: NORMAL
SIGNIFICANT IND 70042: NORMAL
SITE SITE: NORMAL
SOURCE SOURCE: NORMAL

## 2019-03-11 ENCOUNTER — HOSPITAL ENCOUNTER (OUTPATIENT)
Dept: LAB | Facility: MEDICAL CENTER | Age: 84
End: 2019-03-11
Attending: UROLOGY
Payer: MEDICARE

## 2019-03-11 PROCEDURE — 87086 URINE CULTURE/COLONY COUNT: CPT

## 2019-03-11 PROCEDURE — 87186 SC STD MICRODIL/AGAR DIL: CPT

## 2019-03-11 PROCEDURE — 87077 CULTURE AEROBIC IDENTIFY: CPT

## 2019-03-25 DIAGNOSIS — E55.9 VITAMIN D DEFICIENCY: ICD-10-CM

## 2019-03-25 RX ORDER — CHOLECALCIFEROL (VITAMIN D3) 1250 MCG
1 CAPSULE ORAL
Qty: 4 CAP | Refills: 5 | Status: SHIPPED | OUTPATIENT
Start: 2019-03-25 | End: 2019-07-09 | Stop reason: SDUPTHER

## 2019-04-02 DIAGNOSIS — E78.5 DYSLIPIDEMIA: ICD-10-CM

## 2019-04-02 RX ORDER — ATORVASTATIN CALCIUM 20 MG/1
20 TABLET, FILM COATED ORAL DAILY
Qty: 100 TAB | Refills: 3 | Status: SHIPPED | OUTPATIENT
Start: 2019-04-02 | End: 2019-04-24 | Stop reason: SDUPTHER

## 2019-04-10 ENCOUNTER — HOSPITAL ENCOUNTER (OUTPATIENT)
Dept: LAB | Facility: MEDICAL CENTER | Age: 84
End: 2019-04-10
Attending: UROLOGY
Payer: MEDICARE

## 2019-04-10 PROCEDURE — 87086 URINE CULTURE/COLONY COUNT: CPT

## 2019-04-10 PROCEDURE — 87186 SC STD MICRODIL/AGAR DIL: CPT

## 2019-04-10 PROCEDURE — 87077 CULTURE AEROBIC IDENTIFY: CPT

## 2019-04-12 LAB
AMBIGUOUS DTTM AMBI4: NORMAL
SIGNIFICANT IND 70042: NORMAL
SITE SITE: NORMAL
SOURCE SOURCE: NORMAL

## 2019-04-24 DIAGNOSIS — E78.5 DYSLIPIDEMIA: ICD-10-CM

## 2019-04-24 RX ORDER — ATORVASTATIN CALCIUM 20 MG/1
20 TABLET, FILM COATED ORAL DAILY
Qty: 100 TAB | Refills: 3 | Status: SHIPPED | OUTPATIENT
Start: 2019-04-24 | End: 2019-10-08 | Stop reason: SDUPTHER

## 2019-06-03 ENCOUNTER — HOSPITAL ENCOUNTER (OUTPATIENT)
Dept: LAB | Facility: MEDICAL CENTER | Age: 84
End: 2019-06-03
Attending: UROLOGY
Payer: MEDICARE

## 2019-06-03 PROCEDURE — 87077 CULTURE AEROBIC IDENTIFY: CPT

## 2019-06-03 PROCEDURE — 87186 SC STD MICRODIL/AGAR DIL: CPT

## 2019-06-03 PROCEDURE — 87086 URINE CULTURE/COLONY COUNT: CPT

## 2019-07-09 DIAGNOSIS — E55.9 VITAMIN D DEFICIENCY: ICD-10-CM

## 2019-07-09 RX ORDER — CHOLECALCIFEROL (VITAMIN D3) 1250 MCG
1 CAPSULE ORAL
Qty: 4 CAP | Refills: 2 | Status: SHIPPED | OUTPATIENT
Start: 2019-07-09 | End: 2019-08-26 | Stop reason: SDUPTHER

## 2019-07-31 ENCOUNTER — HOSPITAL ENCOUNTER (OUTPATIENT)
Dept: LAB | Facility: MEDICAL CENTER | Age: 84
End: 2019-07-31
Attending: PHYSICIAN ASSISTANT
Payer: MEDICARE

## 2019-07-31 PROCEDURE — 87086 URINE CULTURE/COLONY COUNT: CPT

## 2019-07-31 PROCEDURE — 87186 SC STD MICRODIL/AGAR DIL: CPT

## 2019-07-31 PROCEDURE — 87077 CULTURE AEROBIC IDENTIFY: CPT

## 2019-08-02 ENCOUNTER — TELEPHONE (OUTPATIENT)
Dept: MEDICAL GROUP | Facility: MEDICAL CENTER | Age: 84
End: 2019-08-02

## 2019-08-02 DIAGNOSIS — E11.9 TYPE 2 DIABETES MELLITUS WITHOUT COMPLICATION, WITHOUT LONG-TERM CURRENT USE OF INSULIN (HCC): ICD-10-CM

## 2019-08-02 NOTE — TELEPHONE ENCOUNTER
1. Caller Name: Herminia Jones                                           Call Back Number: [phone] (home)         Patient approves a detailed voicemail message: N\A    2. SPECIFIC Action To Be Taken: Orders pending, please sign.    3. Diagnosis/Clinical Reason for Request: Diabetes     4. Specialty & Provider Name/Lab/Imaging Location: [facility]    5. Is appointment scheduled for requested order/referral: no    Patient was not informed they will receive a return phone call from the office ONLY if there are any questions before processing their request. Advised to call back if they haven't received a call from the referral department in 5 days.

## 2019-08-19 ENCOUNTER — HOSPITAL ENCOUNTER (OUTPATIENT)
Dept: LAB | Facility: MEDICAL CENTER | Age: 84
End: 2019-08-19
Attending: UROLOGY
Payer: MEDICARE

## 2019-08-19 PROCEDURE — 87077 CULTURE AEROBIC IDENTIFY: CPT

## 2019-08-19 PROCEDURE — 87086 URINE CULTURE/COLONY COUNT: CPT

## 2019-08-19 PROCEDURE — 87186 SC STD MICRODIL/AGAR DIL: CPT

## 2019-08-19 PROCEDURE — 81003 URINALYSIS AUTO W/O SCOPE: CPT

## 2019-08-21 LAB
APPEARANCE UR: ABNORMAL
BILIRUB UR QL STRIP.AUTO: NEGATIVE
COLOR UR: YELLOW
GLUCOSE UR STRIP.AUTO-MCNC: NEGATIVE MG/DL
KETONES UR STRIP.AUTO-MCNC: NEGATIVE MG/DL
LEUKOCYTE ESTERASE UR QL STRIP.AUTO: ABNORMAL
MICRO URNS: ABNORMAL
NITRITE UR QL STRIP.AUTO: POSITIVE
PH UR STRIP.AUTO: 6 [PH] (ref 5–8)
PROT UR QL STRIP: 30 MG/DL
RBC UR QL AUTO: ABNORMAL
SP GR UR STRIP.AUTO: 1.01
UROBILINOGEN UR STRIP.AUTO-MCNC: 0.2 MG/DL

## 2019-08-28 ENCOUNTER — PATIENT OUTREACH (OUTPATIENT)
Dept: HEALTH INFORMATION MANAGEMENT | Facility: OTHER | Age: 84
End: 2019-08-28

## 2019-08-28 NOTE — PROGRESS NOTES
Outcome: Gave the introduction to Veronica. She requested a call back next week Tuesday/Wednesday Morning as she is busy right now. I sent my contact information via [employer] as requested.     Please transfer to Patient Outreach Team at [phone] when patient returns call.      HealthConnect Verified: yes    Attempt # 1

## 2019-09-04 ENCOUNTER — HOSPITAL ENCOUNTER (OUTPATIENT)
Dept: LAB | Facility: MEDICAL CENTER | Age: 84
End: 2019-09-04
Attending: UROLOGY
Payer: MEDICARE

## 2019-09-04 PROCEDURE — 87086 URINE CULTURE/COLONY COUNT: CPT

## 2019-09-04 PROCEDURE — 87186 SC STD MICRODIL/AGAR DIL: CPT

## 2019-09-04 PROCEDURE — 87077 CULTURE AEROBIC IDENTIFY: CPT

## 2019-09-05 LAB
AMBIGUOUS DTTM AMBI4: NORMAL
SIGNIFICANT IND 70042: NORMAL
SITE SITE: NORMAL
SOURCE SOURCE: NORMAL

## 2019-09-05 NOTE — PROGRESS NOTES
1. Attempt #:1    2. HealthConnect Verified: yes    3. Verify PCP: yes    4. Review Care Team: yes    5. WebIZ Checked & Epic Updated:NA  · Is patient due for Tdap? NO  · Is patient due for Shingles? NO    6. Reviewed/Updated the following with patient:       •   Communication Preference Obtained? YES       •   Preferred Pharmacy? YES       •   Preferred Lab? YES       •   Family History (document living status of immediate family members and if + hx of cancer, diabetes, hypertension, hyperlipidemia, heart attack, stroke) NO    7. Annual Wellness Visit Scheduling  · Scheduling Status:Scheduled     8. Care Gap Scheduling (Attempt to Schedule EACH Overdue Care Gap!)     Health Maintenance Due   Topic Date Due   • IMM PNEUMOCOCCAL VACCINE: 65+ Years (2 of 2 - PPSV23) 12/03/2018   • A1C SCREENING  03/28/2019   • FASTING LIPID PROFILE  04/10/2019   • DIABETES MONOFILAMENT / LE EXAM  06/11/2019   • Annual Wellness Visit  06/12/2019   • IMM INFLUENZA (1) 09/01/2019   • URINE ACR / MICROALBUMIN  09/28/2019        Scheduled patient for Annual Wellness Visit and Immunizations: FLU and PREVNAR (PCV13)      9. [employer] Activation: already active    10. [employer] Scott: yes    11. Virtual Visits: no    12. Opt In to Text Messages: no    13. Patient was advised: “This is a free wellness visit. The provider will screen for medical conditions to help you stay healthy. If you have other concerns to address you may be asked to discuss these at a separate visit or there may be an additional fee.”     14. Patient was informed to arrive 15 min prior to their scheduled appointment and bring in their medication bottles.

## 2019-09-05 NOTE — PROGRESS NOTES
Outcome: Intro completed. AWV scheduled.     Please transfer to Patient Outreach Team at [phone] when patient returns call.    HealthConnect Verified: yes    Attempt # 3

## 2019-09-24 ENCOUNTER — HOSPITAL ENCOUNTER (OUTPATIENT)
Dept: LAB | Facility: MEDICAL CENTER | Age: 84
End: 2019-09-24
Attending: FAMILY MEDICINE
Payer: MEDICARE

## 2019-09-24 ENCOUNTER — TELEPHONE (OUTPATIENT)
Dept: URGENT CARE | Facility: PHYSICIAN GROUP | Age: 84
End: 2019-09-24

## 2019-09-24 ENCOUNTER — HOSPITAL ENCOUNTER (INPATIENT)
Facility: MEDICAL CENTER | Age: 84
LOS: 2 days | DRG: 690 | End: 2019-09-27
Attending: EMERGENCY MEDICINE | Admitting: HOSPITALIST
Payer: MEDICARE

## 2019-09-24 ENCOUNTER — HOSPITAL ENCOUNTER (OUTPATIENT)
Dept: RADIOLOGY | Facility: MEDICAL CENTER | Age: 84
End: 2019-09-24
Attending: FAMILY MEDICINE
Payer: MEDICARE

## 2019-09-24 ENCOUNTER — HOSPITAL ENCOUNTER (OUTPATIENT)
Facility: MEDICAL CENTER | Age: 84
End: 2019-09-24
Attending: FAMILY MEDICINE
Payer: MEDICARE

## 2019-09-24 ENCOUNTER — OFFICE VISIT (OUTPATIENT)
Dept: URGENT CARE | Facility: PHYSICIAN GROUP | Age: 84
End: 2019-09-24
Payer: MEDICARE

## 2019-09-24 VITALS
TEMPERATURE: 97.2 F | OXYGEN SATURATION: 97 % | WEIGHT: 121 LBS | HEART RATE: 97 BPM | BODY MASS INDEX: 21.44 KG/M2 | SYSTOLIC BLOOD PRESSURE: 102 MMHG | DIASTOLIC BLOOD PRESSURE: 60 MMHG | RESPIRATION RATE: 14 BRPM | HEIGHT: 63 IN

## 2019-09-24 DIAGNOSIS — N17.9 AKI (ACUTE KIDNEY INJURY) (HCC): ICD-10-CM

## 2019-09-24 DIAGNOSIS — R53.1 WEAKNESS: ICD-10-CM

## 2019-09-24 DIAGNOSIS — S09.90XA TRAUMATIC INJURY OF HEAD, INITIAL ENCOUNTER: ICD-10-CM

## 2019-09-24 DIAGNOSIS — N30.00 ACUTE CYSTITIS WITHOUT HEMATURIA: ICD-10-CM

## 2019-09-24 DIAGNOSIS — I95.9 HYPOTENSION, UNSPECIFIED HYPOTENSION TYPE: ICD-10-CM

## 2019-09-24 DIAGNOSIS — R32 URINARY INCONTINENCE, UNSPECIFIED TYPE: ICD-10-CM

## 2019-09-24 LAB
ALBUMIN SERPL BCP-MCNC: 3.9 G/DL (ref 3.2–4.9)
ALBUMIN/GLOB SERPL: 1 G/DL
ALP SERPL-CCNC: 50 U/L (ref 30–99)
ALT SERPL-CCNC: 15 U/L (ref 2–50)
ANION GAP SERPL CALC-SCNC: 12 MMOL/L (ref 0–11.9)
APPEARANCE UR: NORMAL
AST SERPL-CCNC: 15 U/L (ref 12–45)
BASOPHILS # BLD AUTO: 0.3 % (ref 0–1.8)
BASOPHILS # BLD: 0.04 K/UL (ref 0–0.12)
BILIRUB SERPL-MCNC: 0.4 MG/DL (ref 0.1–1.5)
BILIRUB UR STRIP-MCNC: NEGATIVE MG/DL
BUN SERPL-MCNC: 34 MG/DL (ref 8–22)
CALCIUM SERPL-MCNC: 10 MG/DL (ref 8.5–10.5)
CHLORIDE SERPL-SCNC: 99 MMOL/L (ref 96–112)
CO2 SERPL-SCNC: 24 MMOL/L (ref 20–33)
COLOR UR AUTO: NORMAL
CREAT SERPL-MCNC: 1.83 MG/DL (ref 0.5–1.4)
EOSINOPHIL # BLD AUTO: 0.35 K/UL (ref 0–0.51)
EOSINOPHIL NFR BLD: 3 % (ref 0–6.9)
ERYTHROCYTE [DISTWIDTH] IN BLOOD BY AUTOMATED COUNT: 47.7 FL (ref 35.9–50)
GLOBULIN SER CALC-MCNC: 3.8 G/DL (ref 1.9–3.5)
GLUCOSE SERPL-MCNC: 108 MG/DL (ref 65–99)
GLUCOSE UR STRIP.AUTO-MCNC: NEGATIVE MG/DL
HCT VFR BLD AUTO: 45.3 % (ref 37–47)
HGB BLD-MCNC: 14.1 G/DL (ref 12–16)
IMM GRANULOCYTES # BLD AUTO: 0.11 K/UL (ref 0–0.11)
IMM GRANULOCYTES NFR BLD AUTO: 0.9 % (ref 0–0.9)
KETONES UR STRIP.AUTO-MCNC: NEGATIVE MG/DL
LEUKOCYTE ESTERASE UR QL STRIP.AUTO: NORMAL
LYMPHOCYTES # BLD AUTO: 2.33 K/UL (ref 1–4.8)
LYMPHOCYTES NFR BLD: 20.1 % (ref 22–41)
MCH RBC QN AUTO: 26.9 PG (ref 27–33)
MCHC RBC AUTO-ENTMCNC: 31.1 G/DL (ref 33.6–35)
MCV RBC AUTO: 86.5 FL (ref 81.4–97.8)
MONOCYTES # BLD AUTO: 0.91 K/UL (ref 0–0.85)
MONOCYTES NFR BLD AUTO: 7.9 % (ref 0–13.4)
NEUTROPHILS # BLD AUTO: 7.85 K/UL (ref 2–7.15)
NEUTROPHILS NFR BLD: 67.8 % (ref 44–72)
NITRITE UR QL STRIP.AUTO: POSITIVE
NRBC # BLD AUTO: 0 K/UL
NRBC BLD-RTO: 0 /100 WBC
PH UR STRIP.AUTO: 6 [PH] (ref 5–8)
PLATELET # BLD AUTO: 347 K/UL (ref 164–446)
PMV BLD AUTO: 9.6 FL (ref 9–12.9)
POTASSIUM SERPL-SCNC: 4.1 MMOL/L (ref 3.6–5.5)
PROT SERPL-MCNC: 7.7 G/DL (ref 6–8.2)
PROT UR QL STRIP: 100 MG/DL
RBC # BLD AUTO: 5.24 M/UL (ref 4.2–5.4)
RBC UR QL AUTO: NORMAL
SODIUM SERPL-SCNC: 135 MMOL/L (ref 135–145)
SP GR UR STRIP.AUTO: 1.02
UROBILINOGEN UR STRIP-MCNC: 0.2 MG/DL
WBC # BLD AUTO: 11.6 K/UL (ref 4.8–10.8)

## 2019-09-24 PROCEDURE — 70450 CT HEAD/BRAIN W/O DYE: CPT

## 2019-09-24 PROCEDURE — 87077 CULTURE AEROBIC IDENTIFY: CPT

## 2019-09-24 PROCEDURE — 87086 URINE CULTURE/COLONY COUNT: CPT

## 2019-09-24 PROCEDURE — 87186 SC STD MICRODIL/AGAR DIL: CPT

## 2019-09-24 PROCEDURE — 99285 EMERGENCY DEPT VISIT HI MDM: CPT

## 2019-09-24 PROCEDURE — 303105 HCHG CATHETER EXTRA

## 2019-09-24 PROCEDURE — 71046 X-RAY EXAM CHEST 2 VIEWS: CPT

## 2019-09-24 PROCEDURE — 51798 US URINE CAPACITY MEASURE: CPT

## 2019-09-24 PROCEDURE — 51702 INSERT TEMP BLADDER CATH: CPT

## 2019-09-24 PROCEDURE — 99220 PR INITIAL OBSERVATION CARE,LEVL III: CPT | Performed by: HOSPITALIST

## 2019-09-24 PROCEDURE — 81002 URINALYSIS NONAUTO W/O SCOPE: CPT | Performed by: FAMILY MEDICINE

## 2019-09-24 PROCEDURE — 36415 COLL VENOUS BLD VENIPUNCTURE: CPT

## 2019-09-24 PROCEDURE — G0378 HOSPITAL OBSERVATION PER HR: HCPCS

## 2019-09-24 PROCEDURE — 85025 COMPLETE CBC W/AUTO DIFF WBC: CPT

## 2019-09-24 PROCEDURE — 80053 COMPREHEN METABOLIC PANEL: CPT

## 2019-09-24 PROCEDURE — 99214 OFFICE O/P EST MOD 30 MIN: CPT | Performed by: FAMILY MEDICINE

## 2019-09-24 RX ORDER — NITROFURANTOIN 25; 75 MG/1; MG/1
100 CAPSULE ORAL 2 TIMES DAILY
Status: ON HOLD | COMMUNITY
End: 2019-09-27

## 2019-09-24 RX ORDER — OXYBUTYNIN CHLORIDE 5 MG/1
5 TABLET ORAL 3 TIMES DAILY
COMMUNITY
End: 2021-05-25

## 2019-09-24 RX ORDER — ONDANSETRON 4 MG/1
4 TABLET, ORALLY DISINTEGRATING ORAL EVERY 4 HOURS PRN
Status: DISCONTINUED | OUTPATIENT
Start: 2019-09-24 | End: 2019-09-27 | Stop reason: HOSPADM

## 2019-09-24 RX ORDER — SODIUM CHLORIDE 9 MG/ML
INJECTION, SOLUTION INTRAVENOUS CONTINUOUS
Status: DISCONTINUED | OUTPATIENT
Start: 2019-09-25 | End: 2019-09-27

## 2019-09-24 RX ORDER — ONDANSETRON 2 MG/ML
4 INJECTION INTRAMUSCULAR; INTRAVENOUS EVERY 4 HOURS PRN
Status: DISCONTINUED | OUTPATIENT
Start: 2019-09-24 | End: 2019-09-27 | Stop reason: HOSPADM

## 2019-09-24 RX ORDER — AMOXICILLIN 250 MG
2 CAPSULE ORAL 2 TIMES DAILY
Status: DISCONTINUED | OUTPATIENT
Start: 2019-09-25 | End: 2019-09-27 | Stop reason: HOSPADM

## 2019-09-24 RX ORDER — HEPARIN SODIUM 5000 [USP'U]/ML
5000 INJECTION, SOLUTION INTRAVENOUS; SUBCUTANEOUS EVERY 8 HOURS
Status: DISCONTINUED | OUTPATIENT
Start: 2019-09-25 | End: 2019-09-27 | Stop reason: HOSPADM

## 2019-09-24 RX ORDER — POLYETHYLENE GLYCOL 3350 17 G/17G
1 POWDER, FOR SOLUTION ORAL
Status: DISCONTINUED | OUTPATIENT
Start: 2019-09-24 | End: 2019-09-27 | Stop reason: HOSPADM

## 2019-09-24 RX ORDER — SULFAMETHOXAZOLE AND TRIMETHOPRIM 800; 160 MG/1; MG/1
1 TABLET ORAL EVERY 12 HOURS
Qty: 6 TAB | Refills: 0 | Status: ON HOLD | OUTPATIENT
Start: 2019-09-24 | End: 2019-09-27

## 2019-09-24 RX ORDER — BISACODYL 10 MG
10 SUPPOSITORY, RECTAL RECTAL
Status: DISCONTINUED | OUTPATIENT
Start: 2019-09-24 | End: 2019-09-27 | Stop reason: HOSPADM

## 2019-09-24 NOTE — PROGRESS NOTES
"Subjective:      Chief Complaint   Patient presents with   • Fall     fell on Sunday   L side of head               Head Injury     She states that after getting out of the shower 2 d ago, her legs \"suddenly\" gave out and she fell, hitting left side of head on the floor          There was no loss of consciousness. There was no blood loss. The quality of the pain is described as aching.   The pain is mostly mild. The pain has been improving since the injury.        Pertinent negatives include no neck or shoulder pain, no  blurred vision, disorientation, memory loss, numbness, tinnitus.        . Pt has tried acetaminophen for the symptoms. The treatment provided mild relief.     She has a hx of cystocele with recurrent UTI and is currently seeing urologist for this but is requesting referral to gyn           Past Medical History:   Diagnosis Date   • Arthritis    • Bowel habit changes    • Dental disorder     dentures   • Diabetes (HCC)     oral medication   • Glaucoma    • Gynecological disorder    • Heart burn    • Macular degeneration    • Psychiatric problem     depression   • Snoring    • Urinary bladder disorder    • Urinary incontinence      No Known Allergies      Social History     Tobacco Use   • Smoking status: Never Smoker   • Smokeless tobacco: Never Used   Substance Use Topics   • Alcohol use: No   • Drug use: No         Current Outpatient Medications on File Prior to Visit   Medication Sig Dispense Refill   • Cholecalciferol (VITAMIN D3) 00572 units Cap TAKE 1 EACH BY MOUTH EVERY 7 DAYS. 4 Cap 0   • Cholecalciferol (VITAMIN D3) 16512 units Cap TAKE 1 EACH BY MOUTH EVERY 7 DAYS. 4 Cap 0   • metFORMIN (GLUCOPHAGE) 500 MG Tab Take 1 Tab by mouth 2 times a day, with meals. 100 Tab 1   • atorvastatin (LIPITOR) 20 MG Tab Take 1 Tab by mouth every day. 100 Tab 3   • sertraline (ZOLOFT) 100 MG Tab TAKE 1.5 TABS BY MOUTH EVERY DAY. 45 Tab 9   • raNITidine (ZANTAC) 150 MG Tab TAKE 1 TABLET BY MOUTH TWICE A DAY " "180 Tab 3   • sertraline (ZOLOFT) 100 MG Tab Take 1.5 Tabs by mouth every day. 60 Tab 11   • celecoxib (CELEBREX) 200 MG Cap Take 1 Cap by mouth every morning with breakfast. 30 Cap 0   • Non Formulary Request every day. OCUVITE DROPS BOTH EYES DAILY     • CALCIUM PO Take 600 mg by mouth 2 Times a Day.       No current facility-administered medications on file prior to visit.          Family History   Problem Relation Age of Onset   • Stroke Father    • Cancer Sister    • Cancer Brother    • Heart Disease Sister    • Cancer Brother          Review of Systems   Constitutional: Negative for fever.   HENT: Negative for tinnitus.    Eyes: Negative for blurred vision and double vision.   Respiratory: Negative for shortness of breath.    Cardiovascular: Negative for chest pain.   Gastrointestinal: Negative for abdominal pain, n/v.   Skin: Negative for itching and rash.   Neurological: Positive for headaches. Negative for dizziness,   tremors, sensory change, weakness and numbness.   :  + urinary incontinence (chronic).   Denies dysuria  Psychiatric/Behavioral: Negative for memory loss.   All other systems reviewed and are negative.         Objective:     /60 (BP Location: Left arm, Patient Position: Sitting, BP Cuff Size: Adult)   Pulse 97   Temp 36.2 °C (97.2 °F) (Temporal)   Resp 14   Ht 1.6 m (5' 3\")   Wt 54.9 kg (121 lb)   SpO2 97%       Physical Exam   Constitutional: pt is oriented to person, place, and time. Pt appears well-developed and well-nourished. No distress.   HENT:   Head: Normocephalic .  There is a small hematoma over left parietal area.    No bony step-off    No periorbital ecchymosis, Hutchins's sign  , hemotympanum , cerebrospinal fluid  otorrhea, or CSF rhinorrhea  Right Ear: External ear normal.   Left Ear: External ear normal.   Mouth/Throat: Oropharynx is clear and moist.   Eyes: Conjunctivae and EOM are normal. Pupils are equal, round, and reactive to light. No scleral icterus. " "  Neck: Normal range of motion. Neck supple.   Cardiovascular: Normal rate, regular rhythm, normal heart sounds and intact distal pulses.  Exam reveals no gallop and no friction rub.    No murmur heard.  Pulmonary/Chest: Effort normal and breath sounds normal. No respiratory distress. Pt has no wheezes or rales.  +TTP over sternum.   Musculoskeletal: Normal range of motion. no edema or tenderness.   Neurologic: Alert and oriented. Cranial nerves II-XII intact, EOMs intact, no tongue deviation, PERRL, no facial asymmetry to motor or sensation, symmetric palate, normal finger-to-nose test, no pronator drift. No focal motor deficits. Symmetric reflexes. Normal station and gait, normal tandem walk. Coordination normal.   Skin: Skin is warm and dry. Pt is not diaphoretic. No erythema.   Psychiatric: pt has a normal mood and affect. The patient's behavior is normal. Judgment normal.   Nursing note and vitals reviewed.            Narrative       9/24/2019 10:57 AM    HISTORY/REASON FOR EXAM:  Head trauma, minor (Age > 65y); head trauma  after getting out of the shower 2 d ago, her legs \"suddenly\" gave out and she fell, hitting left side of head on the floor    TECHNIQUE/EXAM DESCRIPTION AND NUMBER OF VIEWS:  CT of the head without contrast.    The study was performed on a helical multidetector CT scanner. Contiguous 2.5 mm axial sections were obtained from the skull base through the vertex.    Up to date radiation dose reduction adjustments have been utilized to meet ALARA standards for radiation dose reduction.    COMPARISON:  7/30/2018    FINDINGS:  There is no evidence of acute intracranial hemorrhage, mass, mass-effect or shift of midline structures.    Gray-white matter differentiation is grossly preserved.    Ventricle size and brain parenchymal volume are appropriate for this patient's stated age.    The basal cisterns are patent.    There are no abnormal extra-axial fluid collections.    No depressed or widely "  calvarial fracture is seen.    The visualized paranasal sinuses and temporal bone structures are aerated.    ___________________________________      Impression         1. No acute intracranial abnormality. No evidence of acute intracranial hemorrhage or mass lesion.                 Last Resulted: 09/24/19 11:22 AM Order Details View Encounter Lab and Collection Details Routing Result History            CT-HEAD W/O [[id number]]     Electronically signed by: Sameer Avalos M.D. on 09/24/19 1000 Status: Completed   Ordering user: Sameer Avalos M.D. 09/24/19 1000 Authorized by: Sameer Avalos M.D.    Add Signature Requirement   Frequency:  09/24/19 1006 - 1  occurrence   Diagnoses  Traumatic injury of head, initial encounter [S09.90XA]  Weakness [R53.1]   Questionnaire     Question Answer   Is the patient diabetic? No   Screening Form     General Information     Patient Name: Herminia Jones   YOB: 1934   Legal Sex: Female    MRN: [id number]   Home Phone: [phone]   Mobile: [phone]          Procedure Ordering Provider Authorizing Provider Appointment Information   CT-HEAD W/O     Sameer Avalos M.D.    [phone]    9/24/2019 11:30 AM  [place] [address]  IMAGING [place]   Screening Form Questions     No questions have been answered for this form.   LMP/OB Status     OB Status   Postmenopausal   Images      Show images for CT-HEAD W/O   View SmartLink Info     CT-HEAD W/O (Order #[id number]) on 9/24/19   Reading Provider Reading Date   Johnathon Wolfe M.D. Sep 24, 2019   Signing Provider Signing Date Signing Time   Johnathon Wolfe M.D. Sep 24, 2019 11:22 AM       Reading Physician Reading Date Result Priority   Claudia Bruce M.D. 9/24/2019       Narrative       9/24/2019 10:57 AM    HISTORY/REASON FOR EXAM:  Chronic long-term cough for several years. Weakness after a fall 3 days ago.      TECHNIQUE/EXAM DESCRIPTION AND NUMBER OF VIEWS:  Two views of the  chest.    COMPARISON:  11/30/2017    FINDINGS:      The mediastinal and cardiac silhouette is unremarkable.    There is atherosclerosis of the aorta.    There is biapical pleural and parenchyma scarring again seen similar to the prior exam.    There is no significant pleural effusion.    There is no visible pneumothorax.    There are no acute bony abnormalities.      Impression       1.  No evidence of acute cardiopulmonary process.  2.  There is stable biapical pleural-parenchymal scarring          Lab Results   Component Value Date/Time    POCCOLOR Dark Yellow 09/24/2019 10:20 AM    POCAPPEAR Cloudy 09/24/2019 10:20 AM    POCLEUKEST Large 09/24/2019 10:20 AM    POCNITRITE Positive 09/24/2019 10:20 AM    POCUROBILIGE 0.2 09/24/2019 10:20 AM    POCPROTEIN 100 09/24/2019 10:20 AM    POCURPH 6.0 09/24/2019 10:20 AM    POCBLOOD Moderate 09/24/2019 10:20 AM    POCSPGRV 1.020 09/24/2019 10:20 AM    POCKETONES Negative 09/24/2019 10:20 AM    POCBILIRUBIN Negative 09/24/2019 10:20 AM    POCGLUCUA Negative 09/24/2019 10:20 AM        Assessment/Plan:      1. Traumatic injury of head, initial encounter   CT personally reviewed - unremarkable    2. Weakness  Likely secondary to UTI  Labs ordered  - CBC WITH DIFFERENTIAL; Future  - Comp Metabolic Panel; Future     3. Hypotension, unspecified hypotension type   BP improved to 102/60 after oral fluids  Advised to push fluids at home.     Labs ordered.   - URINE CULTURE(NEW); Future    4. Urinary incontinence, unspecified type     - REFERRAL TO GYNECOLOGY        5. UTI  UA c/w infection  - sulfamethoxazole-trimethoprim (BACTRIM DS) 800-160 MG tablet; Take 1 Tab by mouth every 12 hours for 3 days.  Dispense: 6 Tab; Refill: 0  F/u with urology

## 2019-09-25 ENCOUNTER — APPOINTMENT (OUTPATIENT)
Dept: RADIOLOGY | Facility: MEDICAL CENTER | Age: 84
DRG: 690 | End: 2019-09-25
Attending: INTERNAL MEDICINE
Payer: MEDICARE

## 2019-09-25 ENCOUNTER — APPOINTMENT (OUTPATIENT)
Dept: RADIOLOGY | Facility: MEDICAL CENTER | Age: 84
DRG: 690 | End: 2019-09-25
Attending: HOSPITALIST
Payer: MEDICARE

## 2019-09-25 PROBLEM — R91.1 LUNG NODULE: Status: ACTIVE | Noted: 2019-09-25

## 2019-09-25 PROBLEM — N39.0 SEPSIS DUE TO URINARY TRACT INFECTION (HCC): Status: ACTIVE | Noted: 2019-09-25

## 2019-09-25 PROBLEM — I95.9 HYPOTENSION: Status: ACTIVE | Noted: 2019-09-25

## 2019-09-25 PROBLEM — A41.9 SEPSIS DUE TO URINARY TRACT INFECTION (HCC): Status: ACTIVE | Noted: 2019-09-25

## 2019-09-25 PROBLEM — A41.9 SEPSIS (HCC): Status: ACTIVE | Noted: 2019-09-25

## 2019-09-25 PROBLEM — R33.9 URINARY RETENTION: Status: ACTIVE | Noted: 2019-09-25

## 2019-09-25 PROBLEM — N17.9 AKI (ACUTE KIDNEY INJURY) (HCC): Status: ACTIVE | Noted: 2019-09-25

## 2019-09-25 LAB
ALBUMIN SERPL BCP-MCNC: 3.6 G/DL (ref 3.2–4.9)
ALBUMIN/GLOB SERPL: 1.1 G/DL
ALP SERPL-CCNC: 45 U/L (ref 30–99)
ALT SERPL-CCNC: 13 U/L (ref 2–50)
ANION GAP SERPL CALC-SCNC: 11 MMOL/L (ref 0–11.9)
APPEARANCE UR: ABNORMAL
AST SERPL-CCNC: 19 U/L (ref 12–45)
BACTERIA #/AREA URNS HPF: ABNORMAL /HPF
BASOPHILS # BLD AUTO: 0.3 % (ref 0–1.8)
BASOPHILS # BLD: 0.04 K/UL (ref 0–0.12)
BILIRUB SERPL-MCNC: 0.3 MG/DL (ref 0.1–1.5)
BILIRUB UR QL STRIP.AUTO: NEGATIVE
BUN SERPL-MCNC: 39 MG/DL (ref 8–22)
CALCIUM SERPL-MCNC: 9.7 MG/DL (ref 8.5–10.5)
CHLORIDE SERPL-SCNC: 101 MMOL/L (ref 96–112)
CHLORIDE UR-SCNC: 47 MMOL/L
CO2 SERPL-SCNC: 22 MMOL/L (ref 20–33)
COLOR UR: YELLOW
CREAT SERPL-MCNC: 1.86 MG/DL (ref 0.5–1.4)
CREAT UR-MCNC: 16.1 MG/DL
EOSINOPHIL # BLD AUTO: 0.47 K/UL (ref 0–0.51)
EOSINOPHIL NFR BLD: 4 % (ref 0–6.9)
EPI CELLS #/AREA URNS HPF: NEGATIVE /HPF
ERYTHROCYTE [DISTWIDTH] IN BLOOD BY AUTOMATED COUNT: 45.5 FL (ref 35.9–50)
GLOBULIN SER CALC-MCNC: 3.4 G/DL (ref 1.9–3.5)
GLUCOSE BLD-MCNC: 104 MG/DL (ref 65–99)
GLUCOSE BLD-MCNC: 107 MG/DL (ref 65–99)
GLUCOSE BLD-MCNC: 77 MG/DL (ref 65–99)
GLUCOSE BLD-MCNC: 90 MG/DL (ref 65–99)
GLUCOSE SERPL-MCNC: 121 MG/DL (ref 65–99)
GLUCOSE UR STRIP.AUTO-MCNC: NEGATIVE MG/DL
HCT VFR BLD AUTO: 41.8 % (ref 37–47)
HGB BLD-MCNC: 13.5 G/DL (ref 12–16)
IMM GRANULOCYTES # BLD AUTO: 0.1 K/UL (ref 0–0.11)
IMM GRANULOCYTES NFR BLD AUTO: 0.8 % (ref 0–0.9)
INR PPP: 1.14 (ref 0.87–1.13)
KETONES UR STRIP.AUTO-MCNC: NEGATIVE MG/DL
LACTATE BLD-SCNC: 0.7 MMOL/L (ref 0.5–2)
LACTATE BLD-SCNC: 0.8 MMOL/L (ref 0.5–2)
LACTATE BLD-SCNC: 1.1 MMOL/L (ref 0.5–2)
LEUKOCYTE ESTERASE UR QL STRIP.AUTO: ABNORMAL
LYMPHOCYTES # BLD AUTO: 1.67 K/UL (ref 1–4.8)
LYMPHOCYTES NFR BLD: 14.1 % (ref 22–41)
MCH RBC QN AUTO: 27.4 PG (ref 27–33)
MCHC RBC AUTO-ENTMCNC: 32.3 G/DL (ref 33.6–35)
MCV RBC AUTO: 84.8 FL (ref 81.4–97.8)
MICRO URNS: ABNORMAL
MONOCYTES # BLD AUTO: 0.68 K/UL (ref 0–0.85)
MONOCYTES NFR BLD AUTO: 5.7 % (ref 0–13.4)
NEUTROPHILS # BLD AUTO: 8.89 K/UL (ref 2–7.15)
NEUTROPHILS NFR BLD: 75.1 % (ref 44–72)
NITRITE UR QL STRIP.AUTO: POSITIVE
NRBC # BLD AUTO: 0 K/UL
NRBC BLD-RTO: 0 /100 WBC
PH UR STRIP.AUTO: 6 [PH] (ref 5–8)
PLATELET # BLD AUTO: 289 K/UL (ref 164–446)
PMV BLD AUTO: 9.2 FL (ref 9–12.9)
POTASSIUM SERPL-SCNC: 3.9 MMOL/L (ref 3.6–5.5)
POTASSIUM UR-SCNC: 8.1 MMOL/L
PROCALCITONIN SERPL-MCNC: 0.16 NG/ML
PROT SERPL-MCNC: 7 G/DL (ref 6–8.2)
PROT UR QL STRIP: 30 MG/DL
PROT UR-MCNC: 13 MG/DL (ref 0–15)
PROTHROMBIN TIME: 14.9 SEC (ref 12–14.6)
RBC # BLD AUTO: 4.93 M/UL (ref 4.2–5.4)
RBC # URNS HPF: ABNORMAL /HPF
RBC UR QL AUTO: ABNORMAL
SODIUM SERPL-SCNC: 134 MMOL/L (ref 135–145)
SODIUM UR-SCNC: 46 MMOL/L
SP GR UR STRIP.AUTO: 1.02
UROBILINOGEN UR STRIP.AUTO-MCNC: 0.2 MG/DL
WBC # BLD AUTO: 11.9 K/UL (ref 4.8–10.8)
WBC #/AREA URNS HPF: ABNORMAL /HPF

## 2019-09-25 PROCEDURE — 700105 HCHG RX REV CODE 258: Performed by: HOSPITALIST

## 2019-09-25 PROCEDURE — 81001 URINALYSIS AUTO W/SCOPE: CPT

## 2019-09-25 PROCEDURE — 700111 HCHG RX REV CODE 636 W/ 250 OVERRIDE (IP): Performed by: EMERGENCY MEDICINE

## 2019-09-25 PROCEDURE — 90662 IIV NO PRSV INCREASED AG IM: CPT | Performed by: HOSPITALIST

## 2019-09-25 PROCEDURE — 84156 ASSAY OF PROTEIN URINE: CPT

## 2019-09-25 PROCEDURE — 84133 ASSAY OF URINE POTASSIUM: CPT

## 2019-09-25 PROCEDURE — 84300 ASSAY OF URINE SODIUM: CPT

## 2019-09-25 PROCEDURE — 96374 THER/PROPH/DIAG INJ IV PUSH: CPT

## 2019-09-25 PROCEDURE — 83605 ASSAY OF LACTIC ACID: CPT

## 2019-09-25 PROCEDURE — 85610 PROTHROMBIN TIME: CPT

## 2019-09-25 PROCEDURE — A9270 NON-COVERED ITEM OR SERVICE: HCPCS | Performed by: HOSPITALIST

## 2019-09-25 PROCEDURE — 87040 BLOOD CULTURE FOR BACTERIA: CPT | Mod: 91

## 2019-09-25 PROCEDURE — 82436 ASSAY OF URINE CHLORIDE: CPT

## 2019-09-25 PROCEDURE — 3E02340 INTRODUCTION OF INFLUENZA VACCINE INTO MUSCLE, PERCUTANEOUS APPROACH: ICD-10-PCS | Performed by: HOSPITALIST

## 2019-09-25 PROCEDURE — 82570 ASSAY OF URINE CREATININE: CPT

## 2019-09-25 PROCEDURE — 36415 COLL VENOUS BLD VENIPUNCTURE: CPT

## 2019-09-25 PROCEDURE — 84145 PROCALCITONIN (PCT): CPT

## 2019-09-25 PROCEDURE — 74176 CT ABD & PELVIS W/O CONTRAST: CPT

## 2019-09-25 PROCEDURE — 76775 US EXAM ABDO BACK WALL LIM: CPT

## 2019-09-25 PROCEDURE — 770020 HCHG ROOM/CARE - TELE (206)

## 2019-09-25 PROCEDURE — 80053 COMPREHEN METABOLIC PANEL: CPT

## 2019-09-25 PROCEDURE — 99233 SBSQ HOSP IP/OBS HIGH 50: CPT | Performed by: INTERNAL MEDICINE

## 2019-09-25 PROCEDURE — 85025 COMPLETE CBC W/AUTO DIFF WBC: CPT

## 2019-09-25 PROCEDURE — 700111 HCHG RX REV CODE 636 W/ 250 OVERRIDE (IP): Performed by: HOSPITALIST

## 2019-09-25 PROCEDURE — 90471 IMMUNIZATION ADMIN: CPT

## 2019-09-25 PROCEDURE — 82962 GLUCOSE BLOOD TEST: CPT | Mod: 91

## 2019-09-25 PROCEDURE — 700102 HCHG RX REV CODE 250 W/ 637 OVERRIDE(OP): Performed by: HOSPITALIST

## 2019-09-25 RX ORDER — ATORVASTATIN CALCIUM 20 MG/1
20 TABLET, FILM COATED ORAL DAILY
Status: DISCONTINUED | OUTPATIENT
Start: 2019-09-25 | End: 2019-09-27 | Stop reason: HOSPADM

## 2019-09-25 RX ORDER — HYDROCODONE BITARTRATE AND ACETAMINOPHEN 5; 325 MG/1; MG/1
1-2 TABLET ORAL EVERY 6 HOURS PRN
Status: DISCONTINUED | OUTPATIENT
Start: 2019-09-25 | End: 2019-09-27 | Stop reason: HOSPADM

## 2019-09-25 RX ORDER — FAMOTIDINE 20 MG/1
20 TABLET, FILM COATED ORAL DAILY
Status: DISCONTINUED | OUTPATIENT
Start: 2019-09-25 | End: 2019-09-27 | Stop reason: HOSPADM

## 2019-09-25 RX ORDER — OXYBUTYNIN CHLORIDE 5 MG/1
5 TABLET ORAL 3 TIMES DAILY
Status: DISCONTINUED | OUTPATIENT
Start: 2019-09-25 | End: 2019-09-27 | Stop reason: HOSPADM

## 2019-09-25 RX ORDER — RANITIDINE 150 MG/1
150 TABLET ORAL 2 TIMES DAILY
Status: DISCONTINUED | OUTPATIENT
Start: 2019-09-25 | End: 2019-09-25

## 2019-09-25 RX ORDER — SODIUM CHLORIDE 9 MG/ML
30 INJECTION, SOLUTION INTRAVENOUS ONCE
Status: COMPLETED | OUTPATIENT
Start: 2019-09-25 | End: 2019-09-25

## 2019-09-25 RX ADMIN — INFLUENZA A VIRUS A/MICHIGAN/45/2015 X-275 (H1N1) ANTIGEN (FORMALDEHYDE INACTIVATED), INFLUENZA A VIRUS A/SINGAPORE/INFIMH-16-0019/2016 IVR-186 (H3N2) ANTIGEN (FORMALDEHYDE INACTIVATED), AND INFLUENZA B VIRUS B/MARYLAND/15/2016 BX-69A (A B/COLORADO/6/2017-LIKE VIRUS) ANTIGEN (FORMALDEHYDE INACTIVATED) 0.5 ML: 60; 60; 60 INJECTION, SUSPENSION INTRAMUSCULAR at 06:35

## 2019-09-25 RX ADMIN — SODIUM CHLORIDE: 9 INJECTION, SOLUTION INTRAVENOUS at 01:41

## 2019-09-25 RX ADMIN — OXYBUTYNIN CHLORIDE 5 MG: 5 TABLET ORAL at 05:42

## 2019-09-25 RX ADMIN — HEPARIN SODIUM 5000 UNITS: 5000 INJECTION INTRAVENOUS; SUBCUTANEOUS at 13:04

## 2019-09-25 RX ADMIN — SODIUM CHLORIDE: 9 INJECTION, SOLUTION INTRAVENOUS at 13:04

## 2019-09-25 RX ADMIN — CEFTRIAXONE SODIUM 1 G: 1 INJECTION, POWDER, FOR SOLUTION INTRAMUSCULAR; INTRAVENOUS at 05:41

## 2019-09-25 RX ADMIN — SODIUM CHLORIDE: 9 INJECTION, SOLUTION INTRAVENOUS at 22:58

## 2019-09-25 RX ADMIN — SODIUM CHLORIDE 1698 ML: 9 INJECTION, SOLUTION INTRAVENOUS at 01:00

## 2019-09-25 RX ADMIN — SERTRALINE HYDROCHLORIDE 150 MG: 100 TABLET ORAL at 06:30

## 2019-09-25 RX ADMIN — FAMOTIDINE 20 MG: 20 TABLET ORAL at 05:42

## 2019-09-25 RX ADMIN — ATORVASTATIN CALCIUM 20 MG: 20 TABLET, FILM COATED ORAL at 05:42

## 2019-09-25 RX ADMIN — FENTANYL CITRATE 50 MCG: 50 INJECTION INTRAMUSCULAR; INTRAVENOUS at 00:25

## 2019-09-25 RX ADMIN — OXYBUTYNIN CHLORIDE 5 MG: 5 TABLET ORAL at 17:08

## 2019-09-25 RX ADMIN — HEPARIN SODIUM 5000 UNITS: 5000 INJECTION INTRAVENOUS; SUBCUTANEOUS at 05:42

## 2019-09-25 RX ADMIN — OXYBUTYNIN CHLORIDE 5 MG: 5 TABLET ORAL at 13:04

## 2019-09-25 ASSESSMENT — ENCOUNTER SYMPTOMS
WEAKNESS: 1
VOMITING: 0
CHILLS: 0
ABDOMINAL PAIN: 0
EYE DISCHARGE: 0
FEVER: 0
SHORTNESS OF BREATH: 0
DEPRESSION: 0
HEARTBURN: 0
COUGH: 0
HALLUCINATIONS: 0
BRUISES/BLEEDS EASILY: 0
SPEECH CHANGE: 0
BLURRED VISION: 0
DIZZINESS: 0
PALPITATIONS: 0
NAUSEA: 0
HEMOPTYSIS: 0
FLANK PAIN: 0
FOCAL WEAKNESS: 0
MYALGIAS: 0
DOUBLE VISION: 0
SENSORY CHANGE: 0
ABDOMINAL PAIN: 1

## 2019-09-25 ASSESSMENT — LIFESTYLE VARIABLES
TOTAL SCORE: 0
DOES PATIENT WANT TO STOP DRINKING: NO
CONSUMPTION TOTAL: NEGATIVE
TOTAL SCORE: 0
SUBSTANCE_ABUSE: 0
TOTAL SCORE: 0
ALCOHOL_USE: NO
EVER HAD A DRINK FIRST THING IN THE MORNING TO STEADY YOUR NERVES TO GET RID OF A HANGOVER: NO
HAVE YOU EVER FELT YOU SHOULD CUT DOWN ON YOUR DRINKING: NO
EVER_SMOKED: NEVER
EVER FELT BAD OR GUILTY ABOUT YOUR DRINKING: NO
AVERAGE NUMBER OF DAYS PER WEEK YOU HAVE A DRINK CONTAINING ALCOHOL: 0
HOW MANY TIMES IN THE PAST YEAR HAVE YOU HAD 5 OR MORE DRINKS IN A DAY: 0
HAVE PEOPLE ANNOYED YOU BY CRITICIZING YOUR DRINKING: NO
ON A TYPICAL DAY WHEN YOU DRINK ALCOHOL HOW MANY DRINKS DO YOU HAVE: 0

## 2019-09-25 ASSESSMENT — COGNITIVE AND FUNCTIONAL STATUS - GENERAL
HELP NEEDED FOR BATHING: A LITTLE
SUGGESTED CMS G CODE MODIFIER DAILY ACTIVITY: CI
SUGGESTED CMS G CODE MODIFIER MOBILITY: CK
MOVING FROM LYING ON BACK TO SITTING ON SIDE OF FLAT BED: A LITTLE
WALKING IN HOSPITAL ROOM: A LITTLE
STANDING UP FROM CHAIR USING ARMS: A LITTLE
CLIMB 3 TO 5 STEPS WITH RAILING: A LITTLE
MOBILITY SCORE: 19
MOVING TO AND FROM BED TO CHAIR: A LITTLE
DAILY ACTIVITIY SCORE: 23

## 2019-09-25 ASSESSMENT — PATIENT HEALTH QUESTIONNAIRE - PHQ9
SUM OF ALL RESPONSES TO PHQ QUESTIONS 1-9: 7
3. TROUBLE FALLING OR STAYING ASLEEP OR SLEEPING TOO MUCH: NOT AT ALL
1. LITTLE INTEREST OR PLEASURE IN DOING THINGS: NEARLY EVERY DAY
7. TROUBLE CONCENTRATING ON THINGS, SUCH AS READING THE NEWSPAPER OR WATCHING TELEVISION: NOT AT ALL
5. POOR APPETITE OR OVEREATING: SEVERAL DAYS
SUM OF ALL RESPONSES TO PHQ9 QUESTIONS 1 AND 2: 5
2. FEELING DOWN, DEPRESSED, IRRITABLE, OR HOPELESS: MORE THAN HALF THE DAYS
4. FEELING TIRED OR HAVING LITTLE ENERGY: SEVERAL DAYS
8. MOVING OR SPEAKING SO SLOWLY THAT OTHER PEOPLE COULD HAVE NOTICED. OR THE OPPOSITE, BEING SO FIGETY OR RESTLESS THAT YOU HAVE BEEN MOVING AROUND A LOT MORE THAN USUAL: NOT AT ALL
6. FEELING BAD ABOUT YOURSELF - OR THAT YOU ARE A FAILURE OR HAVE LET YOURSELF OR YOUR FAMILY DOWN: NOT AL ALL
9. THOUGHTS THAT YOU WOULD BE BETTER OFF DEAD, OR OF HURTING YOURSELF: NOT AT ALL

## 2019-09-25 ASSESSMENT — COPD QUESTIONNAIRES
DURING THE PAST 4 WEEKS HOW MUCH DID YOU FEEL SHORT OF BREATH: NONE/LITTLE OF THE TIME
DO YOU EVER COUGH UP ANY MUCUS OR PHLEGM?: NO/ONLY WITH OCCASIONAL COLDS OR INFECTIONS
HAVE YOU SMOKED AT LEAST 100 CIGARETTES IN YOUR ENTIRE LIFE: NO/DON'T KNOW
IN THE PAST 12 MONTHS DO YOU DO LESS THAN YOU USED TO BECAUSE OF YOUR BREATHING PROBLEMS: DISAGREE/UNSURE
COPD SCREENING SCORE: 2

## 2019-09-25 NOTE — PROGRESS NOTES
2 RN skin check complete with MARCO Melissa.  Devices in place tele box, SCDs, R PIV, selby.  Skin assessed under devices yes.  Confirmed pressure ulcers found on n/a.  New potential pressure ulcers noted on n/a. Wound consult place n/a.    The following interventions in place: patient turns self, pillows as needed, encourage ambulation, moisturizer, sacral mepilex.    Skin C/D/I except:  Sacrum red/blanching/edematous. Mepilex in place.

## 2019-09-25 NOTE — PROGRESS NOTES
UROLOGY Consult Note:    Herrera Elizondo  Date & Time note created:    9/25/2019   3:03 PM     Referring MD:  Dr. Tate    Patient ID:   Name:             Herminia Jones   YOB: 1934  Age:                 85 y.o.  female   MRN:               [id number]                                                             Reason for Consult:      Retention, hydronephrosis, UTI and RAFIA    History of Present Illness:    Consult requested for this pleasant 84 yo female with known grade 4 cystocele and history of incomplete emptying and recurrent UTIs who has been feeling weak over the past several days. She was brought to urgent care by her daughter yesterday after a fall and during the course of her work up she was found to have a UTI and renal insufficiency. Imaging revealed left hydronephrosis and retention and thus a selby was placed however her renal functions have yet to improve. Her daughter states that there is a history of kinking of the urethra (ureter?). She is not having any flank pains but does endorse some pains in her lower mid abdomen. She has not had any fevers. No N/V. She does report difficulty emptying her bladder prior to the selby.     Review of Systems:      Constitutional: Denies fevers  Eyes: poor vision  Ears/Nose/Throat/Mouth: Denies nasal congestion or sore throat   Cardiovascular: no chest pain  Respiratory: no shortness of breath  Gastrointestinal/Hepatic: abdominal pain  Genitourinary: straining to void  Musculoskeletal/Rheum: Denies swelling  Skin: Denies rash  Neurological:weakness  Psychiatric: denies mood disorder   Endocrine: Cara thyroid problems  Heme/Oncology/Lymph Nodes: denies brusing or known bleeding disorder  All other systems were reviewed and are negative (AMA/CMS criteria)                Past Medical History:   Past Medical History:   Diagnosis Date   • Arthritis    • Bowel habit changes    • Dental disorder     dentures   • Diabetes (HCC)     oral medication    • Glaucoma    • Gynecological disorder    • Heart burn    • Macular degeneration    • Psychiatric problem     depression   • Snoring    • Urinary bladder disorder    • Urinary incontinence      Active Hospital Problems    Diagnosis   • UTI (urinary tract infection) [N39.0]     Priority: High   • Urinary retention [R33.9]     Priority: High   • Type 2 diabetes mellitus without complication, without long-term current use of insulin ([facility]) [E11.9]     Priority: Low   • RAFIA (acute kidney injury) ([facility]) [N17.9]   • Hypotension [I95.9]   • Lung nodule [R91.1]   • Recurrent major depressive disorder, in full remission ([facility]) [F33.42]   • Dyslipidemia [E78.5]       Past Surgical History:  Past Surgical History:   Procedure Laterality Date   • BLADDER SLING FEMALE N/A 4/20/2018    Procedure: BLADDER SLING FEMALE- TOT;  Surgeon: Espinoza Bryan M.D.;  Location: SURGERY SAME DAY [facility];  Service: Gynecology   • ANTERIOR REPAIR N/A 4/20/2018    Procedure: ANTERIOR REPAIR- COLPHORRHAPHY;  Surgeon: Espinoza Bryan M.D.;  Location: SURGERY SAME DAY [facility];  Service: Gynecology   • PELVISCOPY N/A 4/20/2018    Procedure: PELVISCOPY- FOR SACRAL SPINOUS FIXATION;  Surgeon: Espinoza Bryan M.D.;  Location: SURGERY SAME DAY [facility];  Service: Gynecology   • HIP CANNULATED SCREW Left 12/1/2017    Procedure: HIP CANNULATED SCREW;  Surgeon: Abhinav Askew M.D.;  Location: [facility];  Service: Orthopedics   • LEFORT COLPOCLESIS  6/2/2017    Procedure: LEFORT COLPOCLESIS, perineorrhaphy, posterior colporraphy;  Surgeon: Espinoza Bryan M.D.;  Location: SURGERY SAME DAY [facility];  Service:    • CYSTOSCOPY  6/2/2017    Procedure: CYSTOSCOPY;  Surgeon: Espinoza Bryan M.D.;  Location: SURGERY SAME DAY [facility];  Service:    • ABDOMINAL HYSTERECTOMY TOTAL         Hospital Medications:    Current Facility-Administered Medications:   •  atorvastatin (LIPITOR) tablet 20 mg, 20 mg, Oral, DAILY,  Carolynn Iniguez M.D., 20 mg at 09/25/19 0542  •  oxybutynin (DITROPAN) tablet 5 mg, 5 mg, Oral, TID, Carolynn Iniguez M.D., 5 mg at 09/25/19 1304  •  sertraline (ZOLOFT) tablet 150 mg, 150 mg, Oral, DAILY, Carolynn Iniguez M.D., 150 mg at 09/25/19 0630  •  cefTRIAXone (ROCEPHIN) 1 g in  mL IVPB, 1 g, Intravenous, Q24HRS, Carolynn Iniguez M.D., Stopped at 09/25/19 0611  •  HYDROcodone-acetaminophen (NORCO) 5-325 MG per tablet 1-2 Tab, 1-2 Tab, Oral, Q6HRS PRN, Carolynn Iniguez M.D.  •  insulin regular (HUMULIN R) injection 1-6 Units, 1-6 Units, Subcutaneous, Q6HRS, Stopped at 09/25/19 0030 **AND** Accu-Chek Q6 if NPO, , , Q6H **AND** NOTIFY MD and PharmD, , , Once **AND** glucose 4 g chewable tablet 16 g, 16 g, Oral, Q15 MIN PRN **AND** DEXTROSE 10% BOLUS 250 mL, 250 mL, Intravenous, Q15 MIN PRN, Carolynn Iniguez M.D.  •  famotidine (PEPCID) tablet 20 mg, 20 mg, Oral, DAILY, Carolynn Iniguez M.D., 20 mg at 09/25/19 0542  •  senna-docusate (PERICOLACE or SENOKOT S) 8.6-50 MG per tablet 2 Tab, 2 Tab, Oral, BID **AND** polyethylene glycol/lytes (MIRALAX) PACKET 1 Packet, 1 Packet, Oral, QDAY PRN **AND** magnesium hydroxide (MILK OF MAGNESIA) suspension 30 mL, 30 mL, Oral, QDAY PRN **AND** bisacodyl (DULCOLAX) suppository 10 mg, 10 mg, Rectal, QDAY PRN, Carolynn Iniguez M.D.  •  NS infusion, , Intravenous, Continuous, Carolynn Iniguez M.D., Last Rate: 100 mL/hr at 09/25/19 1304  •  heparin injection 5,000 Units, 5,000 Units, Subcutaneous, Q8HRS, Carolynn Iniguez M.D., 5,000 Units at 09/25/19 1304  •  ondansetron (ZOFRAN) syringe/vial injection 4 mg, 4 mg, Intravenous, Q4HRS PRN, Carolynn Iniguez M.D.  •  ondansetron (ZOFRAN ODT) dispertab 4 mg, 4 mg, Oral, Q4HRS PRN, Carolynn Iniguez M.D.    Current Outpatient Medications:  Medications Prior to Admission   Medication Sig Dispense Refill Last Dose   • nitrofurantoin monohyd macro (MACROBID) 100 MG Cap Take 100 mg by mouth 2 times a day.      •  oxybutynin (DITROPAN) 5 MG Tab Take 5 mg by mouth 3 times a day.      • sulfamethoxazole-trimethoprim (BACTRIM DS) 800-160 MG tablet Take 1 Tab by mouth every 12 hours for 3 days. 6 Tab 0    • Cholecalciferol (VITAMIN D3) 91497 units Cap TAKE 1 EACH BY MOUTH EVERY 7 DAYS. 4 Cap 0    • Cholecalciferol (VITAMIN D3) 92665 units Cap TAKE 1 EACH BY MOUTH EVERY 7 DAYS. 4 Cap 0    • metFORMIN (GLUCOPHAGE) 500 MG Tab Take 1 Tab by mouth 2 times a day, with meals. 100 Tab 1    • atorvastatin (LIPITOR) 20 MG Tab Take 1 Tab by mouth every day. 100 Tab 3    • sertraline (ZOLOFT) 100 MG Tab TAKE 1.5 TABS BY MOUTH EVERY DAY. 45 Tab 9    • raNITidine (ZANTAC) 150 MG Tab TAKE 1 TABLET BY MOUTH TWICE A  Tab 3    • sertraline (ZOLOFT) 100 MG Tab Take 1.5 Tabs by mouth every day. 60 Tab 11    • celecoxib (CELEBREX) 200 MG Cap Take 1 Cap by mouth every morning with breakfast. 30 Cap 0    • Non Formulary Request every day. OCUVITE DROPS BOTH EYES DAILY    at Unknown time   • CALCIUM PO Take 600 mg by mouth 2 Times a Day.    at 1630       Medication Allergy:  No Known Allergies    Family History:  Family History   Problem Relation Age of Onset   • Stroke Father    • Cancer Sister    • Cancer Brother    • Heart Disease Sister    • Cancer Brother        Social History:  Social History     Socioeconomic History   • Marital status:      Spouse name: Not on file   • Number of children: Not on file   • Years of education: Not on file   • Highest education level: Not on file   Occupational History   • Not on file   Social Needs   • Financial resource strain: Not on file   • Food insecurity:     Worry: Not on file     Inability: Not on file   • Transportation needs:     Medical: Not on file     Non-medical: Not on file   Tobacco Use   • Smoking status: Never Smoker   • Smokeless tobacco: Never Used   Substance and Sexual Activity   • Alcohol use: No   • Drug use: No   • Sexual activity: Not Currently   Lifestyle   • Physical  "activity:     Days per week: Not on file     Minutes per session: Not on file   • Stress: Not on file   Relationships   • Social connections:     Talks on phone: Not on file     Gets together: Not on file     Attends Jainism service: Not on file     Active member of club or organization: Not on file     Attends meetings of clubs or organizations: Not on file     Relationship status: Not on file   • Intimate partner violence:     Fear of current or ex partner: Not on file     Emotionally abused: Not on file     Physically abused: Not on file     Forced sexual activity: Not on file   Other Topics Concern   •  Service Not Asked   • Blood Transfusions Not Asked   • Caffeine Concern Not Asked   • Occupational Exposure Not Asked   • Hobby Hazards Not Asked   • Sleep Concern Not Asked   • Stress Concern Not Asked   • Weight Concern Not Asked   • Special Diet Not Asked   • Back Care Not Asked   • Exercise Not Asked   • Bike Helmet Not Asked   • Seat Belt Not Asked   • Self-Exams Not Asked   Social History Narrative   • Not on file         Physical Exam:  Vitals/ General Appearance:   Weight/BMI: Body mass index is 20.91 kg/m².  /66   Pulse 69   Temp 36.3 °C (97.4 °F) (Temporal)   Resp 18   Ht 1.651 m (5' 5\")   Wt 57 kg (125 lb 10.6 oz)   SpO2 95%   Vitals:    09/25/19 0100 09/25/19 0400 09/25/19 0800 09/25/19 1200   BP: (!) 98/59 129/53 (!) 94/57 128/66   Pulse: 85 78 69 69   Resp: 17 16 18 18   Temp: 36 °C (96.8 °F) 35.9 °C (96.7 °F) 36.2 °C (97.1 °F) 36.3 °C (97.4 °F)   TempSrc: Temporal Temporal Temporal Temporal   SpO2: 97% 94% 94% 95%   Weight: 57 kg (125 lb 10.6 oz)      Height: 1.651 m (5' 5\")        Oxygen Therapy:  Pulse Oximetry: 95 %, O2 (LPM): 0, O2 Delivery: None (Room Air)    Constitutional:   Well developed, Well nourished, No acute distress  HENMT:  Normocephalic, Atraumatic, Oropharynx moist mucous membranes, No oral exudates, Nose normal.  No thyromegaly.  Eyes:  EOMI, Conjunctiva " normal, No discharge.  Neck:  Normal range of motion, No cervical tenderness  Cardiovascular:  Normal heart rate, Normal rhythm, No murmurs, No rubs, No gallops.   Extremitites with intact distal pulses, no cyanosis, or edema.  Lungs:  Normal breath sounds, breath sounds clear to auscultation bilaterally,  no crackles, no wheezing.   Abdomen: Bowel sounds normal, Soft, No tenderness, No guarding, No rebound, No masses, No hepatosplenomegaly.  : Mckoy draining clear. No CVAT  Skin: Warm, Dry, No erythema, No rash, no induration.  Neurologic: Alert & oriented x 3, No focal deficits noted  Psychiatric: Affect normal, Judgment normal, Mood normal.      MDM (Data Review):     Records reviewed and summarized in current documentation    Lab Data Review:  Recent Results (from the past 24 hour(s))   CBC with Differential    Collection Time: 09/25/19 12:20 AM   Result Value Ref Range    WBC 11.9 (H) 4.8 - 10.8 K/uL    RBC 4.93 4.20 - 5.40 M/uL    Hemoglobin 13.5 12.0 - 16.0 g/dL    Hematocrit 41.8 37.0 - 47.0 %    MCV 84.8 81.4 - 97.8 fL    MCH 27.4 27.0 - 33.0 pg    MCHC 32.3 (L) 33.6 - 35.0 g/dL    RDW 45.5 35.9 - 50.0 fL    Platelet Count 289 164 - 446 K/uL    MPV 9.2 9.0 - 12.9 fL    Neutrophils-Polys 75.10 (H) 44.00 - 72.00 %    Lymphocytes 14.10 (L) 22.00 - 41.00 %    Monocytes 5.70 0.00 - 13.40 %    Eosinophils 4.00 0.00 - 6.90 %    Basophils 0.30 0.00 - 1.80 %    Immature Granulocytes 0.80 0.00 - 0.90 %    Nucleated RBC 0.00 /100 WBC    Neutrophils (Absolute) 8.89 (H) 2.00 - 7.15 K/uL    Lymphs (Absolute) 1.67 1.00 - 4.80 K/uL    Monos (Absolute) 0.68 0.00 - 0.85 K/uL    Eos (Absolute) 0.47 0.00 - 0.51 K/uL    Baso (Absolute) 0.04 0.00 - 0.12 K/uL    Immature Granulocytes (abs) 0.10 0.00 - 0.11 K/uL    NRBC (Absolute) 0.00 K/uL   Comp Metabolic Panel (CMP)    Collection Time: 09/25/19 12:20 AM   Result Value Ref Range    Sodium 134 (L) 135 - 145 mmol/L    Potassium 3.9 3.6 - 5.5 mmol/L    Chloride 101 96 - 112  mmol/L    Co2 22 20 - 33 mmol/L    Anion Gap 11.0 0.0 - 11.9    Glucose 121 (H) 65 - 99 mg/dL    Bun 39 (H) 8 - 22 mg/dL    Creatinine 1.86 (H) 0.50 - 1.40 mg/dL    Calcium 9.7 8.5 - 10.5 mg/dL    AST(SGOT) 19 12 - 45 U/L    ALT(SGPT) 13 2 - 50 U/L    Alkaline Phosphatase 45 30 - 99 U/L    Total Bilirubin 0.3 0.1 - 1.5 mg/dL    Albumin 3.6 3.2 - 4.9 g/dL    Total Protein 7.0 6.0 - 8.2 g/dL    Globulin 3.4 1.9 - 3.5 g/dL    A-G Ratio 1.1 g/dL   URINALYSIS    Collection Time: 09/25/19 12:20 AM   Result Value Ref Range    Color Yellow     Character Hazy (A)     Specific Gravity 1.020 <1.035    Ph 6.0 5.0 - 8.0    Glucose Negative Negative mg/dL    Ketones Negative Negative mg/dL    Protein 30 (A) Negative mg/dL    Bilirubin Negative Negative    Urobilinogen, Urine 0.2 Negative    Nitrite Positive (A) Negative    Leukocyte Esterase Large (A) Negative    Occult Blood Large (A) Negative    Micro Urine Req Microscopic    PROCALCITONIN    Collection Time: 09/25/19 12:20 AM   Result Value Ref Range    Procalcitonin 0.16 <0.25 ng/mL   Lactic Acid -STAT Once    Collection Time: 09/25/19 12:20 AM   Result Value Ref Range    Lactic Acid 1.1 0.5 - 2.0 mmol/L   Prothrombin Time    Collection Time: 09/25/19 12:20 AM   Result Value Ref Range    PT 14.9 (H) 12.0 - 14.6 sec    INR 1.14 (H) 0.87 - 1.13   URINE MICROSCOPIC (W/UA)    Collection Time: 09/25/19 12:20 AM   Result Value Ref Range    WBC Packed (A) /hpf    RBC 2-5 (A) /hpf    Bacteria Many (A) None /hpf    Epithelial Cells Negative /hpf   ESTIMATED GFR    Collection Time: 09/25/19 12:20 AM   Result Value Ref Range    GFR If  31 (A) >60 mL/min/1.73 m 2    GFR If Non African American 26 (A) >60 mL/min/1.73 m 2   ACCU-CHEK GLUCOSE    Collection Time: 09/25/19  1:39 AM   Result Value Ref Range    Glucose - Accu-Ck 104 (H) 65 - 99 mg/dL   URINE SODIUM RANDOM    Collection Time: 09/25/19  3:04 AM   Result Value Ref Range    Sodium, Urine -per volume 46 mmol/L    URINE POTASSIUM RANDOM    Collection Time: 09/25/19  3:04 AM   Result Value Ref Range    Potassium 8.1 mmol/L   URINE CHLORIDE RANDOM    Collection Time: 09/25/19  3:04 AM   Result Value Ref Range    Chloride, Urine-per volume 47 mmol/L   URINE CREATININE RANDOM    Collection Time: 09/25/19  3:04 AM   Result Value Ref Range    Creatinine, Random Urine 16.10 mg/dL   URINE PROTEIN RANDOM    Collection Time: 09/25/19  3:04 AM   Result Value Ref Range    Total Protein, Urine 13.0 0.0 - 15.0 mg/dL   Lactic Acid Every four hours after STAT order    Collection Time: 09/25/19  4:31 AM   Result Value Ref Range    Lactic Acid 0.8 0.5 - 2.0 mmol/L   ACCU-CHEK GLUCOSE    Collection Time: 09/25/19  6:32 AM   Result Value Ref Range    Glucose - Accu-Ck 107 (H) 65 - 99 mg/dL   Lactic Acid Every four hours after STAT order    Collection Time: 09/25/19  8:08 AM   Result Value Ref Range    Lactic Acid 0.7 0.5 - 2.0 mmol/L   ACCU-CHEK GLUCOSE    Collection Time: 09/25/19  1:02 PM   Result Value Ref Range    Glucose - Accu-Ck 77 65 - 99 mg/dL       Imaging/Procedures Review:    Reviewed    MDM (Assessment and Plan):     RAFIA  Left hydronephrosis  UTI  Urinary retention  Cystocele      Cystocele with urinary retention, RAFIA and hydronephrosis. A selby has been placed and a Mag3 renal scan has been ordered to assess the degree of obstruction on the left. We can consider intervention if that is significant. Agree with selby drainage and antibiotics for the UTI. Eventually she will need evaluation by a GYN who can proceed with surgical correction of the cystocele. Case discussed with Dr. Olivarez who has directed this plan of care a well as daughter who was present and assisted with history.

## 2019-09-25 NOTE — ED NOTES
Mckoy catheter inserted per MD order and using aseptic technique.  Pt tolerated well.  Pt 250 mL out.

## 2019-09-25 NOTE — ASSESSMENT & PLAN NOTE
8 mm nodular opacity medial base right lower lobe.    Single lateral between 6 mm and 8 mm.  Low Risk: CT at 6-12 months, then consider CT at 18-24 months    High Risk: CT at 6-12 months, then CT at 18-24 months    Low Risk - Minimal or absent history of smoking and of other known risk factors.    High Risk - History of smoking or of other known risk factors.    Note: These recommendations do not apply to lung cancer screening, patients with immunosuppression, or patients with known primary cancer.

## 2019-09-25 NOTE — PROGRESS NOTES
Dr. Iniguez notified patient scored for low suicide risk -- sometimes goes to sleep and hopes to not wake up. No plan in place. never attempted to take her own life.  consult ordered per protocol. No new orders

## 2019-09-25 NOTE — H&P
Hospital Medicine History & Physical Note    Date of Service  9/24/2019    Primary Care Physician  BRENNON Kulkarni.    Consultants  urology    Code Status  full    Chief Complaint  Dysuria, urinary retention, weakness     History of Presenting Illness  85 y.o. female who presented 9/24/2019 with past medical history of diabetes, urinary incontinence and urinary bladder disorder who presents with weakness.  This patient has been feeling weak over the last several days.  She had a ground-level fall earlier today and went to urgent care.  She had CT imaging of her head that was unremarkable and a urinalysis which was consistent with a urinary tract infection.  She also had some mild leukocytosis and renal dysfunction.  She was sent to [facility] for further evaluation.  She does have a history of bladder sling and feels like her bladder is partially prolapse at this time.  She has incomplete emptying of her bladder every time.  And she has had multiple chronic recurrent urinary tract infections.  In the emergency department she had Mckoy placement with significant urinary output.  She is also appears to be early sepsis and will be admitted to the hospital for IV antibiotics and urology evaluation.    Review of Systems  Review of Systems   Constitutional: Positive for malaise/fatigue. Negative for chills and fever.   HENT: Negative for congestion, hearing loss and tinnitus.    Eyes: Negative for blurred vision, double vision and discharge.   Respiratory: Negative for cough, hemoptysis and shortness of breath.    Cardiovascular: Negative for chest pain, palpitations and leg swelling.   Gastrointestinal: Positive for abdominal pain. Negative for heartburn, nausea and vomiting.   Genitourinary: Positive for dysuria. Negative for flank pain.   Musculoskeletal: Negative for joint pain and myalgias.   Skin: Negative for rash.   Neurological: Positive for weakness. Negative for dizziness, sensory change, speech change and  focal weakness.   Endo/Heme/Allergies: Negative for environmental allergies. Does not bruise/bleed easily.   Psychiatric/Behavioral: Negative for depression, hallucinations and substance abuse.       Past Medical History   has a past medical history of Arthritis, Bowel habit changes, Dental disorder, Diabetes (HCC), Glaucoma, Gynecological disorder, Heart burn, Macular degeneration, Psychiatric problem, Snoring, Urinary bladder disorder, and Urinary incontinence.    Surgical History   has a past surgical history that includes abdominal hysterectomy total; lefort colpoclesis (6/2/2017); cystoscopy (6/2/2017); hip cannulated screw (Left, 12/1/2017); bladder sling female (N/A, 4/20/2018); anterior repair (N/A, 4/20/2018); and pelviscopy (N/A, 4/20/2018).     Family History  family history includes Cancer in her brother, brother, and sister; Heart Disease in her sister; Stroke in her father.     Social History   reports that she has never smoked. She has never used smokeless tobacco. She reports that she does not drink alcohol or use drugs.    Allergies  No Known Allergies    Medications  Prior to Admission Medications   Prescriptions Last Dose Informant Patient Reported? Taking?   CALCIUM PO  Patient Yes No   Sig: Take 600 mg by mouth 2 Times a Day.   Cholecalciferol (VITAMIN D3) 18804 units Cap   No No   Sig: TAKE 1 EACH BY MOUTH EVERY 7 DAYS.   Cholecalciferol (VITAMIN D3) 26622 units Cap   No No   Sig: TAKE 1 EACH BY MOUTH EVERY 7 DAYS.   Non Formulary Request  Patient Yes No   Sig: every day. OCUVITE DROPS BOTH EYES DAILY   atorvastatin (LIPITOR) 20 MG Tab   No No   Sig: Take 1 Tab by mouth every day.   celecoxib (CELEBREX) 200 MG Cap   No No   Sig: Take 1 Cap by mouth every morning with breakfast.   metFORMIN (GLUCOPHAGE) 500 MG Tab   No No   Sig: Take 1 Tab by mouth 2 times a day, with meals.   nitrofurantoin monohyd macro (MACROBID) 100 MG Cap   Yes Yes   Sig: Take 100 mg by mouth 2 times a day.   oxybutynin  (DITROPAN) 5 MG Tab   Yes Yes   Sig: Take 5 mg by mouth 3 times a day.   raNITidine (ZANTAC) 150 MG Tab   No No   Sig: TAKE 1 TABLET BY MOUTH TWICE A DAY   sertraline (ZOLOFT) 100 MG Tab   No No   Sig: Take 1.5 Tabs by mouth every day.   sertraline (ZOLOFT) 100 MG Tab   No No   Sig: TAKE 1.5 TABS BY MOUTH EVERY DAY.   sulfamethoxazole-trimethoprim (BACTRIM DS) 800-160 MG tablet   No No   Sig: Take 1 Tab by mouth every 12 hours for 3 days.      Facility-Administered Medications: None       Physical Exam  Temp:  [36.8 °C (98.2 °F)] 36.8 °C (98.2 °F)  Pulse:  [] 90  Resp:  [16] 16  BP: ()/(51-62) 103/58  SpO2:  [92 %-96 %] 92 %    Physical Exam   Constitutional: She is oriented to person, place, and time. She appears well-developed and well-nourished. No distress.   Ill appearing   HENT:   Head: Normocephalic and atraumatic.   Dry oral mucosa    Eyes: Pupils are equal, round, and reactive to light. Conjunctivae and EOM are normal.   Neck: Normal range of motion. Neck supple. No JVD present.   Cardiovascular: Normal rate, regular rhythm, normal heart sounds and intact distal pulses.   No murmur heard.  Pulmonary/Chest: Effort normal and breath sounds normal. No respiratory distress. She has no wheezes.   Abdominal: Soft. Bowel sounds are normal. She exhibits no distension. There is tenderness.   Suprapubic ttp    Musculoskeletal: Normal range of motion. She exhibits no edema.   Neurological: She is alert and oriented to person, place, and time. She exhibits normal muscle tone.   Skin: Skin is warm and dry.   Psychiatric: She has a normal mood and affect. Her behavior is normal. Judgment and thought content normal.   Nursing note and vitals reviewed.      Laboratory:  Recent Labs     09/24/19  1216   WBC 11.6*   RBC 5.24   HEMOGLOBIN 14.1   HEMATOCRIT 45.3   MCV 86.5   MCH 26.9*   MCHC 31.1*   RDW 47.7   PLATELETCT 347   MPV 9.6     Recent Labs     09/24/19  1216   SODIUM 135   POTASSIUM 4.1   CHLORIDE 99    CO2 24   GLUCOSE 108*   BUN 34*   CREATININE 1.83*   CALCIUM 10.0     Recent Labs     09/24/19  1216   ALTSGPT 15   ASTSGOT 15   ALKPHOSPHAT 50   TBILIRUBIN 0.4   GLUCOSE 108*         No results for input(s): NTPROBNP in the last 72 hours.      No results for input(s): TROPONINT in the last 72 hours.    Urinalysis:    No results found     Imaging:  No orders to display         Assessment/Plan:  I anticipate this patient will require at least two midnights for appropriate medical management, necessitating inpatient admission.    * UTI (urinary tract infection)  Assessment & Plan  This patient has recurrent urinary tract infections  This does not meet sepsis criteria by CMS guidelines on admission, only has tachycardia > 90 and leukocytosis is 11.9 12. Only meets 1 sirs criteria.  Cont with IV abx   Follow cultures   procal   descialte therapy as appropriate   May need Urology consult in the am as she follows with Dr. Olivarez. I discussed case with Dr. Ewelina lazaro who states if she gets worse clinically may need CT urogram.    Hypotension  Assessment & Plan  Cont with IVF and montior     RAFIA (acute kidney injury) (HCC)  Assessment & Plan  I suspect this is pre renal vs obstructive   She does have evidence of bladder prolapse may need Gyne consultation, Urology consultation in the am with Dr. Olivarez  Mckoy placed with good UOP  Cont to monitor I/o   Renal ultrasound for eval ordered  Urine lytes   Recheck labs in am     Urinary retention  Assessment & Plan  Mckoy placed   Needs urology evaluation     Recurrent major depressive disorder, in full remission (HCC)- (present on admission)  Assessment & Plan  Resume home zoloft    Dyslipidemia- (present on admission)  Assessment & Plan  Resume home statin therapy     Type 2 diabetes mellitus without complication, without long-term current use of insulin (HCC)- (present on admission)  Assessment & Plan  Hold oral metformin   SSI ordered accu checks       VTE prophylaxis:  heparin

## 2019-09-25 NOTE — ASSESSMENT & PLAN NOTE
Likely due to cystocele  Mckoy placed   Urology consulted  I spoke with Dr. Marjan Henley with gynecology, she says she does cystocele repair, can follow-up outpatient if patient would like.

## 2019-09-25 NOTE — CARE PLAN
Problem: Infection  Goal: Will remain free from infection  Note:   Continue to monitor VS and daily labs.     Problem: Knowledge Deficit  Goal: Knowledge of disease process/condition, treatment plan, diagnostic tests, and medications will improve  Note:   RN will assess knowledge of disease process and provide education as appropriate.

## 2019-09-25 NOTE — ED TRIAGE NOTES
"Chief Complaint   Patient presents with   • Abnormal Labs     Patient brought to triage via wc by family after being called by MD due to elevated BUN and creatinine, denies any difficulty urinating.      BP (!) 93/62   Pulse 100   Temp 36.8 °C (98.2 °F) (Temporal)   Resp 16   Ht 1.651 m (5' 5\")   Wt 56.6 kg (124 lb 12.5 oz)   SpO2 96%      Patient currently denies any symptoms at this time other than some back pain. Patient placed in LTAC, located within [facility], educated on ed triage process, instructed to notify staff of any new or worsening symptoms.   "

## 2019-09-25 NOTE — CARE PLAN
Problem: Nutritional:  Goal: Achieve adequate nutritional intake  Description  Patient will consume 50% of meals   Outcome: PROGRESSING SLOWER THAN EXPECTED

## 2019-09-25 NOTE — ED PROVIDER NOTES
"ED Provider Note    CHIEF COMPLAINT  Chief Complaint   Patient presents with   • Abnormal Labs     Patient brought to triage via wc by family after being called by MD due to elevated BUN and creatinine, denies any difficulty urinating.        HPI  Herminia Jones is a 85 y.o. female who presents for evaluation of possible kidney issues.  Patient was seen earlier this week at the urgent care after a ground-level fall unrelated to her visit today.  At that point the did several studies including CT imaging of her head and labs.  Urinalysis was collected which demonstrated a urinary tract infection which seems to be a recurrent problem for the patient after a remote bladder sling surgery.  Patient notes that she feels her bladder is partially prolapsed and that the urethra may be \"kinked\" resulting in ending completely emptying her bladder every time she urinates.  Patient's daughter notes a chronic history of back to back urinary tract infections which may be related.  Today she was noted to have a GFR that was roughly 50% of GFR in February and was told to come to the emergency department for evaluation.  Patient has been seen multiple times by different urologists in [facility] who feels she might need another bladder lift surgery and are currently attempting to get her referred to a gynecologist for the surgery as the urologist at that group do not perform that surgery.    REVIEW OF SYSTEMS  Constitutional: No fevers, weakness, or weight loss   Skin: No rashes  HEENT: No sore throat, runny nose, sores, trouble swallowing, trouble speaking.  Neck: No neck pain, stiffness, or masses.  Chest: No pain or rashes  Pulm: No shortness of breath, cough, wheezing, stridor, or pain with inspiration/expiration  Gastrointestinal: No nausea, vomiting, diarrhea, constipation, bloating, melena, hematochezia or pain.  Genitourinary: Dysuria with occasional hematuria  Musculoskeletal: No recent trauma, pain, swelling, " "weakness  Neurologic: No sensory or motor changes. No confusion or disorientation.  Heme: No bleeding or bruising problems.   Immuno: No hx of recurrent infections      PAST MEDICAL HISTORY   has a past medical history of Arthritis, Bowel habit changes, Dental disorder, Diabetes (HCC), Glaucoma, Gynecological disorder, Heart burn, Macular degeneration, Psychiatric problem, Snoring, Urinary bladder disorder, and Urinary incontinence.    SOCIAL HISTORY  Social History     Tobacco Use   • Smoking status: Never Smoker   • Smokeless tobacco: Never Used   Substance and Sexual Activity   • Alcohol use: No   • Drug use: No   • Sexual activity: Not Currently       SURGICAL HISTORY   has a past surgical history that includes abdominal hysterectomy total; lefort colpoclesis (6/2/2017); cystoscopy (6/2/2017); hip cannulated screw (Left, 12/1/2017); bladder sling female (N/A, 4/20/2018); anterior repair (N/A, 4/20/2018); and pelviscopy (N/A, 4/20/2018).    CURRENT MEDICATIONS  Home Medications     Reviewed by Oralia Smith R.N. (Registered Nurse) on 09/24/19 at 2047  Med List Status: Complete   Medication Last Dose Status   atorvastatin (LIPITOR) 20 MG Tab  Active   CALCIUM PO  Active   celecoxib (CELEBREX) 200 MG Cap  Active   Cholecalciferol (VITAMIN D3) 04520 units Cap  Active   Cholecalciferol (VITAMIN D3) 20028 units Cap  Active   metFORMIN (GLUCOPHAGE) 500 MG Tab  Active   nitrofurantoin monohyd macro (MACROBID) 100 MG Cap  Active   Non Formulary Request  Active   oxybutynin (DITROPAN) 5 MG Tab  Active   raNITidine (ZANTAC) 150 MG Tab  Active   sertraline (ZOLOFT) 100 MG Tab  Active   sertraline (ZOLOFT) 100 MG Tab  Active   sulfamethoxazole-trimethoprim (BACTRIM DS) 800-160 MG tablet  Active                ALLERGIES  No Known Allergies    PHYSICAL EXAM  VITAL SIGNS: BP (!) 92/47 Comment: rn notified  Pulse 93   Temp 36.7 °C (98.1 °F) (Temporal)   Resp 18   Ht 1.651 m (5' 5\")   Wt 57 kg (125 lb 10.6 oz)   SpO2 " 96%   BMI 20.91 kg/m²    Gen: Alert in no apparent distress.  HEENT: No signs of trauma, Bilateral external ears normal, Nose normal. Conjunctiva normal, Non-icteric.   Neck:  No tenderness, Supple, No masses  Lymphatic: No cervical lymphadenopathy noted.   Cardiovascular: Regular rate and rhythm, no murmurs.   Thorax & Lungs: Normal breath sounds, No respiratory distress, No wheezing bilateral chest rise  Abdomen: Bowel sounds normal, Soft, mild suprapubic tenderness, No masses, No pulsatile masses. No Guarding or rebound  Skin: Warm, Dry, No erythema, No rash.   Back: No bony tenderness, No CVA tenderness.   Extremities: Intact distal pulses, No edema  Neurologic: Alert , no facial droop, grossly normal coordination and strength      INITIAL IMPRESSION  Patient arrives for evaluation of apparent decline in her renal function of unclear etiology.  Given her history, it is likely this is related to urinary retention and bladder outlet obstruction.  She is known to have what appears to be a failed bladder sling operation in the past which may have caused mechanical obstruction in the urethra.  She does not appear septic or toxic but may require inpatient evaluation due to the combination of problems and the lack of outpatient follow-up.  LABS  Results for orders placed or performed during the hospital encounter of 09/24/19   CBC with Differential   Result Value Ref Range    WBC 11.9 (H) 4.8 - 10.8 K/uL    RBC 4.93 4.20 - 5.40 M/uL    Hemoglobin 13.5 12.0 - 16.0 g/dL    Hematocrit 41.8 37.0 - 47.0 %    MCV 84.8 81.4 - 97.8 fL    MCH 27.4 27.0 - 33.0 pg    MCHC 32.3 (L) 33.6 - 35.0 g/dL    RDW 45.5 35.9 - 50.0 fL    Platelet Count 289 164 - 446 K/uL    MPV 9.2 9.0 - 12.9 fL    Neutrophils-Polys 75.10 (H) 44.00 - 72.00 %    Lymphocytes 14.10 (L) 22.00 - 41.00 %    Monocytes 5.70 0.00 - 13.40 %    Eosinophils 4.00 0.00 - 6.90 %    Basophils 0.30 0.00 - 1.80 %    Immature Granulocytes 0.80 0.00 - 0.90 %    Nucleated RBC  0.00 /100 WBC    Neutrophils (Absolute) 8.89 (H) 2.00 - 7.15 K/uL    Lymphs (Absolute) 1.67 1.00 - 4.80 K/uL    Monos (Absolute) 0.68 0.00 - 0.85 K/uL    Eos (Absolute) 0.47 0.00 - 0.51 K/uL    Baso (Absolute) 0.04 0.00 - 0.12 K/uL    Immature Granulocytes (abs) 0.10 0.00 - 0.11 K/uL    NRBC (Absolute) 0.00 K/uL   Comp Metabolic Panel (CMP)   Result Value Ref Range    Sodium 134 (L) 135 - 145 mmol/L    Potassium 3.9 3.6 - 5.5 mmol/L    Chloride 101 96 - 112 mmol/L    Co2 22 20 - 33 mmol/L    Anion Gap 11.0 0.0 - 11.9    Glucose 121 (H) 65 - 99 mg/dL    Bun 39 (H) 8 - 22 mg/dL    Creatinine 1.86 (H) 0.50 - 1.40 mg/dL    Calcium 9.7 8.5 - 10.5 mg/dL    AST(SGOT) 19 12 - 45 U/L    ALT(SGPT) 13 2 - 50 U/L    Alkaline Phosphatase 45 30 - 99 U/L    Total Bilirubin 0.3 0.1 - 1.5 mg/dL    Albumin 3.6 3.2 - 4.9 g/dL    Total Protein 7.0 6.0 - 8.2 g/dL    Globulin 3.4 1.9 - 3.5 g/dL    A-G Ratio 1.1 g/dL   URINALYSIS   Result Value Ref Range    Color Yellow     Character Hazy (A)     Specific Gravity 1.020 <1.035    Ph 6.0 5.0 - 8.0    Glucose Negative Negative mg/dL    Ketones Negative Negative mg/dL    Protein 30 (A) Negative mg/dL    Bilirubin Negative Negative    Urobilinogen, Urine 0.2 Negative    Nitrite Positive (A) Negative    Leukocyte Esterase Large (A) Negative    Occult Blood Large (A) Negative    Micro Urine Req Microscopic    PROCALCITONIN   Result Value Ref Range    Procalcitonin 0.16 <0.25 ng/mL   Lactic Acid -STAT Once   Result Value Ref Range    Lactic Acid 1.1 0.5 - 2.0 mmol/L   Lactic Acid Every four hours after STAT order   Result Value Ref Range    Lactic Acid 0.8 0.5 - 2.0 mmol/L   Prothrombin Time   Result Value Ref Range    PT 14.9 (H) 12.0 - 14.6 sec    INR 1.14 (H) 0.87 - 1.13   URINE MICROSCOPIC (W/UA)   Result Value Ref Range    WBC Packed (A) /hpf    RBC 2-5 (A) /hpf    Bacteria Many (A) None /hpf    Epithelial Cells Negative /hpf   ESTIMATED GFR   Result Value Ref Range    GFR If   American 31 (A) >60 mL/min/1.73 m 2    GFR If Non African American 26 (A) >60 mL/min/1.73 m 2   URINE SODIUM RANDOM   Result Value Ref Range    Sodium, Urine -per volume 46 mmol/L   URINE POTASSIUM RANDOM   Result Value Ref Range    Potassium 8.1 mmol/L   URINE CHLORIDE RANDOM   Result Value Ref Range    Chloride, Urine-per volume 47 mmol/L   URINE CREATININE RANDOM   Result Value Ref Range    Creatinine, Random Urine 16.10 mg/dL   URINE PROTEIN RANDOM   Result Value Ref Range    Total Protein, Urine 13.0 0.0 - 15.0 mg/dL   Lactic Acid Every four hours after STAT order   Result Value Ref Range    Lactic Acid 0.7 0.5 - 2.0 mmol/L   ACCU-CHEK GLUCOSE   Result Value Ref Range    Glucose - Accu-Ck 104 (H) 65 - 99 mg/dL   ACCU-CHEK GLUCOSE   Result Value Ref Range    Glucose - Accu-Ck 107 (H) 65 - 99 mg/dL   ACCU-CHEK GLUCOSE   Result Value Ref Range    Glucose - Accu-Ck 77 65 - 99 mg/dL   ACCU-CHEK GLUCOSE   Result Value Ref Range    Glucose - Accu-Ck 90 65 - 99 mg/dL       RADIOLOGY  CT-RENAL COLIC EVALUATION(A/P W/O)   Final Result      Mild left hydronephrosis and dilatation of the left ureter with no obstructing stones identified. Left perinephric and periureteral fat stranding could indicate inflammatory changes.   No right hydronephrosis and no right ureterolithiasis.   Diverticula colon. No evidence diverticulitis. No free fluid.   8 mm nodular opacity medial base right lower lobe.      Single lateral between 6 mm and 8 mm.   Low Risk: CT at 6-12 months, then consider CT at 18-24 months      High Risk: CT at 6-12 months, then CT at 18-24 months      Low Risk - Minimal or absent history of smoking and of other known risk factors.      High Risk - History of smoking or of other known risk factors.      Note: These recommendations do not apply to lung cancer screening, patients with immunosuppression, or patients with known primary cancer.      Fleischner Society 2017 Guidelines for Management of Incidentally  Detected Pulmonary Nodules in Adults      US-RENAL   Final Result      Mild left hydronephrosis.      NM-MAG-3 RENAL IMAGING    (Results Pending)     Reevaluation   Time: 11 PM  Vital signs: Patient getting hypotensive blood pressure readings and it does not appear to be related to positioning.  Will bolus normal saline  Assessment: Appears tired otherwise no distress      Reevaluation   Time: 12 AM  Vital signs: Noted blood pressure is improved slightly  Assessment: Appears tired otherwise no distress    Critical Care Note  Upon my evaluation, this patient had high probability of imminent and life-threatening deterioration due to hypotension, acute kidney injury, cystitis, which required my direct attention, intervention, and personal management. I personally provided 35 minutes of critical care time exclusive of time spent on separately billable procedures. Time includes review of laboratory data, radiology results, discussion with consultants, and monitoring for potential decompensation.     HYDRATION: Based on the patient's presentation of Hypotension the patient was given IV fluids. IV Hydration was used because oral hydration was not adequate alone. Upon recheck following hydration, the patient was Improved.    COURSE & MEDICAL DECISION MAKING  Pertinent Labs & Imaging studies reviewed. (See chart for details)  Patient has a findings suggestive of acute on chronic bladder infection due to what appears to be a chronic bladder outlet obstruction.  Patient had urinary retention and required Mckoy placement to relieve this.  There is noted to be a large amount of sediment in her urine and, given the kidney injury and her chronic infections, I felt she was at high risk for deterioration as an outpatient and would benefit from inpatient antibiotic treatment and expert consultation.  It was also notable the patient had hypotensive blood pressure readings in the emergency department of unclear etiology.  This did not  appear to be sepsis related however early sepsis cannot be ruled out.  Patient was discussed with the hospitalist service who admitted the patient for further treatment and evaluation.    FINAL IMPRESSION  1.  Urinary retention  2.  Acute kidney injury  3.  Acute cystitis  4.  Hypotension  Electronically signed by: Scott Gimenez, 9/24/2019 9:37 PM

## 2019-09-25 NOTE — CARE PLAN
Problem: Communication  Goal: The ability to communicate needs accurately and effectively will improve  Outcome: PROGRESSING AS EXPECTED     Problem: Safety  Goal: Will remain free from injury  Outcome: PROGRESSING AS EXPECTED  Goal: Will remain free from falls  Outcome: PROGRESSING AS EXPECTED     Problem: Venous Thromboembolism (VTW)/Deep Vein Thrombosis (DVT) Prevention:  Goal: Patient will participate in Venous Thrombosis (VTE)/Deep Vein Thrombosis (DVT)Prevention Measures  Outcome: PROGRESSING AS EXPECTED     Problem: Bowel/Gastric:  Goal: Normal bowel function is maintained or improved  Outcome: PROGRESSING AS EXPECTED  Goal: Will not experience complications related to bowel motility  Outcome: PROGRESSING AS EXPECTED     Problem: Knowledge Deficit  Goal: Knowledge of disease process/condition, treatment plan, diagnostic tests, and medications will improve  Outcome: PROGRESSING AS EXPECTED  Goal: Knowledge of the prescribed therapeutic regimen will improve  Outcome: PROGRESSING AS EXPECTED     Problem: Discharge Barriers/Planning  Goal: Patient's continuum of care needs will be met  Outcome: PROGRESSING AS EXPECTED     Problem: Fluid Volume:  Goal: Will maintain balanced intake and output  Outcome: PROGRESSING AS EXPECTED     Problem: Respiratory:  Goal: Respiratory status will improve  Outcome: PROGRESSING AS EXPECTED     Problem: Infection  Goal: Will remain free from infection  Outcome: PROGRESSING SLOWER THAN EXPECTED

## 2019-09-25 NOTE — PROGRESS NOTES
Pt A&Ox3. Disoriented to time.Drowsy.  Generalized weakness.  Denies pain.  Mckoy intact.  CT renal completed today.  IV abx  Plan is gyn consult for cystocele repair. Outpatient?  Intermitted soft BP. NS @100ml/hr

## 2019-09-25 NOTE — DIETARY
Nutrition Services: Day 0 of admit.  Herminia Jones is a 85 y.o. female with admitting DX of Obstructive uropathy, Sepsis due to urinary tract infection.   Consult received for 24-33 lb weight loss x 3 months, poor appetite.     Patient reports low appetite currently despite adequate appetite prior to admission. She stated her weight was 68.2 kg (150 lb) 2 months ago. Patient reports decrease in sense of taste. MD verbal consent given for Magic Cup daily.     Assessment:  Ht: 165.1 cm, Wt: 57 kg via stand up scale, BMI: 20.91 - normal  Diet/Intake: regular - Per chart no pt PO intake recorded to assess    Evaluation:   1. Appearance: Severe fat loss on upper arm region noted.    2. Weight: 11.4 kg (25 lb) weight decrease,17% loss x 2 months - severe.   3. Labs: BUN 34 (trending down)  4. Meds: humulin R, pericolace, glucose 4 g chewable tablet, dextrose 10% 250 ml, zofran  5. Last BM: 9/24    Malnutrition Risk: Pt with severe malnutrition in the context of acute illness related malnutrition related to UTI, RAFIA as evidenced by severe weight loss of 17% x 2 months, severe fat loss on upper arm region.     Recommendations/Plan:  1. Supplement ordered daily.   2. Encourage intake of meals/supplements.  3. Document intake of all meals/supplements as % taken in ADL's to provide interdisciplinary communication across all shifts.   4. Monitor weight.  5. Nutrition rep will continue to see patient for ongoing meal and snack preferences.    RD following

## 2019-09-25 NOTE — RESPIRATORY CARE
COPD EDUCATION by COPD CLINICAL EDUCATOR  9/25/2019 at 7:13 AM by Shannon Shea     Patient reviewed by COPD education team. Patient does not have a history or diagnosis of COPD and is a non-smoker, therefore does not qualify for the COPD program.

## 2019-09-25 NOTE — ASSESSMENT & PLAN NOTE
Possible obstruction from cystocele and UTI  Mckoy cath in place  No renal stone on CT  Renal function is improved  Continue IVF, abx and monitor BMP

## 2019-09-25 NOTE — PROGRESS NOTES
Report received at bedside from María LARA. Patient transported up to 724-1 on tele. VSS. Room air. Labs, orders, and notes reviewed. Admission profile complete. LR bolus running. Patient on bed alarm. Informed on call light, hourly rounding, vitals, calling for assistance. Denies needs at this time.

## 2019-09-25 NOTE — PROGRESS NOTES
Hospital Medicine Daily Progress Note    Date of Service  9/25/2019    Chief Complaint  85 y.o. female admitted 9/24/2019 with urinary retention.    Hospital Course    PMH DM, urinary incontinence followed by Dr. Olivarez who had been feeling weak and fell.  She also complains of difficulty urinating.  She was found with UTI, RAFIA and urinary retention and selby cath was placed with 250cc out. Renal US showed mild left hydronephrosis.       Interval Problem Update  Daughter at bedside.  Patient says she is feeling better.  I spoke with Dr. Olivarez, she has cystocele that he scoped during her last visit with him.  The role urologist who does cystocele repair is no longer with their group.  Will check CT to rule out kidney stone.  I spoke with Dr. Marjan Henley with gynecology, she says she does cystocele repair, can follow-up outpatient if patient would like.    Consultants/Specialty  Urology    Code Status  Full Code      Disposition  Pending RAFIA    Review of Systems  Review of Systems   Constitutional: Positive for malaise/fatigue.   Respiratory: Negative for shortness of breath.    Cardiovascular: Negative for chest pain.   Gastrointestinal: Negative for abdominal pain.   Genitourinary: Positive for frequency. Negative for dysuria and hematuria.   Musculoskeletal: Negative for myalgias.   Skin: Negative for itching.   Neurological: Negative for dizziness.        Physical Exam  Temp:  [35.9 °C (96.7 °F)-36.8 °C (98.2 °F)] 36.3 °C (97.4 °F)  Pulse:  [] 69  Resp:  [16-18] 18  BP: ()/(51-66) 128/66  SpO2:  [92 %-97 %] 95 %    Physical Exam   Constitutional: She is oriented to person, place, and time. She appears well-developed and well-nourished.   HENT:   Mouth/Throat: Oropharynx is clear and moist.   Eyes: Conjunctivae are normal.   Cardiovascular: Normal rate and regular rhythm.   Pulmonary/Chest: She has no wheezes. She has no rales.   Abdominal: Soft. There is no tenderness. There is no rebound and no guarding.    Musculoskeletal: She exhibits no edema.   Neurological: She is alert and oriented to person, place, and time.   Skin: Skin is warm and dry.   Nursing note and vitals reviewed.      Fluids    Intake/Output Summary (Last 24 hours) at 9/25/2019 1430  Last data filed at 9/25/2019 1300  Gross per 24 hour   Intake 2376 ml   Output 825 ml   Net 1551 ml       Laboratory  Recent Labs     09/24/19  1216 09/25/19  0020   WBC 11.6* 11.9*   RBC 5.24 4.93   HEMOGLOBIN 14.1 13.5   HEMATOCRIT 45.3 41.8   MCV 86.5 84.8   MCH 26.9* 27.4   MCHC 31.1* 32.3*   RDW 47.7 45.5   PLATELETCT 347 289   MPV 9.6 9.2     Recent Labs     09/24/19  1216 09/25/19  0020   SODIUM 135 134*   POTASSIUM 4.1 3.9   CHLORIDE 99 101   CO2 24 22   GLUCOSE 108* 121*   BUN 34* 39*   CREATININE 1.83* 1.86*   CALCIUM 10.0 9.7     Recent Labs     09/25/19  0020   INR 1.14*               Imaging  CT-RENAL COLIC EVALUATION(A/P W/O)   Final Result      Mild left hydronephrosis and dilatation of the left ureter with no obstructing stones identified. Left perinephric and periureteral fat stranding could indicate inflammatory changes.   No right hydronephrosis and no right ureterolithiasis.   Diverticula colon. No evidence diverticulitis. No free fluid.   8 mm nodular opacity medial base right lower lobe.      Single lateral between 6 mm and 8 mm.   Low Risk: CT at 6-12 months, then consider CT at 18-24 months      High Risk: CT at 6-12 months, then CT at 18-24 months      Low Risk - Minimal or absent history of smoking and of other known risk factors.      High Risk - History of smoking or of other known risk factors.      Note: These recommendations do not apply to lung cancer screening, patients with immunosuppression, or patients with known primary cancer.      Fleischner Society 2017 Guidelines for Management of Incidentally Detected Pulmonary Nodules in Adults      US-RENAL   Final Result      Mild left hydronephrosis.           Assessment/Plan  * RAFIA (acute  kidney injury) (HCC)  Assessment & Plan  Possible obstruction from cystocele and UTI  Mckoy cath in place  No renal stone on CT  Continue IVF, abx and monitor BMP      Urinary retention  Assessment & Plan  Likely due to cystocele  Mckoy placed   Urology consulted  I spoke with Dr. Marjan Henley with gynecology, she says she does cystocele repair, can follow-up outpatient if patient would like.    UTI (urinary tract infection)  Assessment & Plan  Continue CTX  Urine culture pending    Lung nodule  Assessment & Plan  8 mm nodular opacity medial base right lower lobe.    Single lateral between 6 mm and 8 mm.  Low Risk: CT at 6-12 months, then consider CT at 18-24 months    High Risk: CT at 6-12 months, then CT at 18-24 months    Low Risk - Minimal or absent history of smoking and of other known risk factors.    High Risk - History of smoking or of other known risk factors.    Note: These recommendations do not apply to lung cancer screening, patients with immunosuppression, or patients with known primary cancer.    Hypotension  Assessment & Plan  Resolved with IVF     Recurrent major depressive disorder, in full remission (HCC)- (present on admission)  Assessment & Plan  Stable  Resume home zoloft    Dyslipidemia- (present on admission)  Assessment & Plan  Chronic, Resume home statin therapy     Type 2 diabetes mellitus without complication, without long-term current use of insulin (HCC)- (present on admission)  Assessment & Plan  Hold oral metformin while in renal dysfunction  SSI ordered accu checks        VTE prophylaxis: heparin

## 2019-09-25 NOTE — TELEPHONE ENCOUNTER
Discussed labs with pt's daughter - GFR decreased approximately 50% compared to feb 2019      Recommend to be evaluated in ED immediately.

## 2019-09-26 ENCOUNTER — APPOINTMENT (OUTPATIENT)
Dept: RADIOLOGY | Facility: MEDICAL CENTER | Age: 84
DRG: 690 | End: 2019-09-26
Attending: PHYSICIAN ASSISTANT
Payer: MEDICARE

## 2019-09-26 DIAGNOSIS — F33.1 MODERATE EPISODE OF RECURRENT MAJOR DEPRESSIVE DISORDER (HCC): ICD-10-CM

## 2019-09-26 LAB
ANION GAP SERPL CALC-SCNC: 7 MMOL/L (ref 0–11.9)
BASOPHILS # BLD AUTO: 0.4 % (ref 0–1.8)
BASOPHILS # BLD: 0.04 K/UL (ref 0–0.12)
BUN SERPL-MCNC: 22 MG/DL (ref 8–22)
CALCIUM SERPL-MCNC: 8.5 MG/DL (ref 8.5–10.5)
CHLORIDE SERPL-SCNC: 111 MMOL/L (ref 96–112)
CO2 SERPL-SCNC: 23 MMOL/L (ref 20–33)
CREAT SERPL-MCNC: 1.15 MG/DL (ref 0.5–1.4)
EOSINOPHIL # BLD AUTO: 0.51 K/UL (ref 0–0.51)
EOSINOPHIL NFR BLD: 5.5 % (ref 0–6.9)
ERYTHROCYTE [DISTWIDTH] IN BLOOD BY AUTOMATED COUNT: 46.9 FL (ref 35.9–50)
GLUCOSE BLD-MCNC: 103 MG/DL (ref 65–99)
GLUCOSE BLD-MCNC: 111 MG/DL (ref 65–99)
GLUCOSE BLD-MCNC: 124 MG/DL (ref 65–99)
GLUCOSE BLD-MCNC: 151 MG/DL (ref 65–99)
GLUCOSE SERPL-MCNC: 108 MG/DL (ref 65–99)
HCT VFR BLD AUTO: 36.7 % (ref 37–47)
HGB BLD-MCNC: 12.1 G/DL (ref 12–16)
IMM GRANULOCYTES # BLD AUTO: 0.08 K/UL (ref 0–0.11)
IMM GRANULOCYTES NFR BLD AUTO: 0.9 % (ref 0–0.9)
LYMPHOCYTES # BLD AUTO: 1.94 K/UL (ref 1–4.8)
LYMPHOCYTES NFR BLD: 20.9 % (ref 22–41)
MCH RBC QN AUTO: 28.2 PG (ref 27–33)
MCHC RBC AUTO-ENTMCNC: 33 G/DL (ref 33.6–35)
MCV RBC AUTO: 85.5 FL (ref 81.4–97.8)
MONOCYTES # BLD AUTO: 0.72 K/UL (ref 0–0.85)
MONOCYTES NFR BLD AUTO: 7.8 % (ref 0–13.4)
NEUTROPHILS # BLD AUTO: 5.99 K/UL (ref 2–7.15)
NEUTROPHILS NFR BLD: 64.5 % (ref 44–72)
NRBC # BLD AUTO: 0 K/UL
NRBC BLD-RTO: 0 /100 WBC
PLATELET # BLD AUTO: 241 K/UL (ref 164–446)
PMV BLD AUTO: 9.2 FL (ref 9–12.9)
POTASSIUM SERPL-SCNC: 4.2 MMOL/L (ref 3.6–5.5)
RBC # BLD AUTO: 4.29 M/UL (ref 4.2–5.4)
SODIUM SERPL-SCNC: 141 MMOL/L (ref 135–145)
WBC # BLD AUTO: 9.3 K/UL (ref 4.8–10.8)

## 2019-09-26 PROCEDURE — 80048 BASIC METABOLIC PNL TOTAL CA: CPT

## 2019-09-26 PROCEDURE — A9270 NON-COVERED ITEM OR SERVICE: HCPCS | Performed by: HOSPITALIST

## 2019-09-26 PROCEDURE — 700102 HCHG RX REV CODE 250 W/ 637 OVERRIDE(OP): Performed by: HOSPITALIST

## 2019-09-26 PROCEDURE — 99232 SBSQ HOSP IP/OBS MODERATE 35: CPT | Performed by: INTERNAL MEDICINE

## 2019-09-26 PROCEDURE — 82962 GLUCOSE BLOOD TEST: CPT | Mod: 91

## 2019-09-26 PROCEDURE — A9562 TC99M MERTIATIDE: HCPCS

## 2019-09-26 PROCEDURE — 85025 COMPLETE CBC W/AUTO DIFF WBC: CPT

## 2019-09-26 PROCEDURE — 770020 HCHG ROOM/CARE - TELE (206)

## 2019-09-26 PROCEDURE — 36415 COLL VENOUS BLD VENIPUNCTURE: CPT

## 2019-09-26 PROCEDURE — 700105 HCHG RX REV CODE 258: Performed by: HOSPITALIST

## 2019-09-26 PROCEDURE — 700111 HCHG RX REV CODE 636 W/ 250 OVERRIDE (IP): Performed by: HOSPITALIST

## 2019-09-26 RX ORDER — FUROSEMIDE 10 MG/ML
INJECTION INTRAMUSCULAR; INTRAVENOUS
Status: DISPENSED
Start: 2019-09-26 | End: 2019-09-26

## 2019-09-26 RX ORDER — SERTRALINE HYDROCHLORIDE 100 MG/1
150 TABLET, FILM COATED ORAL DAILY
Qty: 135 TAB | Refills: 0 | Status: SHIPPED | OUTPATIENT
Start: 2019-09-26 | End: 2019-09-27

## 2019-09-26 RX ADMIN — OXYBUTYNIN CHLORIDE 5 MG: 5 TABLET ORAL at 17:08

## 2019-09-26 RX ADMIN — HEPARIN SODIUM 5000 UNITS: 5000 INJECTION INTRAVENOUS; SUBCUTANEOUS at 22:30

## 2019-09-26 RX ADMIN — OXYBUTYNIN CHLORIDE 5 MG: 5 TABLET ORAL at 12:09

## 2019-09-26 RX ADMIN — INSULIN HUMAN 1 UNITS: 100 INJECTION, SOLUTION PARENTERAL at 22:34

## 2019-09-26 RX ADMIN — HEPARIN SODIUM 5000 UNITS: 5000 INJECTION INTRAVENOUS; SUBCUTANEOUS at 05:08

## 2019-09-26 RX ADMIN — ATORVASTATIN CALCIUM 20 MG: 20 TABLET, FILM COATED ORAL at 05:08

## 2019-09-26 RX ADMIN — SENNOSIDES, DOCUSATE SODIUM 2 TABLET: 50; 8.6 TABLET, FILM COATED ORAL at 05:08

## 2019-09-26 RX ADMIN — HEPARIN SODIUM 5000 UNITS: 5000 INJECTION INTRAVENOUS; SUBCUTANEOUS at 14:15

## 2019-09-26 RX ADMIN — SODIUM CHLORIDE: 9 INJECTION, SOLUTION INTRAVENOUS at 12:10

## 2019-09-26 RX ADMIN — FAMOTIDINE 20 MG: 20 TABLET ORAL at 05:08

## 2019-09-26 RX ADMIN — OXYBUTYNIN CHLORIDE 5 MG: 5 TABLET ORAL at 05:08

## 2019-09-26 RX ADMIN — CEFTRIAXONE SODIUM 1 G: 1 INJECTION, POWDER, FOR SOLUTION INTRAMUSCULAR; INTRAVENOUS at 05:08

## 2019-09-26 RX ADMIN — SERTRALINE HYDROCHLORIDE 150 MG: 100 TABLET ORAL at 05:08

## 2019-09-26 ASSESSMENT — ENCOUNTER SYMPTOMS
ABDOMINAL PAIN: 0
FLANK PAIN: 0
FEVER: 0
DIZZINESS: 0
SHORTNESS OF BREATH: 0
MYALGIAS: 0

## 2019-09-26 NOTE — DISCHARGE PLANNING
LSW spoke with Pts bedside RN regarding Pts suicide status.  RN reported the Pt does not have any legal documentation in her hard chart and is just being monitored at this time.  LSW will continue to follow and assist as needed.      LSW contacted Brittani with legal hold and reported the above information.

## 2019-09-26 NOTE — PROGRESS NOTES
Report received at bedside from Yelena RN, pt care assumed, tele box on. Pt aaox4, no signs of distress noted at this time. Patient resting comfortably in bed. POC discussed with pt and verbalizes no questions. Pt c/o of no pain. Pt denies any additional needs at this time. Bed in lowest position, pt educated on fall risk and verbalized understanding, call light within reach, bed alarm plugged in and on.

## 2019-09-26 NOTE — PROGRESS NOTES
Bedside report received from day RN. Patient's plan of care reviewed. Patient found resting in bed, A&Ox4 but slow to respond, flat affect. VSS, on RA. No signs of distress, declines pain at this time. All needs met. Safety precautions in place. Tele box on. Bed in lowest/locked position. Bed alarm on. Call light and personal belongings within reach. Will continue to monitor.

## 2019-09-26 NOTE — DOCUMENTATION QUERY
"                                                                         [facility]                                                                       Query Response Note      PATIENT:               JAS LANE  ACCT #:                  [id number]  MRN:                     [id number]  :                      1934  ADMIT DATE:       2019 9:04 PM  DISCH DATE:          RESPONDING  PROVIDER #:        [id number]           QUERY TEXT:    Sepsis is documented in the History & Physical but is not addressed in the Progress Notes. Please clarify status of this condition:    NOTE:  If an appropriate response is not listed below, please respond with a new note.       The patient's Clinical Indicators include:  \"Appears to be early sepsis and will be admitted to the hospital for IV antibiotics and urology evaluation\" is documented in the HPI of the H&P. 0/4 SIRS Criteria POA. WBC: 11.6, Lactic Acid: 1.1, Procalcitonin: 0.16 on admission. UA on admission noted large amount of leukocyte esterase, packed WBC, and many bacteria.    Treatments include: Rocephin, and U/S Renal, CT Renal, and UA.    Risk factors include: dx RAFIA, dx UTI, hx DM II, and Advanced Age.  Options provided:   -- Sepsis without acute organ dysfunction is ruled in   -- Severe Sepsis with acute organ dysfunction is ruled in, Please specify the acute organ dysfunction present (e.g. RAFIA, Acute Encephalopathy, etc.)   -- Sepsis is ruled out   -- Unable to determine      Query created by: Catarino Reza on 2019 8:19 AM    RESPONSE TEXT:    Sepsis is ruled out          Electronically signed by:  CRISTY SOLANO 2019 12:58 PM              "

## 2019-09-26 NOTE — PROGRESS NOTES
[facility] Medicine Daily Progress Note    Date of Service  9/26/2019    Chief Complaint  85 y.o. female admitted 9/24/2019 with urinary retention.    Hospital Course    PMH DM, urinary incontinence followed by Dr. Olivarez who had been feeling weak and fell.  She also complains of difficulty urinating.  She was found with UTI, RAFIA and urinary retention and selby cath was placed with 250cc out. Renal US showed mild left hydronephrosis.  Dr. Olivarez with urology was consulted, she likely has obstruction related to her cystocele..  I spoke with Dr. Marjan Henley gynecology who says she can follow-up with the patient as outpatient for cystocele repair.  She also had an incidental 8 mm nodule in the right lower lung base and will need follow-up CT in 6 to 12 months.      Interval Problem Update  She feels well, she has no complaints.  Renal function is much improved    Consultants/Specialty  Urology    Code Status  Full Code      Disposition  Pending RAFIA    Review of Systems  Review of Systems   Constitutional: Negative for malaise/fatigue.   Respiratory: Negative for shortness of breath.    Cardiovascular: Negative for chest pain.   Gastrointestinal: Negative for abdominal pain.   Genitourinary: Negative for dysuria, frequency and hematuria.   Musculoskeletal: Negative for myalgias.   Skin: Negative for itching.   Neurological: Negative for dizziness.        Physical Exam  Temp:  [36.2 °C (97.2 °F)-36.7 °C (98.1 °F)] 36.5 °C (97.7 °F)  Pulse:  [71-93] 72  Resp:  [15-18] 18  BP: ()/(47-67) 92/53  SpO2:  [94 %-96 %] 95 %    Physical Exam   Constitutional: She is oriented to person, place, and time. She appears well-developed and well-nourished.   HENT:   Mouth/Throat: Oropharynx is clear and moist.   Eyes: Conjunctivae are normal.   Cardiovascular: Normal rate and regular rhythm.   Pulmonary/Chest: She has no wheezes. She has no rales.   Abdominal: Soft. There is no tenderness.   Musculoskeletal: She exhibits no edema.    Neurological: She is alert and oriented to person, place, and time.   Skin: Skin is warm and dry.   Nursing note and vitals reviewed.      Fluids    Intake/Output Summary (Last 24 hours) at 9/26/2019 1706  Last data filed at 9/26/2019 0900  Gross per 24 hour   Intake --   Output 2600 ml   Net -2600 ml       Laboratory  Recent Labs     09/24/19  1216 09/25/19  0020 09/26/19  0310   WBC 11.6* 11.9* 9.3   RBC 5.24 4.93 4.29   HEMOGLOBIN 14.1 13.5 12.1   HEMATOCRIT 45.3 41.8 36.7*   MCV 86.5 84.8 85.5   MCH 26.9* 27.4 28.2   MCHC 31.1* 32.3* 33.0*   RDW 47.7 45.5 46.9   PLATELETCT 347 289 241   MPV 9.6 9.2 9.2     Recent Labs     09/24/19  1216 09/25/19  0020 09/26/19  0310   SODIUM 135 134* 141   POTASSIUM 4.1 3.9 4.2   CHLORIDE 99 101 111   CO2 24 22 23   GLUCOSE 108* 121* 108*   BUN 34* 39* 22   CREATININE 1.83* 1.86* 1.15   CALCIUM 10.0 9.7 8.5     Recent Labs     09/25/19  0020   INR 1.14*               Imaging  NM-MAG-3 RENAL IMAGING   Final Result      1.  There is slightly asymmetric perfusion, decreased on the left.   2.  There is bilateral excretion and spontaneous drainage from both kidneys. There is no evidence of significant hydronephrosis or mechanical obstruction.            CT-RENAL COLIC EVALUATION(A/P W/O)   Final Result      Mild left hydronephrosis and dilatation of the left ureter with no obstructing stones identified. Left perinephric and periureteral fat stranding could indicate inflammatory changes.   No right hydronephrosis and no right ureterolithiasis.   Diverticula colon. No evidence diverticulitis. No free fluid.   8 mm nodular opacity medial base right lower lobe.      Single lateral between 6 mm and 8 mm.   Low Risk: CT at 6-12 months, then consider CT at 18-24 months      High Risk: CT at 6-12 months, then CT at 18-24 months      Low Risk - Minimal or absent history of smoking and of other known risk factors.      High Risk - History of smoking or of other known risk factors.      Note:  These recommendations do not apply to lung cancer screening, patients with immunosuppression, or patients with known primary cancer.      Fleischner Society 2017 Guidelines for Management of Incidentally Detected Pulmonary Nodules in Adults      US-RENAL   Final Result      Mild left hydronephrosis.           Assessment/Plan  * RAFIA (acute kidney injury) (HCC)  Assessment & Plan  Possible obstruction from cystocele and UTI  Mckoy cath in place  No renal stone on CT  Renal function is improved  Continue IVF, abx and monitor BMP      Urinary retention  Assessment & Plan  Likely due to cystocele  Mckoy placed   Urology consulted  I spoke with Dr. Marjan Henley with gynecology, she says she does cystocele repair, can follow-up outpatient if patient would like.    UTI (urinary tract infection)  Assessment & Plan  Continue CTX  Urine culture pending    Lung nodule  Assessment & Plan  8 mm nodular opacity medial base right lower lobe.    Single lateral between 6 mm and 8 mm.  Low Risk: CT at 6-12 months, then consider CT at 18-24 months    High Risk: CT at 6-12 months, then CT at 18-24 months    Low Risk - Minimal or absent history of smoking and of other known risk factors.    High Risk - History of smoking or of other known risk factors.    Note: These recommendations do not apply to lung cancer screening, patients with immunosuppression, or patients with known primary cancer.    Hypotension  Assessment & Plan  Resolved with IVF     Recurrent major depressive disorder, in full remission (HCC)- (present on admission)  Assessment & Plan  Stable  Resume home zoloft    Dyslipidemia- (present on admission)  Assessment & Plan  Chronic, Resume home statin therapy     Type 2 diabetes mellitus without complication, without long-term current use of insulin (HCC)- (present on admission)  Assessment & Plan  Hold oral metformin while in renal dysfunction  SSI ordered accu checks        VTE prophylaxis: heparin

## 2019-09-26 NOTE — CARE PLAN
Problem: Safety  Goal: Will remain free from falls  Outcome: PROGRESSING AS EXPECTED  Note:   Safety precautions in place. Education provided to patient, verbalized understanding. Bed in lowest/locked position. Bed alarm on. Room close to nurses station. Call light and personal belongings within reach. Will continue to monitor.       Problem: Venous Thromboembolism (VTW)/Deep Vein Thrombosis (DVT) Prevention:  Goal: Patient will participate in Venous Thrombosis (VTE)/Deep Vein Thrombosis (DVT)Prevention Measures  Outcome: PROGRESSING AS EXPECTED  Note:   Patient educated on VTE prophylaxis including mechanical and pharmacologic methods. SCDs on and medication orders followed. Education on the importance of VTE prophylaxis due to decreased mobility while in the hospital. Verbalized understanding, no additional questions at this time.

## 2019-09-27 ENCOUNTER — PATIENT OUTREACH (OUTPATIENT)
Dept: HEALTH INFORMATION MANAGEMENT | Facility: OTHER | Age: 84
End: 2019-09-27

## 2019-09-27 VITALS
DIASTOLIC BLOOD PRESSURE: 69 MMHG | BODY MASS INDEX: 23.54 KG/M2 | OXYGEN SATURATION: 97 % | HEIGHT: 65 IN | HEART RATE: 74 BPM | WEIGHT: 141.31 LBS | TEMPERATURE: 98.8 F | SYSTOLIC BLOOD PRESSURE: 103 MMHG | RESPIRATION RATE: 19 BRPM

## 2019-09-27 PROBLEM — I95.9 HYPOTENSION: Status: RESOLVED | Noted: 2019-09-25 | Resolved: 2019-09-27

## 2019-09-27 PROBLEM — N17.9 AKI (ACUTE KIDNEY INJURY) (HCC): Status: RESOLVED | Noted: 2019-09-25 | Resolved: 2019-09-27

## 2019-09-27 LAB
ANION GAP SERPL CALC-SCNC: 6 MMOL/L (ref 0–11.9)
BACTERIA UR CULT: ABNORMAL
BACTERIA UR CULT: ABNORMAL
BUN SERPL-MCNC: 18 MG/DL (ref 8–22)
CALCIUM SERPL-MCNC: 8.9 MG/DL (ref 8.5–10.5)
CHLORIDE SERPL-SCNC: 108 MMOL/L (ref 96–112)
CO2 SERPL-SCNC: 25 MMOL/L (ref 20–33)
CREAT SERPL-MCNC: 1.09 MG/DL (ref 0.5–1.4)
GLUCOSE BLD-MCNC: 103 MG/DL (ref 65–99)
GLUCOSE BLD-MCNC: 111 MG/DL (ref 65–99)
GLUCOSE SERPL-MCNC: 112 MG/DL (ref 65–99)
POTASSIUM SERPL-SCNC: 4.1 MMOL/L (ref 3.6–5.5)
SIGNIFICANT IND 70042: ABNORMAL
SITE SITE: ABNORMAL
SODIUM SERPL-SCNC: 139 MMOL/L (ref 135–145)
SOURCE SOURCE: ABNORMAL

## 2019-09-27 PROCEDURE — A9270 NON-COVERED ITEM OR SERVICE: HCPCS | Performed by: HOSPITALIST

## 2019-09-27 PROCEDURE — 99239 HOSP IP/OBS DSCHRG MGMT >30: CPT | Performed by: INTERNAL MEDICINE

## 2019-09-27 PROCEDURE — 36415 COLL VENOUS BLD VENIPUNCTURE: CPT

## 2019-09-27 PROCEDURE — 700105 HCHG RX REV CODE 258: Performed by: HOSPITALIST

## 2019-09-27 PROCEDURE — 700111 HCHG RX REV CODE 636 W/ 250 OVERRIDE (IP): Performed by: HOSPITALIST

## 2019-09-27 PROCEDURE — 80048 BASIC METABOLIC PNL TOTAL CA: CPT

## 2019-09-27 PROCEDURE — 700102 HCHG RX REV CODE 250 W/ 637 OVERRIDE(OP): Performed by: HOSPITALIST

## 2019-09-27 PROCEDURE — 82962 GLUCOSE BLOOD TEST: CPT

## 2019-09-27 RX ORDER — CALCIUM CARBONATE 500(1250)
600 TABLET ORAL
Qty: 30 TAB | Refills: 0 | Status: SHIPPED | OUTPATIENT
Start: 2019-09-27 | End: 2020-09-22

## 2019-09-27 RX ORDER — CEFDINIR 300 MG/1
300 CAPSULE ORAL 2 TIMES DAILY
Qty: 20 CAP | Refills: 0 | Status: SHIPPED | OUTPATIENT
Start: 2019-09-27 | End: 2019-10-11

## 2019-09-27 RX ADMIN — FAMOTIDINE 20 MG: 20 TABLET ORAL at 05:53

## 2019-09-27 RX ADMIN — OXYBUTYNIN CHLORIDE 5 MG: 5 TABLET ORAL at 11:53

## 2019-09-27 RX ADMIN — SENNOSIDES, DOCUSATE SODIUM 2 TABLET: 50; 8.6 TABLET, FILM COATED ORAL at 05:53

## 2019-09-27 RX ADMIN — OXYBUTYNIN CHLORIDE 5 MG: 5 TABLET ORAL at 05:53

## 2019-09-27 RX ADMIN — SERTRALINE HYDROCHLORIDE 150 MG: 100 TABLET ORAL at 05:53

## 2019-09-27 RX ADMIN — HEPARIN SODIUM 5000 UNITS: 5000 INJECTION INTRAVENOUS; SUBCUTANEOUS at 05:53

## 2019-09-27 RX ADMIN — CEFTRIAXONE SODIUM 1 G: 1 INJECTION, POWDER, FOR SOLUTION INTRAMUSCULAR; INTRAVENOUS at 05:53

## 2019-09-27 RX ADMIN — SODIUM CHLORIDE: 9 INJECTION, SOLUTION INTRAVENOUS at 00:07

## 2019-09-27 RX ADMIN — ATORVASTATIN CALCIUM 20 MG: 20 TABLET, FILM COATED ORAL at 05:53

## 2019-09-27 ASSESSMENT — ENCOUNTER SYMPTOMS
ABDOMINAL PAIN: 0
FLANK PAIN: 0
FEVER: 0

## 2019-09-27 NOTE — CARE PLAN
Problem: Nutritional:  Goal: Achieve adequate nutritional intake  Description  Patient will consume 50% of meals   Outcome: MET     PO 70% average x 3 meals per chart review.

## 2019-09-27 NOTE — PROGRESS NOTES
Report received at bedside from NOC RN, pt care assumed, tele box on. Pt sleeping. Bed in lowest position, call light within reach, bed alarm plugged in and on.

## 2019-09-27 NOTE — CARE PLAN
Problem: Communication  Goal: The ability to communicate needs accurately and effectively will improve  Outcome: PROGRESSING AS EXPECTED  Note:   Pt will communicate needs with staff. Pt uses call light appropriately and understands the bed alarm and why it is on and the need to call for assistance.      Problem: Infection  Goal: Will remain free from infection  Outcome: PROGRESSING AS EXPECTED  Note:   Standard precautions in place. Daughter and pt educated on using hand hygiene before and after pt contact and upon entering and exiting pt room.

## 2019-09-27 NOTE — DISCHARGE INSTRUCTIONS
Discharge Instructions    Discharged to home by car with relative. Discharged via wheelchair, hospital escort: Refused.  Special equipment needed: Not Applicable    Be sure to schedule a follow-up appointment with your primary care doctor or any specialists as instructed.     Discharge Plan:   Influenza Vaccine Indication: Indicated: 65 years and older  Influenza Vaccine Given - only chart on this line when given: Influenza Vaccine Given (See MAR)    I understand that a diet low in cholesterol, fat, and sodium is recommended for good health. Unless I have been given specific instructions below for another diet, I accept this instruction as my diet prescription.   Other diet: none    Special Instructions: None    · Is patient discharged on Warfarin / Coumadin?   No     Depression / Suicide Risk    As you are discharged from this [facility] facility, it is important to learn how to keep safe from harming yourself.    Recognize the warning signs:  · Abrupt changes in personality, positive or negative- including increase in energy   · Giving away possessions  · Change in eating patterns- significant weight changes-  positive or negative  · Change in sleeping patterns- unable to sleep or sleeping all the time   · Unwillingness or inability to communicate  · Depression  · Unusual sadness, discouragement and loneliness  · Talk of wanting to die  · Neglect of personal appearance   · Rebelliousness- reckless behavior  · Withdrawal from people/activities they love  · Confusion- inability to concentrate     If you or a loved one observes any of these behaviors or has concerns about self-harm, here's what you can do:  · Talk about it- your feelings and reasons for harming yourself  · Remove any means that you might use to hurt yourself (examples: pills, rope, extension cords, firearm)  · Get professional help from the community (Mental Health, Substance Abuse, psychological counseling)  · Do not be alone:Call your Safe  Contact- someone whom you trust who will be there for you.  · Call your local CRISIS HOTLINE 292-2128 or 305-657-1600  · Call your local Children's Mobile Crisis Response Team [place] (769) 406-3708 or www.Datadecision  · Call the toll free National Suicide Prevention Hotlines   · National Suicide Prevention Lifeline 852-856-QXOB (3697)  · [facility] Line Network 800-SUICIDE (266-1513)    Mckoy Catheter Care, Adult  A Mckoy catheter is a soft, flexible tube that is placed into the bladder to drain urine. A Mckoy catheter may be inserted if:  · You leak urine or are not able to control when you urinate (urinary incontinence).  · You are not able to urinate when you need to (urinary retention).  · You had prostate surgery or surgery on the genitals.  · You have certain medical conditions, such as multiple sclerosis, dementia, or a spinal cord injury.  If you are going home with a Mckoy catheter in place, follow the instructions below.  TAKING CARE OF THE CATHETER  1. Wash your hands with soap and water.  2. Using mild soap and warm water on a clean washcloth:  ¨ Clean the area on your body closest to the catheter insertion site using a circular motion, moving away from the catheter. Never wipe toward the catheter because this could sweep bacteria up into the urethra and cause infection.  ¨ Remove all traces of soap. Pat the area dry with a clean towel. For males, reposition the foreskin.  3. Attach the catheter to your leg so there is no tension on the catheter. Use adhesive tape or a leg strap. If you are using adhesive tape, remove any sticky residue left behind by the previous tape you used.  4. Keep the drainage bag below the level of the bladder, but keep it off the floor.  5. Check throughout the day to be sure the catheter is working and urine is draining freely. Make sure the tubing does not become kinked.  6. Do not pull on the catheter or try to remove it. Pulling could damage internal  tissues.  TAKING CARE OF THE DRAINAGE BAGS  You will be given two drainage bags to take home. One is a large overnight drainage bag, and the other is a smaller leg bag that fits underneath clothing. You may wear the overnight bag at any time, but you should never wear the smaller leg bag at night. Follow the instructions below for how to empty, change, and clean your drainage bags.  Emptying the Drainage Bag  You must empty your drainage bag when it is  -½ full or at least 2-3 times a day.  2. Wash your hands with soap and water.  3. Keep the drainage bag below your hips, below the level of your bladder. This stops urine from going back into the tubing and into your bladder.  4. Hold the dirty bag over the toilet or a clean container.  5. Open the pour spout at the bottom of the bag and empty the urine into the toilet or container. Do not let the pour spout touch the toilet, container, or any other surface. Doing so can place bacteria on the bag, which can cause an infection.  6. Clean the pour spout with a gauze pad or cotton ball that has rubbing alcohol on it.  7. Close the pour spout.  8. Attach the bag to your leg with adhesive tape or a leg strap.  9. Wash your hands well.  Changing the Drainage Bag  Change your drainage bag once a month or sooner if it starts to smell bad or look dirty. Below are steps to follow when changing the drainage bag.  2. Wash your hands with soap and water.  3. Pinch off the rubber catheter so that urine does not spill out.  4. Disconnect the catheter tube from the drainage tube at the connection valve. Do not let the tubes touch any surface.  5. Clean the end of the catheter tube with an alcohol wipe. Use a different alcohol wipe to clean the end of the drainage tube.  6. Connect the catheter tube to the drainage tube of the clean drainage bag.  7. Attach the new bag to the leg with adhesive tape or a leg strap. Avoid attaching the new bag too tightly.  8. Wash your hands  well.  Cleaning the Drainage Bag  1. Wash your hands with soap and water.  2. Wash the bag in warm, soapy water.  3. Rinse the bag thoroughly with warm water.  4. Fill the bag with a solution of white vinegar and water (1 cup vinegar to 1 qt warm water [.2 L vinegar to 1 L warm water]). Close the bag and soak it for 30 minutes in the solution.  5. Rinse the bag with warm water.  6. Hang the bag to dry with the pour spout open and hanging downward.  7. Store the clean bag (once it is dry) in a clean plastic bag.  8. Wash your hands well.  PREVENTING INFECTION  · Wash your hands before and after handling your catheter.  · Take showers daily and wash the area where the catheter enters your body. Do not take baths. Replace wet leg straps with dry ones, if this applies.  · Do not use powders, sprays, or lotions on the genital area. Only use creams, lotions, or ointments as directed by your caregiver.  · For females, wipe from front to back after each bowel movement.  · Drink enough fluids to keep your urine clear or pale yellow unless you have a fluid restriction.  · Do not let the drainage bag or tubing touch or lie on the floor.  · Wear cotton underwear to absorb moisture and to keep your .  SEEK MEDICAL CARE IF:   · Your urine is cloudy or smells unusually bad.  · Your catheter becomes clogged.  · You are not draining urine into the bag or your bladder feels full.  · Your catheter starts to leak.  SEEK IMMEDIATE MEDICAL CARE IF:   · You have pain, swelling, redness, or pus where the catheter enters the body.  · You have pain in the abdomen, legs, lower back, or bladder.  · You have a fever.  · You see blood fill the catheter, or your urine is pink or red.  · You have nausea, vomiting, or chills.  · Your catheter gets pulled out.  MAKE SURE YOU:   · Understand these instructions.  · Will watch your condition.  · Will get help right away if you are not doing well or get worse.     This information is not  intended to replace advice given to you by your health care provider. Make sure you discuss any questions you have with your health care provider.     Document Released: 12/18/2006 Document Revised: 05/04/2015 Document Reviewed: 12/09/2013  [employer] Interactive Patient Education ©2016 Elsevier Inc.    Cefdinir capsules  What is this medicine?  CEFDINIR (SEF di ner) is a cephalosporin antibiotic. It is used to treat certain kinds of bacterial infections. It will not work for colds, flu, or other viral infections.  This medicine may be used for other purposes; ask your health care provider or pharmacist if you have questions.  COMMON BRAND NAME(S): Omnicef    How should I use this medicine?  Take this medicine by mouth. Swallow it with a drink of water. Follow the directions on the prescription label. You can take it with or without food. If it upsets your stomach it may help to take it with food. Take your doses at regular intervals. Do not take it more often than directed. Finish all the medicine you are prescribed even if you think your infection is better.  Talk to your pediatrician regarding the use of this medicine in children. Special care may be needed.  Overdosage: If you think you have taken too much of this medicine contact a poison control center or emergency room at once.  NOTE: This medicine is only for you. Do not share this medicine with others.  What if I miss a dose?  If you miss a dose, take it as soon as you can. If it is almost time for your next dose, take only that dose. Do not take double or extra doses.  What may interact with this medicine?  -antacids that contain aluminum or magnesium  -iron supplements  -other antibiotics  -probenecid  This list may not describe all possible interactions. Give your health care provider a list of all the medicines, herbs, non-prescription drugs, or dietary supplements you use. Also tell them if you smoke, drink alcohol, or use illegal drugs. Some items may  interact with your medicine.  What should I watch for while using this medicine?  Tell your doctor or health care professional if your symptoms do not get better in a few days.  If you are diabetic you may get a false-positive result for sugar in your urine. Check with your doctor or health care professional before you change your diet or the dose of your diabetes medicine.  What side effects may I notice from receiving this medicine?  Side effects that you should report to your doctor or health care professional as soon as possible:  -allergic reactions like skin rash, itching or hives, swelling of the face, lips, or tongue  -bloody or watery diarrhea  -breathing problems  -fever  -redness, blistering, peeling or loosening of the skin, including inside the mouth  -seizures  -trouble passing urine or change in the amount of urine  -unusual bleeding or bruising  -unusually weak or tired  Side effects that usually do not require medical attention (report to your doctor or health care professional if they continue or are bothersome):  -constipation  -diarrhea  -dizziness  -dry mouth  -headache  -loss of appetite  -nausea, vomiting  -stomach pain  -stool discoloration  -tiredness  -vaginal discharge, itching, or odor in women  This list may not describe all possible side effects. Call your doctor for medical advice about side effects. You may report side effects to FDA at 1-646-FDA-5688.  Where should I keep my medicine?  Keep out of the reach of children.  Store at room temperature between 15 and 30 degrees C (59 and 86 degrees F). Throw the medicine away after the expiration date.  NOTE: This sheet is a summary. It may not cover all possible information. If you have questions about this medicine, talk to your doctor, pharmacist, or health care provider.  © 2018 Elsevier/Gold Standard (2017-04-24 15:52:44)      Kidney Stones  Kidney stones (urolithiasis) are solid, rock-like deposits that form inside of the organs that  make urine (kidneys). A kidney stone may form in a kidney and move into the bladder, where it can cause intense pain and block the flow of urine. Kidney stones are created when high levels of certain minerals are found in the urine. They are usually passed through urination, but in some cases, medical treatment may be needed to remove them.  What are the causes?  Kidney stones may be caused by:  · A condition in which certain glands produce too much parathyroid hormone (primary hyperparathyroidism), which causes too much calcium buildup in the blood.  · Buildup of uric acid crystals in the bladder (hyperuricosuria). Uric acid is a chemical that the body produces when you eat certain foods. It usually exits the body in the urine.  · Narrowing (stricture) of one or both of the tubes that drain urine from the kidneys to the bladder (ureters).  · A kidney blockage that is present at birth (congenital obstruction).  · Past surgery on the kidney or the ureters, such as gastric bypass surgery.  What increases the risk?  The following factors make you more likely to develop kidney stones:  · Having had a kidney stone in the past.  · Having a family history of kidney stones.  · Not drinking enough water.  · Eating a diet that is high in protein, salt (sodium), or sugar.  · Being overweight or obese.  What are the signs or symptoms?  Symptoms of a kidney stone may include:  · Nausea.  · Vomiting.  · Blood in the urine (hematuria).  · Pain in the side of the abdomen, right below the ribs (flank pain). Pain usually spreads (radiates) to the groin.  · Needing to urinate frequently or urgently.  How is this diagnosed?  This condition may be diagnosed based on:  · Your medical history.  · A physical exam.  · Blood tests.  · Urine tests.  · CT scan.  · Abdominal X-ray.  · A procedure to examine the inside of the bladder (cystoscopy).  How is this treated?  Treatment for kidney stones depends on the size, location, and makeup of the  stones. Treatment may involve:  · Analyzing your urine before and after you pass the stone through urination.  · Being monitored at the hospital until you pass the stone through urination.  · Increasing your fluid intake and decreasing the amount of calcium and protein in your diet.  · A procedure to break up kidney stones in the bladder using:  ¨ A focused beam of light (laser therapy).  ¨ Shock waves (extracorporeal shock wave lithotripsy).  · Surgery to remove kidney stones. This may be needed if you have severe pain or have stones that block your urinary tract.  Follow these instructions at home:  Eating and drinking  · Drink enough fluid to keep your urine clear or pale yellow. This will help you to pass the kidney stone.  · If directed, change your diet. This may include:  ¨ Limiting how much sodium you eat.  ¨ Eating more fruits and vegetables.  ¨ Limiting how much meat, poultry, fish, and eggs you eat.  · Follow instructions from your health care provider about eating or drinking restrictions.  General instructions  · Collect urine samples as told by your health care provider. You may need to collect a urine sample:  ¨ 24 hours after you pass the stone.  ¨ 8-12 weeks after passing the kidney stone, and every 6-12 months after that.  · Strain your urine every time you urinate, for as long as directed. Use the strainer that your health care provider recommends.  · Do not throw out the kidney stone after passing it. Keep the stone so it can be tested by your health care provider. Testing the makeup of your kidney stone may help prevent you from getting kidney stones in the future.  · Take over-the-counter and prescription medicines only as told by your health care provider.  · Keep all follow-up visits as told by your health care provider. This is important. You may need follow-up X-rays or ultrasounds to make sure that your stone has passed.  How is this prevented?  To prevent another kidney stone:  · Drink  enough fluid to keep your urine clear or pale yellow. This is the best way to prevent kidney stones.  · Eat a healthy diet and follow recommendations from your health care provider about foods to avoid. You may be instructed to eat a low-protein diet. Recommendations vary depending on the type of kidney stone that you have.  · Maintain a healthy weight.  Contact a health care provider if:  · You have pain that gets worse or does not get better with medicine.  Get help right away if:  · You have a fever or chills.  · You develop severe pain.  · You develop new abdominal pain.  · You faint.  · You are unable to urinate.  This information is not intended to replace advice given to you by your health care provider. Make sure you discuss any questions you have with your health care provider.  Document Released: 12/18/2006 Document Revised: 07/07/2017 Document Reviewed: 06/02/2017  Elsevier Interactive Patient Education © 2017 Elsevier Inc.

## 2019-09-27 NOTE — PROGRESS NOTES
Pt still under close obs. No complaints or issues. On RA. Pt resting comfortably with so signs of distress or discomfort. Will monitor.

## 2019-09-27 NOTE — DISCHARGE SUMMARY
Discharge Summary    CHIEF COMPLAINT ON ADMISSION  Chief Complaint   Patient presents with   • Abnormal Labs     Patient brought to triage via wc by family after being called by MD due to elevated BUN and creatinine, denies any difficulty urinating.        Reason for Admission  Sent by MD     Admission Date  9/24/2019    CODE STATUS  Full Code    HPI & HOSPITAL COURSE  This is a 85 y.o. female here with DM, urinary incontinence followed by Dr. Olivarez who had been feeling weak and fell.  She also complains of difficulty urinating.  She was found with UTI, RAFIA and urinary retention and selby cath was placed with 250cc out. Renal US showed mild left hydronephrosis.  CT was negative for renal stone. Dr. Olivarez with urology was consulted, she likely has obstruction related to her cystocele.  I spoke with Dr. Marjan Henley gynecology who says she can follow-up with the patient as outpatient for cystocele repair.  He was discharged with Selby catheter.  Her urine grew E. coli, resistant to Cipro and Bactrim.  She will be discharged on Omnicef to complete 14-day course.  She also had an incidental 8 mm nodule in the right lower lung base and will need follow-up CT in 6 to 12 months.  Patient and daughter was provided Selby cath teaching prior to discharge.    Therefore, she is discharged in good and stable condition to home with close outpatient follow-up.    The patient met 2-midnight criteria for an inpatient stay at the time of discharge.    Discharge Date  9/27/2019    FOLLOW UP ITEMS POST DISCHARGE  Follow-up with urology for Selby cath management  Follow-up with Dr. Marjan Henley after completed antibiotics for cystocele repair  Follow-up CT scan of chest in 6 to 12 months to follow-up lung nodule    DISCHARGE DIAGNOSES  Principal Problem (Resolved):    RAFIA (acute kidney injury) (HCC) POA: Unknown  Active Problems:    UTI (urinary tract infection) POA: Yes    Urinary retention POA: Yes    Dyslipidemia POA: Yes    Recurrent  major depressive disorder, in full remission (HCC) POA: Yes    Lung nodule POA: Yes    Type 2 diabetes mellitus without complication, without long-term current use of insulin (HCC) POA: Yes  Resolved Problems:    Hypotension POA: Unknown      FOLLOW UP  Future Appointments   Date Time Provider Department Center   10/4/2019 11:20 AM LETY Kulkarni 75[facility]     [facility] [place]  [address] [facility] [address]  [phone]    The office will contact you directly to get a follow up appointment scheduled. Please be aware of a call to get an appointment. Thank you.    Marjan Henley M.D.  [address]  [facility] [address] [phone]      Gynocology office closed for weekend. Please call them directly to schedule a follow up appointment as soon as possible. Thank you.      MEDICATIONS ON DISCHARGE     Medication List      START taking these medications      Instructions   cefdinir 300 MG Caps  Commonly known as:  OMNICEF   Take 1 Cap by mouth 2 times a day for 14 days.  Dose:  300 mg        CHANGE how you take these medications      Instructions   calcium carbonate 500 MG Tabs  What changed:    · medication strength  · how much to take  · when to take this  Commonly known as:  OS-BROOK 500   Take 1 Tab by mouth every morning with breakfast.  Dose:  500 mg     sertraline 100 MG Tabs  What changed:  Another medication with the same name was removed. Continue taking this medication, and follow the directions you see here.  Commonly known as:  ZOLOFT   Take 1.5 Tabs by mouth every day.  Dose:  150 mg        CONTINUE taking these medications      Instructions   atorvastatin 20 MG Tabs  Commonly known as:  LIPITOR   Take 1 Tab by mouth every day.  Dose:  20 mg     celecoxib 200 MG Caps  Commonly known as:  CELEBREX   Take 1 Cap by mouth every morning with breakfast.  Dose:  200 mg     metFORMIN 500 MG Tabs  Commonly known as:  GLUCOPHAGE   Take 1 Tab by mouth 2 times a day, with meals.  Dose:  500 mg      Non Formulary Request   every day. OCUVITE DROPS BOTH EYES DAILY     oxybutynin 5 MG Tabs  Commonly known as:  DITROPAN   Take 5 mg by mouth 3 times a day.  Dose:  5 mg     raNITidine 150 MG Tabs  Commonly known as:  ZANTAC   TAKE 1 TABLET BY MOUTH TWICE A DAY     * Vitamin D3 86749 units Caps   Doctor's comments:  Patient needs a office visit before meds can be refilled again.  TAKE 1 EACH BY MOUTH EVERY 7 DAYS.  Dose:  1 Each     * Vitamin D3 40793 units Caps   Doctor's comments:  Patient needs a office visit before meds can be refilled again.  TAKE 1 EACH BY MOUTH EVERY 7 DAYS.  Dose:  1 Each         * This list has 2 medication(s) that are the same as other medications prescribed for you. Read the directions carefully, and ask your doctor or other care provider to review them with you.            STOP taking these medications    nitrofurantoin monohyd macro 100 MG Caps  Commonly known as:  MACROBID     sulfamethoxazole-trimethoprim 800-160 MG tablet  Commonly known as:  BACTRIM DS            Allergies  No Known Allergies    DIET  Orders Placed This Encounter   Procedures   • Diet Order Regular     Standing Status:   Standing     Number of Occurrences:   1     Order Specific Question:   Diet:     Answer:   Regular [1]       ACTIVITY  As tolerated.  Weight bearing as tolerated    CONSULTATIONS  urology    PROCEDURES  NM-MAG-3 RENAL IMAGING   Final Result      1.  There is slightly asymmetric perfusion, decreased on the left.   2.  There is bilateral excretion and spontaneous drainage from both kidneys. There is no evidence of significant hydronephrosis or mechanical obstruction.            CT-RENAL COLIC EVALUATION(A/P W/O)   Final Result      Mild left hydronephrosis and dilatation of the left ureter with no obstructing stones identified. Left perinephric and periureteral fat stranding could indicate inflammatory changes.   No right hydronephrosis and no right ureterolithiasis.   Diverticula colon. No evidence  diverticulitis. No free fluid.   8 mm nodular opacity medial base right lower lobe.      Single lateral between 6 mm and 8 mm.   Low Risk: CT at 6-12 months, then consider CT at 18-24 months      High Risk: CT at 6-12 months, then CT at 18-24 months      Low Risk - Minimal or absent history of smoking and of other known risk factors.      High Risk - History of smoking or of other known risk factors.      Note: These recommendations do not apply to lung cancer screening, patients with immunosuppression, or patients with known primary cancer.      Fleischner Society 2017 Guidelines for Management of Incidentally Detected Pulmonary Nodules in Adults      US-RENAL   Final Result      Mild left hydronephrosis.            LABORATORY  Lab Results   Component Value Date    SODIUM 139 09/27/2019    POTASSIUM 4.1 09/27/2019    CHLORIDE 108 09/27/2019    CO2 25 09/27/2019    GLUCOSE 112 (H) 09/27/2019    BUN 18 09/27/2019    CREATININE 1.09 09/27/2019        Lab Results   Component Value Date    WBC 9.3 09/26/2019    HEMOGLOBIN 12.1 09/26/2019    HEMATOCRIT 36.7 (L) 09/26/2019    PLATELETCT 241 09/26/2019        Total time of the discharge process exceeds 32 minutes.

## 2019-09-27 NOTE — PROGRESS NOTES
Note to reader: this note follows the APSO format rather than the historical SOAP format. Assessment and plan located at the top of the note for ease of use.    Chief Complaint  85 y.o. year old female here with retention, UTI, RAFIA, hydronephrosis    Assessment/Plan  Interval History   Cystocele  Urinary retention  UTI  Left hydronephrosis  RAFIA, resolved  Leukocytosis, resolved      Antibiotics per Hospitalist  Keep selby. We will make arrangements for removal vs exchange in 3 weeks  Outpatient referral made to Dr. Henley at patient request  Nothing further at this point from  standpoint, urology signing off      Case discussed with patient, daughter and Urology-Dr. Sher HYDE  Patient seen and examined    9/27. No overnight events. E Coli in urine. NGTD in blood cultures. WBC normal. No fevers. Creat normal. Mag3 scan essentially normal    9/26. Doing well. Selby draining clear. Creat has normalized. No pains. GNR in urine. NGTD in blood cultures. On Rocephin. WBC normalized. No fevers.          Review of Systems  Physical Exam   Review of Systems   Constitutional: Negative for fever.   Gastrointestinal: Negative for abdominal pain.   Genitourinary: Negative for flank pain.     Vitals:    09/26/19 1727 09/26/19 2010 09/27/19 0000 09/27/19 0400   BP: 110/57 101/54 111/54 114/60   Pulse: 80 91 77 63   Resp: 18 18 18 18   Temp: 36.8 °C (98.2 °F) 36.6 °C (97.9 °F) 36.8 °C (98.2 °F) 36.5 °C (97.7 °F)   TempSrc: Temporal Temporal Temporal Temporal   SpO2: 96% 94% 95% 95%   Weight:  64.1 kg (141 lb 5 oz)     Height:         Physical Exam   Constitutional: She is oriented to person, place, and time and well-developed, well-nourished, and in no distress. No distress.   Pulmonary/Chest: Effort normal.   Abdominal: Soft. Bowel sounds are normal. She exhibits no distension. There is no tenderness.   Genitourinary:   Genitourinary Comments: Selby draining clear   Neurological: She is alert and oriented to person, place, and time.    Skin: Skin is warm and dry.   Psychiatric: Affect and judgment normal.   Nursing note and vitals reviewed.       Hematology Chemistry   Lab Results   Component Value Date/Time    WBC 9.3 09/26/2019 03:10 AM    HEMOGLOBIN 12.1 09/26/2019 03:10 AM    HEMATOCRIT 36.7 (L) 09/26/2019 03:10 AM    PLATELETCT 241 09/26/2019 03:10 AM     Lab Results   Component Value Date/Time    SODIUM 139 09/27/2019 04:02 AM    POTASSIUM 4.1 09/27/2019 04:02 AM    CHLORIDE 108 09/27/2019 04:02 AM    CO2 25 09/27/2019 04:02 AM    GLUCOSE 112 (H) 09/27/2019 04:02 AM    BUN 18 09/27/2019 04:02 AM    CREATININE 1.09 09/27/2019 04:02 AM         Labs not explicitly included in this progress note were reviewed by the author.   Radiology/imaging not explicitly included in this progress note was reviewed by the author.     Labs reviewed and Medications reviewed

## 2019-09-27 NOTE — PROGRESS NOTES
Patient escorted via wheelchair to car to go home with daughter. IV and tele box removed, all belongings gathered and sent home with patient. Patient a&ox4, educated pt and family on medications and where to pick them up.     Provided with selby bags and education provided on care of the selby catheter and importance of infections prevention. Daughter verbalized understands and denies an additional needs at this time.

## 2019-09-27 NOTE — PROGRESS NOTES
Received bedside report from RN, pt care assumed, VSS, pt assessment complete. Pt AAOx4, On RA, daughter at bedside. No signs of acute distress noted at this time. POC discussed with pt and verbalizes no questions. Pt denies any additional needs at this time. Bed in lowest position, bed alarm on, pt educated on fall risk and verbalized understanding, call light within reach, hourly rounding initiated.

## 2019-09-27 NOTE — PROGRESS NOTES
Note to reader: this note follows the APSO format rather than the historical SOAP format. Assessment and plan located at the top of the note for ease of use.    Chief Complaint  85 y.o. year old female here with retention, UTI, RAFIA, hydronephrosis    Assessment/Plan  Interval History   Cystocele  Urinary retention  UTI  Left hydronephrosis  RAFIA, resolved  Leukocytosis, resolved      Antibiotics per Hospitalist pending results of cultures  Continue selby drainage  Await Mag3 renal scan results  Will make outpatient referral to GYN Dr. Henley at patient request      Case discussed with patient, daughter and Urology-Dr. Sher HYDE  Patient seen and examined    9/26. Doing well. Selby draining clear. Creat has normalized. No pains. GNR in urine. NGTD in blood cultures. On Rocephin. WBC normalized. No fevers.          Review of Systems  Physical Exam   Review of Systems   Constitutional: Negative for fever.   Gastrointestinal: Negative for abdominal pain.   Genitourinary: Negative for flank pain.     Vitals:    09/26/19 0400 09/26/19 0802 09/26/19 1242 09/26/19 1727   BP: 123/67 127/65 (!) 92/53 110/57   Pulse: 71 75 72 80   Resp: 15 18 18 18   Temp: 36.4 °C (97.5 °F) 36.2 °C (97.2 °F) 36.5 °C (97.7 °F) 36.8 °C (98.2 °F)   TempSrc: Temporal Temporal Temporal Temporal   SpO2: 96% 94% 95% 96%   Weight:       Height:         Physical Exam   Constitutional: She is oriented to person, place, and time and well-developed, well-nourished, and in no distress. No distress.   Pulmonary/Chest: Effort normal.   Abdominal: Soft. Bowel sounds are normal. She exhibits no distension. There is no tenderness.   Genitourinary:   Genitourinary Comments: Selby draining clear   Neurological: She is alert and oriented to person, place, and time.   Skin: Skin is warm and dry.   Psychiatric: Affect and judgment normal.   Nursing note and vitals reviewed.       Hematology Chemistry   Lab Results   Component Value Date/Time    WBC 9.3 09/26/2019 03:10  AM    HEMOGLOBIN 12.1 09/26/2019 03:10 AM    HEMATOCRIT 36.7 (L) 09/26/2019 03:10 AM    PLATELETCT 241 09/26/2019 03:10 AM     Lab Results   Component Value Date/Time    SODIUM 141 09/26/2019 03:10 AM    POTASSIUM 4.2 09/26/2019 03:10 AM    CHLORIDE 111 09/26/2019 03:10 AM    CO2 23 09/26/2019 03:10 AM    GLUCOSE 108 (H) 09/26/2019 03:10 AM    BUN 22 09/26/2019 03:10 AM    CREATININE 1.15 09/26/2019 03:10 AM         Labs not explicitly included in this progress note were reviewed by the author.   Radiology/imaging not explicitly included in this progress note was reviewed by the author.     Labs reviewed, Medications reviewed and Radiology images reviewed

## 2019-09-28 LAB — GLUCOSE BLD-MCNC: 102 MG/DL (ref 65–99)

## 2019-09-30 ENCOUNTER — PATIENT OUTREACH (OUTPATIENT)
Dept: HEALTH INFORMATION MANAGEMENT | Facility: OTHER | Age: 84
End: 2019-09-30

## 2019-09-30 LAB
BACTERIA BLD CULT: NORMAL
BACTERIA BLD CULT: NORMAL
SIGNIFICANT IND 70042: NORMAL
SIGNIFICANT IND 70042: NORMAL
SITE SITE: NORMAL
SITE SITE: NORMAL
SOURCE SOURCE: NORMAL
SOURCE SOURCE: NORMAL

## 2019-10-04 ENCOUNTER — OFFICE VISIT (OUTPATIENT)
Dept: MEDICAL GROUP | Facility: MEDICAL CENTER | Age: 84
End: 2019-10-04
Payer: MEDICARE

## 2019-10-04 VITALS
OXYGEN SATURATION: 96 % | BODY MASS INDEX: 22.32 KG/M2 | HEART RATE: 86 BPM | HEIGHT: 63 IN | SYSTOLIC BLOOD PRESSURE: 114 MMHG | WEIGHT: 126 LBS | DIASTOLIC BLOOD PRESSURE: 72 MMHG | RESPIRATION RATE: 16 BRPM

## 2019-10-04 DIAGNOSIS — N30.00 ACUTE CYSTITIS WITHOUT HEMATURIA: ICD-10-CM

## 2019-10-04 DIAGNOSIS — E11.9 TYPE 2 DIABETES MELLITUS WITHOUT COMPLICATION, WITHOUT LONG-TERM CURRENT USE OF INSULIN (HCC): ICD-10-CM

## 2019-10-04 DIAGNOSIS — N18.30 STAGE 3 CHRONIC KIDNEY DISEASE (HCC): ICD-10-CM

## 2019-10-04 DIAGNOSIS — R33.9 URINARY RETENTION: ICD-10-CM

## 2019-10-04 DIAGNOSIS — R80.9 MICROALBUMINURIA: ICD-10-CM

## 2019-10-04 DIAGNOSIS — R91.1 LUNG NODULE: ICD-10-CM

## 2019-10-04 LAB
HBA1C MFR BLD: 6.4 % (ref 0–5.6)
INT CON NEG: NEGATIVE
INT CON POS: POSITIVE

## 2019-10-04 PROCEDURE — 99495 TRANSJ CARE MGMT MOD F2F 14D: CPT | Performed by: NURSE PRACTITIONER

## 2019-10-04 PROCEDURE — 83036 HEMOGLOBIN GLYCOSYLATED A1C: CPT | Performed by: NURSE PRACTITIONER

## 2019-10-04 RX ORDER — NITROFURANTOIN MACROCRYSTALS 50 MG/1
50 CAPSULE ORAL 4 TIMES DAILY
Status: SHIPPED | DISCHARGE
Start: 2019-10-04 | End: 2020-06-15

## 2019-10-04 RX ORDER — TRIMETHOPRIM 100 MG/1
100 TABLET ORAL 2 TIMES DAILY
COMMUNITY
End: 2019-10-04 | Stop reason: SDUPTHER

## 2019-10-04 RX ORDER — NITROFURANTOIN MACROCRYSTALS 50 MG/1
50 CAPSULE ORAL 4 TIMES DAILY
COMMUNITY
End: 2019-10-04

## 2019-10-04 RX ORDER — TRIMETHOPRIM 100 MG/1
100 TABLET ORAL DAILY
Status: SHIPPED | DISCHARGE
Start: 2019-10-04 | End: 2020-06-15

## 2019-10-04 ASSESSMENT — ENCOUNTER SYMPTOMS: MEMORY LOSS: 1

## 2019-10-04 ASSESSMENT — PATIENT HEALTH QUESTIONNAIRE - PHQ9
SUM OF ALL RESPONSES TO PHQ QUESTIONS 1-9: 2
CLINICAL INTERPRETATION OF PHQ2 SCORE: 1
5. POOR APPETITE OR OVEREATING: 0 - NOT AT ALL

## 2019-10-04 NOTE — PROGRESS NOTES
Subjective:      Herminia Jones is a 85 y.o. female who presents with Hospital Follow-up    POST DISCHARGE CALL:  Discharge Date:9/27/2019   Date of Outreach Call: 9/30/2019  9:09 AM  Now that you're home, how are you doing? Good  Comment:Dtr answered telephone. Reports pt doing okay.  Reports she does have a selby catheter. Dtr will contact Dr. Olivarez for follow-up appt.  Do you have questions about your medications? No    Did you fill your medications? Yes    Do you have a follow-up appointment scheduled?Yes  Comment:PCP 10/4/19    Discharging Department: Telemetry 7    Number of Attempts: 1  Current or previous attempts completed within two business days of discharge? Yes  Provided education regarding treatment plan, medication, self-management, ADLs? Yes  Has patient completed Advance Directive? If yes, advise them to bring to appointment. No  Care Manager phone number provided? Yes  Is there anything else I can help you with? No      CC: Patient brought in today by her daughter/caregiver for hospital follow-up with urinary retention and acute kidney injury.  I have not seen patient in 1 year.    HPI       1. Acute cystitis without hematuria  Patient with history of recurring UTIs secondary to cystocele despite prior surgery.  She was seen in urgent care and then sent to the emergency room on 9/24/2019 due to GFR dropping down into the 20s.  CT showed no evidence of renal stone and it was felt her obstruction causing her UTI was secondary to her cystocele.  She was discharged home on Selby catheter and her daughter states this was removed this week at urology and she is finishing up her antibiotics in the hospital and has to preventative antibiotics to be on afterwards.  She appears to be urinating without difficulty and did see her GYN we discussed possible cystocele repair again.    2. Urinary retention  Patient had Selby catheter in because of her urinary retention but it was removed at urology and she  was advised to follow back with them if she did not urinate adequately which her daughter thinks she is currently doing.  There is talk of surgery such as another cystocele or possibly other options.    3. Microalbuminuria  Patient has history of microalbuminuria and recent decreased GFR secondary to diabetes.    4. Type 2 diabetes mellitus without complication, without long-term current use of insulin (HCC)  I have not seen patient for her diabetes in 1 year and lab work ordered was not completed.  Luckily, her hemoglobin A1c comes back today good at 6.4 and she is only on 500 mg of metformin once a day.  She was advised in the past not to go on an ACE or ARB due to hypotension that occurs afterwards.  Blood pressure has been on the low end of normal.    5. Lung nodule  An incidental finding of a pulmonary nodule and it was recommended she have repeat imaging in 6 to 12 months.  She has no complaints of cough or shortness of breath and weight has been stable.    6. Stage 3 chronic kidney disease (HCC)  GFR has traditionally been running in the 40s range but dropped down into the 20s with her acute kidney injury and by discharge was back up to 48.  She is not on NSAIDs.  Past Medical History:   Diagnosis Date   • Arthritis    • Bowel habit changes    • Dental disorder     dentures   • Diabetes (HCC)     oral medication   • Glaucoma    • Gynecological disorder    • Heart burn    • Macular degeneration    • Psychiatric problem     depression   • Snoring    • Urinary bladder disorder    • Urinary incontinence      Social History     Socioeconomic History   • Marital status:      Spouse name: Not on file   • Number of children: Not on file   • Years of education: Not on file   • Highest education level: Not on file   Occupational History   • Not on file   Social Needs   • Financial resource strain: Not on file   • Food insecurity:     Worry: Not on file     Inability: Not on file   • Transportation needs:      Medical: Not on file     Non-medical: Not on file   Tobacco Use   • Smoking status: Never Smoker   • Smokeless tobacco: Never Used   Substance and Sexual Activity   • Alcohol use: No   • Drug use: No   • Sexual activity: Not Currently   Lifestyle   • Physical activity:     Days per week: Not on file     Minutes per session: Not on file   • Stress: Not on file   Relationships   • Social connections:     Talks on phone: Not on file     Gets together: Not on file     Attends Scientologist service: Not on file     Active member of club or organization: Not on file     Attends meetings of clubs or organizations: Not on file     Relationship status: Not on file   • Intimate partner violence:     Fear of current or ex partner: Not on file     Emotionally abused: Not on file     Physically abused: Not on file     Forced sexual activity: Not on file   Other Topics Concern   •  Service Not Asked   • Blood Transfusions Not Asked   • Caffeine Concern Not Asked   • Occupational Exposure Not Asked   • Hobby Hazards Not Asked   • Sleep Concern Not Asked   • Stress Concern Not Asked   • Weight Concern Not Asked   • Special Diet Not Asked   • Back Care Not Asked   • Exercise Not Asked   • Bike Helmet Not Asked   • Seat Belt Not Asked   • Self-Exams Not Asked   Social History Narrative   • Not on file     Current Outpatient Medications   Medication Sig Dispense Refill   • trimethoprim (TRIMPEX) 100 MG Tab Take 1 Tab by mouth every day.     • nitrofurantoin (MACRODANTIN) 50 MG Cap Take 1 Cap by mouth 4 times a day.     • metFORMIN (GLUCOPHAGE) 500 MG Tab Take 1 Tab by mouth every day.     • cefdinir (OMNICEF) 300 MG Cap Take 1 Cap by mouth 2 times a day for 14 days. 20 Cap 0   • calcium carbonate (OS-BROOK 500) 500 MG Tab Take 1 Tab by mouth every morning with breakfast. 30 Tab 0   • oxybutynin (DITROPAN) 5 MG Tab Take 5 mg by mouth 3 times a day.     • Cholecalciferol (VITAMIN D3) 51972 units Cap TAKE 1 EACH BY MOUTH EVERY 7 DAYS.  "4 Cap 0   • Cholecalciferol (VITAMIN D3) 14342 units Cap TAKE 1 EACH BY MOUTH EVERY 7 DAYS. 4 Cap 0   • atorvastatin (LIPITOR) 20 MG Tab Take 1 Tab by mouth every day. 100 Tab 3   • raNITidine (ZANTAC) 150 MG Tab TAKE 1 TABLET BY MOUTH TWICE A  Tab 3   • sertraline (ZOLOFT) 100 MG Tab Take 1.5 Tabs by mouth every day. 60 Tab 11   • Non Formulary Request every day. OCUVITE DROPS BOTH EYES DAILY       No current facility-administered medications for this visit.      Family History   Problem Relation Age of Onset   • Stroke Father    • Cancer Sister    • Cancer Brother    • Heart Disease Sister    • Cancer Brother          Review of Systems   Psychiatric/Behavioral: Positive for memory loss.   All other systems reviewed and are negative.         Objective:     /72 (BP Location: Left arm, Patient Position: Sitting, BP Cuff Size: Adult)   Pulse 86   Resp 16   Ht 1.6 m (5' 3\")   Wt 57.2 kg (126 lb)   SpO2 96%   BMI 22.32 kg/m²      Physical Exam   Constitutional: She is oriented to person, place, and time. She appears well-developed and well-nourished. No distress.   HENT:   Head: Normocephalic and atraumatic.   Right Ear: External ear normal.   Left Ear: External ear normal.   Nose: Nose normal.   Eyes: Right eye exhibits no discharge. Left eye exhibits no discharge.   Neck: Normal range of motion. Neck supple. No thyromegaly present.   Cardiovascular: Normal rate, regular rhythm and normal heart sounds. Exam reveals no gallop and no friction rub.   No murmur heard.  Pulmonary/Chest: Effort normal and breath sounds normal. She has no wheezes. She has no rales.   Musculoskeletal: She exhibits no edema or tenderness.   Neurological: She is alert and oriented to person, place, and time. She displays normal reflexes.   Skin: Skin is warm and dry. No rash noted. She is not diaphoretic.   Psychiatric: She has a normal mood and affect. Her behavior is normal. Judgment and thought content normal.   Patient " able to answer some questions but most of information is provided by her daughter.   Nursing note and vitals reviewed.              Assessment/Plan:     1. Acute cystitis without hematuria  Patient is finishing her course of Omnicef from the hospital but has listed both trimethoprim and nitrofurantoin daily.  I advised her daughter to contact urology to see if they want her on both of these daily or if there was an error with prescribing.  She is not currently complaining of weakness or increased confusion on her antibiotics and her daughter knows to bring her back to the ER should she start retaining fluids again.  - trimethoprim (TRIMPEX) 100 MG Tab; Take 1 Tab by mouth every day.  - nitrofurantoin (MACRODANTIN) 50 MG Cap; Take 1 Cap by mouth 4 times a day.    2. Urinary retention  Patient's catheter was removed at urology and her daughter is monitoring her output and will bring her back to the ER or to urology if she needs new catheter.    We discussed the risks with repeat surgery such as cystocele repair but daughter states she understands the risks and even if it is high risk, she would like her mother to go forward with this.  - Comp Metabolic Panel; Future    3. Microalbuminuria  History of microalbuminuria and patient will need follow-up lab work at next visit.    4. Type 2 diabetes mellitus without complication, without long-term current use of insulin (HCC)  Patient has not been to the office in over a year for her diabetes and other issues and her daughter states this is because she has been doing so well at home.  I advised her that we should see patient at least every 3 to 6 months.  I discussed with her daughter feels she is able to care for her mother adequately at home and she states she does.  Spoke with her about possibility of assisted living or skilled nursing in the future.  - POCT  A1C  - metFORMIN (GLUCOPHAGE) 500 MG Tab; Take 1 Tab by mouth every day.    5. Lung nodule  I advised that in 6  to 12 months we should do repeat CT.    6. Stage 3 chronic kidney disease (HCC)  GFR has returned to the upper 40s range and her blood pressure is controlled.  Her diabetes also appears well controlled now.  I will keep her on her low-dose metformin although I explained that if her GFR drops below 40, we will probably need to switch her to something such as Tradjenta.

## 2019-10-08 DIAGNOSIS — E11.9 TYPE 2 DIABETES MELLITUS WITHOUT COMPLICATION, WITHOUT LONG-TERM CURRENT USE OF INSULIN (HCC): ICD-10-CM

## 2019-10-08 DIAGNOSIS — E78.5 DYSLIPIDEMIA: ICD-10-CM

## 2019-10-08 RX ORDER — ATORVASTATIN CALCIUM 20 MG/1
TABLET, FILM COATED ORAL
Qty: 100 TAB | Refills: 2 | Status: SHIPPED | OUTPATIENT
Start: 2019-10-08 | End: 2020-04-08

## 2019-10-09 DIAGNOSIS — F33.41 RECURRENT MAJOR DEPRESSIVE DISORDER, IN PARTIAL REMISSION (HCC): ICD-10-CM

## 2019-10-09 RX ORDER — RANITIDINE 150 MG/1
TABLET ORAL
Qty: 200 TAB | Refills: 3 | Status: SHIPPED | OUTPATIENT
Start: 2019-10-09 | End: 2019-12-19

## 2019-10-09 RX ORDER — SERTRALINE HYDROCHLORIDE 100 MG/1
TABLET, FILM COATED ORAL
Qty: 100 TAB | Refills: 3 | Status: SHIPPED | OUTPATIENT
Start: 2019-10-09 | End: 2020-01-13 | Stop reason: SDUPTHER

## 2019-10-09 RX ORDER — BLOOD-GLUCOSE METER
EACH MISCELLANEOUS
Qty: 600 EACH | Refills: 2 | Status: SHIPPED | OUTPATIENT
Start: 2019-10-09 | End: 2020-07-16

## 2019-10-14 DIAGNOSIS — E55.9 VITAMIN D DEFICIENCY: ICD-10-CM

## 2019-10-14 RX ORDER — CHOLECALCIFEROL (VITAMIN D3) 1250 MCG
1 CAPSULE ORAL
Qty: 4 CAP | Refills: 5 | Status: SHIPPED | OUTPATIENT
Start: 2019-10-14 | End: 2020-01-13

## 2019-10-27 ENCOUNTER — PATIENT OUTREACH (OUTPATIENT)
Dept: HEALTH INFORMATION MANAGEMENT | Facility: OTHER | Age: 84
End: 2019-10-27

## 2019-11-05 NOTE — PROGRESS NOTES
An 85-year-old female was an emergent admission to [facility] from 9/24/2019 to 9/27/2019 to treat urinary tract infection. [facility] visited the patient bedside. The patient was discharged home.     The patient was ordered to start/continue to take the following medications: Sertraline Hydrochloride (Zoloft), Calcium Carbonate (Tums) , and Cefdinir (Omnicef). The patient successfully filled all medications.     Per discharge orders, patient was instructed to see Urology and Ob/Gyn. Patient saw her urologist on 10/2/19 and will return on 11/19/19. Patient saw her Ob/Gyn on 10/2/19, and her PCP on 10/4/19.    [facility] patient advocate was able to successfully engage with patient's daughter, Veronica, post-discharge and throughout the case. Veronica is patient's caregiver and handles all of the patient's health care needs. Advocate assisted Veronica in switching to [facility], due the barriers patient was facing with her old pharmacy.      PPS score 80%.

## 2019-11-18 ENCOUNTER — HOSPITAL ENCOUNTER (OUTPATIENT)
Dept: LAB | Facility: MEDICAL CENTER | Age: 84
End: 2019-11-18
Attending: NURSE PRACTITIONER
Payer: MEDICARE

## 2019-11-18 DIAGNOSIS — R33.9 URINARY RETENTION: ICD-10-CM

## 2019-11-18 DIAGNOSIS — E11.9 TYPE 2 DIABETES MELLITUS WITHOUT COMPLICATION, WITHOUT LONG-TERM CURRENT USE OF INSULIN (HCC): ICD-10-CM

## 2019-11-18 LAB
ALBUMIN SERPL BCP-MCNC: 3.9 G/DL (ref 3.2–4.9)
ALBUMIN/GLOB SERPL: 1.1 G/DL
ALP SERPL-CCNC: 51 U/L (ref 30–99)
ALT SERPL-CCNC: 15 U/L (ref 2–50)
ANION GAP SERPL CALC-SCNC: 11 MMOL/L (ref 0–11.9)
AST SERPL-CCNC: 14 U/L (ref 12–45)
BILIRUB SERPL-MCNC: 0.4 MG/DL (ref 0.1–1.5)
BUN SERPL-MCNC: 25 MG/DL (ref 8–22)
CALCIUM SERPL-MCNC: 9.6 MG/DL (ref 8.5–10.5)
CHLORIDE SERPL-SCNC: 107 MMOL/L (ref 96–112)
CHOLEST SERPL-MCNC: 148 MG/DL (ref 100–199)
CO2 SERPL-SCNC: 23 MMOL/L (ref 20–33)
CREAT SERPL-MCNC: 1.49 MG/DL (ref 0.5–1.4)
CREAT UR-MCNC: 51.4 MG/DL
EST. AVERAGE GLUCOSE BLD GHB EST-MCNC: 134 MG/DL
FASTING STATUS PATIENT QL REPORTED: NORMAL
GLOBULIN SER CALC-MCNC: 3.7 G/DL (ref 1.9–3.5)
GLUCOSE SERPL-MCNC: 106 MG/DL (ref 65–99)
HBA1C MFR BLD: 6.3 % (ref 0–5.6)
HDLC SERPL-MCNC: 41 MG/DL
LDLC SERPL CALC-MCNC: 83 MG/DL
MICROALBUMIN UR-MCNC: 7 MG/DL
MICROALBUMIN/CREAT UR: 136 MG/G (ref 0–30)
POTASSIUM SERPL-SCNC: 4 MMOL/L (ref 3.6–5.5)
PROT SERPL-MCNC: 7.6 G/DL (ref 6–8.2)
SODIUM SERPL-SCNC: 141 MMOL/L (ref 135–145)
TRIGL SERPL-MCNC: 121 MG/DL (ref 0–149)

## 2019-11-18 PROCEDURE — 83036 HEMOGLOBIN GLYCOSYLATED A1C: CPT

## 2019-11-18 PROCEDURE — 80061 LIPID PANEL: CPT

## 2019-11-18 PROCEDURE — 36415 COLL VENOUS BLD VENIPUNCTURE: CPT

## 2019-11-18 PROCEDURE — 80053 COMPREHEN METABOLIC PANEL: CPT

## 2019-11-18 PROCEDURE — 82570 ASSAY OF URINE CREATININE: CPT

## 2019-11-18 PROCEDURE — 82043 UR ALBUMIN QUANTITATIVE: CPT

## 2019-12-02 ENCOUNTER — ANESTHESIA EVENT (OUTPATIENT)
Dept: SURGERY | Facility: MEDICAL CENTER | Age: 84
End: 2019-12-02
Payer: MEDICARE

## 2019-12-02 DIAGNOSIS — Z01.810 PRE-OPERATIVE CARDIOVASCULAR EXAMINATION: ICD-10-CM

## 2019-12-02 LAB — EKG IMPRESSION: NORMAL

## 2019-12-02 PROCEDURE — 93010 ELECTROCARDIOGRAM REPORT: CPT | Performed by: INTERNAL MEDICINE

## 2019-12-02 PROCEDURE — 93005 ELECTROCARDIOGRAM TRACING: CPT

## 2019-12-03 ENCOUNTER — HOSPITAL ENCOUNTER (OUTPATIENT)
Facility: MEDICAL CENTER | Age: 84
End: 2019-12-03
Attending: OBSTETRICS & GYNECOLOGY | Admitting: OBSTETRICS & GYNECOLOGY
Payer: MEDICARE

## 2019-12-03 ENCOUNTER — ANESTHESIA (OUTPATIENT)
Dept: SURGERY | Facility: MEDICAL CENTER | Age: 84
End: 2019-12-03
Payer: MEDICARE

## 2019-12-03 VITALS
HEART RATE: 86 BPM | BODY MASS INDEX: 19.1 KG/M2 | RESPIRATION RATE: 16 BRPM | OXYGEN SATURATION: 97 % | WEIGHT: 114.64 LBS | SYSTOLIC BLOOD PRESSURE: 92 MMHG | TEMPERATURE: 98.5 F | HEIGHT: 65 IN | DIASTOLIC BLOOD PRESSURE: 56 MMHG

## 2019-12-03 DIAGNOSIS — G89.18 POSTOPERATIVE PAIN: ICD-10-CM

## 2019-12-03 LAB — GLUCOSE BLD-MCNC: 96 MG/DL (ref 65–99)

## 2019-12-03 PROCEDURE — 160039 HCHG SURGERY MINUTES - EA ADDL 1 MIN LEVEL 3: Performed by: OBSTETRICS & GYNECOLOGY

## 2019-12-03 PROCEDURE — 160028 HCHG SURGERY MINUTES - 1ST 30 MINS LEVEL 3: Performed by: OBSTETRICS & GYNECOLOGY

## 2019-12-03 PROCEDURE — 700101 HCHG RX REV CODE 250: Performed by: OBSTETRICS & GYNECOLOGY

## 2019-12-03 PROCEDURE — 160035 HCHG PACU - 1ST 60 MINS PHASE I: Performed by: OBSTETRICS & GYNECOLOGY

## 2019-12-03 PROCEDURE — 160048 HCHG OR STATISTICAL LEVEL 1-5: Performed by: OBSTETRICS & GYNECOLOGY

## 2019-12-03 PROCEDURE — 160002 HCHG RECOVERY MINUTES (STAT): Performed by: OBSTETRICS & GYNECOLOGY

## 2019-12-03 PROCEDURE — A6402 STERILE GAUZE <= 16 SQ IN: HCPCS | Performed by: OBSTETRICS & GYNECOLOGY

## 2019-12-03 PROCEDURE — 160047 HCHG PACU  - EA ADDL 30 MINS PHASE II: Performed by: OBSTETRICS & GYNECOLOGY

## 2019-12-03 PROCEDURE — 87186 SC STD MICRODIL/AGAR DIL: CPT

## 2019-12-03 PROCEDURE — 700105 HCHG RX REV CODE 258: Performed by: OBSTETRICS & GYNECOLOGY

## 2019-12-03 PROCEDURE — 87086 URINE CULTURE/COLONY COUNT: CPT

## 2019-12-03 PROCEDURE — 500892 HCHG PACK, PERI-GYN: Performed by: OBSTETRICS & GYNECOLOGY

## 2019-12-03 PROCEDURE — 82962 GLUCOSE BLOOD TEST: CPT

## 2019-12-03 PROCEDURE — 501838 HCHG SUTURE GENERAL: Performed by: OBSTETRICS & GYNECOLOGY

## 2019-12-03 PROCEDURE — 160046 HCHG PACU - 1ST 60 MINS PHASE II: Performed by: OBSTETRICS & GYNECOLOGY

## 2019-12-03 PROCEDURE — 160025 RECOVERY II MINUTES (STATS): Performed by: OBSTETRICS & GYNECOLOGY

## 2019-12-03 PROCEDURE — 160009 HCHG ANES TIME/MIN: Performed by: OBSTETRICS & GYNECOLOGY

## 2019-12-03 PROCEDURE — 700111 HCHG RX REV CODE 636 W/ 250 OVERRIDE (IP): Performed by: ANESTHESIOLOGY

## 2019-12-03 PROCEDURE — 87077 CULTURE AEROBIC IDENTIFY: CPT

## 2019-12-03 PROCEDURE — A4338 INDWELLING CATHETER LATEX: HCPCS | Performed by: OBSTETRICS & GYNECOLOGY

## 2019-12-03 PROCEDURE — 160036 HCHG PACU - EA ADDL 30 MINS PHASE I: Performed by: OBSTETRICS & GYNECOLOGY

## 2019-12-03 PROCEDURE — 700101 HCHG RX REV CODE 250: Performed by: ANESTHESIOLOGY

## 2019-12-03 PROCEDURE — 500901 HCHG PACKING, VAG 2 X-RAY: Performed by: OBSTETRICS & GYNECOLOGY

## 2019-12-03 RX ORDER — SODIUM CHLORIDE, SODIUM LACTATE, POTASSIUM CHLORIDE, CALCIUM CHLORIDE 600; 310; 30; 20 MG/100ML; MG/100ML; MG/100ML; MG/100ML
INJECTION, SOLUTION INTRAVENOUS CONTINUOUS
Status: DISCONTINUED | OUTPATIENT
Start: 2019-12-03 | End: 2019-12-03 | Stop reason: HOSPADM

## 2019-12-03 RX ORDER — MORPHINE SULFATE 2 MG/ML
INJECTION, SOLUTION INTRAMUSCULAR; INTRAVENOUS
Status: COMPLETED
Start: 2019-12-03 | End: 2019-12-03

## 2019-12-03 RX ORDER — PROMETHAZINE HYDROCHLORIDE 25 MG/1
25 SUPPOSITORY RECTAL EVERY 4 HOURS PRN
Status: DISCONTINUED | OUTPATIENT
Start: 2019-12-03 | End: 2019-12-03 | Stop reason: HOSPADM

## 2019-12-03 RX ORDER — SIMETHICONE 80 MG
80 TABLET,CHEWABLE ORAL EVERY 8 HOURS PRN
Status: DISCONTINUED | OUTPATIENT
Start: 2019-12-03 | End: 2019-12-03 | Stop reason: HOSPADM

## 2019-12-03 RX ORDER — BUPIVACAINE HYDROCHLORIDE AND EPINEPHRINE 5; 5 MG/ML; UG/ML
INJECTION, SOLUTION EPIDURAL; INTRACAUDAL; PERINEURAL
Status: DISCONTINUED
Start: 2019-12-03 | End: 2019-12-03 | Stop reason: HOSPADM

## 2019-12-03 RX ORDER — MAGNESIUM SULFATE HEPTAHYDRATE 40 MG/ML
INJECTION, SOLUTION INTRAVENOUS PRN
Status: DISCONTINUED | OUTPATIENT
Start: 2019-12-03 | End: 2019-12-03 | Stop reason: SURG

## 2019-12-03 RX ORDER — ATROPA BELLADONNA AND OPIUM 16.2; 6 MG/1; MG/1
60 SUPPOSITORY RECTAL EVERY 8 HOURS PRN
Status: DISCONTINUED | OUTPATIENT
Start: 2019-12-03 | End: 2019-12-03 | Stop reason: HOSPADM

## 2019-12-03 RX ORDER — MORPHINE SULFATE 2 MG/ML
INJECTION, SOLUTION INTRAMUSCULAR; INTRAVENOUS PRN
Status: DISCONTINUED | OUTPATIENT
Start: 2019-12-03 | End: 2019-12-03 | Stop reason: SURG

## 2019-12-03 RX ORDER — LIDOCAINE HYDROCHLORIDE 20 MG/ML
INJECTION, SOLUTION EPIDURAL; INFILTRATION; INTRACAUDAL; PERINEURAL PRN
Status: DISCONTINUED | OUTPATIENT
Start: 2019-12-03 | End: 2019-12-03 | Stop reason: SURG

## 2019-12-03 RX ORDER — OXYCODONE HYDROCHLORIDE AND ACETAMINOPHEN 5; 325 MG/1; MG/1
1 TABLET ORAL EVERY 4 HOURS PRN
Status: DISCONTINUED | OUTPATIENT
Start: 2019-12-03 | End: 2019-12-03 | Stop reason: HOSPADM

## 2019-12-03 RX ORDER — DEXAMETHASONE SODIUM PHOSPHATE 4 MG/ML
INJECTION, SOLUTION INTRA-ARTICULAR; INTRALESIONAL; INTRAMUSCULAR; INTRAVENOUS; SOFT TISSUE PRN
Status: DISCONTINUED | OUTPATIENT
Start: 2019-12-03 | End: 2019-12-03 | Stop reason: SURG

## 2019-12-03 RX ORDER — IBUPROFEN 400 MG/1
800 TABLET ORAL 3 TIMES DAILY PRN
Status: DISCONTINUED | OUTPATIENT
Start: 2019-12-03 | End: 2019-12-03 | Stop reason: HOSPADM

## 2019-12-03 RX ORDER — MIDAZOLAM HYDROCHLORIDE 1 MG/ML
1 INJECTION INTRAMUSCULAR; INTRAVENOUS
Status: DISCONTINUED | OUTPATIENT
Start: 2019-12-03 | End: 2019-12-03 | Stop reason: HOSPADM

## 2019-12-03 RX ORDER — CEFAZOLIN SODIUM 1 G/3ML
INJECTION, POWDER, FOR SOLUTION INTRAMUSCULAR; INTRAVENOUS PRN
Status: DISCONTINUED | OUTPATIENT
Start: 2019-12-03 | End: 2019-12-03 | Stop reason: SURG

## 2019-12-03 RX ORDER — ONDANSETRON 2 MG/ML
INJECTION INTRAMUSCULAR; INTRAVENOUS PRN
Status: DISCONTINUED | OUTPATIENT
Start: 2019-12-03 | End: 2019-12-03 | Stop reason: SURG

## 2019-12-03 RX ORDER — OXYCODONE HYDROCHLORIDE AND ACETAMINOPHEN 5; 325 MG/1; MG/1
1 TABLET ORAL EVERY 8 HOURS PRN
Qty: 15 TAB | Refills: 0 | Status: SHIPPED | OUTPATIENT
Start: 2019-12-03 | End: 2019-12-08

## 2019-12-03 RX ORDER — HALOPERIDOL 5 MG/ML
1 INJECTION INTRAMUSCULAR
Status: DISCONTINUED | OUTPATIENT
Start: 2019-12-03 | End: 2019-12-03 | Stop reason: HOSPADM

## 2019-12-03 RX ORDER — IBUPROFEN 800 MG/1
800 TABLET ORAL EVERY 8 HOURS PRN
Qty: 30 TAB | Refills: 1 | Status: SHIPPED | OUTPATIENT
Start: 2019-12-03 | End: 2020-01-13

## 2019-12-03 RX ORDER — BUPIVACAINE HYDROCHLORIDE AND EPINEPHRINE 5; 5 MG/ML; UG/ML
INJECTION, SOLUTION EPIDURAL; INTRACAUDAL; PERINEURAL
Status: DISCONTINUED | OUTPATIENT
Start: 2019-12-03 | End: 2019-12-03 | Stop reason: HOSPADM

## 2019-12-03 RX ADMIN — ONDANSETRON 4 MG: 2 INJECTION INTRAMUSCULAR; INTRAVENOUS at 16:24

## 2019-12-03 RX ADMIN — PROPOFOL 80 MG: 10 INJECTION, EMULSION INTRAVENOUS at 16:24

## 2019-12-03 RX ADMIN — LIDOCAINE HYDROCHLORIDE 2 ML: 20 INJECTION, SOLUTION EPIDURAL; INFILTRATION; INTRACAUDAL at 16:24

## 2019-12-03 RX ADMIN — DEXAMETHASONE SODIUM PHOSPHATE 4 MG: 4 INJECTION, SOLUTION INTRA-ARTICULAR; INTRALESIONAL; INTRAMUSCULAR; INTRAVENOUS; SOFT TISSUE at 16:24

## 2019-12-03 RX ADMIN — PROPOFOL 40 MG: 10 INJECTION, EMULSION INTRAVENOUS at 16:48

## 2019-12-03 RX ADMIN — SODIUM CHLORIDE, POTASSIUM CHLORIDE, SODIUM LACTATE AND CALCIUM CHLORIDE 1000 ML: 600; 310; 30; 20 INJECTION, SOLUTION INTRAVENOUS at 13:50

## 2019-12-03 RX ADMIN — EPHEDRINE SULFATE 5 MG: 50 INJECTION, SOLUTION INTRAVENOUS at 17:13

## 2019-12-03 RX ADMIN — EPHEDRINE SULFATE 5 MG: 50 INJECTION, SOLUTION INTRAVENOUS at 17:02

## 2019-12-03 RX ADMIN — LIDOCAINE HYDROCHLORIDE 1 ML: 20 INJECTION, SOLUTION EPIDURAL; INFILTRATION; INTRACAUDAL at 16:48

## 2019-12-03 RX ADMIN — MAGNESIUM SULFATE IN WATER 4 G: 40 INJECTION, SOLUTION INTRAVENOUS at 16:28

## 2019-12-03 RX ADMIN — MORPHINE SULFATE 2 MG: 2 INJECTION, SOLUTION INTRAMUSCULAR; INTRAVENOUS at 17:38

## 2019-12-03 RX ADMIN — CEFAZOLIN 1.5 G: 330 INJECTION, POWDER, FOR SOLUTION INTRAMUSCULAR; INTRAVENOUS at 16:24

## 2019-12-03 ASSESSMENT — PAIN SCALES - GENERAL: PAIN_LEVEL: 1

## 2019-12-03 NOTE — ANESTHESIA PREPROCEDURE EVALUATION
Cystocele/rectocele    Relevant Problems      (+) Stage 3 chronic kidney disease (HCC)      ENDO   (+) Type 2 diabetes mellitus without complication, without long-term current use of insulin (HCC)       Physical Exam    Airway   Mallampati: II  TM distance: >3 FB  Neck ROM: full       Cardiovascular - normal exam  Rhythm: regular  Rate: normal  (-) murmur     Dental - normal exam  (+) upper dentures, lower dentures         Pulmonary - normal exam  Breath sounds clear to auscultation     Abdominal    Neurological - normal exam                 Anesthesia Plan    ASA 2       Plan - general       Airway plan will be LMA        Induction: intravenous      Pertinent diagnostic labs and testing reviewed    Informed Consent:    Anesthetic plan and risks discussed with patient.

## 2019-12-03 NOTE — H&P
History and Physical      Herminia Jones is a 85 y.o. female presents with a large cystocele and 2nd degree rectocele wit perineal relaxation who has urinary retention secondary to her prolapsed bladder who desires surgical therapy. It was felt by her urologist that repairing the cystocele would help with the urinary retention.  Risks, benefits and alternative therapies have been discussed and patient still desires surgical therapy. Questions answered.    Review of systems:  Pertinent positives documented in HPI and a comprehensive review of system is negative as follows:    Constitutional ROS: No unexpected change in weight, No weakness, No fatigue, No unexplained fevers, sweats, or chills  Mouth/Throat ROS: No bleeding gums, No sore throat, No recent change in voice or hoarseness  Neck ROS: No lumps or masses, No swollen glands, No significant pain in neck  Pulmonary ROS: No chronic cough, sputum, or hemoptysis, No dyspnea on exertion, No wheezing, No shortness of breath, No recent change in breathing  Cardiovascular ROS: No exercise intolerance, No chest pain, No shortness of breath, No palpitations, No syncope  Genitourinary ROS: Negative for dysuria, frequency and incontinence  Gastrointestinal ROS: No abdominal pain, No change in bowel habits, No significant change in appetite, No nausea, vomiting, diarrhea, or constipation, No hematemesis, No abdominal bloating or early satiety  Breast ROS: No new breast lumps or masses, No severe breast pain, No nipple discharge  Musculoskeletal/Extremities ROS: No cyanosis, No peripheral edema, No pain, redness or swelling on the joints  Hematologic/Lymphatic ROS: No anemia, No abnormal bleeding, No chills, No bruising, No weight loss  Skin/Integumentary ROS: No evidence of bleeding or bruising, No abnormal skin lesions noted, No evidence of rash, No itching  Neurologic ROS: No chronic headaches, No seizures, No weakness  Psychiatric ROS: No depression, No anxiety, No  psychosis    Past Medical History:   Diagnosis Date   • Arthritis     hands    • Dental disorder     dentures   • Diabetes (HCC)     oral medication   • Glaucoma     both eyes    • Gynecological disorder    • Heart burn    • Macular degeneration    • Psychiatric problem     depression   • Snoring    • Urinary bladder disorder    • Urinary incontinence      Past Surgical History:   Procedure Laterality Date   • BLADDER SLING FEMALE N/A 4/20/2018    Procedure: BLADDER SLING FEMALE- TOT;  Surgeon: Espinoza Bryan M.D.;  Location: SURGERY SAME DAY [facility];  Service: Gynecology   • ANTERIOR REPAIR N/A 4/20/2018    Procedure: ANTERIOR REPAIR- COLPHORRHAPHY;  Surgeon: Espinoza Bryan M.D.;  Location: SURGERY SAME DAY [facility];  Service: Gynecology   • PELVISCOPY N/A 4/20/2018    Procedure: PELVISCOPY- FOR SACRAL SPINOUS FIXATION;  Surgeon: Espinoza Bryan M.D.;  Location: SURGERY SAME DAY [facility];  Service: Gynecology   • HIP CANNULATED SCREW Left 12/1/2017    Procedure: HIP CANNULATED SCREW;  Surgeon: Abhinav Askew M.D.;  Location: SURGERY [facility];  Service: Orthopedics   • LEFORT COLPOCLESIS  6/2/2017    Procedure: LEFORT COLPOCLESIS, perineorrhaphy, posterior colporraphy;  Surgeon: Espinoza Bryan M.D.;  Location: SURGERY SAME DAY [facility];  Service:    • CYSTOSCOPY  6/2/2017    Procedure: CYSTOSCOPY;  Surgeon: Espinoza Bryan M.D.;  Location: SURGERY SAME DAY [facility];  Service:    • ABDOMINAL HYSTERECTOMY TOTAL       Family History   Problem Relation Age of Onset   • Stroke Father    • Cancer Sister    • Cancer Brother    • Heart Disease Sister    • Cancer Brother      OB History   No obstetric history on file.     Social History     Socioeconomic History   • Marital status:      Spouse name: Not on file   • Number of children: Not on file   • Years of education: Not on file   • Highest education level: Not on file   Occupational History   • Not on file   Social  Needs   • Financial resource strain: Not on file   • Food insecurity:     Worry: Not on file     Inability: Not on file   • Transportation needs:     Medical: Not on file     Non-medical: Not on file   Tobacco Use   • Smoking status: Never Smoker   • Smokeless tobacco: Never Used   Substance and Sexual Activity   • Alcohol use: No   • Drug use: No   • Sexual activity: Not Currently   Lifestyle   • Physical activity:     Days per week: Not on file     Minutes per session: Not on file   • Stress: Not on file   Relationships   • Social connections:     Talks on phone: Not on file     Gets together: Not on file     Attends Scientologist service: Not on file     Active member of club or organization: Not on file     Attends meetings of clubs or organizations: Not on file     Relationship status: Not on file   • Intimate partner violence:     Fear of current or ex partner: Not on file     Emotionally abused: Not on file     Physically abused: Not on file     Forced sexual activity: Not on file   Other Topics Concern   •  Service Not Asked   • Blood Transfusions Not Asked   • Caffeine Concern Not Asked   • Occupational Exposure Not Asked   • Hobby Hazards Not Asked   • Sleep Concern Not Asked   • Stress Concern Not Asked   • Weight Concern Not Asked   • Special Diet Not Asked   • Back Care Not Asked   • Exercise Not Asked   • Bike Helmet Not Asked   • Seat Belt Not Asked   • Self-Exams Not Asked   Social History Narrative   • Not on file     Allergies: Sulfamethoxazole    Current Facility-Administered Medications:   •  lactated ringers infusion, , Intravenous, Continuous, Marjan Henley M.D., Last Rate: 10 mL/hr at 12/03/19 1350, 1,000 mL at 12/03/19 1350    Pt with following problems:  Patient Active Problem List    Diagnosis Date Noted   • UTI (urinary tract infection) 09/25/2019     Priority: High   • Urinary retention 09/25/2019     Priority: High   • Type 2 diabetes mellitus without complication, without long-term  "current use of insulin ([facility]) 12/01/2017     Priority: Low   • Stage 3 chronic kidney disease ([facility]) 10/04/2019   • Lung nodule 09/25/2019   • Microalbuminuria 10/02/2018   • Osteopenia 07/24/2018   • Recurrent major depressive disorder, in full remission ([facility]) 12/08/2017   • Vitamin D deficiency 12/08/2017   • Risk for falls 12/08/2017   • Uterine prolapse without mention of vaginal wall prolapse 06/02/2017   • Dyslipidemia 05/16/2017         Objective:      BP (!) 94/48   Pulse 95   Temp 37 °C (98.6 °F) (Temporal)   Resp 18   Ht 1.651 m (5' 5\")   Wt 52 kg (114 lb 10.2 oz)   SpO2 96%     General:   no acute distress, alert, cooperative, appears older than stated age   Skin:   normal   HEENT:  PERRLA, EOMI, Sclera clear, anicteric, Neck supple with midline trachea and Thyroid without masses   Lungs:   CTA bilateral   Heart:   RRR   Abdomen:   BS positive, ND, soft, NT x 4   EXT:  NT, soft     Lab Review  Lab:    Recent Results (from the past 2016 hour(s))   POCT Urinalysis    Collection Time: 09/24/19 10:20 AM   Result Value Ref Range    POC Color Dark Yellow Negative    POC Appearance Cloudy Negative    POC Leukocyte Esterase Large Negative    POC Nitrites Positive Negative    POC Urobiligen 0.2 Negative (0.2) mg/dL    POC Protein 100 Negative mg/dL    POC Urine PH 6.0 5.0 - 8.0    POC Blood Moderate Negative    POC Specific Gravity 1.020 <1.005 - >1.030    POC Ketones Negative Negative mg/dL    POC Bilirubin Negative Negative mg/dL    POC Glucose Negative Negative mg/dL   URINE CULTURE(NEW)    Collection Time: 09/24/19 11:31 AM   Result Value Ref Range    Significant Indicator POS (POS)     Source UR     Site -     Culture Result - (A)     Culture Result Escherichia coli  >100,000 cfu/mL   (A)        Susceptibility    Escherichia coli - MARC     Ampicillin >16 Resistant mcg/mL     Ceftriaxone <=1 Sensitive mcg/mL     Ceftazidime <=1 Sensitive mcg/mL     Cefotaxime <=2 Sensitive mcg/mL     Cefazolin 16 " Intermediate mcg/mL     Ciprofloxacin >2 Resistant mcg/mL     Ampicillin/sulbactam >16/8 Resistant mcg/mL     Cefepime <=2 Sensitive mcg/mL     Tobramycin <=4 Sensitive mcg/mL     Cefotetan <=16 Sensitive mcg/mL     Nitrofurantoin >64 Resistant mcg/mL     Gentamicin <=4 Sensitive mcg/mL     Levofloxacin >4 Resistant mcg/mL     Pip/Tazobactam <=16 Sensitive mcg/mL     Trimeth/Sulfa >2/38 Resistant mcg/mL   Comp Metabolic Panel    Collection Time: 09/24/19 12:16 PM   Result Value Ref Range    Sodium 135 135 - 145 mmol/L    Potassium 4.1 3.6 - 5.5 mmol/L    Chloride 99 96 - 112 mmol/L    Co2 24 20 - 33 mmol/L    Anion Gap 12.0 (H) 0.0 - 11.9    Glucose 108 (H) 65 - 99 mg/dL    Bun 34 (H) 8 - 22 mg/dL    Creatinine 1.83 (H) 0.50 - 1.40 mg/dL    Calcium 10.0 8.5 - 10.5 mg/dL    AST(SGOT) 15 12 - 45 U/L    ALT(SGPT) 15 2 - 50 U/L    Alkaline Phosphatase 50 30 - 99 U/L    Total Bilirubin 0.4 0.1 - 1.5 mg/dL    Albumin 3.9 3.2 - 4.9 g/dL    Total Protein 7.7 6.0 - 8.2 g/dL    Globulin 3.8 (H) 1.9 - 3.5 g/dL    A-G Ratio 1.0 g/dL   CBC WITH DIFFERENTIAL    Collection Time: 09/24/19 12:16 PM   Result Value Ref Range    WBC 11.6 (H) 4.8 - 10.8 K/uL    RBC 5.24 4.20 - 5.40 M/uL    Hemoglobin 14.1 12.0 - 16.0 g/dL    Hematocrit 45.3 37.0 - 47.0 %    MCV 86.5 81.4 - 97.8 fL    MCH 26.9 (L) 27.0 - 33.0 pg    MCHC 31.1 (L) 33.6 - 35.0 g/dL    RDW 47.7 35.9 - 50.0 fL    Platelet Count 347 164 - 446 K/uL    MPV 9.6 9.0 - 12.9 fL    Neutrophils-Polys 67.80 44.00 - 72.00 %    Lymphocytes 20.10 (L) 22.00 - 41.00 %    Monocytes 7.90 0.00 - 13.40 %    Eosinophils 3.00 0.00 - 6.90 %    Basophils 0.30 0.00 - 1.80 %    Immature Granulocytes 0.90 0.00 - 0.90 %    Nucleated RBC 0.00 /100 WBC    Neutrophils (Absolute) 7.85 (H) 2.00 - 7.15 K/uL    Lymphs (Absolute) 2.33 1.00 - 4.80 K/uL    Monos (Absolute) 0.91 (H) 0.00 - 0.85 K/uL    Eos (Absolute) 0.35 0.00 - 0.51 K/uL    Baso (Absolute) 0.04 0.00 - 0.12 K/uL    Immature Granulocytes (abs)  0.11 0.00 - 0.11 K/uL    NRBC (Absolute) 0.00 K/uL   ESTIMATED GFR    Collection Time: 09/24/19 12:16 PM   Result Value Ref Range    GFR If  32 (A) >60 mL/min/1.73 m 2    GFR If Non African American 26 (A) >60 mL/min/1.73 m 2   CBC with Differential    Collection Time: 09/25/19 12:20 AM   Result Value Ref Range    WBC 11.9 (H) 4.8 - 10.8 K/uL    RBC 4.93 4.20 - 5.40 M/uL    Hemoglobin 13.5 12.0 - 16.0 g/dL    Hematocrit 41.8 37.0 - 47.0 %    MCV 84.8 81.4 - 97.8 fL    MCH 27.4 27.0 - 33.0 pg    MCHC 32.3 (L) 33.6 - 35.0 g/dL    RDW 45.5 35.9 - 50.0 fL    Platelet Count 289 164 - 446 K/uL    MPV 9.2 9.0 - 12.9 fL    Neutrophils-Polys 75.10 (H) 44.00 - 72.00 %    Lymphocytes 14.10 (L) 22.00 - 41.00 %    Monocytes 5.70 0.00 - 13.40 %    Eosinophils 4.00 0.00 - 6.90 %    Basophils 0.30 0.00 - 1.80 %    Immature Granulocytes 0.80 0.00 - 0.90 %    Nucleated RBC 0.00 /100 WBC    Neutrophils (Absolute) 8.89 (H) 2.00 - 7.15 K/uL    Lymphs (Absolute) 1.67 1.00 - 4.80 K/uL    Monos (Absolute) 0.68 0.00 - 0.85 K/uL    Eos (Absolute) 0.47 0.00 - 0.51 K/uL    Baso (Absolute) 0.04 0.00 - 0.12 K/uL    Immature Granulocytes (abs) 0.10 0.00 - 0.11 K/uL    NRBC (Absolute) 0.00 K/uL   Comp Metabolic Panel (CMP)    Collection Time: 09/25/19 12:20 AM   Result Value Ref Range    Sodium 134 (L) 135 - 145 mmol/L    Potassium 3.9 3.6 - 5.5 mmol/L    Chloride 101 96 - 112 mmol/L    Co2 22 20 - 33 mmol/L    Anion Gap 11.0 0.0 - 11.9    Glucose 121 (H) 65 - 99 mg/dL    Bun 39 (H) 8 - 22 mg/dL    Creatinine 1.86 (H) 0.50 - 1.40 mg/dL    Calcium 9.7 8.5 - 10.5 mg/dL    AST(SGOT) 19 12 - 45 U/L    ALT(SGPT) 13 2 - 50 U/L    Alkaline Phosphatase 45 30 - 99 U/L    Total Bilirubin 0.3 0.1 - 1.5 mg/dL    Albumin 3.6 3.2 - 4.9 g/dL    Total Protein 7.0 6.0 - 8.2 g/dL    Globulin 3.4 1.9 - 3.5 g/dL    A-G Ratio 1.1 g/dL   URINALYSIS    Collection Time: 09/25/19 12:20 AM   Result Value Ref Range    Color Yellow     Character Hazy (A)      Specific Gravity 1.020 <1.035    Ph 6.0 5.0 - 8.0    Glucose Negative Negative mg/dL    Ketones Negative Negative mg/dL    Protein 30 (A) Negative mg/dL    Bilirubin Negative Negative    Urobilinogen, Urine 0.2 Negative    Nitrite Positive (A) Negative    Leukocyte Esterase Large (A) Negative    Occult Blood Large (A) Negative    Micro Urine Req Microscopic    PROCALCITONIN    Collection Time: 09/25/19 12:20 AM   Result Value Ref Range    Procalcitonin 0.16 <0.25 ng/mL   Lactic Acid -STAT Once    Collection Time: 09/25/19 12:20 AM   Result Value Ref Range    Lactic Acid 1.1 0.5 - 2.0 mmol/L   Blood Culture    Collection Time: 09/25/19 12:20 AM   Result Value Ref Range    Significant Indicator NEG     Source BLD     Site PERIPHERAL     Culture Result No growth after 5 days of incubation.    Prothrombin Time    Collection Time: 09/25/19 12:20 AM   Result Value Ref Range    PT 14.9 (H) 12.0 - 14.6 sec    INR 1.14 (H) 0.87 - 1.13   URINE MICROSCOPIC (W/UA)    Collection Time: 09/25/19 12:20 AM   Result Value Ref Range    WBC Packed (A) /hpf    RBC 2-5 (A) /hpf    Bacteria Many (A) None /hpf    Epithelial Cells Negative /hpf   ESTIMATED GFR    Collection Time: 09/25/19 12:20 AM   Result Value Ref Range    GFR If  31 (A) >60 mL/min/1.73 m 2    GFR If Non African American 26 (A) >60 mL/min/1.73 m 2   Blood Culture    Collection Time: 09/25/19 12:35 AM   Result Value Ref Range    Significant Indicator NEG     Source BLD     Site PERIPHERAL     Culture Result No growth after 5 days of incubation.    ACCU-CHEK GLUCOSE    Collection Time: 09/25/19  1:39 AM   Result Value Ref Range    Glucose - Accu-Ck 104 (H) 65 - 99 mg/dL   URINE SODIUM RANDOM    Collection Time: 09/25/19  3:04 AM   Result Value Ref Range    Sodium, Urine -per volume 46 mmol/L   URINE POTASSIUM RANDOM    Collection Time: 09/25/19  3:04 AM   Result Value Ref Range    Potassium 8.1 mmol/L   URINE CHLORIDE RANDOM    Collection Time: 09/25/19   3:04 AM   Result Value Ref Range    Chloride, Urine-per volume 47 mmol/L   URINE CREATININE RANDOM    Collection Time: 09/25/19  3:04 AM   Result Value Ref Range    Creatinine, Random Urine 16.10 mg/dL   URINE PROTEIN RANDOM    Collection Time: 09/25/19  3:04 AM   Result Value Ref Range    Total Protein, Urine 13.0 0.0 - 15.0 mg/dL   Lactic Acid Every four hours after STAT order    Collection Time: 09/25/19  4:31 AM   Result Value Ref Range    Lactic Acid 0.8 0.5 - 2.0 mmol/L   ACCU-CHEK GLUCOSE    Collection Time: 09/25/19  6:32 AM   Result Value Ref Range    Glucose - Accu-Ck 107 (H) 65 - 99 mg/dL   Lactic Acid Every four hours after STAT order    Collection Time: 09/25/19  8:08 AM   Result Value Ref Range    Lactic Acid 0.7 0.5 - 2.0 mmol/L   ACCU-CHEK GLUCOSE    Collection Time: 09/25/19  1:02 PM   Result Value Ref Range    Glucose - Accu-Ck 77 65 - 99 mg/dL   ACCU-CHEK GLUCOSE    Collection Time: 09/25/19  5:04 PM   Result Value Ref Range    Glucose - Accu-Ck 90 65 - 99 mg/dL   ACCU-CHEK GLUCOSE    Collection Time: 09/26/19 12:34 AM   Result Value Ref Range    Glucose - Accu-Ck 103 (H) 65 - 99 mg/dL   CBC WITH DIFFERENTIAL    Collection Time: 09/26/19  3:10 AM   Result Value Ref Range    WBC 9.3 4.8 - 10.8 K/uL    RBC 4.29 4.20 - 5.40 M/uL    Hemoglobin 12.1 12.0 - 16.0 g/dL    Hematocrit 36.7 (L) 37.0 - 47.0 %    MCV 85.5 81.4 - 97.8 fL    MCH 28.2 27.0 - 33.0 pg    MCHC 33.0 (L) 33.6 - 35.0 g/dL    RDW 46.9 35.9 - 50.0 fL    Platelet Count 241 164 - 446 K/uL    MPV 9.2 9.0 - 12.9 fL    Neutrophils-Polys 64.50 44.00 - 72.00 %    Lymphocytes 20.90 (L) 22.00 - 41.00 %    Monocytes 7.80 0.00 - 13.40 %    Eosinophils 5.50 0.00 - 6.90 %    Basophils 0.40 0.00 - 1.80 %    Immature Granulocytes 0.90 0.00 - 0.90 %    Nucleated RBC 0.00 /100 WBC    Neutrophils (Absolute) 5.99 2.00 - 7.15 K/uL    Lymphs (Absolute) 1.94 1.00 - 4.80 K/uL    Monos (Absolute) 0.72 0.00 - 0.85 K/uL    Eos (Absolute) 0.51 0.00 - 0.51 K/uL     Baso (Absolute) 0.04 0.00 - 0.12 K/uL    Immature Granulocytes (abs) 0.08 0.00 - 0.11 K/uL    NRBC (Absolute) 0.00 K/uL   Basic Metabolic Panel    Collection Time: 09/26/19  3:10 AM   Result Value Ref Range    Sodium 141 135 - 145 mmol/L    Potassium 4.2 3.6 - 5.5 mmol/L    Chloride 111 96 - 112 mmol/L    Co2 23 20 - 33 mmol/L    Glucose 108 (H) 65 - 99 mg/dL    Bun 22 8 - 22 mg/dL    Creatinine 1.15 0.50 - 1.40 mg/dL    Calcium 8.5 8.5 - 10.5 mg/dL    Anion Gap 7.0 0.0 - 11.9   ESTIMATED GFR    Collection Time: 09/26/19  3:10 AM   Result Value Ref Range    GFR If  54 (A) >60 mL/min/1.73 m 2    GFR If Non African American 45 (A) >60 mL/min/1.73 m 2   ACCU-CHEK GLUCOSE    Collection Time: 09/26/19  8:13 AM   Result Value Ref Range    Glucose - Accu-Ck 124 (H) 65 - 99 mg/dL   ACCU-CHEK GLUCOSE    Collection Time: 09/26/19 12:11 PM   Result Value Ref Range    Glucose - Accu-Ck 102 (H) 65 - 99 mg/dL   ACCU-CHEK GLUCOSE    Collection Time: 09/26/19  5:11 PM   Result Value Ref Range    Glucose - Accu-Ck 111 (H) 65 - 99 mg/dL   ACCU-CHEK GLUCOSE    Collection Time: 09/26/19 10:33 PM   Result Value Ref Range    Glucose - Accu-Ck 151 (H) 65 - 99 mg/dL   Basic Metabolic Panel    Collection Time: 09/27/19  4:02 AM   Result Value Ref Range    Sodium 139 135 - 145 mmol/L    Potassium 4.1 3.6 - 5.5 mmol/L    Chloride 108 96 - 112 mmol/L    Co2 25 20 - 33 mmol/L    Glucose 112 (H) 65 - 99 mg/dL    Bun 18 8 - 22 mg/dL    Creatinine 1.09 0.50 - 1.40 mg/dL    Calcium 8.9 8.5 - 10.5 mg/dL    Anion Gap 6.0 0.0 - 11.9   ESTIMATED GFR    Collection Time: 09/27/19  4:02 AM   Result Value Ref Range    GFR If  58 (A) >60 mL/min/1.73 m 2    GFR If Non  48 (A) >60 mL/min/1.73 m 2   ACCU-CHEK GLUCOSE    Collection Time: 09/27/19  6:01 AM   Result Value Ref Range    Glucose - Accu-Ck 111 (H) 65 - 99 mg/dL   ACCU-CHEK GLUCOSE    Collection Time: 09/27/19 11:56 AM   Result Value Ref Range    Glucose  - Accu-Ck 103 (H) 65 - 99 mg/dL   POCT  A1C    Collection Time: 10/04/19 11:39 AM   Result Value Ref Range    Glycohemoglobin 6.4 (A) 0.0 - 5.6 %    Internal Control Negative Negative     Internal Control Positive Positive    MICROALBUMIN CREAT RATIO URINE    Collection Time: 11/18/19  8:09 AM   Result Value Ref Range    Creatinine, Urine 51.40 mg/dL    Microalbumin, Urine Random 7.0 mg/dL    Micro Alb Creat Ratio 136 (H) 0 - 30 mg/g   HEMOGLOBIN A1C    Collection Time: 11/18/19  8:10 AM   Result Value Ref Range    Glycohemoglobin 6.3 (H) 0.0 - 5.6 %    Est Avg Glucose 134 mg/dL   Lipid Profile    Collection Time: 11/18/19  8:10 AM   Result Value Ref Range    Cholesterol,Tot 148 100 - 199 mg/dL    Triglycerides 121 0 - 149 mg/dL    HDL 41 >=40 mg/dL    LDL 83 <100 mg/dL   Comp Metabolic Panel    Collection Time: 11/18/19  8:10 AM   Result Value Ref Range    Sodium 141 135 - 145 mmol/L    Potassium 4.0 3.6 - 5.5 mmol/L    Chloride 107 96 - 112 mmol/L    Co2 23 20 - 33 mmol/L    Anion Gap 11.0 0.0 - 11.9    Glucose 106 (H) 65 - 99 mg/dL    Bun 25 (H) 8 - 22 mg/dL    Creatinine 1.49 (H) 0.50 - 1.40 mg/dL    Calcium 9.6 8.5 - 10.5 mg/dL    AST(SGOT) 14 12 - 45 U/L    ALT(SGPT) 15 2 - 50 U/L    Alkaline Phosphatase 51 30 - 99 U/L    Total Bilirubin 0.4 0.1 - 1.5 mg/dL    Albumin 3.9 3.2 - 4.9 g/dL    Total Protein 7.6 6.0 - 8.2 g/dL    Globulin 3.7 (H) 1.9 - 3.5 g/dL    A-G Ratio 1.1 g/dL   FASTING STATUS    Collection Time: 11/18/19  8:10 AM   Result Value Ref Range    Fasting Status Fasting    ESTIMATED GFR    Collection Time: 11/18/19  8:10 AM   Result Value Ref Range    GFR If  40 (A) >60 mL/min/1.73 m 2    GFR If Non  33 (A) >60 mL/min/1.73 m 2   ECG    Collection Time: 12/02/19 11:30 AM   Result Value Ref Range    Report       [facility]    Test Date:  2019-12-02  Pt Name:    JAS DOMINGO             Department: [facility]  MRN:        [id number]                       Room:  Gender:     Female                       Technician: [facility]  :        [id number]                   Requested By:SCOTT DE LA FUENTE  Order #:    [id number]                    Reading MD: Julio Cesar Dent MD    Measurements  Intervals                                Axis  Rate:       87                           P:          63  PA:         164                          QRS:        74  QRSD:       124                          T:          44  QT:         392  QTc:        472    Interpretive Statements  SINUS RHYTHM  RIGHT BUNDLE BRANCH BLOCK  Compared to ECG 2018 12:51:16  No significant changes  Electronically Signed On 2019 14:52:58 PST by Julio Cesar Dent MD     ACCU-CHEK GLUCOSE    Collection Time: 19  1:47 PM   Result Value Ref Range    Glucose - Accu-Ck 96 65 - 99 mg/dL          Assessment:   1. Cystocele  2. Rectocele  3. Perineal Relaxation   Plan:   Pt is scheduled for a A&P Repair with cystoscopy    Risks and benefits of the surgery are discussed at this time. Specific risks are infection, bleeding, damage to bowel, bladder or ureters, need for laparotomy and anesthesia risks. Questions answered.

## 2019-12-04 NOTE — OP REPORT
PreOp Diagnosis:   1. Cystocele  2. Rectocele  3. Urinary Retention     PostOp Diagnosis:   1. Cystocele  2. Rectocele  3. Urinary Retention        Procedure(s):  COLPORRHAPHY, COMBINED ANTEROPOSTERIOR - Wound Class: Clean Contaminated     Surgeon(s):  Marjan Henley M.D.     Assistant:  Agnes Sen M.D.     Anesthesiologist/Type of Anesthesia:  Anesthesiologist: Reynold Diego M.D./General     Surgical Staff:  Circulator: Talya Cody R.N.; Nidia Woodard R.N.  Scrub Person: Rafal Galloway     Specimens removed if any:  None     Estimated Blood Loss: less than 5 cc     UOP: 250 cc of very cloudy urine. Sent for UA with C&S     Findings: 3rd degree cystocele with 2nd degree rectocele. Copious amount of cloudy urine. Perineal relaxation.     Complications: None    PROCEDURE IN DETAIL: After informed consent was obtained, the patient was   taken to the operating room where general endotracheal anesthesia was   administered without difficulty. She was then prepped and draped in the usual   sterile fashion in the lithotomy position with Morgan stirrups. A formal time   out was called prior to the procedure and the preoperative antibiotics were   given. Examination under anesthesia was performed and a Mckoy catheter was   placed under sterile technique.    Hydrodissection with approximately 5 cc of lidocaine with epi was infiltrated in the   anterior vagina submucosal layer. A midline incision was made through the vaginal   mucosa beginning at the apex and extending to within 1cm of the external urethral   meatus. The vaginal mucosa was sharply dissected laterally off the pubovesical   cervical fascia. The paraurethral and paravescial fascia was mobilized by blunt and   sharp dissection using a single layer of gauze sponge.    The anterior repair is started by placing 0 synthetic absorbable mattress sutures   in the pubovesical cervical (PVC) fascia, starting at  1 cm below the urethral  meatus.   Four mattress sutures were placed and then progressively tied. The vaginal muscularis   was then approximated with  0 synthetic absorbable mattress sutures. 0.5 cm redundant   vaginal mucosa was trimmed. The vaginal mucosa was reapproximated with a running   2-0 vicryl suture. Hemostasis was noted.    The posterior repair and perineoplasty was then started with infiltrating the posterior   vaginal submucosal layer with lidocaine and epinephrine solution. A transverse   incision is made at the posterior fourchette dissecting the posterior mucosa off   the perirectal fascia. An additional incision is made in the perineal body with removal   of the triangular skin exposing the bulbocavernosus muscle.The perirectal fascia is   dissected off the posterior vaginal mucosa laterally.  A finger is placed over the rectocele, pushing it into the rectum, thus revealing the margins of the levator ani muscles. A heavy, number 1 synthetic absorbable suture is passed through the margin of the levator ani from the apex down to the posterior fourchette. After all levator ani sutures have been placed, they are progressively tied.The perirectal fascia is closed with interrupted 0 synthetic absorbable suture in the midline. Interrupted 0 synthetic absorbable suture are placed in the insertion of the bulbocavernosus muscle to reconstruct the perineal body. The posterior vaginal mucosa and subcutaneous tissue of the perineum is reapproximated with 2-0 synthetic absorbable running stitch. The skin is reapproximated with 2-0 synthetic absorbable subcuticular stitch. Hemostasis is noted.    Sponge, needle, instrument and lap counts are correct x 2.    Pt tolerated procedure well and went to recovery room in stable condition.

## 2019-12-04 NOTE — OR SURGEON
Immediate Post OP Note    PreOp Diagnosis:   1. Cystocele  2. Rectocele  3. Urinary Retention    PostOp Diagnosis:   1. Cystocele  2. Rectocele  3. Urinary Retention      Procedure(s):  COLPORRHAPHY, COMBINED ANTEROPOSTERIOR - Wound Class: Clean Contaminated    Surgeon(s):  Marjan Henley M.D.    Assistant:  Agnes Sen M.D.    Anesthesiologist/Type of Anesthesia:  Anesthesiologist: Reynold Diego M.D./General    Surgical Staff:  Circulator: Talya Cody R.N.; Nidia Woodard R.N.  Scrub Person: Rafal Galloway    Specimens removed if any:  None    Estimated Blood Loss: less than 5 cc    UOP: 250 cc of very cloudy urine. Sent for UA with C&S    Findings: 3rd degree cystocele with 2nd degree rectocele. Copious amount of cloudy urine. Perineal relaxation.    Complications: None        12/3/2019 5:36 PM Marjan Henley M.D.

## 2019-12-04 NOTE — ANESTHESIA TIME REPORT
Anesthesia Start and Stop Event Times     Date Time Event    12/3/2019 1547 Ready for Procedure     1616 Anesthesia Start     1739 Anesthesia Stop        Responsible Staff  12/03/19    Name Role Begin End    Reynold Diego M.D. Anesth 1616 1739        Preop Diagnosis (Free Text):  Pre-op Diagnosis     CYSTOCELE        Preop Diagnosis (Codes):  Diagnosis Information     Diagnosis Code(s): Cystocele [N81.1]        Post op Diagnosis  Cystocele with rectocele      Premium Reason  A. 3PM - 7AM    Comments:

## 2019-12-04 NOTE — OR NURSING
1815 assumed care of pt report revd from Helene Snow pt is resting comfortable. bp running low pt baseline in preop 94/48 Dr White aware of low bp   1830 family to bedside   1930 family back to bedside  Pain controlled feeling sleepy still states no need to void yet doesn't want to get up to try yet qualifies for stage 2 transferred     2015 feels ready to get up to bathroom to void up to bathroom and able to successfully void qs amounts bleeding scant to sheri pad dressed in bathroom and placed into a recliner chair   2035 bp improved and back to pt baseline ready to dc to home meets all dc criteria assisted to wheelchair and escorted out to car with Rn with all belongings accomp with adult daughter

## 2019-12-04 NOTE — ANESTHESIA PROCEDURE NOTES
Airway  Date/Time: 12/3/2019 4:26 PM  Performed by: Reynold Diego M.D.  Authorized by: Reynold Diego M.D.     Location:  OR  Urgency:  Elective  Indications for Airway Management:  Anesthesia  Spontaneous Ventilation: absent    Sedation Level:  Deep  Preoxygenated: Yes    Mask Difficulty Assessment:  0 - not attempted  Final Airway Type:  Supraglottic airway  Final Supraglottic Airway:  Standard LMA  SGA Size:  3  Number of Attempts at Approach:  1

## 2019-12-04 NOTE — ANESTHESIA POSTPROCEDURE EVALUATION
Patient: Herminia Jones    Procedure Summary     Date:  12/03/19 Room / Location:  [facility] ROOM 28 / SURGERY SAME DAY [facility]    Anesthesia Start:  1616 Anesthesia Stop:  1739    Procedure:  COLPORRHAPHY, COMBINED ANTEROPOSTERIOR (Vagina ) Diagnosis:       Cystocele      (CYSTOCELE)    Surgeon:  Marjan Henley M.D. Responsible Provider:  Reynold Diego M.D.    Anesthesia Type:  general ASA Status:  2          Final Anesthesia Type: general  Last vitals  BP   Blood Pressure : (!) 94/48    Temp   36.9 °C (98.5 °F)    Pulse   Pulse: 95   Resp   18    SpO2   96 %      Anesthesia Post Evaluation    Patient location during evaluation: PACU  Patient participation: complete - patient participated  Level of consciousness: awake and alert  Pain score: 1    Airway patency: patent  Anesthetic complications: no  Cardiovascular status: hemodynamically stable  Respiratory status: acceptable  Hydration status: euvolemic    PONV: none           Nurse Pain Score: 0 (NPRS)

## 2019-12-04 NOTE — OR NURSING
1731 received from the OR, report from Dr Diego, s/p A/P repair, asleep, no OA, breathing unlabored & clear,abd soft no vag bleeding  1750 awake, tolerating water, denies pain,  1815 reported off to Linsey CONTRERAS RN

## 2019-12-04 NOTE — DISCHARGE INSTRUCTIONS
ACTIVITY: Rest and take it easy for the first 24 hours.  A responsible adult is recommended to remain with you during that time.  It is normal to feel sleepy.  We encourage you to not do anything that requires balance, judgment or coordination.    MILD FLU-LIKE SYMPTOMS ARE NORMAL. YOU MAY EXPERIENCE GENERALIZED MUSCLE ACHES, THROAT IRRITATION, HEADACHE AND/OR SOME NAUSEA.    FOR 24 HOURS DO NOT:  Drive, operate machinery or run household appliances.  Drink beer or alcoholic beverages.   Make important decisions or sign legal documents.    SPECIAL INSTRUCTIONS: *Pelvic rest x 6 weeks   F/U in 2 weeks   Call office for any problems**    DIET: To avoid nausea, slowly advance diet as tolerated, avoiding spicy or greasy foods for the first day.  Add more substantial food to your diet according to your physician's instructions.  Babies can be fed formula or breast milk as soon as they are hungry.  INCREASE FLUIDS AND FIBER TO AVOID CONSTIPATION.    SURGICAL DRESSING/BATHING: *may shower but no baths, hot tub, swimming x 2 weeks**    FOLLOW-UP APPOINTMENT:  A follow-up appointment should be arranged with your doctor in *call for f/u**; call to schedule.    You should CALL YOUR PHYSICIAN if you develop:  Fever greater than 101 degrees F.  Pain not relieved by medication, or persistent nausea or vomiting.  Excessive bleeding (blood soaking through dressing) or unexpected drainage from the wound.  Extreme redness or swelling around the incision site, drainage of pus or foul smelling drainage.  Inability to urinate or empty your bladder within 8 hours.  Problems with breathing or chest pain.    You should call 911 if you develop problems with breathing or chest pain.  If you are unable to contact your doctor or surgical center, you should go to the nearest emergency room or urgent care center.  Physician's telephone #: **  Dr Henley [phone]*    If any questions arise, call your doctor.  If your doctor is not available, please  feel free to call the Surgical Center at [phone].  The Center is open Monday through Friday from 7AM to 7PM.  You can also call the HEALTH HOTLINE open 24 hours/day, 7 days/week and speak to a nurse at [phone], or toll free at [phone].    A registered nurse may call you a few days after your surgery to see how you are doing after your procedure.    MEDICATIONS: Resume taking daily medication.  Take prescribed pain medication with food.  If no medication is prescribed, you may take non-aspirin pain medication if needed.  PAIN MEDICATION CAN BE VERY CONSTIPATING.  Take a stool softener or laxative such as senokot, pericolace, or milk of magnesia if needed.    Prescription given for *motrin & percocet**.  Last pain medication given at *none **.    If your physician has prescribed pain medication that includes Acetaminophen (Tylenol), do not take additional Acetaminophen (Tylenol) while taking the prescribed medication.    Depression / Suicide Risk    As you are discharged from this [facility] facility, it is important to learn how to keep safe from harming yourself.    Recognize the warning signs:  · Abrupt changes in personality, positive or negative- including increase in energy   · Giving away possessions  · Change in eating patterns- significant weight changes-  positive or negative  · Change in sleeping patterns- unable to sleep or sleeping all the time   · Unwillingness or inability to communicate  · Depression  · Unusual sadness, discouragement and loneliness  · Talk of wanting to die  · Neglect of personal appearance   · Rebelliousness- reckless behavior  · Withdrawal from people/activities they love  · Confusion- inability to concentrate     If you or a loved one observes any of these behaviors or has concerns about self-harm, here's what you can do:  · Talk about it- your feelings and reasons for harming yourself  · Remove any means that you might use to hurt yourself (examples: pills,  rope, extension cords, firearm)  · Get professional help from the community (Mental Health, Substance Abuse, psychological counseling)  · Do not be alone:Call your Safe Contact- someone whom you trust who will be there for you.  · Call your local CRISIS HOTLINE 360-3331 or 512-302-6711  · Call your local Children's Mobile Crisis Response Team [place] (867) 523-2792 or www.[employer]  · Call the toll free National Suicide Prevention Hotlines   · National Suicide Prevention Lifeline 890-257-NORF (5374)  National Hope Line Network 800-SUICIDE (370-9380)

## 2019-12-19 RX ORDER — RANITIDINE 150 MG/1
TABLET ORAL
Qty: 180 TAB | Refills: 3 | Status: SHIPPED | OUTPATIENT
Start: 2019-12-19 | End: 2020-01-13

## 2020-01-13 ENCOUNTER — OFFICE VISIT (OUTPATIENT)
Dept: MEDICAL GROUP | Facility: MEDICAL CENTER | Age: 85
End: 2020-01-13
Payer: MEDICARE

## 2020-01-13 VITALS
HEIGHT: 63 IN | HEART RATE: 81 BPM | DIASTOLIC BLOOD PRESSURE: 72 MMHG | SYSTOLIC BLOOD PRESSURE: 110 MMHG | BODY MASS INDEX: 21.44 KG/M2 | OXYGEN SATURATION: 95 % | WEIGHT: 121 LBS | RESPIRATION RATE: 16 BRPM

## 2020-01-13 DIAGNOSIS — F02.80 DEMENTIA ASSOCIATED WITH OTHER UNDERLYING DISEASE WITHOUT BEHAVIORAL DISTURBANCE (HCC): ICD-10-CM

## 2020-01-13 DIAGNOSIS — Z23 NEED FOR PNEUMOCOCCAL VACCINATION: ICD-10-CM

## 2020-01-13 DIAGNOSIS — H35.3233 EXUDATIVE AGE-RELATED MACULAR DEGENERATION OF BOTH EYES WITH INACTIVE SCAR (HCC): ICD-10-CM

## 2020-01-13 DIAGNOSIS — I70.0 ATHEROSCLEROSIS OF AORTA (HCC): ICD-10-CM

## 2020-01-13 DIAGNOSIS — N18.30 STAGE 3 CHRONIC KIDNEY DISEASE: ICD-10-CM

## 2020-01-13 DIAGNOSIS — E11.9 TYPE 2 DIABETES MELLITUS WITHOUT COMPLICATION, WITHOUT LONG-TERM CURRENT USE OF INSULIN (HCC): ICD-10-CM

## 2020-01-13 DIAGNOSIS — K21.9 GASTROESOPHAGEAL REFLUX DISEASE WITHOUT ESOPHAGITIS: ICD-10-CM

## 2020-01-13 DIAGNOSIS — F33.42 RECURRENT MAJOR DEPRESSIVE DISORDER, IN FULL REMISSION (HCC): ICD-10-CM

## 2020-01-13 PROBLEM — N39.0 UTI (URINARY TRACT INFECTION): Status: RESOLVED | Noted: 2019-09-25 | Resolved: 2020-01-13

## 2020-01-13 PROCEDURE — 90732 PPSV23 VACC 2 YRS+ SUBQ/IM: CPT | Performed by: NURSE PRACTITIONER

## 2020-01-13 PROCEDURE — 99214 OFFICE O/P EST MOD 30 MIN: CPT | Mod: 25 | Performed by: NURSE PRACTITIONER

## 2020-01-13 PROCEDURE — 8041 PR SCP AHA: Performed by: NURSE PRACTITIONER

## 2020-01-13 PROCEDURE — G0009 ADMIN PNEUMOCOCCAL VACCINE: HCPCS | Performed by: NURSE PRACTITIONER

## 2020-01-13 RX ORDER — FAMOTIDINE 20 MG/1
20 TABLET, FILM COATED ORAL 2 TIMES DAILY
Qty: 60 TAB | Refills: 11 | Status: SHIPPED | OUTPATIENT
Start: 2020-01-13 | End: 2020-04-08

## 2020-01-13 RX ORDER — SERTRALINE HYDROCHLORIDE 100 MG/1
150 TABLET, FILM COATED ORAL DAILY
Qty: 45 TAB | Refills: 11 | Status: SHIPPED | OUTPATIENT
Start: 2020-01-13 | End: 2020-04-08

## 2020-01-13 ASSESSMENT — ENCOUNTER SYMPTOMS
DEPRESSION: 1
HEARTBURN: 1
BLURRED VISION: 1
GENERAL WELL-BEING: GOOD

## 2020-01-13 ASSESSMENT — PATIENT HEALTH QUESTIONNAIRE - PHQ9: CLINICAL INTERPRETATION OF PHQ2 SCORE: 0

## 2020-01-13 ASSESSMENT — ACTIVITIES OF DAILY LIVING (ADL): BATHING_REQUIRES_ASSISTANCE: 0

## 2020-01-13 NOTE — PROGRESS NOTES
Subjective:      Herminia Jones is a 85 y.o. female who presents with Follow-Up (3 month); Medication Refill; and Medication Management (nexium off of market needs something else)        CC: Patient is brought in today by her daughter for multiple issues including follow-up on depression, diabetes, chronic kidney disease and acid reflux.    HPI       1. Type 2 diabetes mellitus without complication, without long-term current use of insulin ([facility])  Patient is on low-dose metformin 500 mg daily and her hemoglobin A1c was doing well at 6.3, 2 months ago.  She has had no problems with metformin although her GFR is decreasing and daughter feels it is due to patient's chronic kidney urinary problems for which she sees urology.    2. Dementia associated with other underlying disease without behavioral disturbance ([facility])  Patient currently cared for by her daughter.  She does have poor memory but is able to do many of her ADLs by herself.    3. Recurrent major depressive disorder, in full remission ([facility])  Patient is currently on Zoloft 150 mg a day and her daughter feels this dosage is necessary to keep the depression under control.    4. Exudative age-related macular degeneration of both eyes with inactive scar ([facility])  Patient follows with ophthalmology and is considered legally blind.    5. Atherosclerosis of aorta ([facility])  This was a finding on a previous CT of the pelvis.  Patient is on Lipitor    6. Stage 3 chronic kidney disease ([facility])  Patient's GFR has been running in the 40s range but most recently dropped down to 33 a month ago when she was having some kidney related issues.  She cannot take an ACE or ARB due to hypotension problems.  She is only on low-dose metformin once a day    7. Gastroesophageal reflux disease without esophagitis  Patient has been using Zantac for a while twice a day for acid reflux but it has been recalled and her daughter would like something else to replace this    Past Medical History:    Diagnosis Date   • Arthritis     hands    • Dental disorder     dentures   • Diabetes (HCC)     oral medication   • Glaucoma     both eyes    • Gynecological disorder    • Heart burn    • Macular degeneration    • Psychiatric problem     depression   • Snoring    • Urinary bladder disorder    • Urinary incontinence      Social History     Socioeconomic History   • Marital status:      Spouse name: Not on file   • Number of children: Not on file   • Years of education: Not on file   • Highest education level: Not on file   Occupational History   • Not on file   Social Needs   • Financial resource strain: Not on file   • Food insecurity:     Worry: Not on file     Inability: Not on file   • Transportation needs:     Medical: Not on file     Non-medical: Not on file   Tobacco Use   • Smoking status: Never Smoker   • Smokeless tobacco: Never Used   Substance and Sexual Activity   • Alcohol use: No   • Drug use: No   • Sexual activity: Not Currently   Lifestyle   • Physical activity:     Days per week: Not on file     Minutes per session: Not on file   • Stress: Not on file   Relationships   • Social connections:     Talks on phone: Not on file     Gets together: Not on file     Attends [employer] service: Not on file     Active member of club or organization: Not on file     Attends meetings of clubs or organizations: Not on file     Relationship status: Not on file   • Intimate partner violence:     Fear of current or ex partner: Not on file     Emotionally abused: Not on file     Physically abused: Not on file     Forced sexual activity: Not on file   Other Topics Concern   •  Service Not Asked   • Blood Transfusions Not Asked   • Caffeine Concern Not Asked   • Occupational Exposure Not Asked   • Hobby Hazards Not Asked   • Sleep Concern Not Asked   • Stress Concern Not Asked   • Weight Concern Not Asked   • Special Diet Not Asked   • Back Care Not Asked   • Exercise Not Asked   • Bike Helmet Not Asked  "  • Seat Belt Not Asked   • Self-Exams Not Asked   Social History Narrative   • Not on file     Current Outpatient Medications   Medication Sig Dispense Refill   • famotidine (PEPCID) 20 MG Tab Take 1 Tab by mouth 2 times a day. 60 Tab 11   • metFORMIN (GLUCOPHAGE) 500 MG Tab Take 1 Tab by mouth every day. 100 Tab 3   • sertraline (ZOLOFT) 100 MG Tab Take 1.5 Tabs by mouth every day. 45 Tab 11   • D-Mannose 500 MG Cap Take  by mouth every day.     • CONTOUR NEXT TEST strip USE 4-6 TIMES DAILY 600 Strip 2   • EASY COMFORT LANCETS Misc USE 4-6 TIMES DAILY 600 Each 2   • atorvastatin (LIPITOR) 20 MG Tab TAKE ONE TABLET BY MOUTH DAILY 100 Tab 2   • trimethoprim (TRIMPEX) 100 MG Tab Take 1 Tab by mouth every day.     • nitrofurantoin (MACRODANTIN) 50 MG Cap Take 1 Cap by mouth 4 times a day.     • calcium carbonate (OS-BROOK 500) 500 MG Tab Take 1 Tab by mouth every morning with breakfast. 30 Tab 0   • oxybutynin (DITROPAN) 5 MG Tab Take 5 mg by mouth 3 times a day.     • Non Formulary Request every day. OCUVITE DROPS BOTH EYES DAILY       No current facility-administered medications for this visit.      Family History   Problem Relation Age of Onset   • Stroke Father    • Cancer Sister    • Cancer Brother    • Heart Disease Sister    • Cancer Brother          Review of Systems   Eyes: Positive for blurred vision.   Gastrointestinal: Positive for heartburn.   Psychiatric/Behavioral: Positive for depression.   All other systems reviewed and are negative.         Objective:     /72 (BP Location: Right arm, Patient Position: Sitting, BP Cuff Size: Adult)   Pulse 81   Resp 16   Ht 1.6 m (5' 3\")   Wt 54.9 kg (121 lb)   SpO2 95%   BMI 21.43 kg/m²      Physical Exam  Vitals signs and nursing note reviewed.   Constitutional:       General: She is not in acute distress.     Appearance: She is well-developed. She is not diaphoretic.   HENT:      Head: Normocephalic and atraumatic.      Right Ear: External ear normal.     "  Left Ear: External ear normal.      Nose: Nose normal.   Eyes:      General:         Right eye: No discharge.         Left eye: No discharge.      Comments: Patient legally blind   Neck:      Musculoskeletal: Normal range of motion and neck supple.      Thyroid: No thyromegaly.   Cardiovascular:      Rate and Rhythm: Normal rate and regular rhythm.      Heart sounds: Normal heart sounds. No murmur. No friction rub. No gallop.    Pulmonary:      Effort: Pulmonary effort is normal.      Breath sounds: Normal breath sounds. No wheezing or rales.   Musculoskeletal:         General: No tenderness.   Feet:      Right foot:      Protective Sensation: 7 sites tested. 7 sites sensed.      Skin integrity: No ulcer.      Left foot:      Protective Sensation: 7 sites tested. 7 sites sensed.      Skin integrity: No ulcer.   Skin:     General: Skin is warm and dry.      Findings: No rash.   Neurological:      Mental Status: She is alert and oriented to person, place, and time.      Deep Tendon Reflexes: Reflexes normal.   Psychiatric:         Behavior: Behavior normal.         Thought Content: Thought content normal.         Judgment: Judgment normal.               Annual Health Assessment Questions:    1.  Are you currently engaging in any exercise or physical activity? No    2.  How would you describe your mood or emotional well-being today? good    3.  Have you had any falls in the last year? Yes    4.  Have you noticed any problems with your balance or had difficulty walking? Yes    5.  In the last six months have you experienced any leakage of urine? Yes    6. DPA/Advanced Directive: Patient has Durable Power of  on file.   Assessment/Plan:       1. Type 2 diabetes mellitus without complication, without long-term current use of insulin (HCC)  I will keep patient on metformin for now because it is worked well for her and is inexpensive.  However, I explained that if her GFR should drop down below 30, she will need to  go on a different medicine such as Tradjenta.  She will do lab work today.  Her hemoglobin A1c has been in the 6 range.  - metFORMIN (GLUCOPHAGE) 500 MG Tab; Take 1 Tab by mouth every day.  Dispense: 100 Tab; Refill: 3    2. Dementia associated with other underlying disease without behavioral disturbance ([facility])  Patient currently cared for by her daughter and son-in-law at their home.    3. Recurrent major depressive disorder, in full remission ([facility])  Patient continues with her antidepressant which daughter feels is helpful    4. Exudative age-related macular degeneration of both eyes with inactive scar ([facility])  Patient follows with ophthalmology regularly and is legally blind    5. Atherosclerosis of aorta ([facility])  Patient on statin with no reports of side effects    6. Stage 3 chronic kidney disease ([facility])  Patient has been having multiple UTIs and is on daily antibiotic suppression through urology.  Her GFR did drop down a month ago but we will recheck this today.  Hopefully it is back to her 40s range  - Comp Metabolic Panel; Future    7. Gastroesophageal reflux disease without esophagitis  I will switch patient to Pepcid since Zantac is no longer available due to recalls  - famotidine (PEPCID) 20 MG Tab; Take 1 Tab by mouth 2 times a day.  Dispense: 60 Tab; Refill: 11      8. Need for pneumococcal vaccination  I have placed the below orders and discussed them with an approved delegating provider. The MA is performing the below orders under the direction of Dr. Armas    - Pneumococal Polysaccharide Vaccine 23-Valent =>3YO SQ/IM

## 2020-01-13 NOTE — PROGRESS NOTES
Annual Health Assessment Questions:    1.  Are you currently engaging in any exercise or physical activity? No    2.  How would you describe your mood or emotional well-being today? good    3.  Have you had any falls in the last year? Yes    4.  Have you noticed any problems with your balance or had difficulty walking? Yes    5.  In the last six months have you experienced any leakage of urine? Yes    6. DPA/Advanced Directive: Patient has Durable Power of  on file.

## 2020-04-08 RX ORDER — ERGOCALCIFEROL 1.25 MG/1
CAPSULE ORAL
Qty: 12 CAP | Refills: 0 | Status: SHIPPED | OUTPATIENT
Start: 2020-04-08 | End: 2020-06-15

## 2020-06-09 ENCOUNTER — HOSPITAL ENCOUNTER (OUTPATIENT)
Dept: LAB | Facility: MEDICAL CENTER | Age: 85
End: 2020-06-09
Attending: NURSE PRACTITIONER
Payer: MEDICARE

## 2020-06-09 DIAGNOSIS — N18.30 STAGE 3 CHRONIC KIDNEY DISEASE: ICD-10-CM

## 2020-06-09 LAB
ALBUMIN SERPL BCP-MCNC: 3.9 G/DL (ref 3.2–4.9)
ALBUMIN/GLOB SERPL: 1.1 G/DL
ALP SERPL-CCNC: 51 U/L (ref 30–99)
ALT SERPL-CCNC: 9 U/L (ref 2–50)
ANION GAP SERPL CALC-SCNC: 8 MMOL/L (ref 7–16)
AST SERPL-CCNC: 11 U/L (ref 12–45)
BILIRUB SERPL-MCNC: 0.3 MG/DL (ref 0.1–1.5)
BUN SERPL-MCNC: 25 MG/DL (ref 8–22)
CALCIUM SERPL-MCNC: 9.9 MG/DL (ref 8.5–10.5)
CHLORIDE SERPL-SCNC: 105 MMOL/L (ref 96–112)
CO2 SERPL-SCNC: 25 MMOL/L (ref 20–33)
CREAT SERPL-MCNC: 1.33 MG/DL (ref 0.5–1.4)
FASTING STATUS PATIENT QL REPORTED: NORMAL
GLOBULIN SER CALC-MCNC: 3.5 G/DL (ref 1.9–3.5)
GLUCOSE SERPL-MCNC: 111 MG/DL (ref 65–99)
POTASSIUM SERPL-SCNC: 4.4 MMOL/L (ref 3.6–5.5)
PROT SERPL-MCNC: 7.4 G/DL (ref 6–8.2)
SODIUM SERPL-SCNC: 138 MMOL/L (ref 135–145)

## 2020-06-09 PROCEDURE — 80053 COMPREHEN METABOLIC PANEL: CPT

## 2020-06-09 PROCEDURE — 36415 COLL VENOUS BLD VENIPUNCTURE: CPT

## 2020-06-15 ENCOUNTER — OFFICE VISIT (OUTPATIENT)
Dept: MEDICAL GROUP | Facility: MEDICAL CENTER | Age: 85
End: 2020-06-15
Payer: MEDICARE

## 2020-06-15 VITALS
WEIGHT: 126 LBS | SYSTOLIC BLOOD PRESSURE: 108 MMHG | DIASTOLIC BLOOD PRESSURE: 72 MMHG | OXYGEN SATURATION: 94 % | BODY MASS INDEX: 22.32 KG/M2 | RESPIRATION RATE: 16 BRPM | HEIGHT: 63 IN | HEART RATE: 84 BPM

## 2020-06-15 DIAGNOSIS — F33.42 RECURRENT MAJOR DEPRESSIVE DISORDER, IN FULL REMISSION (HCC): ICD-10-CM

## 2020-06-15 DIAGNOSIS — E11.9 TYPE 2 DIABETES MELLITUS WITHOUT COMPLICATION, WITHOUT LONG-TERM CURRENT USE OF INSULIN (HCC): ICD-10-CM

## 2020-06-15 DIAGNOSIS — N18.30 STAGE 3 CHRONIC KIDNEY DISEASE: ICD-10-CM

## 2020-06-15 DIAGNOSIS — E55.9 VITAMIN D DEFICIENCY: ICD-10-CM

## 2020-06-15 DIAGNOSIS — F02.80 DEMENTIA ASSOCIATED WITH OTHER UNDERLYING DISEASE WITHOUT BEHAVIORAL DISTURBANCE (HCC): ICD-10-CM

## 2020-06-15 DIAGNOSIS — R80.9 MICROALBUMINURIA: ICD-10-CM

## 2020-06-15 LAB
HBA1C MFR BLD: 6.1 % (ref 0–5.6)
INT CON NEG: NEGATIVE
INT CON POS: POSITIVE

## 2020-06-15 PROCEDURE — 83036 HEMOGLOBIN GLYCOSYLATED A1C: CPT | Performed by: NURSE PRACTITIONER

## 2020-06-15 PROCEDURE — 99214 OFFICE O/P EST MOD 30 MIN: CPT | Performed by: NURSE PRACTITIONER

## 2020-06-15 RX ORDER — LISINOPRIL 2.5 MG/1
2.5 TABLET ORAL DAILY
Qty: 100 TAB | Refills: 3 | Status: SHIPPED | OUTPATIENT
Start: 2020-06-15 | End: 2020-09-15

## 2020-06-15 ASSESSMENT — FIBROSIS 4 INDEX: FIB4 SCORE: 1.31

## 2020-06-15 ASSESSMENT — ENCOUNTER SYMPTOMS: MEMORY LOSS: 1

## 2020-06-15 NOTE — PROGRESS NOTES
Subjective:      Herminia Jones is a 86 y.o. female who presents with Follow-Up (4 month)        CC: Patient is here today accompanied by her daughter/caregiver for follow-up on diabetes, chronic kidney disease and depression.    HPI         1. Type 2 diabetes mellitus without complication, without long-term current use of insulin (HCC)  Patient has been weaned down off her metformin and currently is only taking 500 mg once a day.  Her hemoglobin A1c today comes back lower at 6.1 and she has history of microalbuminuria and her GFR has been low but stable today.    2. Stage 3 chronic kidney disease (HCC)  Patient's previous GFR was as low as 33 back in November and today comes back at 38.  She is not on NSAIDs but she is diabetic.  She is on 500 mg of metformin daily.    3. Recurrent major depressive disorder, in full remission ([facility])  Patient is on Zoloft 150 mg a day and her daughter is not sure if it is helping totally and had wanted her with a higher dose.    4. Dementia associated with other underlying disease without behavioral disturbance ([facility])  Patient's caregiver feels that her memory has been getting worse over the last year.  She felt some of this was related to depression and that is why she is on Zoloft.    5. Microalbuminuria  Last urine for microalbuminuria was at 136 in November.  She has not been on an ACE inhibitor for blood pressure concerns.    6. Vitamin D deficiency  Patient was on vitamin D 50,000 units daily time and her daughter wonders if she should go back on this.  Past Medical History:   Diagnosis Date   • Arthritis     hands    • Dental disorder     dentures   • Diabetes ([facility])     oral medication   • Glaucoma     both eyes    • Gynecological disorder    • Heart burn    • Macular degeneration    • Psychiatric problem     depression   • Snoring    • Urinary bladder disorder    • Urinary incontinence      Social History     Socioeconomic History   • Marital status:      Spouse  name: Not on file   • Number of children: Not on file   • Years of education: Not on file   • Highest education level: Not on file   Occupational History   • Not on file   Social Needs   • Financial resource strain: Not on file   • Food insecurity     Worry: Not on file     Inability: Not on file   • Transportation needs     Medical: Not on file     Non-medical: Not on file   Tobacco Use   • Smoking status: Never Smoker   • Smokeless tobacco: Never Used   Substance and Sexual Activity   • Alcohol use: No   • Drug use: No   • Sexual activity: Not Currently   Lifestyle   • Physical activity     Days per week: Not on file     Minutes per session: Not on file   • Stress: Not on file   Relationships   • Social connections     Talks on phone: Not on file     Gets together: Not on file     Attends Sabianist service: Not on file     Active member of club or organization: Not on file     Attends meetings of clubs or organizations: Not on file     Relationship status: Not on file   • Intimate partner violence     Fear of current or ex partner: Not on file     Emotionally abused: Not on file     Physically abused: Not on file     Forced sexual activity: Not on file   Other Topics Concern   •  Service Not Asked   • Blood Transfusions Not Asked   • Caffeine Concern Not Asked   • Occupational Exposure Not Asked   • Hobby Hazards Not Asked   • Sleep Concern Not Asked   • Stress Concern Not Asked   • Weight Concern Not Asked   • Special Diet Not Asked   • Back Care Not Asked   • Exercise Not Asked   • Bike Helmet Not Asked   • Seat Belt Not Asked   • Self-Exams Not Asked   Social History Narrative   • Not on file     Current Outpatient Medications   Medication Sig Dispense Refill   • lisinopril (PRINIVIL) 2.5 MG Tab Take 1 Tab by mouth every day. 100 Tab 3   • atorvastatin (LIPITOR) 20 MG Tab TAKE ONE TABLET BY MOUTH DAILY 100 Tab 3   • famotidine (PEPCID) 20 MG Tab TAKE ONE TABLET BY MOUTH TWICE A  Tab 2   •  "sertraline (ZOLOFT) 100 MG Tab TAKE 1 AND A HALF TABLET BY MOUTH DAILY 135 Tab 2   • D-Mannose 500 MG Cap Take  by mouth every day.     • CONTOUR NEXT TEST strip USE 4-6 TIMES DAILY 600 Strip 2   • EASY COMFORT LANCETS Misc USE 4-6 TIMES DAILY 600 Each 2   • calcium carbonate (OS-BROOK 500) 500 MG Tab Take 1 Tab by mouth every morning with breakfast. 30 Tab 0   • oxybutynin (DITROPAN) 5 MG Tab Take 5 mg by mouth 3 times a day.     • Non Formulary Request every day. OCUVITE DROPS BOTH EYES DAILY       No current facility-administered medications for this visit.      Family History   Problem Relation Age of Onset   • Stroke Father    • Cancer Sister    • Cancer Brother    • Heart Disease Sister    • Cancer Brother          Review of Systems   Psychiatric/Behavioral: Positive for memory loss.   All other systems reviewed and are negative.         Objective:     /72 (BP Location: Right arm, Patient Position: Sitting, BP Cuff Size: Adult)   Pulse 84   Resp 16   Ht 1.6 m (5' 3\")   Wt 57.2 kg (126 lb)   SpO2 94%   BMI 22.32 kg/m²      Physical Exam  Vitals signs and nursing note reviewed.   Constitutional:       General: She is not in acute distress.     Appearance: She is well-developed. She is not diaphoretic.   HENT:      Head: Normocephalic and atraumatic.      Right Ear: External ear normal.      Left Ear: External ear normal.      Nose: Nose normal.   Eyes:      General:         Right eye: No discharge.         Left eye: No discharge.   Neck:      Musculoskeletal: Normal range of motion and neck supple.      Thyroid: No thyromegaly.   Cardiovascular:      Rate and Rhythm: Normal rate and regular rhythm.      Heart sounds: Normal heart sounds. No murmur. No friction rub. No gallop.    Pulmonary:      Effort: Pulmonary effort is normal.      Breath sounds: Normal breath sounds. No wheezing or rales.   Musculoskeletal:         General: No tenderness.   Skin:     General: Skin is warm and dry.      Findings: No " rash.   Neurological:      Mental Status: She is alert and oriented to person, place, and time.      Deep Tendon Reflexes: Reflexes normal.   Psychiatric:         Behavior: Behavior normal.         Thought Content: Thought content normal.         Judgment: Judgment normal.      Comments: Patient is quiet in the office but she is able to give me her birthdate, age, day of the week and remembers 3 objects.                 Assessment/Plan:       1. Type 2 diabetes mellitus without complication, without long-term current use of insulin (HCC)  Patient's hemoglobin A1c is now at 6.1 so I am going to stop her metformin and just keep track of her blood sugars.  She also has had lowered GFR and possibly stopping the low-dose metformin may help.  - POCT  A1C  - Comp Metabolic Panel; Future  - HEMOGLOBIN A1C; Future    2. Stage 3 chronic kidney disease (HCC)  I will try to get patient on very low-dose lisinopril 2.5 mg but I advised her daughter to watch carefully and if she should develop any dizziness with the medication, she is to stop it.  We also talked about possibly low-dose Jardiance in the future if her blood sugars are too high.  - lisinopril (PRINIVIL) 2.5 MG Tab; Take 1 Tab by mouth every day.  Dispense: 100 Tab; Refill: 3    3. Recurrent major depressive disorder, in full remission (HCC)  I advised patient's daughter that if she does not feel the Zoloft is working, she may wish to try weaning her down off this slowly over a month.    4. Dementia associated with other underlying disease without behavioral disturbance (HCC)  I discussed options such as referral to our memory clinic but her daughter wishes to hold on that for now.    5. Microalbuminuria  Blood sugars are doing well and she is not hypertensive and was started on low-dose ACE.    6. Vitamin D deficiency  I advised over-the-counter vitamin D 2000 units daily.

## 2020-06-30 ENCOUNTER — PATIENT OUTREACH (OUTPATIENT)
Dept: HEALTH INFORMATION MANAGEMENT | Facility: OTHER | Age: 85
End: 2020-06-30

## 2020-06-30 NOTE — PROGRESS NOTES
Veronica called to ask about Home Health benefits as she needs someone to come over to her house this weekend to take her of her mom--patient. Advised that according to the summary of benefits: Home health agency care: Prior to receiving home health services, a doctor must certify that you need home health services and will order home health services to be provided by a home health agency. You must be homebound, which means leaving home is a major effort. Advised the benefits for home health are for medical care and not caregiving. Advised there are agencies in the community for caregiving and she will need to contact the agencies for pricing--not a covered benefit. Advised she should also reach out to [facility] as they might have programs that assist with caregiving. Asked if she would like the information she said yes but not now. Advised I can email the information and Veronica asked for the information to be mail. Advised I will mail information out. Declines further questions or concerns.

## 2020-07-16 RX ORDER — BLOOD-GLUCOSE METER
EACH MISCELLANEOUS
Qty: 300 EACH | Refills: 2 | Status: SHIPPED | OUTPATIENT
Start: 2020-07-16 | End: 2020-09-22

## 2020-09-02 ENCOUNTER — APPOINTMENT (OUTPATIENT)
Dept: RADIOLOGY | Facility: IMAGING CENTER | Age: 85
End: 2020-09-02
Attending: PHYSICIAN ASSISTANT
Payer: MEDICARE

## 2020-09-02 ENCOUNTER — OFFICE VISIT (OUTPATIENT)
Dept: URGENT CARE | Facility: PHYSICIAN GROUP | Age: 85
End: 2020-09-02
Payer: MEDICARE

## 2020-09-02 VITALS
BODY MASS INDEX: 23.39 KG/M2 | OXYGEN SATURATION: 95 % | DIASTOLIC BLOOD PRESSURE: 58 MMHG | HEART RATE: 83 BPM | TEMPERATURE: 98.3 F | HEIGHT: 63 IN | RESPIRATION RATE: 16 BRPM | SYSTOLIC BLOOD PRESSURE: 108 MMHG | WEIGHT: 132 LBS

## 2020-09-02 DIAGNOSIS — S29.9XXA RIB INJURY: ICD-10-CM
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
## 2020-09-02 PROCEDURE — 99214 OFFICE O/P EST MOD 30 MIN: CPT | Performed by: PHYSICIAN ASSISTANT

## 2020-09-02 PROCEDURE — 71101 X-RAY EXAM UNILAT RIBS/CHEST: CPT | Mod: TC,RT | Performed by: PHYSICIAN ASSISTANT

## 2020-09-02 PROCEDURE — 72100 X-RAY EXAM L-S SPINE 2/3 VWS: CPT | Mod: TC | Performed by: PHYSICIAN ASSISTANT

## 2020-09-02 RX ORDER — TRIMETHOPRIM 100 MG/1
100 TABLET ORAL 2 TIMES DAILY
COMMUNITY
End: 2020-09-21

## 2020-09-02 ASSESSMENT — ENCOUNTER SYMPTOMS
SHORTNESS OF BREATH: 0
FALLS: 1
HEADACHES: 0
VERTIGO: 0
NAUSEA: 0
VISUAL CHANGE: 0
VOMITING: 0
CONSTITUTIONAL NEGATIVE: 1
COUGH: 0
ABDOMINAL PAIN: 0
WHEEZING: 0
NUMBNESS: 0
BACK PAIN: 1
FEVER: 0
NEUROLOGICAL NEGATIVE: 1
FATIGUE: 0
WEAKNESS: 0

## 2020-09-02 ASSESSMENT — FIBROSIS 4 INDEX: FIB4 SCORE: 1.31

## 2020-09-02 NOTE — PROGRESS NOTES
Subjective:      Herminia Jones is a 86 y.o. female who presents with Rib Injury (R sided rib pain, tender/sensitive pt fell in shower and injured her ribs, onset today at 7am )            Fall today injuring her right rib and lumbar spine.  No head injury or loss of conscious.  Patient denies abdominal pain, vomiting, hematuria.  Patient denies headache, dizziness, hip pain.    Rib Injury  This is a new problem. The current episode started today. The problem occurs constantly. The problem has been unchanged. Associated symptoms include chest pain. Pertinent negatives include no abdominal pain, congestion, coughing, fatigue, fever, headaches, nausea, numbness, urinary symptoms, vertigo, visual change, vomiting or weakness. The symptoms are aggravated by coughing and bending. She has tried nothing for the symptoms. The treatment provided no relief.       PMH:  has a past medical history of Arthritis, Dental disorder, Diabetes (HCC), Glaucoma, Gynecological disorder, Heart burn, Macular degeneration, Psychiatric problem, Snoring, Urinary bladder disorder, and Urinary incontinence.  MEDS:   Current Outpatient Medications:   •  metFORMIN (GLUCOPHAGE) 500 MG Tab, Take 500 mg by mouth 2 times a day, with meals., Disp: , Rfl:   •  trimethoprim (TRIMPEX) 100 MG Tab, Take 100 mg by mouth 2 times a day., Disp: , Rfl:   •  CONTOUR NEXT TEST strip, AS DIRECTED FOUR TIMES A DAY, Disp: 300 Each, Rfl: 11  •  Easy Comfort Lancets Misc, AS DIRECTED FOUR TIMES A DAY, Disp: 300 Each, Rfl: 2  •  lisinopril (PRINIVIL) 2.5 MG Tab, Take 1 Tab by mouth every day., Disp: 100 Tab, Rfl: 3  •  atorvastatin (LIPITOR) 20 MG Tab, TAKE ONE TABLET BY MOUTH DAILY, Disp: 100 Tab, Rfl: 3  •  famotidine (PEPCID) 20 MG Tab, TAKE ONE TABLET BY MOUTH TWICE A DAY, Disp: 180 Tab, Rfl: 2  •  sertraline (ZOLOFT) 100 MG Tab, TAKE 1 AND A HALF TABLET BY MOUTH DAILY, Disp: 135 Tab, Rfl: 2  •  D-Mannose 500 MG Cap, Take  by mouth every day., Disp: , Rfl:    •  calcium carbonate (OS-BROOK 500) 500 MG Tab, Take 1 Tab by mouth every morning with breakfast., Disp: 30 Tab, Rfl: 0  •  oxybutynin (DITROPAN) 5 MG Tab, Take 5 mg by mouth 3 times a day., Disp: , Rfl:   •  Non Formulary Request, every day. OCUVITE DROPS BOTH EYES DAILY, Disp: , Rfl:   ALLERGIES:   Allergies   Allergen Reactions   • Sulfamethoxazole      Does not remember reaction      SURGHX:   Past Surgical History:   Procedure Laterality Date   • ANTERIOR AND POSTERIOR REPAIR  12/3/2019    Procedure: COLPORRHAPHY, COMBINED ANTEROPOSTERIOR;  Surgeon: Marjan Henley M.D.;  Location: SURGERY SAME DAY [facility];  Service: Gynecology   • BLADDER SLING FEMALE N/A 4/20/2018    Procedure: BLADDER SLING FEMALE- TOT;  Surgeon: Espinoza Bryan M.D.;  Location: SURGERY SAME DAY [facility];  Service: Gynecology   • ANTERIOR REPAIR N/A 4/20/2018    Procedure: ANTERIOR REPAIR- COLPHORRHAPHY;  Surgeon: Espinoza Bryan M.D.;  Location: SURGERY SAME DAY [facility];  Service: Gynecology   • PELVISCOPY N/A 4/20/2018    Procedure: PELVISCOPY- FOR SACRAL SPINOUS FIXATION;  Surgeon: Espinoza Bryan M.D.;  Location: SURGERY SAME DAY [facility];  Service: Gynecology   • HIP CANNULATED SCREW Left 12/1/2017    Procedure: HIP CANNULATED SCREW;  Surgeon: Abhinav Askew M.D.;  Location: SURGERY [facility];  Service: Orthopedics   • LEFORT COLPOCLESIS  6/2/2017    Procedure: LEFORT COLPOCLESIS, perineorrhaphy, posterior colporraphy;  Surgeon: Espinoza Bryan M.D.;  Location: SURGERY SAME DAY [facility];  Service:    • CYSTOSCOPY  6/2/2017    Procedure: CYSTOSCOPY;  Surgeon: Espinoza Bryan M.D.;  Location: SURGERY SAME DAY [facility];  Service:    • ABDOMINAL HYSTERECTOMY TOTAL       SOCHX:  reports that she has never smoked. She has never used smokeless tobacco. She reports that she does not drink alcohol or use drugs.  FH: family history includes Cancer in her brother, brother, and sister; Heart Disease in her  "sister; Stroke in her father.    Review of Systems   Constitutional: Negative.  Negative for fatigue and fever.   HENT: Negative for congestion.    Respiratory: Negative for cough, shortness of breath and wheezing.    Cardiovascular: Positive for chest pain.   Gastrointestinal: Negative for abdominal pain, nausea and vomiting.   Genitourinary: Negative for hematuria.   Musculoskeletal: Positive for back pain and falls.        RT rib pain   Neurological: Negative.  Negative for vertigo, weakness, numbness and headaches.   All other systems reviewed and are negative.      Medications, Allergies, and current problem list reviewed today in Epic     Objective:     /58 (BP Location: Right arm, Patient Position: Sitting, BP Cuff Size: Adult)   Pulse 83   Temp 36.8 °C (98.3 °F) (Temporal)   Resp 16   Ht 1.6 m (5' 3\")   Wt 59.9 kg (132 lb)   SpO2 95%   BMI 23.38 kg/m²      Physical Exam  Vitals signs and nursing note reviewed.   Constitutional:       General: She is not in acute distress.     Appearance: She is well-developed. She is not diaphoretic.   HENT:      Head: Normocephalic and atraumatic.      Right Ear: Tympanic membrane and external ear normal.      Left Ear: Tympanic membrane and external ear normal.      Nose: Nose normal.      Mouth/Throat:      Pharynx: No oropharyngeal exudate or posterior oropharyngeal erythema.   Eyes:      General:         Right eye: No discharge.         Left eye: No discharge.      Conjunctiva/sclera: Conjunctivae normal.   Neck:      Musculoskeletal: Normal range of motion and neck supple.   Cardiovascular:      Rate and Rhythm: Normal rate and regular rhythm.      Heart sounds: Normal heart sounds.   Pulmonary:      Effort: Pulmonary effort is normal. No respiratory distress.      Breath sounds: Normal breath sounds. No wheezing, rhonchi or rales.   Chest:      Chest wall: Tenderness present. No deformity, swelling, crepitus or edema.       Abdominal:      General: " Abdomen is flat. Bowel sounds are normal. There is no distension.      Tenderness: There is no abdominal tenderness. There is no right CVA tenderness, left CVA tenderness, guarding or rebound.   Musculoskeletal: Normal range of motion.      Lumbar back: She exhibits tenderness and pain. She exhibits normal range of motion, no bony tenderness, no swelling, no edema and no spasm.        Back:    Lymphadenopathy:      Cervical: No cervical adenopathy.   Skin:     General: Skin is warm and dry.   Neurological:      Mental Status: She is alert and oriented to person, place, and time.   Psychiatric:         Behavior: Behavior normal.         Thought Content: Thought content normal.         Judgment: Judgment normal.                 Assessment/Plan:     IMPRESSION:     1.  There is no acute compression fracture or malalignment.  2.  There is multilevel degenerative disc disease and arthropathy most significant at the L5-S1 level without gross interval change.         IMPRESSION:     No displaced rib fractures or pneumothorax.          1. Fall, initial encounter  [id number] (WITH 1-VIEW CXR) RIGHT    DX-LUMBAR SPINE-2 OR 3 VIEWS   2. Rib injury  [id number] (WITH 1-VIEW CXR) RIGHT     Fall resulting in right rib injury and right lumbar spinal tenderness.  X-rays negative.  PO2 adequate lungs clear bilaterally.  Patient denies head injury, loss of consciousness, headache, dizziness, abdominal pain, hematuria, shortness of breath.  She states she otherwise feels well.  OTC meds and conservative measures as discussed    Return to clinic or go to ED if symptoms worsen or persist. Indications for ED discussed at length. Patient and daughter voices understanding. Follow-up with your primary care provider in 3-5 days. Red flags discussed. All side effects of medication discussed including allergic response, GI upset, tendon injury, etc.    Please note that this dictation was created using voice recognition software.  I have made every reasonable attempt to correct obvious errors, but I expect that there are errors of grammar and possibly content that I did not discover before finalizing the note.

## 2020-09-15 ENCOUNTER — OFFICE VISIT (OUTPATIENT)
Dept: MEDICAL GROUP | Facility: MEDICAL CENTER | Age: 85
End: 2020-09-15
Payer: MEDICARE

## 2020-09-15 VITALS
HEIGHT: 63 IN | TEMPERATURE: 98.2 F | RESPIRATION RATE: 16 BRPM | BODY MASS INDEX: 21.26 KG/M2 | OXYGEN SATURATION: 96 % | HEART RATE: 88 BPM | DIASTOLIC BLOOD PRESSURE: 56 MMHG | WEIGHT: 120 LBS | SYSTOLIC BLOOD PRESSURE: 106 MMHG

## 2020-09-15 DIAGNOSIS — R30.0 DYSURIA: ICD-10-CM

## 2020-09-15 DIAGNOSIS — R29.6 FREQUENT FALLS: ICD-10-CM

## 2020-09-15 DIAGNOSIS — E11.9 TYPE 2 DIABETES MELLITUS WITHOUT COMPLICATION, WITHOUT LONG-TERM CURRENT USE OF INSULIN (HCC): ICD-10-CM

## 2020-09-15 PROCEDURE — 99213 OFFICE O/P EST LOW 20 MIN: CPT | Performed by: NURSE PRACTITIONER

## 2020-09-15 ASSESSMENT — FIBROSIS 4 INDEX: FIB4 SCORE: 1.31

## 2020-09-15 ASSESSMENT — ENCOUNTER SYMPTOMS: WEAKNESS: 1

## 2020-09-15 NOTE — PROGRESS NOTES
Subjective:      Herminia Jones is a 86 y.o. female who presents with Loss Of Balance        CC: Patient brought in by her daughter/caregiver today because of more recent falls and concerns about possible UTI.    HPI         1. Frequent falls  Patient has problems with stability and has fallen twice in the last month.  She did not lose consciousness or complain of dizziness but is unsteady on her feet when she is not using her walker.  She went to urgent care approximately 2 weeks ago after a fall and complained of rib pain.  Her imaging study showed no evidence of fracture of the ribs or lumbar spine.  She still has some mild pain in the right hip area.  She does have multilevel degenerative disc disease.  Her daughter feels that she is safe living at home with her and her  but does not always ask for help with transfer.    2. Dysuria  Patient's daughter states she is concerned that the falls might be related to UTI as has happened in the past although she has been having no hematuria or abdominal pain.    3. Type 2 diabetes mellitus without complication, without long-term current use of insulin (HCC)  Patient continues on metformin 500 mg twice a day and has been on a low dose ACE inhibitor for kidney protection.  Her last hemoglobin A1c was 6.1 in June and she is due for repeat lab work.  Past Medical History:   Diagnosis Date   • Arthritis     hands    • Dental disorder     dentures   • Diabetes (HCC)     oral medication   • Glaucoma     both eyes    • Gynecological disorder    • Heart burn    • Macular degeneration    • Psychiatric problem     depression   • Snoring    • Urinary bladder disorder    • Urinary incontinence      Social History     Socioeconomic History   • Marital status:      Spouse name: Not on file   • Number of children: Not on file   • Years of education: Not on file   • Highest education level: Not on file   Occupational History   • Not on file   Social Needs   •  Financial resource strain: Not on file   • Food insecurity     Worry: Not on file     Inability: Not on file   • Transportation needs     Medical: Not on file     Non-medical: Not on file   Tobacco Use   • Smoking status: Never Smoker   • Smokeless tobacco: Never Used   Substance and Sexual Activity   • Alcohol use: No   • Drug use: No   • Sexual activity: Not Currently   Lifestyle   • Physical activity     Days per week: Not on file     Minutes per session: Not on file   • Stress: Not on file   Relationships   • Social connections     Talks on phone: Not on file     Gets together: Not on file     Attends Muslim service: Not on file     Active member of club or organization: Not on file     Attends meetings of clubs or organizations: Not on file     Relationship status: Not on file   • Intimate partner violence     Fear of current or ex partner: Not on file     Emotionally abused: Not on file     Physically abused: Not on file     Forced sexual activity: Not on file   Other Topics Concern   •  Service Not Asked   • Blood Transfusions Not Asked   • Caffeine Concern Not Asked   • Occupational Exposure Not Asked   • Hobby Hazards Not Asked   • Sleep Concern Not Asked   • Stress Concern Not Asked   • Weight Concern Not Asked   • Special Diet Not Asked   • Back Care Not Asked   • Exercise Not Asked   • Bike Helmet Not Asked   • Seat Belt Not Asked   • Self-Exams Not Asked   Social History Narrative   • Not on file     Current Outpatient Medications   Medication Sig Dispense Refill   • trimethoprim (TRIMPEX) 100 MG Tab Take 100 mg by mouth 2 times a day.     • CONTOUR NEXT TEST strip AS DIRECTED FOUR TIMES A  Each 11   • Easy Comfort Lancets Misc AS DIRECTED FOUR TIMES A  Each 2   • atorvastatin (LIPITOR) 20 MG Tab TAKE ONE TABLET BY MOUTH DAILY 100 Tab 3   • famotidine (PEPCID) 20 MG Tab TAKE ONE TABLET BY MOUTH TWICE A  Tab 2   • sertraline (ZOLOFT) 100 MG Tab TAKE 1 AND A HALF TABLET BY  "MOUTH DAILY 135 Tab 2   • calcium carbonate (OS-BROOK 500) 500 MG Tab Take 1 Tab by mouth every morning with breakfast. 30 Tab 0   • oxybutynin (DITROPAN) 5 MG Tab Take 5 mg by mouth 3 times a day.     • Non Formulary Request every day. OCUVITE DROPS BOTH EYES DAILY     • metFORMIN (GLUCOPHAGE) 500 MG Tab Take 500 mg by mouth 2 times a day, with meals.     • D-Mannose 500 MG Cap Take  by mouth every day.       No current facility-administered medications for this visit.      Family History   Problem Relation Age of Onset   • Stroke Father    • Cancer Sister    • Cancer Brother    • Heart Disease Sister    • Cancer Brother          Review of Systems   Neurological: Positive for weakness.   All other systems reviewed and are negative.         Objective:     /56 (BP Location: Right arm, Patient Position: Sitting)   Pulse 88   Temp 36.8 °C (98.2 °F) (Temporal)   Resp 16   Ht 1.6 m (5' 3\")   Wt 54.4 kg (120 lb)   SpO2 96%   BMI 21.26 kg/m²      Physical Exam  Vitals signs and nursing note reviewed.   Constitutional:       General: She is not in acute distress.     Appearance: She is well-developed. She is not diaphoretic.   HENT:      Head: Normocephalic and atraumatic.      Right Ear: External ear normal.      Left Ear: External ear normal.      Nose: Nose normal.   Eyes:      General:         Right eye: No discharge.         Left eye: No discharge.   Neck:      Musculoskeletal: Normal range of motion and neck supple.      Thyroid: No thyromegaly.   Cardiovascular:      Rate and Rhythm: Normal rate and regular rhythm.      Heart sounds: Normal heart sounds. No murmur. No friction rub. No gallop.    Pulmonary:      Effort: Pulmonary effort is normal.      Breath sounds: Normal breath sounds. No wheezing or rales.   Musculoskeletal:         General: No tenderness.      Comments: Patient needs assist with transfer from wheelchair to bed and back again.  She is unsteady on her feet.  She is able to bear weight on " her feet.   Skin:     General: Skin is warm and dry.      Findings: No rash.   Neurological:      Mental Status: She is alert and oriented to person, place, and time.      Deep Tendon Reflexes: Reflexes normal.   Psychiatric:         Behavior: Behavior normal.         Thought Content: Thought content normal.         Judgment: Judgment normal.      Comments: She is alert today and able to answer questions appropriately.                 Assessment/Plan:        1. Frequent falls  Patient's gait is getting worse but daughter feels that she is able to care for her mother still safely at home.  Her daughter and I reminded her to always use her walker and ask for assistance with transfer.  I will try to get her into physical therapy for gait training.  We will check a urine for infection but she was unable to give us a sample in the office.  - REFERRAL TO PHYSICAL THERAPY Reason for Therapy: Eval/Treat/Report  - URINALYSIS,CULTURE IF INDICATED; Future    2. Dysuria  Patient unable to give us a sample in the office so I will have her daughter try to get one from the laboratory.  - URINALYSIS,CULTURE IF INDICATED; Future    3. Type 2 diabetes mellitus without complication, without long-term current use of insulin ([facility])  Patient reminded to do her lab work soon to be sure if we need to change any dosing on her metformin.

## 2020-09-21 ENCOUNTER — HOSPITAL ENCOUNTER (OUTPATIENT)
Dept: LAB | Facility: MEDICAL CENTER | Age: 85
End: 2020-09-21
Attending: NURSE PRACTITIONER
Payer: MEDICARE

## 2020-09-21 DIAGNOSIS — R29.6 FREQUENT FALLS: ICD-10-CM

## 2020-09-21 DIAGNOSIS — R30.0 DYSURIA: ICD-10-CM

## 2020-09-21 DIAGNOSIS — E11.9 TYPE 2 DIABETES MELLITUS WITHOUT COMPLICATION, WITHOUT LONG-TERM CURRENT USE OF INSULIN (HCC): ICD-10-CM

## 2020-09-21 LAB
ALBUMIN SERPL BCP-MCNC: 3.7 G/DL (ref 3.2–4.9)
ALBUMIN/GLOB SERPL: 0.9 G/DL
ALP SERPL-CCNC: 79 U/L (ref 30–99)
ALT SERPL-CCNC: 8 U/L (ref 2–50)
ANION GAP SERPL CALC-SCNC: 15 MMOL/L (ref 7–16)
APPEARANCE UR: ABNORMAL
AST SERPL-CCNC: 9 U/L (ref 12–45)
BACTERIA #/AREA URNS HPF: ABNORMAL /HPF
BILIRUB SERPL-MCNC: 0.3 MG/DL (ref 0.1–1.5)
BILIRUB UR QL STRIP.AUTO: NEGATIVE
BUN SERPL-MCNC: 39 MG/DL (ref 8–22)
CALCIUM SERPL-MCNC: 9.6 MG/DL (ref 8.5–10.5)
CHLORIDE SERPL-SCNC: 103 MMOL/L (ref 96–112)
CO2 SERPL-SCNC: 21 MMOL/L (ref 20–33)
COLOR UR: YELLOW
CREAT SERPL-MCNC: 1.9 MG/DL (ref 0.5–1.4)
EPI CELLS #/AREA URNS HPF: ABNORMAL /HPF
EST. AVERAGE GLUCOSE BLD GHB EST-MCNC: 148 MG/DL
FASTING STATUS PATIENT QL REPORTED: NORMAL
GLOBULIN SER CALC-MCNC: 4 G/DL (ref 1.9–3.5)
GLUCOSE SERPL-MCNC: 115 MG/DL (ref 65–99)
GLUCOSE UR STRIP.AUTO-MCNC: NEGATIVE MG/DL
HBA1C MFR BLD: 6.8 % (ref 0–5.6)
HYALINE CASTS #/AREA URNS LPF: ABNORMAL /LPF
KETONES UR STRIP.AUTO-MCNC: NEGATIVE MG/DL
LEUKOCYTE ESTERASE UR QL STRIP.AUTO: ABNORMAL
MICRO URNS: ABNORMAL
NITRITE UR QL STRIP.AUTO: POSITIVE
PH UR STRIP.AUTO: 5.5 [PH] (ref 5–8)
POTASSIUM SERPL-SCNC: 4.4 MMOL/L (ref 3.6–5.5)
PROT SERPL-MCNC: 7.7 G/DL (ref 6–8.2)
PROT UR QL STRIP: 30 MG/DL
RBC # URNS HPF: ABNORMAL /HPF
RBC UR QL AUTO: ABNORMAL
SODIUM SERPL-SCNC: 139 MMOL/L (ref 135–145)
SP GR UR STRIP.AUTO: 1.01
UROBILINOGEN UR STRIP.AUTO-MCNC: 0.2 MG/DL
WBC #/AREA URNS HPF: ABNORMAL /HPF

## 2020-09-21 PROCEDURE — 87086 URINE CULTURE/COLONY COUNT: CPT

## 2020-09-21 PROCEDURE — 81001 URINALYSIS AUTO W/SCOPE: CPT

## 2020-09-21 PROCEDURE — 87077 CULTURE AEROBIC IDENTIFY: CPT

## 2020-09-21 PROCEDURE — 36415 COLL VENOUS BLD VENIPUNCTURE: CPT

## 2020-09-21 PROCEDURE — 83036 HEMOGLOBIN GLYCOSYLATED A1C: CPT

## 2020-09-21 PROCEDURE — 80053 COMPREHEN METABOLIC PANEL: CPT

## 2020-09-21 PROCEDURE — 87186 SC STD MICRODIL/AGAR DIL: CPT

## 2020-09-21 RX ORDER — TRIMETHOPRIM 100 MG/1
TABLET ORAL
Qty: 30 TAB | Refills: 5 | Status: ON HOLD | OUTPATIENT
Start: 2020-09-21 | End: 2020-09-24

## 2020-09-22 ENCOUNTER — APPOINTMENT (OUTPATIENT)
Dept: RADIOLOGY | Facility: MEDICAL CENTER | Age: 85
End: 2020-09-22
Attending: EMERGENCY MEDICINE
Payer: MEDICARE

## 2020-09-22 ENCOUNTER — HOSPITAL ENCOUNTER (OUTPATIENT)
Facility: MEDICAL CENTER | Age: 85
End: 2020-09-24
Attending: EMERGENCY MEDICINE | Admitting: INTERNAL MEDICINE
Payer: MEDICARE

## 2020-09-22 DIAGNOSIS — R29.6 RECURRENT FALLS: ICD-10-CM

## 2020-09-22 DIAGNOSIS — N39.0 ACUTE UTI: ICD-10-CM

## 2020-09-22 DIAGNOSIS — S22.41XA CLOSED FRACTURE OF MULTIPLE RIBS OF RIGHT SIDE, INITIAL ENCOUNTER: ICD-10-CM

## 2020-09-22 DIAGNOSIS — M85.80 OSTEOPENIA, UNSPECIFIED LOCATION: ICD-10-CM

## 2020-09-22 DIAGNOSIS — Z91.81 RISK FOR FALLS: ICD-10-CM

## 2020-09-22 DIAGNOSIS — F02.80 DEMENTIA ASSOCIATED WITH OTHER UNDERLYING DISEASE WITHOUT BEHAVIORAL DISTURBANCE (HCC): ICD-10-CM

## 2020-09-22 DIAGNOSIS — G31.84 MILD COGNITIVE IMPAIRMENT: ICD-10-CM

## 2020-09-22 PROBLEM — N17.9 ACUTE KIDNEY INJURY SUPERIMPOSED ON CHRONIC KIDNEY DISEASE (HCC): Status: ACTIVE | Noted: 2020-09-22

## 2020-09-22 PROBLEM — S22.49XA RIB FRACTURES: Status: ACTIVE | Noted: 2020-09-22

## 2020-09-22 PROBLEM — N18.9 ACUTE KIDNEY INJURY SUPERIMPOSED ON CHRONIC KIDNEY DISEASE (HCC): Status: ACTIVE | Noted: 2020-09-22

## 2020-09-22 LAB
ALBUMIN SERPL BCP-MCNC: 3.7 G/DL (ref 3.2–4.9)
ALBUMIN/GLOB SERPL: 0.9 G/DL
ALP SERPL-CCNC: 77 U/L (ref 30–99)
ALT SERPL-CCNC: 5 U/L (ref 2–50)
ANION GAP SERPL CALC-SCNC: 14 MMOL/L (ref 7–16)
APPEARANCE UR: ABNORMAL
AST SERPL-CCNC: 6 U/L (ref 12–45)
BACTERIA #/AREA URNS HPF: ABNORMAL /HPF
BASOPHILS # BLD AUTO: 0.8 % (ref 0–1.8)
BASOPHILS # BLD: 0.08 K/UL (ref 0–0.12)
BILIRUB SERPL-MCNC: 0.4 MG/DL (ref 0.1–1.5)
BILIRUB UR QL STRIP.AUTO: NEGATIVE
BUN SERPL-MCNC: 36 MG/DL (ref 8–22)
CALCIUM SERPL-MCNC: 9.9 MG/DL (ref 8.5–10.5)
CHLORIDE SERPL-SCNC: 104 MMOL/L (ref 96–112)
CO2 SERPL-SCNC: 21 MMOL/L (ref 20–33)
COLOR UR: YELLOW
CREAT SERPL-MCNC: 1.91 MG/DL (ref 0.5–1.4)
EOSINOPHIL # BLD AUTO: 0.16 K/UL (ref 0–0.51)
EOSINOPHIL NFR BLD: 1.6 % (ref 0–6.9)
EPI CELLS #/AREA URNS HPF: ABNORMAL /HPF
ERYTHROCYTE [DISTWIDTH] IN BLOOD BY AUTOMATED COUNT: 47.5 FL (ref 35.9–50)
GLOBULIN SER CALC-MCNC: 4.2 G/DL (ref 1.9–3.5)
GLUCOSE SERPL-MCNC: 119 MG/DL (ref 65–99)
GLUCOSE UR STRIP.AUTO-MCNC: NEGATIVE MG/DL
HCT VFR BLD AUTO: 45.8 % (ref 37–47)
HGB BLD-MCNC: 14.8 G/DL (ref 12–16)
HYALINE CASTS #/AREA URNS LPF: ABNORMAL /LPF
IMM GRANULOCYTES # BLD AUTO: 0.07 K/UL (ref 0–0.11)
IMM GRANULOCYTES NFR BLD AUTO: 0.7 % (ref 0–0.9)
KETONES UR STRIP.AUTO-MCNC: NEGATIVE MG/DL
LACTATE BLD-SCNC: 0.9 MMOL/L (ref 0.5–2)
LEUKOCYTE ESTERASE UR QL STRIP.AUTO: ABNORMAL
LYMPHOCYTES # BLD AUTO: 1.96 K/UL (ref 1–4.8)
LYMPHOCYTES NFR BLD: 19.6 % (ref 22–41)
MCH RBC QN AUTO: 28.6 PG (ref 27–33)
MCHC RBC AUTO-ENTMCNC: 32.3 G/DL (ref 33.6–35)
MCV RBC AUTO: 88.4 FL (ref 81.4–97.8)
MICRO URNS: ABNORMAL
MONOCYTES # BLD AUTO: 0.63 K/UL (ref 0–0.85)
MONOCYTES NFR BLD AUTO: 6.3 % (ref 0–13.4)
NEUTROPHILS # BLD AUTO: 7.1 K/UL (ref 2–7.15)
NEUTROPHILS NFR BLD: 71 % (ref 44–72)
NITRITE UR QL STRIP.AUTO: POSITIVE
NRBC # BLD AUTO: 0 K/UL
NRBC BLD-RTO: 0 /100 WBC
PH UR STRIP.AUTO: 5 [PH] (ref 5–8)
PLATELET # BLD AUTO: 321 K/UL (ref 164–446)
PMV BLD AUTO: 9.6 FL (ref 9–12.9)
POTASSIUM SERPL-SCNC: 4.3 MMOL/L (ref 3.6–5.5)
PROT SERPL-MCNC: 7.9 G/DL (ref 6–8.2)
PROT UR QL STRIP: 30 MG/DL
RBC # BLD AUTO: 5.18 M/UL (ref 4.2–5.4)
RBC # URNS HPF: ABNORMAL /HPF
RBC UR QL AUTO: ABNORMAL
SODIUM SERPL-SCNC: 139 MMOL/L (ref 135–145)
SP GR UR STRIP.AUTO: 1.01
UROBILINOGEN UR STRIP.AUTO-MCNC: 0.2 MG/DL
WBC # BLD AUTO: 10 K/UL (ref 4.8–10.8)
WBC #/AREA URNS HPF: ABNORMAL /HPF

## 2020-09-22 PROCEDURE — 87077 CULTURE AEROBIC IDENTIFY: CPT

## 2020-09-22 PROCEDURE — 700111 HCHG RX REV CODE 636 W/ 250 OVERRIDE (IP): Performed by: EMERGENCY MEDICINE

## 2020-09-22 PROCEDURE — 96365 THER/PROPH/DIAG IV INF INIT: CPT

## 2020-09-22 PROCEDURE — 700105 HCHG RX REV CODE 258: Performed by: EMERGENCY MEDICINE

## 2020-09-22 PROCEDURE — 80053 COMPREHEN METABOLIC PANEL: CPT

## 2020-09-22 PROCEDURE — 85025 COMPLETE CBC W/AUTO DIFF WBC: CPT | Mod: 91

## 2020-09-22 PROCEDURE — 90471 IMMUNIZATION ADMIN: CPT

## 2020-09-22 PROCEDURE — 700102 HCHG RX REV CODE 250 W/ 637 OVERRIDE(OP): Performed by: STUDENT IN AN ORGANIZED HEALTH CARE EDUCATION/TRAINING PROGRAM

## 2020-09-22 PROCEDURE — 71045 X-RAY EXAM CHEST 1 VIEW: CPT

## 2020-09-22 PROCEDURE — 700111 HCHG RX REV CODE 636 W/ 250 OVERRIDE (IP): Performed by: STUDENT IN AN ORGANIZED HEALTH CARE EDUCATION/TRAINING PROGRAM

## 2020-09-22 PROCEDURE — 700101 HCHG RX REV CODE 250: Performed by: STUDENT IN AN ORGANIZED HEALTH CARE EDUCATION/TRAINING PROGRAM

## 2020-09-22 PROCEDURE — 83605 ASSAY OF LACTIC ACID: CPT

## 2020-09-22 PROCEDURE — G0378 HOSPITAL OBSERVATION PER HR: HCPCS

## 2020-09-22 PROCEDURE — 99285 EMERGENCY DEPT VISIT HI MDM: CPT

## 2020-09-22 PROCEDURE — 90662 IIV NO PRSV INCREASED AG IM: CPT | Performed by: INTERNAL MEDICINE

## 2020-09-22 PROCEDURE — 700111 HCHG RX REV CODE 636 W/ 250 OVERRIDE (IP): Performed by: INTERNAL MEDICINE

## 2020-09-22 PROCEDURE — 87086 URINE CULTURE/COLONY COUNT: CPT

## 2020-09-22 PROCEDURE — 99220 PR INITIAL OBSERVATION CARE,LEVL III: CPT | Mod: GC | Performed by: INTERNAL MEDICINE

## 2020-09-22 PROCEDURE — 87040 BLOOD CULTURE FOR BACTERIA: CPT

## 2020-09-22 PROCEDURE — 87186 SC STD MICRODIL/AGAR DIL: CPT

## 2020-09-22 PROCEDURE — A9270 NON-COVERED ITEM OR SERVICE: HCPCS | Performed by: STUDENT IN AN ORGANIZED HEALTH CARE EDUCATION/TRAINING PROGRAM

## 2020-09-22 PROCEDURE — 96372 THER/PROPH/DIAG INJ SC/IM: CPT

## 2020-09-22 PROCEDURE — 81001 URINALYSIS AUTO W/SCOPE: CPT

## 2020-09-22 RX ORDER — POLYETHYLENE GLYCOL 3350 17 G/17G
1 POWDER, FOR SOLUTION ORAL
Status: DISCONTINUED | OUTPATIENT
Start: 2020-09-22 | End: 2020-09-24 | Stop reason: HOSPADM

## 2020-09-22 RX ORDER — ACETAMINOPHEN 500 MG
1000 TABLET ORAL ONCE
COMMUNITY
End: 2021-01-05

## 2020-09-22 RX ORDER — BISACODYL 10 MG
10 SUPPOSITORY, RECTAL RECTAL
Status: DISCONTINUED | OUTPATIENT
Start: 2020-09-22 | End: 2020-09-24 | Stop reason: HOSPADM

## 2020-09-22 RX ORDER — AMOXICILLIN 250 MG
2 CAPSULE ORAL 2 TIMES DAILY
Status: DISCONTINUED | OUTPATIENT
Start: 2020-09-22 | End: 2020-09-24 | Stop reason: HOSPADM

## 2020-09-22 RX ORDER — ATORVASTATIN CALCIUM 20 MG/1
20 TABLET, FILM COATED ORAL DAILY
Status: DISCONTINUED | OUTPATIENT
Start: 2020-09-23 | End: 2020-09-24 | Stop reason: HOSPADM

## 2020-09-22 RX ORDER — SODIUM CHLORIDE, SODIUM LACTATE, POTASSIUM CHLORIDE, CALCIUM CHLORIDE 600; 310; 30; 20 MG/100ML; MG/100ML; MG/100ML; MG/100ML
INJECTION, SOLUTION INTRAVENOUS CONTINUOUS
Status: DISCONTINUED | OUTPATIENT
Start: 2020-09-22 | End: 2020-09-24 | Stop reason: HOSPADM

## 2020-09-22 RX ORDER — OXYBUTYNIN CHLORIDE 5 MG/1
5 TABLET ORAL 3 TIMES DAILY
Status: DISCONTINUED | OUTPATIENT
Start: 2020-09-22 | End: 2020-09-24 | Stop reason: HOSPADM

## 2020-09-22 RX ORDER — SERTRALINE HYDROCHLORIDE 100 MG/1
150 TABLET, FILM COATED ORAL DAILY
COMMUNITY
End: 2021-02-01

## 2020-09-22 RX ORDER — FAMOTIDINE 40 MG/1
40 TABLET, FILM COATED ORAL DAILY
COMMUNITY
End: 2021-01-05

## 2020-09-22 RX ORDER — ONDANSETRON 2 MG/ML
4 INJECTION INTRAMUSCULAR; INTRAVENOUS EVERY 4 HOURS PRN
Status: DISCONTINUED | OUTPATIENT
Start: 2020-09-22 | End: 2020-09-24 | Stop reason: HOSPADM

## 2020-09-22 RX ORDER — LIDOCAINE 50 MG/G
1 PATCH TOPICAL EVERY 24 HOURS
Status: DISCONTINUED | OUTPATIENT
Start: 2020-09-22 | End: 2020-09-24 | Stop reason: HOSPADM

## 2020-09-22 RX ORDER — HEPARIN SODIUM 5000 [USP'U]/ML
5000 INJECTION, SOLUTION INTRAVENOUS; SUBCUTANEOUS EVERY 8 HOURS
Status: DISCONTINUED | OUTPATIENT
Start: 2020-09-22 | End: 2020-09-24 | Stop reason: HOSPADM

## 2020-09-22 RX ORDER — ACETAMINOPHEN 325 MG/1
650 TABLET ORAL EVERY 6 HOURS PRN
Status: DISCONTINUED | OUTPATIENT
Start: 2020-09-22 | End: 2020-09-24 | Stop reason: HOSPADM

## 2020-09-22 RX ORDER — FAMOTIDINE 20 MG/1
20 TABLET, FILM COATED ORAL DAILY
Status: DISCONTINUED | OUTPATIENT
Start: 2020-09-23 | End: 2020-09-24 | Stop reason: HOSPADM

## 2020-09-22 RX ORDER — ONDANSETRON 4 MG/1
4 TABLET, ORALLY DISINTEGRATING ORAL EVERY 4 HOURS PRN
Status: DISCONTINUED | OUTPATIENT
Start: 2020-09-22 | End: 2020-09-24 | Stop reason: HOSPADM

## 2020-09-22 RX ORDER — SODIUM CHLORIDE 9 MG/ML
1000 INJECTION, SOLUTION INTRAVENOUS ONCE
Status: COMPLETED | OUTPATIENT
Start: 2020-09-22 | End: 2020-09-22

## 2020-09-22 RX ORDER — ENALAPRILAT 1.25 MG/ML
1.25 INJECTION INTRAVENOUS EVERY 6 HOURS PRN
Status: DISCONTINUED | OUTPATIENT
Start: 2020-09-22 | End: 2020-09-24 | Stop reason: HOSPADM

## 2020-09-22 RX ADMIN — INFLUENZA A VIRUS A/MICHIGAN/45/2015 X-275 (H1N1) ANTIGEN (FORMALDEHYDE INACTIVATED), INFLUENZA A VIRUS A/SINGAPORE/INFIMH-16-0019/2016 IVR-186 (H3N2) ANTIGEN (FORMALDEHYDE INACTIVATED), INFLUENZA B VIRUS B/PHUKET/3073/2013 ANTIGEN (FORMALDEHYDE INACTIVATED), AND INFLUENZA B VIRUS B/MARYLAND/15/2016 BX-69A ANTIGEN (FORMALDEHYDE INACTIVATED) 0.7 ML: 60; 60; 60; 60 INJECTION, SUSPENSION INTRAMUSCULAR at 23:30

## 2020-09-22 RX ADMIN — OXYBUTYNIN CHLORIDE 5 MG: 5 TABLET ORAL at 23:28

## 2020-09-22 RX ADMIN — CEFTRIAXONE SODIUM 1 G: 1 INJECTION, POWDER, FOR SOLUTION INTRAMUSCULAR; INTRAVENOUS at 11:35

## 2020-09-22 RX ADMIN — HEPARIN SODIUM 5000 UNITS: 5000 INJECTION INTRAVENOUS; SUBCUTANEOUS at 22:39

## 2020-09-22 RX ADMIN — SERTRALINE HYDROCHLORIDE 150 MG: 100 TABLET ORAL at 18:59

## 2020-09-22 RX ADMIN — LIDOCAINE 1 PATCH: 50 PATCH CUTANEOUS at 19:00

## 2020-09-22 RX ADMIN — SODIUM CHLORIDE 1000 ML: 9 INJECTION, SOLUTION INTRAVENOUS at 11:02

## 2020-09-22 ASSESSMENT — ENCOUNTER SYMPTOMS
BLURRED VISION: 0
SENSORY CHANGE: 0
FALLS: 1
SPUTUM PRODUCTION: 0
HALLUCINATIONS: 0
DEPRESSION: 0
FLANK PAIN: 0
WHEEZING: 0
DIARRHEA: 0
FEVER: 0
CHILLS: 0
NERVOUS/ANXIOUS: 0
HEARTBURN: 0
ORTHOPNEA: 0
SPEECH CHANGE: 0
BLOOD IN STOOL: 0
DIZZINESS: 0
NAUSEA: 1
COUGH: 1
TINGLING: 1
LOSS OF CONSCIOUSNESS: 0
INSOMNIA: 0
CONSTIPATION: 1
VOMITING: 1
DOUBLE VISION: 0
SORE THROAT: 0
EYE PAIN: 0
PALPITATIONS: 0
SEIZURES: 0
PND: 0
BRUISES/BLEEDS EASILY: 0
WEAKNESS: 1
BACK PAIN: 1
ABDOMINAL PAIN: 0
NECK PAIN: 0
HEMOPTYSIS: 0
HEADACHES: 0

## 2020-09-22 ASSESSMENT — LIFESTYLE VARIABLES
CONSUMPTION TOTAL: NEGATIVE
EVER FELT BAD OR GUILTY ABOUT YOUR DRINKING: NO
DOES PATIENT WANT TO STOP DRINKING: NO
TOTAL SCORE: 0
HAVE YOU EVER FELT YOU SHOULD CUT DOWN ON YOUR DRINKING: NO
TOTAL SCORE: 0
TOTAL SCORE: 0
DO YOU DRINK ALCOHOL: NO
CONSUMPTION TOTAL: INCOMPLETE
EVER HAD A DRINK FIRST THING IN THE MORNING TO STEADY YOUR NERVES TO GET RID OF A HANGOVER: NO
ALCOHOL_USE: NO
EVER HAD A DRINK FIRST THING IN THE MORNING TO STEADY YOUR NERVES TO GET RID OF A HANGOVER: NO
AVERAGE NUMBER OF DAYS PER WEEK YOU HAVE A DRINK CONTAINING ALCOHOL: 0
EVER FELT BAD OR GUILTY ABOUT YOUR DRINKING: NO
HOW MANY TIMES IN THE PAST YEAR HAVE YOU HAD 5 OR MORE DRINKS IN A DAY: 0
HAVE YOU EVER FELT YOU SHOULD CUT DOWN ON YOUR DRINKING: NO
TOTAL SCORE: 0
ON A TYPICAL DAY WHEN YOU DRINK ALCOHOL HOW MANY DRINKS DO YOU HAVE: 0
HAVE PEOPLE ANNOYED YOU BY CRITICIZING YOUR DRINKING: NO
TOTAL SCORE: 0
TOTAL SCORE: 0
HAVE PEOPLE ANNOYED YOU BY CRITICIZING YOUR DRINKING: NO

## 2020-09-22 ASSESSMENT — PATIENT HEALTH QUESTIONNAIRE - PHQ9
2. FEELING DOWN, DEPRESSED, IRRITABLE, OR HOPELESS: NOT AT ALL
SUM OF ALL RESPONSES TO PHQ9 QUESTIONS 1 AND 2: 0
1. LITTLE INTEREST OR PLEASURE IN DOING THINGS: NOT AT ALL

## 2020-09-22 ASSESSMENT — FIBROSIS 4 INDEX: FIB4 SCORE: 1.14

## 2020-09-22 NOTE — ED PROVIDER NOTES
ED Provider Note    CHIEF COMPLAINT  Chief Complaint   Patient presents with   • Sent from Urgent Care   • Abnormal Labs     urinalysis, UTI       HPI  Herminia Jones is a 86 y.o. female who presents for follow-up for urinary tract infection.  The patient presented to her primary care provider's office yesterday for follow-up after she had a fall about 2 weeks ago.  The patient and laboratory analysis as well as a urinalysis as she has a history of recurrent urinary tract infections.  The urinalysis was consistent with a urinary tract infection and she had acute on chronic renal insufficiency and she was instructed to come into the emergency department.  She did have some vomiting this morning.  She denies dysuria.  She has not had any known fevers recently she also denies abdominal pain.    REVIEW OF SYSTEMS  See HPI for further details. All other systems are negative.     PAST MEDICAL HISTORY  Past Medical History:   Diagnosis Date   • Arthritis     hands    • Dental disorder     dentures   • Diabetes (HCC)     oral medication   • Glaucoma     both eyes    • Gynecological disorder    • Heart burn    • Macular degeneration    • Psychiatric problem     depression   • Snoring    • Urinary bladder disorder    • Urinary incontinence        FAMILY HISTORY  [unfilled]    SOCIAL HISTORY  Social History     Socioeconomic History   • Marital status:      Spouse name: Not on file   • Number of children: Not on file   • Years of education: Not on file   • Highest education level: Not on file   Occupational History   • Not on file   Social Needs   • Financial resource strain: Not on file   • Food insecurity     Worry: Not on file     Inability: Not on file   • Transportation needs     Medical: Not on file     Non-medical: Not on file   Tobacco Use   • Smoking status: Never Smoker   • Smokeless tobacco: Never Used   Substance and Sexual Activity   • Alcohol use: No   • Drug use: No   • Sexual activity: Not Currently    Lifestyle   • Physical activity     Days per week: Not on file     Minutes per session: Not on file   • Stress: Not on file   Relationships   • Social connections     Talks on phone: Not on file     Gets together: Not on file     Attends Adventism service: Not on file     Active member of club or organization: Not on file     Attends meetings of clubs or organizations: Not on file     Relationship status: Not on file   • Intimate partner violence     Fear of current or ex partner: Not on file     Emotionally abused: Not on file     Physically abused: Not on file     Forced sexual activity: Not on file   Other Topics Concern   •  Service Not Asked   • Blood Transfusions Not Asked   • Caffeine Concern Not Asked   • Occupational Exposure Not Asked   • Hobby Hazards Not Asked   • Sleep Concern Not Asked   • Stress Concern Not Asked   • Weight Concern Not Asked   • Special Diet Not Asked   • Back Care Not Asked   • Exercise Not Asked   • Bike Helmet Not Asked   • Seat Belt Not Asked   • Self-Exams Not Asked   Social History Narrative   • Not on file       SURGICAL HISTORY  Past Surgical History:   Procedure Laterality Date   • ANTERIOR AND POSTERIOR REPAIR  12/3/2019    Procedure: COLPORRHAPHY, COMBINED ANTEROPOSTERIOR;  Surgeon: Marjan Henley M.D.;  Location: SURGERY SAME DAY [facility];  Service: Gynecology   • BLADDER SLING FEMALE N/A 4/20/2018    Procedure: BLADDER SLING FEMALE- TOT;  Surgeon: Espinoza Bryan M.D.;  Location: SURGERY SAME DAY [facility];  Service: Gynecology   • ANTERIOR REPAIR N/A 4/20/2018    Procedure: ANTERIOR REPAIR- COLPHORRHAPHY;  Surgeon: Espinoza Bryan M.D.;  Location: SURGERY SAME DAY [facility];  Service: Gynecology   • PELVISCOPY N/A 4/20/2018    Procedure: PELVISCOPY- FOR SACRAL SPINOUS FIXATION;  Surgeon: Espinoza Bryan M.D.;  Location: SURGERY SAME DAY [facility];  Service: Gynecology   • HIP CANNULATED SCREW Left 12/1/2017    Procedure: HIP CANNULATED SCREW;   "Surgeon: Abhinav Askew M.D.;  Location: SURGERY [facility];  Service: Orthopedics   • LEFORT COLPOCLESIS  6/2/2017    Procedure: LEFORT COLPOCLESIS, perineorrhaphy, posterior colporraphy;  Surgeon: Espinoza Bryan M.D.;  Location: SURGERY SAME DAY [facility];  Service:    • CYSTOSCOPY  6/2/2017    Procedure: CYSTOSCOPY;  Surgeon: Espinoza Bryan M.D.;  Location: SURGERY SAME DAY [facility];  Service:    • ABDOMINAL HYSTERECTOMY TOTAL         CURRENT MEDICATIONS  Home Medications    **Home medications have not yet been reviewed for this encounter**         ALLERGIES  Allergies   Allergen Reactions   • Sulfamethoxazole      Does not remember reaction        PHYSICAL EXAM  VITAL SIGNS: BP (!) 86/57   Pulse (!) 108   Temp 36.6 °C (97.9 °F) (Temporal)   Resp 16   Ht 1.651 m (5' 5\")   Wt 54.4 kg (120 lb)   SpO2 96%   BMI 19.97 kg/m²       Constitutional: Chronically ill in appearance.   HENT: Normocephalic, Atraumatic, Bilateral external ears normal, Oropharynx moist, No oral exudates, Nose normal.   Eyes: PERRLA, EOMI, Conjunctiva normal, No discharge.   Neck: Normal range of motion, No tenderness, Supple, No stridor.   Lymphatic: No lymphadenopathy noted.   Cardiovascular: Tachycardic heart rate, Normal rhythm, No murmurs, No rubs, No gallops.   Thorax & Lungs: Normal breath sounds, No respiratory distress, No wheezing, No chest tenderness.   Abdomen: Bowel sounds normal, Soft, suprapubic tenderness, No masses, No pulsatile masses.   Skin: Warm, Dry, No erythema, No rash.   Back: No tenderness, No CVA tenderness.   Extremities: Intact distal pulses, No edema, No tenderness, No cyanosis, No clubbing.   Neurologic: Alert & oriented x 3, Normal motor function, Normal sensory function, No focal deficits noted.   Psychiatric: Affect normal, Judgment normal, Mood normal.     Results for orders placed or performed during the hospital encounter of 09/22/20   Lactic acid (lactate)   Result Value Ref " Range    Lactic Acid 0.9 0.5 - 2.0 mmol/L   CBC WITH DIFFERENTIAL   Result Value Ref Range    WBC 10.0 4.8 - 10.8 K/uL    RBC 5.18 4.20 - 5.40 M/uL    Hemoglobin 14.8 12.0 - 16.0 g/dL    Hematocrit 45.8 37.0 - 47.0 %    MCV 88.4 81.4 - 97.8 fL    MCH 28.6 27.0 - 33.0 pg    MCHC 32.3 (L) 33.6 - 35.0 g/dL    RDW 47.5 35.9 - 50.0 fL    Platelet Count 321 164 - 446 K/uL    MPV 9.6 9.0 - 12.9 fL    Neutrophils-Polys 71.00 44.00 - 72.00 %    Lymphocytes 19.60 (L) 22.00 - 41.00 %    Monocytes 6.30 0.00 - 13.40 %    Eosinophils 1.60 0.00 - 6.90 %    Basophils 0.80 0.00 - 1.80 %    Immature Granulocytes 0.70 0.00 - 0.90 %    Nucleated RBC 0.00 /100 WBC    Neutrophils (Absolute) 7.10 2.00 - 7.15 K/uL    Lymphs (Absolute) 1.96 1.00 - 4.80 K/uL    Monos (Absolute) 0.63 0.00 - 0.85 K/uL    Eos (Absolute) 0.16 0.00 - 0.51 K/uL    Baso (Absolute) 0.08 0.00 - 0.12 K/uL    Immature Granulocytes (abs) 0.07 0.00 - 0.11 K/uL    NRBC (Absolute) 0.00 K/uL   COMP METABOLIC PANEL   Result Value Ref Range    Sodium 139 135 - 145 mmol/L    Potassium 4.3 3.6 - 5.5 mmol/L    Chloride 104 96 - 112 mmol/L    Co2 21 20 - 33 mmol/L    Anion Gap 14.0 7.0 - 16.0    Glucose 119 (H) 65 - 99 mg/dL    Bun 36 (H) 8 - 22 mg/dL    Creatinine 1.91 (H) 0.50 - 1.40 mg/dL    Calcium 9.9 8.5 - 10.5 mg/dL    AST(SGOT) 6 (L) 12 - 45 U/L    ALT(SGPT) 5 2 - 50 U/L    Alkaline Phosphatase 77 30 - 99 U/L    Total Bilirubin 0.4 0.1 - 1.5 mg/dL    Albumin 3.7 3.2 - 4.9 g/dL    Total Protein 7.9 6.0 - 8.2 g/dL    Globulin 4.2 (H) 1.9 - 3.5 g/dL    A-G Ratio 0.9 g/dL   URINALYSIS    Specimen: Urine   Result Value Ref Range    Color Yellow     Character Cloudy (A)     Specific Gravity 1.012 <1.035    Ph 5.0 5.0 - 8.0    Glucose Negative Negative mg/dL    Ketones Negative Negative mg/dL    Protein 30 (A) Negative mg/dL    Bilirubin Negative Negative    Urobilinogen, Urine 0.2 Negative    Nitrite Positive (A) Negative    Leukocyte Esterase Large (A) Negative    Occult Blood  Small (A) Negative    Micro Urine Req Microscopic    ESTIMATED GFR   Result Value Ref Range    GFR If African American 30 (A) >60 mL/min/1.73 m 2    GFR If Non African American 25 (A) >60 mL/min/1.73 m 2   URINE MICROSCOPIC (W/UA)   Result Value Ref Range    -150 (A) /hpf    RBC 0-2 /hpf    Bacteria Many (A) None /hpf    Epithelial Cells Few /hpf    Hyaline Cast 6-10 (A) /lpf         COURSE & MEDICAL DECISION MAKING  Pertinent Labs & Imaging studies reviewed. (See chart for details)  This an 86-year-old female who presents the emergency department with a urinary tract infection per her primary care provider.  I did review her work-up and it did show some acute on chronic renal insufficiency as well as a urinalysis that was consistent with a urinary tract infection.  In further speaking with the family she has had some recurrent falls and she also had vomiting last night.  Her blood pressure has been slightly low but she is not tachycardic.  She received a liter of fluid intravenously.  Laboratory analysis does not show any evidence of acidosis and she does not have a lactic acidosis.  Therefore is unlikely the patient is septic.  We will bit the patient for IV antibiotics and further hydration.    FINAL IMPRESSION  1.  Urinary tract infection  2.  Acute on chronic renal insufficiency    Disposition  Patient will be admitted in stable condition         Electronically signed by: Kadeem Castillo M.D., 9/22/2020 10:43 AM

## 2020-09-22 NOTE — ED NOTES
Pt's med rec was reviewed and completed per patient's family - patient's daughter had a list of current meds and allergies (returned notebook to family)  Patient was unable to fully participate in interview   Pt taking FAMOTIDINE 40mg differently as prescribed - per daughter having a hard time cutting the dose in half to take twice daily, so she is taking a full tablet daily  Pt was prescribed up to 30mg of OXYBUTYNIN DAILY, but reports taking 15mg TOTAL DAILY  ZOLOFT 150mg was taken last night, but patient vomited after taking her dose  Pt recently stopped taking the following medications: LISINOPRIL 2.5MG, METFORMIN 500MG BID, and VITAMIN D2 82399 IU WEEKLY  No abx use within 2 weeks   Allergies reviewed and verified with pt's daughter   Home pharmacy: [facility] in [place]

## 2020-09-22 NOTE — PROGRESS NOTES
[facility] OFFICER ADMISSION ACCEPTANCE NOTE:    - I spoke and discussed the case with the ER physician, Dr. Castillo.  - This is a 86 y.o. female with diabetes mellitus, who presented to the ED with UTI, and acute on chronic renal failure.  Labs showed creatinine of 1.91.  Electrolytes are normal.  No leukocytosis.  Lactic is normal.  Blood pressure is soft.  Patient has been given IV fluids, and IV ceftriaxone.  - Patient now being admitted to the hospital for further management.   - Will assign to [facility]. Senior [facility] resident Dr. Schneider contacted and accepted admission.  - Please call admitting physician for full admission orders, and cross-coverage issues on patient's arrival to the unit.

## 2020-09-22 NOTE — ED NOTES
Pt laying in bed. no signs of acute distress. respirations even and unlabored. call light within reach. Side rails up x 2 for safety. Family at bedside. Pending admission.

## 2020-09-22 NOTE — ED TRIAGE NOTES
Chief Complaint   Patient presents with   • Sent from Urgent Care   • Abnormal Labs     urinalysis, UTI     Triage process explained to patient.  Pt instructed to inform RN if any changes or questions arise.  Pt back to waiting room.  Charge RN aware of sepsis score of 3.

## 2020-09-22 NOTE — H&P
History & Physical Note    Date of Admission: 9/22/2020  Admission Status: Emergency  [facility] Team: [facility]  Attending: Dr. Christiano Bowers  Senior Resident: Dr. Elba Schneider  Intern: Dr. Melecoi Mariscal  Contact Number: [phone]    Chief Complaint:   Sent from Urgent Care for abnormal labs      History of Present Illness (HPI):   86F who presents with generalized weakness and recurrent falls. Patient has a history of falls on 09/02, 09/11, 09/14, 09/17 (documented by daughter who is present in room) and was taken by daughter to PCP after initial fall for X-rays to check for injury. Her PCP ordered labs and when follow-up lab results came in this morning, PCP called daughter and instructed her to bring patient to ER for concern of kidney function and UTI.     Past medical history is significant for diabetes, urinary incontinence, recurrent UTI on chronic trimethoprim, and mild cognitive impairment. Patient cannot correlate any symptoms/timing of falls. First fall was getting out of shower, other three while in restroom. Patient denies head trauma, loss of consciousness, dizziness, sensation of spinning, lightheadedness or aura during any falls. Yesterday night, she had one episode of nonbilious nonbloody emesis consisting of food particles.  Patient denies fever, chills, dysuria, abdominal pain, diarrhea.  She has poor oral intake, and has been losing weight.    ED Course: Creatinine 1.91. Electrolytes normal. No leukocytosis. LA normal. BP soft to 80s. Received 1L of NS. Started IV ceftriaxone.    Review of Systems:  Review of Systems   Constitutional: Positive for malaise/fatigue. Negative for chills and fever.   HENT: Negative for ear discharge, ear pain, hearing loss, nosebleeds, sore throat and tinnitus.    Eyes: Negative for blurred vision, double vision and pain.   Respiratory: Positive for cough. Negative for hemoptysis, sputum production and wheezing.    Cardiovascular: Negative for chest pain,  palpitations, orthopnea and PND.   Gastrointestinal: Positive for constipation (uses stool softener), nausea and vomiting (Dinner last night). Negative for abdominal pain, blood in stool, diarrhea and heartburn.   Genitourinary: Positive for frequency and urgency. Negative for dysuria, flank pain and hematuria.   Musculoskeletal: Positive for back pain (chronic) and falls (as above). Negative for neck pain.   Skin: Negative for itching and rash.   Neurological: Positive for tingling (hands distally) and weakness (generalized). Negative for dizziness, sensory change, speech change, seizures, loss of consciousness and headaches.   Endo/Heme/Allergies: Does not bruise/bleed easily.   Psychiatric/Behavioral: Negative for depression, hallucinations and suicidal ideas. The patient is not nervous/anxious and does not have insomnia.    All other systems reviewed and are negative.    Past Medical History:   Past Medical History was reviewed with patient.   has a past medical history of Arthritis, Dental disorder, Diabetes (HCC), Glaucoma, Gynecological disorder, Heart burn, Macular degeneration, Psychiatric problem, Snoring, Urinary bladder disorder, and Urinary incontinence.    Past Surgical History: Past Surgical History was reviewed with patient.   has a past surgical history that includes abdominal hysterectomy total; lefort colpoclesis (6/2/2017); cystoscopy (6/2/2017); hip cannulated screw (Left, 12/1/2017); bladder sling female (N/A, 4/20/2018); anterior repair (N/A, 4/20/2018); pelviscopy (N/A, 4/20/2018); and anterior and posterior repair (12/3/2019).    Medications: Medications have been reviewed with patient.  Prior to Admission Medications   Prescriptions Last Dose Informant Patient Reported? Taking?   Calcium Carbonate (CALCARB 600 PO) 9/22/2020 at 0945 Family Member Yes Yes   Sig: Take 600 mg by mouth every morning.   Non Formulary Request >1 week at [facility] Family Member Yes No   Sig: Place 2 Drops in both eyes  every day. OCUVITE DROPS : 2 DROPS INTO BOTH EYES DAILY   acetaminophen (TYLENOL) 500 MG Tab 9/20/2020 at ONCE Family Member Yes Yes   Sig: Take 1,000 mg by mouth Once. 2 tablets = 1000mg   atorvastatin (LIPITOR) 20 MG Tab 9/22/2020 at 0945 Family Member No No   Sig: TAKE ONE TABLET BY MOUTH DAILY   famotidine (PEPCID) 40 MG Tab 9/22/2020 at 0945 Family Member Yes Yes   Sig: Take 40 mg by mouth every day.   oxybutynin (DITROPAN) 5 MG Tab 9/22/2020 at 0945 Family Member Yes No   Sig: Take 5 mg by mouth 3 times a day.   sertraline (ZOLOFT) 100 MG Tab 9/21/2020 at PM Family Member Yes Yes   Sig: Take 150 mg by mouth every day. 1.5 tablets = 150mg   trimethoprim (TRIMPEX) 100 MG Tab 9/22/2020 at 0945 Family Member No No   Sig: TAKE ONE TABLET BY MOUTH DAILY      Facility-Administered Medications: None     Lisinopril recently stopped by PCP.     Allergies: Allergies have been reviewed-  with patient.  Allergies   Allergen Reactions   • Sulfamethoxazole      Does not remember reaction        Family History:   family history includes Cancer in her brother, brother, and sister; Heart Disease in her sister; Stroke in her father.     Social History:   Tobacco: Denies past and present.  Alcohol: Denies past and present except mildly recreational.  Recreational drugs (illegal and prescription):  Denies.    Employment: Former , .  Activity Level: Limited.   Living situation:  With daughter, son-in-law, 2 grandkids in 20s.  Recent travel:  [place], August.   Primary Care Provider: reviewed LETY Kulkarni  Other (stressors, spirituality, exposures):  Denies.  Physical Exam:   Vitals:  Temp:  [36.1 °C (97 °F)-36.6 °C (97.9 °F)] 36.1 °C (97 °F)  Pulse:  [] 93  Resp:  [16-19] 18  BP: ()/(50-60) 135/60  SpO2:  [95 %-99 %] 98 %    Physical exam:  Constitutional:  No acute distress. A&O x to day, place, person and current president.   HENMT:  Normocephalic, Atraumatic  Eyes:  PERRLA, EOMI,  Conjunctiva normal  Neck:  Supple, Full range of motion, No stridor  Cardiovascular:  Regular rate and rhythm, No murmurs, No rubs, No gallops.   Lungs: Respiratory effort is normal, no crackles, no wheezing.  Extremities: Good pedal pulses b/l, no edema, 5/5 strength. No bruising.   Abdomen: Bowel sounds x4, Soft, Non-tender, No guarding, No rebound, No masses.No bruising on flanks or back. No CVA tenderness.   Bladder appears mildly distended. No suprapubic tenderness.   Neurologic: Good sensations, Cranial nerves II through XII grossly intact. No focal deficits noted.    Labs:   Lab Results   Component Value Date/Time    SODIUM 139 09/22/2020 10:47 AM    POTASSIUM 4.3 09/22/2020 10:47 AM    CHLORIDE 104 09/22/2020 10:47 AM    CO2 21 09/22/2020 10:47 AM    GLUCOSE 119 (H) 09/22/2020 10:47 AM    BUN 36 (H) 09/22/2020 10:47 AM    CREATININE 1.91 (H) 09/22/2020 10:47 AM    BUNCREATRAT 17 01/16/2017 01:04 PM      Lab Results   Component Value Date/Time    WBC 10.0 09/22/2020 10:47 AM    RBC 5.18 09/22/2020 10:47 AM    HEMOGLOBIN 14.8 09/22/2020 10:47 AM    HEMATOCRIT 45.8 09/22/2020 10:47 AM    MCV 88.4 09/22/2020 10:47 AM    MCH 28.6 09/22/2020 10:47 AM    MCHC 32.3 (L) 09/22/2020 10:47 AM    MPV 9.6 09/22/2020 10:47 AM    NEUTSPOLYS 71.00 09/22/2020 10:47 AM    LYMPHOCYTES 19.60 (L) 09/22/2020 10:47 AM    MONOCYTES 6.30 09/22/2020 10:47 AM    EOSINOPHILS 1.60 09/22/2020 10:47 AM    BASOPHILS 0.80 09/22/2020 10:47 AM      Imaging:   DX-CHEST-PORTABLE (1 VIEW)   Final Result      No acute cardiopulmonary process is seen.      Atherosclerotic plaque.      Biapical pleural thickening/scarring.      Fractures of the 8th and 9th ribs on the right.        Previous Data Review: reviewed    Problem Representation:     UTI (urinary tract infection)  Assessment & Plan  - Lower urinary tract infection, SIRS 2/4.  - History of recurrent UTIs in the setting of urine retention.   - Several previous urine cultures growing E. coli.  "  - Started ceftriaxone (09/22/2020) based on previous sensitivities.  - Regular bladder scans with post-void residuals - straight catheter if > 400ml.  - Blood cultures x 2 drawn.    Rib fractures  Assessment & Plan  - CXR showing fracture of 8th and 9th ribs on right.  - Possibly due to recent falls.  - Will manage conservatively, Tylenol, lidocaine patches.  - Incentive spirometry.  - PT/OT evaluation.    Acute kidney injury superimposed on chronic kidney disease (HCC)  Assessment & Plan  - Creatinine 1.91 on admit.  - Hypotensive and appears dry.  - Patient's RAFIA likely prerenal in setting of poor oral intake.  - LR started at 100ml/hr.   - Monitor daily BMP, further work-up if necessary.    Mild cognitive impairment  Assessment & Plan  - Daughter present for much of history, frequent corrections.  - Mini cognitive evaluation (MOCA).     Recurrent falls  Assessment & Plan  - See \"Rib fractures.\"  - Will check orthostatics.       "

## 2020-09-22 NOTE — PROGRESS NOTES
Pt arrived to unit via gurney at 1605. Pt oriented to room, unit, and plan of care. VSS; Daughter at bedside;  All questions answered at this time. Pt x1 assist to restroom w. FWW; Bed Alarm ON educated provided; Call light within reach; fall precautions in place.

## 2020-09-23 ENCOUNTER — APPOINTMENT (OUTPATIENT)
Dept: RADIOLOGY | Facility: MEDICAL CENTER | Age: 85
End: 2020-09-23
Attending: INTERNAL MEDICINE
Payer: MEDICARE

## 2020-09-23 LAB
ALBUMIN SERPL BCP-MCNC: 3 G/DL (ref 3.2–4.9)
ALBUMIN/GLOB SERPL: 0.8 G/DL
ALP SERPL-CCNC: 61 U/L (ref 30–99)
ALT SERPL-CCNC: 7 U/L (ref 2–50)
ANION GAP SERPL CALC-SCNC: 13 MMOL/L (ref 7–16)
AST SERPL-CCNC: 10 U/L (ref 12–45)
BASOPHILS # BLD AUTO: 0.6 % (ref 0–1.8)
BASOPHILS # BLD: 0.07 K/UL (ref 0–0.12)
BILIRUB SERPL-MCNC: 0.3 MG/DL (ref 0.1–1.5)
BUN SERPL-MCNC: 29 MG/DL (ref 8–22)
CALCIUM SERPL-MCNC: 8.9 MG/DL (ref 8.5–10.5)
CHLORIDE SERPL-SCNC: 105 MMOL/L (ref 96–112)
CO2 SERPL-SCNC: 19 MMOL/L (ref 20–33)
CREAT SERPL-MCNC: 1.39 MG/DL (ref 0.5–1.4)
EOSINOPHIL # BLD AUTO: 0.15 K/UL (ref 0–0.51)
EOSINOPHIL NFR BLD: 1.3 % (ref 0–6.9)
ERYTHROCYTE [DISTWIDTH] IN BLOOD BY AUTOMATED COUNT: 49.3 FL (ref 35.9–50)
GLOBULIN SER CALC-MCNC: 3.6 G/DL (ref 1.9–3.5)
GLUCOSE SERPL-MCNC: 95 MG/DL (ref 65–99)
HCT VFR BLD AUTO: 40 % (ref 37–47)
HGB BLD-MCNC: 12.7 G/DL (ref 12–16)
IMM GRANULOCYTES # BLD AUTO: 0.08 K/UL (ref 0–0.11)
IMM GRANULOCYTES NFR BLD AUTO: 0.7 % (ref 0–0.9)
LYMPHOCYTES # BLD AUTO: 1.6 K/UL (ref 1–4.8)
LYMPHOCYTES NFR BLD: 13.7 % (ref 22–41)
MAGNESIUM SERPL-MCNC: 2 MG/DL (ref 1.5–2.5)
MCH RBC QN AUTO: 29.1 PG (ref 27–33)
MCHC RBC AUTO-ENTMCNC: 31.8 G/DL (ref 33.6–35)
MCV RBC AUTO: 91.5 FL (ref 81.4–97.8)
MONOCYTES # BLD AUTO: 0.81 K/UL (ref 0–0.85)
MONOCYTES NFR BLD AUTO: 6.9 % (ref 0–13.4)
NEUTROPHILS # BLD AUTO: 8.95 K/UL (ref 2–7.15)
NEUTROPHILS NFR BLD: 76.8 % (ref 44–72)
NRBC # BLD AUTO: 0 K/UL
NRBC BLD-RTO: 0 /100 WBC
PLATELET # BLD AUTO: 260 K/UL (ref 164–446)
PMV BLD AUTO: 9.6 FL (ref 9–12.9)
POTASSIUM SERPL-SCNC: 4.1 MMOL/L (ref 3.6–5.5)
PROT SERPL-MCNC: 6.6 G/DL (ref 6–8.2)
RBC # BLD AUTO: 4.37 M/UL (ref 4.2–5.4)
SODIUM SERPL-SCNC: 137 MMOL/L (ref 135–145)
WBC # BLD AUTO: 11.7 K/UL (ref 4.8–10.8)

## 2020-09-23 PROCEDURE — 700102 HCHG RX REV CODE 250 W/ 637 OVERRIDE(OP): Performed by: STUDENT IN AN ORGANIZED HEALTH CARE EDUCATION/TRAINING PROGRAM

## 2020-09-23 PROCEDURE — 700102 HCHG RX REV CODE 250 W/ 637 OVERRIDE(OP): Performed by: INTERNAL MEDICINE

## 2020-09-23 PROCEDURE — 700105 HCHG RX REV CODE 258: Performed by: STUDENT IN AN ORGANIZED HEALTH CARE EDUCATION/TRAINING PROGRAM

## 2020-09-23 PROCEDURE — A9270 NON-COVERED ITEM OR SERVICE: HCPCS | Performed by: STUDENT IN AN ORGANIZED HEALTH CARE EDUCATION/TRAINING PROGRAM

## 2020-09-23 PROCEDURE — 302126 SENSORMAT,CHAIR: Performed by: INTERNAL MEDICINE

## 2020-09-23 PROCEDURE — 96372 THER/PROPH/DIAG INJ SC/IM: CPT

## 2020-09-23 PROCEDURE — A9270 NON-COVERED ITEM OR SERVICE: HCPCS | Performed by: INTERNAL MEDICINE

## 2020-09-23 PROCEDURE — 97165 OT EVAL LOW COMPLEX 30 MIN: CPT

## 2020-09-23 PROCEDURE — 97161 PT EVAL LOW COMPLEX 20 MIN: CPT

## 2020-09-23 PROCEDURE — 700111 HCHG RX REV CODE 636 W/ 250 OVERRIDE (IP): Performed by: STUDENT IN AN ORGANIZED HEALTH CARE EDUCATION/TRAINING PROGRAM

## 2020-09-23 PROCEDURE — 76856 US EXAM PELVIC COMPLETE: CPT

## 2020-09-23 PROCEDURE — 80053 COMPREHEN METABOLIC PANEL: CPT

## 2020-09-23 PROCEDURE — 83735 ASSAY OF MAGNESIUM: CPT

## 2020-09-23 PROCEDURE — 99225 PR SUBSEQUENT OBSERVATION CARE,LEVEL II: CPT | Mod: GC | Performed by: INTERNAL MEDICINE

## 2020-09-23 PROCEDURE — G0378 HOSPITAL OBSERVATION PER HR: HCPCS

## 2020-09-23 PROCEDURE — 700101 HCHG RX REV CODE 250: Performed by: STUDENT IN AN ORGANIZED HEALTH CARE EDUCATION/TRAINING PROGRAM

## 2020-09-23 PROCEDURE — 36415 COLL VENOUS BLD VENIPUNCTURE: CPT

## 2020-09-23 RX ORDER — MIRTAZAPINE 15 MG/1
7.5 TABLET, FILM COATED ORAL
Status: DISCONTINUED | OUTPATIENT
Start: 2020-09-23 | End: 2020-09-24 | Stop reason: HOSPADM

## 2020-09-23 RX ADMIN — ATORVASTATIN CALCIUM 20 MG: 20 TABLET, FILM COATED ORAL at 05:43

## 2020-09-23 RX ADMIN — OXYBUTYNIN CHLORIDE 5 MG: 5 TABLET ORAL at 18:44

## 2020-09-23 RX ADMIN — OXYBUTYNIN CHLORIDE 5 MG: 5 TABLET ORAL at 23:01

## 2020-09-23 RX ADMIN — CEFTRIAXONE SODIUM 1 G: 1 INJECTION, POWDER, FOR SOLUTION INTRAMUSCULAR; INTRAVENOUS at 05:43

## 2020-09-23 RX ADMIN — MIRTAZAPINE 7.5 MG: 15 TABLET, FILM COATED ORAL at 22:58

## 2020-09-23 RX ADMIN — FAMOTIDINE 20 MG: 20 TABLET, FILM COATED ORAL at 05:43

## 2020-09-23 RX ADMIN — SODIUM CHLORIDE, POTASSIUM CHLORIDE, SODIUM LACTATE AND CALCIUM CHLORIDE: 600; 310; 30; 20 INJECTION, SOLUTION INTRAVENOUS at 05:42

## 2020-09-23 RX ADMIN — HEPARIN SODIUM 5000 UNITS: 5000 INJECTION INTRAVENOUS; SUBCUTANEOUS at 22:57

## 2020-09-23 RX ADMIN — LIDOCAINE 1 PATCH: 50 PATCH CUTANEOUS at 21:45

## 2020-09-23 RX ADMIN — HEPARIN SODIUM 5000 UNITS: 5000 INJECTION INTRAVENOUS; SUBCUTANEOUS at 14:39

## 2020-09-23 RX ADMIN — SERTRALINE HYDROCHLORIDE 150 MG: 100 TABLET ORAL at 20:47

## 2020-09-23 RX ADMIN — DOCUSATE SODIUM 50 MG AND SENNOSIDES 8.6 MG 2 TABLET: 8.6; 5 TABLET, FILM COATED ORAL at 17:30

## 2020-09-23 RX ADMIN — HEPARIN SODIUM 5000 UNITS: 5000 INJECTION INTRAVENOUS; SUBCUTANEOUS at 05:44

## 2020-09-23 RX ADMIN — OXYBUTYNIN CHLORIDE 5 MG: 5 TABLET ORAL at 09:23

## 2020-09-23 ASSESSMENT — COGNITIVE AND FUNCTIONAL STATUS - GENERAL
MOBILITY SCORE: 23
SUGGESTED CMS G CODE MODIFIER DAILY ACTIVITY: CI
CLIMB 3 TO 5 STEPS WITH RAILING: A LITTLE
DAILY ACTIVITIY SCORE: 23
HELP NEEDED FOR BATHING: A LITTLE
SUGGESTED CMS G CODE MODIFIER MOBILITY: CI

## 2020-09-23 ASSESSMENT — ENCOUNTER SYMPTOMS
CHILLS: 0
DOUBLE VISION: 0
HEARTBURN: 0
DEPRESSION: 0
COUGH: 0
MYALGIAS: 0
TINGLING: 0
BACK PAIN: 1
PALPITATIONS: 0
SHORTNESS OF BREATH: 0
BLURRED VISION: 0
ABDOMINAL PAIN: 0
FEVER: 0
WEAKNESS: 1
HEADACHES: 0
BRUISES/BLEEDS EASILY: 0
VOMITING: 0
DIZZINESS: 0
NAUSEA: 0

## 2020-09-23 ASSESSMENT — GAIT ASSESSMENTS
DEVIATION: BRADYKINETIC
ASSISTIVE DEVICE: FRONT WHEEL WALKER
GAIT LEVEL OF ASSIST: SUPERVISED
DISTANCE (FEET): 200

## 2020-09-23 ASSESSMENT — ACTIVITIES OF DAILY LIVING (ADL): TOILETING: INDEPENDENT

## 2020-09-23 ASSESSMENT — PAIN DESCRIPTION - PAIN TYPE: TYPE: ACUTE PAIN

## 2020-09-23 NOTE — PROGRESS NOTES
Daily Progress Note:     Date of Service: 9/23/2020  Primary Team: [facility]   Attending: Christiano Bowers M.D.   Senior Resident: Dr. Schneider  Intern: Dr. Mariscal  Contact:  [phone]    Chief Complaint:   Chief Complaint   Patient presents with   • Sent from Urgent Care   • Abnormal Labs     urinalysis, UTI     ID:  86-year-old female with past medical history of diabetes, urinary incontinence, recurrent UTI on chronic trimethoprim, mild cognitive impairment who presents with generalized weakness and recurrent falls.    Subjective:   No acute events overnight.  Patient feels well this morning.  She is sitting up in chair.     Consultants/Specialty:  N/A    Review of Systems:    Review of Systems   Constitutional: Negative for chills and fever.   HENT: Negative for hearing loss and tinnitus.    Eyes: Negative for blurred vision and double vision.   Respiratory: Negative for cough and shortness of breath.    Cardiovascular: Negative for chest pain and palpitations.   Gastrointestinal: Negative for abdominal pain, heartburn, nausea and vomiting.   Genitourinary: Positive for frequency. Negative for dysuria.   Musculoskeletal: Positive for back pain (rigth posterior ribs). Negative for myalgias.   Skin: Negative for rash.   Neurological: Positive for weakness (generalized). Negative for dizziness, tingling and headaches.   Endo/Heme/Allergies: Does not bruise/bleed easily.   Psychiatric/Behavioral: Negative for depression and suicidal ideas.       Objective Data:   Physical Exam:   Vitals:   Temp:  [36.7 °C (98.1 °F)-37.3 °C (99.2 °F)] 36.9 °C (98.4 °F)  Pulse:  [] 74  Resp:  [14-24] 16  BP: ()/(51-67) 94/52  SpO2:  [93 %-97 %] 94 %    Physical Exam  Constitutional:       General: She is not in acute distress.     Appearance: She is underweight.   HENT:      Head: Normocephalic and atraumatic.      Nose: Nose normal.      Mouth/Throat:      Mouth: Mucous membranes are moist.      Pharynx:  Oropharynx is clear.   Eyes:      Extraocular Movements: Extraocular movements intact.      Conjunctiva/sclera: Conjunctivae normal.      Pupils: Pupils are equal, round, and reactive to light.   Neck:      Musculoskeletal: Normal range of motion and neck supple.   Cardiovascular:      Rate and Rhythm: Normal rate and regular rhythm.      Pulses: Normal pulses.      Heart sounds: Normal heart sounds.   Pulmonary:      Effort: Pulmonary effort is normal.      Breath sounds: Normal breath sounds.   Abdominal:      General: Bowel sounds are normal.      Tenderness: There is no abdominal tenderness. There is no right CVA tenderness, left CVA tenderness, guarding or rebound.      Comments: Form mass palpated in lower abdomen, unclear if this is bladder or not   Musculoskeletal: Normal range of motion.        Arms:       Right lower leg: No edema.      Left lower leg: No edema.   Skin:     General: Skin is warm and dry.      Capillary Refill: Capillary refill takes less than 2 seconds.   Neurological:      General: No focal deficit present.      Mental Status: She is alert.      Comments: Oriented only to place and person, not to time   Psychiatric:         Mood and Affect: Mood normal.         Behavior: Behavior normal.         Thought Content: Thought content does not include homicidal or suicidal ideation.         Quality Measures  Quality-Core Measures   Reviewed items::  EKG reviewed, Labs reviewed, Medications reviewed and Radiology images reviewed  Mckoy catheter::  No Mckoy  DVT prophylaxis pharmacological::  Heparin  DVT prophylaxis - mechanical:  SCDs  Ulcer Prophylaxis::  Not indicated  Antibiotics:  Treating active infection/contamination beyond 24 hours perioperative coverage  Assessed for rehabilitation services:  Patient was assess for and/or received rehabilitation services during this hospitalization      Labs:   Recent Labs     09/22/20  1047 09/22/20  2350   WBC 10.0 11.7*   RBC 5.18 4.37   HEMOGLOBIN  14.8 12.7   HEMATOCRIT 45.8 40.0   MCV 88.4 91.5   MCH 28.6 29.1   RDW 47.5 49.3   PLATELETCT 321 260   MPV 9.6 9.6   NEUTSPOLYS 71.00 76.80*   LYMPHOCYTES 19.60* 13.70*   MONOCYTES 6.30 6.90   EOSINOPHILS 1.60 1.30   BASOPHILS 0.80 0.60      Recent Labs     09/21/20  1114 09/22/20  1047 09/23/20  0535   SODIUM 139 139 137   POTASSIUM 4.4 4.3 4.1   CHLORIDE 103 104 105   CO2 21 21 19*   GLUCOSE 115* 119* 95   BUN 39* 36* 29*      Recent Labs     09/21/20  1114 09/22/20  1047 09/23/20  0535   ALBUMIN 3.7 3.7 3.0*   TBILIRUBIN 0.3 0.4 0.3   ALKPHOSPHAT 79 77 61   TOTPROTEIN 7.7 7.9 6.6   ALTSGPT 8 5 7   ASTSGOT 9* 6* 10*   CREATININE 1.90* 1.91* 1.39      Lab Results   Component Value Date/Time    PROTHROMBTM 14.9 (H) 09/25/2019 12:20 AM    INR 1.14 (H) 09/25/2019 12:20 AM         Imaging:   DX-CHEST-PORTABLE (1 VIEW)   Final Result      No acute cardiopulmonary process is seen.      Atherosclerotic plaque.      Biapical pleural thickening/scarring.      Fractures of the 8th and 9th ribs on the right.           UTI (urinary tract infection)  Assessment & Plan  - Lower urinary tract infection, SIRS 2/4.  - History of recurrent UTIs in the setting of urine retention.   - Several previous urine cultures growing E. coli.   -Urine culture showing non-lactose fermenting gram-negative rods 50 200,000 CFU per mL, isolation and sensitivities pending  -Continue ceftriaxone (09/22/2020) based on previous sensitivities.  - Regular bladder scans with post-void residuals - straight catheter if > 400ml.  - Blood cultures x 2 drawn.  -    Rib fractures  Assessment & Plan  - CXR showing fracture of 8th and 9th ribs on right.  - Possibly due to recent falls.  - Will manage conservatively, Tylenol, lidocaine patches.  - Incentive spirometry.  - PT/OT evaluation pending.    Acute kidney injury superimposed on chronic kidney disease (HCC)  Assessment & Plan  -Prerenal RAFIA, responded to fluid. Creatinine down to 1.39.  -Gentle IV  "hydration  - Monitor daily BMP, further work-up if necessary.    Mild dementia  Assessment & Plan  - Daughter present for much of history, frequent corrections.  - Mini cognitive evaluation (MOCA) could not be performed successfully, as patient has poor vision  -Started on Remeron for poor appetite    Recurrent falls   Assessment & Plan  - See \"Rib fractures.\"  -Will check orthostatics.  -PT OT eval    Mass palpated in lower abdomen  Unclear if this is a bladder, or another mass which is causing urine retention and patient's recurrent UTI  -Pelvic ultrasound          Please note that this dictation was created using voice recognition software. I have made every reasonable attempt to correct obvious errors, but there may be errors of grammar and possibly content that I did not discover before finalizing the note.   "

## 2020-09-23 NOTE — SENIOR ADMIT NOTE
"SENIOR ADMIT NOTE:    Elba Schneider M.D.  Date & Time note created:    9/22/2020   5:07 PM       Chief Complaint:  Chief Complaint   Patient presents with   • Sent from Urgent Care   • Abnormal Labs     urinalysis, UTI       History of Present Illness:    History is provided by the patient and patient's daughter who is at bedside and is the caregiver.  86-year-old female with past medical history of diabetes, urinary incontinence, recurrent UTI on chronic trimethoprim, mild cognitive impairment who presents with generalized weakness and recurrent falls.  Patient has had 4 mechanical falls in the past 20 days due to generalized weakness.  No head trauma occurred in any of the falls, patient was seen at urgent care and sent home.  Yesterday night, she had one episode of nonbilious nonbloody emesis consisting of food particles.  Patient denies fever, chills, dizziness, dysuria, abdominal pain, diarrhea.  She has poor oral intake, and has been losing weight.  She was seen at her primary care's office yesterday for follow-up, with routine labs and urinalysis was ordered.  She was referred to ER for abnormal urinalysis and worsening renal function.    Physical Exam:  Weight/BMI: Body mass index is 19.97 kg/m².  /60   Pulse 93   Temp 36.1 °C (97 °F) (Temporal)   Resp 18   Ht 1.651 m (5' 5\")   Wt 54.4 kg (120 lb)   SpO2 98%   Vitals:    09/22/20 1330 09/22/20 1401 09/22/20 1430 09/22/20 1600   BP: 114/55 (!) 98/56 105/57 135/60   Pulse:  72 68 93   Resp:    18   Temp:    36.1 °C (97 °F)   TempSrc:    Temporal   SpO2:  95% 98% 98%   Weight:       Height:         Oxygen Therapy:  Pulse Oximetry: 98 %, O2 Delivery Device: None - Room Air    Physical exam:  Constitutional:  No acute distress. A&O x to day, place, person and current president.   HENMT:  Normocephalic, Atraumatic  Eyes:  PERRLA, EOMI, Conjunctiva normal  Neck:  Supple, Full range of motion, No stridor  Cardiovascular:  Regular rate and rhythm, No " murmurs, No rubs, No gallops.   Lungs: Respiratory effort is normal, no crackles, no wheezing.  Extremities: Good pedal pulses b/l, no edema, 5/5 strength. No bruising.   Abdomen: Bowel sounds x4, Soft, Non-tender, No guarding, No rebound, No masses.No bruising on flanks or back. No CVA tenderness.   Bladder appears mildly distended. No suprapubic tenderness.   Neurologic: Good sensations, Cranial nerves II through XII grossly intact. No focal deficits noted.    Imaging  Chest x-ray: No acute cardiopulmonary process.  Fractures of the right eighth and ninth ribs.    ASSESSMENT/PLAN:  -Lower urinary tract infection, SIRS 2/4.    -Acute on chronic kidney disease  -Fracture of right eighth and ninth ribs  -Recurrent falls  -Mild cognitive impairment    -Patient has recurrent UTI in the setting of urine retention.  Will get bladder scans and monitor postvoid residuals.On past urine culture results, patient has grown E. coli, sensitive to ceftriaxone.  Check blood cultures x2.  Will start patient on ceftriaxone.  Will give gentle fluids to the patient.   -Patient's RAFIA is likely prerenal in the setting of poor oral intake.  Patient hypotensive on presentation, and appears dry.  Will give gentle fluids to the patient and monitor BMP.  We will also perform bladder scans to rule out urine retention.  In case patient's renal function does not improve with fluids, will consider further work-up.  Renal imaging to rule out hydronephrosis, urine lites etc.  -We will manage a right 8th-9th rib fracture conservatively with pain management, incentive spirometry  -PT, OT evaluation  -MOCA assessment    For full details please refer to H&P done by Dr. Mariscal.     Elba Schneider M.D.

## 2020-09-23 NOTE — ASSESSMENT & PLAN NOTE
- Lower urinary tract infection, SIRS 2/4.  - History of recurrent UTIs in the setting of urine retention.   - Several previous urine cultures growing E. coli.   - Started ceftriaxone (09/22/2020) based on previous sensitivities.  - Regular bladder scans with post-void residuals - straight catheter if > 400ml.  - Blood cultures x 2 drawn.

## 2020-09-23 NOTE — ASSESSMENT & PLAN NOTE
- Daughter present for much of history, frequent corrections.  - Mini cognitive evaluation (MOCA).

## 2020-09-23 NOTE — CARE PLAN
Problem: Safety  Goal: Will remain free from injury  Outcome: PROGRESSING AS EXPECTED     Problem: Safety  Goal: Will remain free from falls  Outcome: PROGRESSING AS EXPECTED  Note: Pt educated on safety precautions, utilization of the call light, and bed alarm.  Pt verbalized understanding.      Problem: Pain Management  Goal: Pain level will decrease to patient's comfort goal  Outcome: PROGRESSING AS EXPECTED     Problem: Infection  Goal: Will remain free from infection  Outcome: PROGRESSING AS EXPECTED  Note: Implement standard precautions and perform handwashing before and after patient contact. RN will follow protocols and necessary steps to minimize the spread of infection.  RN educated pt and any visitors on proper hand hygiene.

## 2020-09-23 NOTE — CARE PLAN
Problem: Communication  Goal: The ability to communicate needs accurately and effectively will improve  Outcome: PROGRESSING AS EXPECTED  Note: Pt Alert, awake and oriented to person, place and situation, but pt confused with time.  Able to make needs known, Uses call light appropriately.       Problem: Safety  Goal: Will remain free from injury  Outcome: PROGRESSING AS EXPECTED  Note: Non skid socks, fall band on, hourly rounding in place, call light within reach, path free of clutter. Pt calls appropriately. Education provided on need to call staff for assistance with mobility and pt states understanding.   Goal: Will remain free from falls  Outcome: PROGRESSING AS EXPECTED     Problem: Skin Integrity  Goal: Risk for impaired skin integrity will decrease  Outcome: PROGRESSING AS EXPECTED  Note: Skin CDI, mepilex to buttocks d/t red but blanching, pt ambulates frequently, up in chair for meals, encouraged pt to reposition while in bed. Hourly rounding to monitor and encourage pt to remian dry from moisture.

## 2020-09-23 NOTE — ASSESSMENT & PLAN NOTE
- CXR showing fracture of 8th and 9th ribs on right.  - Possibly due to recent falls.  - Will manage conservatively, Tylenol, lidocaine patches.  - Incentive spirometry.  - PT/OT evaluation.

## 2020-09-23 NOTE — PROGRESS NOTES
Report received from MARCO Guevara.  Patient sitting up in bed eating her dinner.  POC gone over with pt and all questions answered at this time.  Patient A & O x 3, pt is disoriented to time.  No apparent signs of distress.  Safety precautions in place.  Patient educated to call for assistance.  Will continue to monitor.

## 2020-09-23 NOTE — THERAPY
"Physical Therapy   Initial Evaluation     Patient Name: Herminia Jones  Age:  86 y.o., Sex:  female  Medical Record #: [id number]  Today's Date: 9/23/2020     Precautions: Fall Risk    Assessment    Pt referred to ER by her primary, after obtaining abnormal urinalysis.  She has a hx of cog impairment, DM, recurrent UTI, urinary incont, and frequent falls.  Per chart review she has had 4 falls in the past 20 days, sustaining right rib fx 8 and 9.  She reports living w/ \"her family\" and per chart review, her daughter is her caregiver.  She lives in a single story house w/o stairs/steps.  She is mobile w/ a fww.  Today she is able to mobilize w/ her fww w/o loss of balance and w/o need of physical assist.  She does take small steps w/ ambulation, but no overt signs of balance deficit.  She does report cleaning the house a lot, including \"sweeping\", which means she would be w/o her fww.  Educated regarding safety of this task.  Recommend oob/amb prn w/ nsg.  No acute PT needs but may benefit from home PT to assess her mobility in her own environment.  PT will be available for d/c needs only.     Plan    Recommend Physical Therapy for Evaluation only       DC Equipment Recommendations: None  Discharge Recommendations: Recommend home health for continued physical therapy services         Objective       09/23/20 0853   Prior Living Situation   Housing / Facility 1 Story House   Steps Into Home 0   Steps In Home 0   Equipment Owned Front-Wheel Walker   Lives with - Patient's Self Care Capacity Adult Children   Prior Level of Functional Mobility   Bed Mobility Independent   Transfer Status Independent   Ambulation Independent   Assistive Devices Used Front-Wheel Walker   History of Falls   History of Falls Yes   Date of Last Fall   (Per chart review, 4 fall in last 20 days)   Cognition    Comments per chart review, cog. impairment   Balance Assessment   Sitting Balance (Static) Fair +   Sitting Balance (Dynamic) Fair + "   Standing Balance (Static) Fair   Standing Balance (Dynamic) Fair   Weight Shift Sitting Good   Weight Shift Standing Good   Comments w/ fww   Gait Analysis   Gait Level Of Assist Supervised   Assistive Device Front Wheel Walker   Distance (Feet) 200   Deviation Bradykinetic  (short step length)   Bed Mobility    Supine to Sit Supervised   Sit to Supine Supervised   Scooting Supervised   Functional Mobility   Sit to Stand Supervised   Bed, Chair, Wheelchair Transfer Supervised   Anticipated Discharge Equipment and Recommendations   DC Equipment Recommendations None   Discharge Recommendations Recommend home health for continued physical therapy services

## 2020-09-23 NOTE — THERAPY
"Occupational Therapy   Initial Evaluation     Patient Name: Herminia Jones  Age:  86 y.o., Sex:  female  Medical Record #: [id number]  Today's Date: 9/23/2020       Precautions: Fall Risk    Assessment  Patient is 86 y.o. female with a diagnosis of recurrent UTI.  Additional factors influencing patient status / progress: Pt has had 4 falls in the past month & sustained right 8th &9th rib fx.  Pt also has hx of cognitive impairments.  Pt's daughter was present during OT assessment.  Pt was able to et OOB & perform basic ADL's with supervision.  Daughter reports someone is always home with pt.  Pt has cog impairments & at home forgets to use her FWW.  Daughter feels pt has become weaker with recurrent UTI.  Pt would benefit from HH therapy to assess safety in home environment & make recommendations.  Pt is likely close to her baseline. Pt will not be actively followed for OT services but will be available for D/C needs only.     Plan    Recommend Occupational Therapy for Evaluation only.    DC Equipment Recommendations: None  Discharge Recommendations: Recommend home health for continued occupational therapy services     Subjective    \"Do I get to go home?\"     Objective       09/23/20 1321   Prior Living Situation   Prior Services Intermittent Physical Support for ADL Per Family   Housing / Facility 1 Story House   Steps Into Home 1   Bathroom Set up Walk In Shower;Grab Bars;Shower Chair   Equipment Owned Front-Wheel Walker;Tub / Shower Seat;Grab Bar(s) In Tub / Shower   Lives with - Patient's Self Care Capacity Adult Children   Comments pt's daughter present during OT eval.  Daughter has modified their home to better accomodate pt's needs.  Daughter reports pt has had increased falls due to UTI's & forgets to use FWW   Cognition    Cognition / Consciousness X   Speech/ Communication Delayed Responses;Word Finding Impairment   Comments pt is very pleasant, has hx of cognitive impairments.  Pt seemed annoyed with " daughter telling her what to do, daughter trying to ensure pt's safety   Balance Assessment   Sitting Balance (Static) Fair +   Sitting Balance (Dynamic) Fair +   Standing Balance (Static) Fair   Standing Balance (Dynamic) Fair   Weight Shift Sitting Good   Weight Shift Standing Good   Comments does better with FWW   ADL Assessment   Eating Modified Independent   Grooming Supervision;Standing   Upper Body Dressing Supervision   Lower Body Dressing Supervision   Toileting Supervision   Functional Mobility   Sit to Stand Supervised   Bed, Chair, Wheelchair Transfer Supervised   Toilet Transfers Supervised

## 2020-09-23 NOTE — RESPIRATORY CARE
COPD EDUCATION by COPD CLINICAL EDUCATOR  9/23/2020 at 9:22 AM by Sachi Herrera, RRT     Patient reviewed by COPD education team. Patient does not have a history or diagnosis of COPD and is a non-smoker.  Therefore does not qualify for the COPD program.

## 2020-09-23 NOTE — NON-PROVIDER
Internal Medicine Daily Progress Note  Note Author: Marissa Mina, Third Year Medical Student    Date of Service: 2020  Date of Admission: 2020   Primary Team: [facility] Green Team   Attending: Christiano Bowers MD   Senior Resident: Dr. Elba Schneider    Name Herminia Jones     1934   Age/Sex 86 y.o. female   MRN [id number]   Code Status Full     Reason for interval visit  UTI, abnormal kidney function blood tests    SUBJECTIVE:  Herminia is a 86 y.o. female on day 1 of admission with a PMHx of recurrent UTI and multiple gynecological surgeries including rectocele and cystocele repairs, now admitted for a UTI and elevated BUN/Cr on recent bloodwork. She has fallen four times in the last month and was found to have right 8th and 9th rib fractures.  She is currently receiving ceftriaxone for the UTI and LR for suspected poor oral intake.    Overnight: NAEON.  She was getting up to pee so frequently that a PureWick catheter was administered overnight.  Her only UTI symptom is increased frequency; she has no dysuria, hematuria, or bladder pain.  She has chronic back pain, increased since the rib fractures.    ROS:  Constitutional: Negative for malaise/fatigue, chills, fever and weight loss  HENT: Negative for congestion, sinus pain.  She is somewhat hard of hearing.  Eyes: Her eyes have been bothering her recently.  She has decreased vision and can no longer knit (her favorite hobby) or read.  Respiratory: She has had no cough or SOB    Cardiovascular: No chest pain or palpitations  Gastrointestinal: No constipation or diarrhea.  No melena or hematochezia.   Genitourinary: She has greatly increased frequency, but no dysuria or hematuria.  Musculoskeletal: She has back pain where she fell.    Skin: Negative for bruises, rashes and discoloration  Neurological: The tips of her fingers tingle and feel numb.  This is chronic.  Psychiatric/Behavioral: She has some feelings of depression recently.  She is  not able to perform ADLs as independently as she once did, and this occasionally makes her feel directionless and purposeless.      Vitals   Temp:  [36.1 °C (97 °F)-37.3 °C (99.2 °F)] 36.9 °C (98.4 °F)  Pulse:  [] 74  Resp:  [14-24] 16  BP: ()/(51-67) 94/52  SpO2:  [93 %-98 %] 94 %    Body mass index is 19.97 kg/m².    Physical Exam     General: Not in acute distress.     Appearance: Not ill-appearing. Not toxic-appearing.  HENT:      Head: Normocephalic and atraumatic.     Mouth: Mucous membranes are moist.      Pharynx: No oropharyngeal exudate.   Eyes:      Extraocular Movements: Extraocular movements intact.      Pupils: Pupils are equal, round, and reactive to light.   Neck:      Musculoskeletal: Normal range of motion.   Cardiovascular:      It is difficult to hear her heart over the sound of her lungs  Pulmonary:      Effort: Pulmonary effort is normal. No respiratory distress.      Breath sounds: Normal breath sounds. No wheezing or rales.   Abdominal:      General: There is no distension.      Palpations: Abdomen is soft.  A hard mass is appreciated in the area of the bladder.     Tenderness: There is no abdominal tenderness. There is no guarding.   Musculoskeletal:         General: No swelling or deformity.      Range of motion: Normal.     Right lower leg: No edema.      Left lower leg: No edema.   Skin:     Findings: No bruises or rashes present. No discoloration.   Neurological:      Mental status, orientation: Awake, alert and oriented x 4. There is some cognitive impairment, but it is mild.     Speech and language: speech is clear and fluent.      Memory: She can recall all recent events but thinks that it is 2010, unable to subtract 7 from 100, recalls 0/5 words after five minutes.     Cranial nerve exam: Cranial nerves grossly intact.     Motor exam: Strength is 5/5 in all extremities. No abnormal movements were seen on exam.      Psychiatric:         Mood and Affect: She seems a little  depressed when talking about ADLs, but perks up when talking about her grandchildren (ages 2 and 3) who live next door.    Labs/Imaging:  Recent/pertinent lab results & imaging reviewed.  Lab Results   Component Value Date/Time    WBC 11.7 (H) 09/22/2020 11:50 PM    RBC 4.37 09/22/2020 11:50 PM    HEMOGLOBIN 12.7 09/22/2020 11:50 PM    HEMATOCRIT 40.0 09/22/2020 11:50 PM    MCV 91.5 09/22/2020 11:50 PM    MCH 29.1 09/22/2020 11:50 PM    MCHC 31.8 (L) 09/22/2020 11:50 PM    MPV 9.6 09/22/2020 11:50 PM    NEUTSPOLYS 76.80 (H) 09/22/2020 11:50 PM    LYMPHOCYTES 13.70 (L) 09/22/2020 11:50 PM    MONOCYTES 6.90 09/22/2020 11:50 PM    EOSINOPHILS 1.30 09/22/2020 11:50 PM    BASOPHILS 0.60 09/22/2020 11:50 PM      Lab Results   Component Value Date/Time    SODIUM 137 09/23/2020 05:35 AM    POTASSIUM 4.1 09/23/2020 05:35 AM    CHLORIDE 105 09/23/2020 05:35 AM    CO2 19 (L) 09/23/2020 05:35 AM    GLUCOSE 95 09/23/2020 05:35 AM    BUN 29 (H) 09/23/2020 05:35 AM    CREATININE 1.39 09/23/2020 05:35 AM    BUNCREATRAT 17 01/16/2017 01:04 PM      Lab Results   Component Value Date/Time    PROTHROMBTM 14.9 (H) 09/25/2019 12:20 AM    INR 1.14 (H) 09/25/2019 12:20 AM        DX-CHEST-PORTABLE (1 VIEW)   Final Result      No acute cardiopulmonary process is seen.      Atherosclerotic plaque.      Biapical pleural thickening/scarring.      Fractures of the 8th and 9th ribs on the right.          Assessment/Plan   Pt is a 86 y.o. female with PMHx of recurrent UTIs and multiple gynecological surgeries for cystocele and rectocele, now presenting with dirty UA and abnormal kidney function.    UTI (urinary tract infection)  Assessment & Plan      -Day 2 of ceftriaxone, can think about starting oral abx  - Given mass in lower pelvis, getting an ultrasound to see if it is bladder  -Awaiting results of urine cultures to be sure pt is on the right abx.     Acute on chronic kidney injury  Assessment & Plan  -BUN 29, down from 36 yesterday  -Cr  "1.39 down from 1.91 yesterday  -Kidney injury likely from poor oral intake 2/2 nausea and loss of appetite from UTI.  -Resolved with fluids      Rib fractures  Assessment & Plan  -Tylenol, lidocaine patches  - PT evaluated, do not think that she needs additional inpatient work at this time.    Mild cognitive impairment  Assessment & Plan  -Was not able to administer full MOCA due to patient's inability to see.  Patient scored 8/22 (score should be out of 30).  0/5 delayed recall of words.  Attention normal, language borderline (unable to name words that begin with the letter \"f\"), abstraction abnormal (unable to determine similarity between train/bicycle or watch/ruler).  -Can pursue follow up outpatient if desired.    Decreased visual acuity  Assessment & Plan  -Patient is an uncertain historian.  Need to consult with daughter to see when pt has last visited optometrist/ophthalmologist        "

## 2020-09-23 NOTE — CARE PLAN
Problem: Safety  Goal: Will remain free from injury  Outcome: PROGRESSING AS EXPECTED  Goal: Will remain free from falls  Outcome: PROGRESSING AS EXPECTED   Fall precautions in place. Bed in lowest position. Non-skid socks in place. Personal possessions within reach. Mobility sign on door. Bed-alarm on. Call light within reach. Pt educated regarding fall prevention and states understanding.

## 2020-09-23 NOTE — ASSESSMENT & PLAN NOTE
- Creatinine 1.91 on admit.  - Hypotensive and appears dry.  - Patient's RAFIA likely prerenal in setting of poor oral intake.  - LR started at 100ml/hr.   - Monitor daily BMP, further work-up if necessary.

## 2020-09-24 ENCOUNTER — HOME HEALTH ADMISSION (OUTPATIENT)
Dept: HOME HEALTH SERVICES | Facility: HOME HEALTHCARE | Age: 85
End: 2020-09-24
Payer: MEDICARE

## 2020-09-24 VITALS
OXYGEN SATURATION: 96 % | HEART RATE: 77 BPM | DIASTOLIC BLOOD PRESSURE: 55 MMHG | TEMPERATURE: 98.3 F | WEIGHT: 120 LBS | RESPIRATION RATE: 19 BRPM | SYSTOLIC BLOOD PRESSURE: 115 MMHG | HEIGHT: 65 IN | BODY MASS INDEX: 19.99 KG/M2

## 2020-09-24 LAB
ANION GAP SERPL CALC-SCNC: 11 MMOL/L (ref 7–16)
BACTERIA UR CULT: ABNORMAL
BASOPHILS # BLD AUTO: 0.9 % (ref 0–1.8)
BASOPHILS # BLD: 0.07 K/UL (ref 0–0.12)
BUN SERPL-MCNC: 24 MG/DL (ref 8–22)
CALCIUM SERPL-MCNC: 9.3 MG/DL (ref 8.5–10.5)
CHLORIDE SERPL-SCNC: 105 MMOL/L (ref 96–112)
CO2 SERPL-SCNC: 23 MMOL/L (ref 20–33)
CREAT SERPL-MCNC: 1.39 MG/DL (ref 0.5–1.4)
EOSINOPHIL # BLD AUTO: 0.26 K/UL (ref 0–0.51)
EOSINOPHIL NFR BLD: 3.5 % (ref 0–6.9)
ERYTHROCYTE [DISTWIDTH] IN BLOOD BY AUTOMATED COUNT: 47 FL (ref 35.9–50)
GLUCOSE SERPL-MCNC: 96 MG/DL (ref 65–99)
HCT VFR BLD AUTO: 37.8 % (ref 37–47)
HGB BLD-MCNC: 12.3 G/DL (ref 12–16)
IMM GRANULOCYTES # BLD AUTO: 0.05 K/UL (ref 0–0.11)
IMM GRANULOCYTES NFR BLD AUTO: 0.7 % (ref 0–0.9)
LYMPHOCYTES # BLD AUTO: 2 K/UL (ref 1–4.8)
LYMPHOCYTES NFR BLD: 27 % (ref 22–41)
MCH RBC QN AUTO: 28.6 PG (ref 27–33)
MCHC RBC AUTO-ENTMCNC: 32.5 G/DL (ref 33.6–35)
MCV RBC AUTO: 87.9 FL (ref 81.4–97.8)
MONOCYTES # BLD AUTO: 0.47 K/UL (ref 0–0.85)
MONOCYTES NFR BLD AUTO: 6.4 % (ref 0–13.4)
NEUTROPHILS # BLD AUTO: 4.55 K/UL (ref 2–7.15)
NEUTROPHILS NFR BLD: 61.5 % (ref 44–72)
NRBC # BLD AUTO: 0 K/UL
NRBC BLD-RTO: 0 /100 WBC
PLATELET # BLD AUTO: 248 K/UL (ref 164–446)
PMV BLD AUTO: 9.6 FL (ref 9–12.9)
POTASSIUM SERPL-SCNC: 3.7 MMOL/L (ref 3.6–5.5)
RBC # BLD AUTO: 4.3 M/UL (ref 4.2–5.4)
SIGNIFICANT IND 70042: ABNORMAL
SIGNIFICANT IND 70042: ABNORMAL
SITE SITE: ABNORMAL
SITE SITE: ABNORMAL
SODIUM SERPL-SCNC: 139 MMOL/L (ref 135–145)
SOURCE SOURCE: ABNORMAL
SOURCE SOURCE: ABNORMAL
WBC # BLD AUTO: 7.4 K/UL (ref 4.8–10.8)

## 2020-09-24 PROCEDURE — 700105 HCHG RX REV CODE 258: Performed by: INTERNAL MEDICINE

## 2020-09-24 PROCEDURE — 99217 PR OBSERVATION CARE DISCHARGE: CPT | Mod: GC | Performed by: INTERNAL MEDICINE

## 2020-09-24 PROCEDURE — 85025 COMPLETE CBC W/AUTO DIFF WBC: CPT

## 2020-09-24 PROCEDURE — 36415 COLL VENOUS BLD VENIPUNCTURE: CPT

## 2020-09-24 PROCEDURE — 80048 BASIC METABOLIC PNL TOTAL CA: CPT

## 2020-09-24 PROCEDURE — A9270 NON-COVERED ITEM OR SERVICE: HCPCS | Performed by: STUDENT IN AN ORGANIZED HEALTH CARE EDUCATION/TRAINING PROGRAM

## 2020-09-24 PROCEDURE — 700111 HCHG RX REV CODE 636 W/ 250 OVERRIDE (IP): Performed by: STUDENT IN AN ORGANIZED HEALTH CARE EDUCATION/TRAINING PROGRAM

## 2020-09-24 PROCEDURE — 96372 THER/PROPH/DIAG INJ SC/IM: CPT

## 2020-09-24 PROCEDURE — G0378 HOSPITAL OBSERVATION PER HR: HCPCS

## 2020-09-24 PROCEDURE — 700105 HCHG RX REV CODE 258: Performed by: STUDENT IN AN ORGANIZED HEALTH CARE EDUCATION/TRAINING PROGRAM

## 2020-09-24 PROCEDURE — 700102 HCHG RX REV CODE 250 W/ 637 OVERRIDE(OP): Performed by: STUDENT IN AN ORGANIZED HEALTH CARE EDUCATION/TRAINING PROGRAM

## 2020-09-24 RX ORDER — MIRTAZAPINE 7.5 MG/1
7.5 TABLET, FILM COATED ORAL
Qty: 30 TAB | Refills: 0 | Status: SHIPPED | OUTPATIENT
Start: 2020-09-24 | End: 2020-10-23

## 2020-09-24 RX ORDER — LIDOCAINE HYDROCHLORIDE 20 MG/ML
1 JELLY TOPICAL PRN
Qty: 1 ML | Refills: 0 | Status: SHIPPED | OUTPATIENT
Start: 2020-09-24 | End: 2021-01-05

## 2020-09-24 RX ORDER — AMOXICILLIN 500 MG/1
500 CAPSULE ORAL 3 TIMES DAILY
Qty: 9 CAP | Refills: 0 | Status: SHIPPED | OUTPATIENT
Start: 2020-09-24 | End: 2020-09-27

## 2020-09-24 RX ADMIN — ATORVASTATIN CALCIUM 20 MG: 20 TABLET, FILM COATED ORAL at 06:25

## 2020-09-24 RX ADMIN — OXYBUTYNIN CHLORIDE 5 MG: 5 TABLET ORAL at 08:11

## 2020-09-24 RX ADMIN — CEFTRIAXONE SODIUM 1 G: 1 INJECTION, POWDER, FOR SOLUTION INTRAMUSCULAR; INTRAVENOUS at 06:21

## 2020-09-24 RX ADMIN — SODIUM CHLORIDE, POTASSIUM CHLORIDE, SODIUM LACTATE AND CALCIUM CHLORIDE: 600; 310; 30; 20 INJECTION, SOLUTION INTRAVENOUS at 08:08

## 2020-09-24 RX ADMIN — HEPARIN SODIUM 5000 UNITS: 5000 INJECTION INTRAVENOUS; SUBCUTANEOUS at 06:25

## 2020-09-24 RX ADMIN — FAMOTIDINE 20 MG: 20 TABLET, FILM COATED ORAL at 06:24

## 2020-09-24 NOTE — DISCHARGE PLANNING
Received Choice form at [phone]  Agency/Facility Name: [facility]  Referral sent per Choice form @ [phone]

## 2020-09-24 NOTE — NON-PROVIDER
Internal Medicine Daily Progress Note  Note Author: Marissa Harmoner, Third Year Medical Student    Date of Service: 2020  Date of Admission: 2020   Primary Team: [facility] Green Team   Attending: Christiano Bowers MD   Senior Resident: Dr. Elba Schneider    Name Herminia Jones     1934   Age/Sex 86 y.o. female   MRN [id number]   Code Status Full code     Reason for interval visit  Abnormal labs    SUBJECTIVE:  Herminia is a 86 y.o. female who presented on 2020 with a past medical history of recurrent UTI and multiple gynecologic issues including rectocele and cystocele, presenting after abnormal labs in an Urgent Care.  She has also fallen more than normal in the last month and now has back pain.    Hospital course: was found to have UTI.  She is on day 2 of Ceftriaxone, urine cultures showing gram-negative, lactose-fermenting sharif.  She had acute kidney injury (Cr up to 1.9 from baseline 1.33) resolved with gentle fluids.  CXR showed fracture of the 8th and 9th ribs, treated conservatively with NSAIDs and lidocaine patch.    Overnight:  She reports one episode of urinary incontinence where she woke up drenched in urine.  Otherwise she feels well.    ROS:  Constitutional: Negative for malaise/fatigue, chills, fever   HENT: Negative for congestion.  She has had no sore throat, swollen lymph nodes.  Eyes: She has trouble with her vision; her daughter reports that she sees an eye doctor regularly and next appointment is in November.  She has a history of glaucoma.   Respiratory: No cough, SOB, production of sputum   Cardiovascular: Negative for chest pain or palpitations  Gastrointestinal: No constipation, diarrhea, melena, or hematochezia  Genitourinary: Positive for urgency and urinary incontinence, negative for dysuria and hematuria.  Musculoskeletal: She has back pain over her broken ribs in the posterior, but it isn't very intense  Skin: Negative for bruises, rashes and  discoloration  Neurological: Her fingertips are numb and have been that way for at least a year.  Psychiatric/Behavioral: She occasionally feels helpless due to her debility and decreased sight.      Vitals   Temp:  [36.8 °C (98.2 °F)-37 °C (98.6 °F)] 36.8 °C (98.2 °F)  Pulse:  [74-86] 77  Resp:  [16-18] 18  BP: ()/(52-77) 108/62  SpO2:  [92 %-96 %] 94 %    Body mass index is 19.97 kg/m².    Physical Exam     General: Not in acute distress.     Appearance: Not ill-appearing. Not toxic-appearing.  HENT:      Head: Normocephalic and atraumatic.  Hearing is normal to speech.     Eyes:   She has a pmhx of glaucoma,   Peripheral visual fields intact      Pupils: Pupils are equal, round, and reactive to light.   Neck:      Musculoskeletal: Normal range of motion.   Cardiovascular:      Rate and Rhythm: Normal rate and regular rhythm.      Pulses: Normal pulses.      Heart sounds: Normal heart sounds. No murmur. No gallop.    Pulmonary:      Effort: Pulmonary effort is normal. No respiratory distress.      Breath sounds: Normal breath sounds. No wheezing or rales.   Abdominal:      General: There is no distension.      Palpations: Abdomen is soft. I don't appreciate the same mass that I did yesterday and on admission.     Tenderness: There is no abdominal tenderness. There is no guarding.   Musculoskeletal:         General: No swelling or deformity.      Range of motion: Normal.     Right lower leg: No edema.      Left lower leg: No edema.   Skin:     Findings: No bruises or rashes present. No discoloration.   Neurological:      Mental status, orientation: Awake, alert and fully oriented.      Speech and language: speech is clear and fluent.     Memory: She has decreased recollection of recent events.      Cranial nerve exam: cranial nerves II-XII grossly intact     Motor exam: Strength is 5/5 in all extremities. Tone is normal. No abnormal movements were seen on exam.     Labs/Imaging:  Recent/pertinent lab results  & imaging reviewed.  Lab Results   Component Value Date/Time    WBC 7.4 09/24/2020 04:09 AM    RBC 4.30 09/24/2020 04:09 AM    HEMOGLOBIN 12.3 09/24/2020 04:09 AM    HEMATOCRIT 37.8 09/24/2020 04:09 AM    MCV 87.9 09/24/2020 04:09 AM    MCH 28.6 09/24/2020 04:09 AM    MCHC 32.5 (L) 09/24/2020 04:09 AM    MPV 9.6 09/24/2020 04:09 AM    NEUTSPOLYS 61.50 09/24/2020 04:09 AM    LYMPHOCYTES 27.00 09/24/2020 04:09 AM    MONOCYTES 6.40 09/24/2020 04:09 AM    EOSINOPHILS 3.50 09/24/2020 04:09 AM    BASOPHILS 0.90 09/24/2020 04:09 AM      Lab Results   Component Value Date/Time    SODIUM 139 09/24/2020 04:09 AM    POTASSIUM 3.7 09/24/2020 04:09 AM    CHLORIDE 105 09/24/2020 04:09 AM    CO2 23 09/24/2020 04:09 AM    GLUCOSE 96 09/24/2020 04:09 AM    BUN 24 (H) 09/24/2020 04:09 AM    CREATININE 1.39 09/24/2020 04:09 AM    BUNCREATRAT 17 01/16/2017 01:04 PM      Lab Results   Component Value Date/Time    PROTHROMBTM 14.9 (H) 09/25/2019 12:20 AM    INR 1.14 (H) 09/25/2019 12:20 AM        US-PELVIC TRANSABDOMINAL ONLY   Final Result         1. No mass lesions or fluid collections in the lower abdominal wall, in the area of clinical concern.   2. Incidental note of nonspecific debris within the bladder. Correlate for UTI.      DX-CHEST-PORTABLE (1 VIEW)   Final Result      No acute cardiopulmonary process is seen.      Atherosclerotic plaque.      Biapical pleural thickening/scarring.      Fractures of the 8th and 9th ribs on the right.          Assessment/Plan   Pt is a 86 y.o. female with PMHx of recurrent UTI, rectocele and cystocele s/p surgical repair, now admitted for UA suggestive of UTI and increased creatinine suggestive of RAFIA.  She has had several recent falls and had CXR with two broken ribs.    UTI  Assessment & Plan       Assessment/Plan:       - Urine culture came back positive for E. coli   - Switch abx to cefoxitime PO  - Her urinary frequency has not resolved, recommend f/u if urinary continence persists.   Otherwise she can be treated outpatient--no signs of sepsis such as fever, hypotension, tachycardia.     RAFIA  Assessment & Plan  -Cr increased to 1.9 from baseline 1.3  -Resolved after gentle LR  -Probably due to poor oral intake from infection     Rib fractures  Assessment & Plan  -8th and 9th rib fractures on CXR  -On lidocaine patch inpatient, will recommend outpatient lidocaine jelly  -On NSAIDs and acetaminophen     Glaucoma  -Spoke with daughter; she has appt with eye doctor in November

## 2020-09-24 NOTE — DISCHARGE SUMMARY
Discharge Summary    Date of Admission: 9/22/2020  Date of Discharge: 09/24/2020  Discharging Attending: Christiano Bowers M.D.   Discharging Senior Resident: Dr. Elba Schneider  Discharging Intern: Dr. Melecio Mariscal    CHIEF COMPLAINT ON ADMISSION  Chief Complaint   Patient presents with   • Sent from Urgent Care   • Abnormal Labs     urinalysis, UTI       Reason for Admission  Urinary tract infection   Acute on chronic kidney disease   Recurrent falls   Fracture of right ribs 8 and 9.     Admission Date  9/22/2020    CODE STATUS  Full Code    HPI & HOSPITAL COURSE  This is a 86 y.o. female with past medical history of diabetes, urinary incontinence/urinary retention, recurrent UTI on chronic trimethoprim, mild cognitive impairment who presented with generalized weakness and recurrent falls.    On presentation, patient was afebrile, hemodynamically stable, labs were notable for no leukocytosis, creatinine of 1.91 from baseline of 1-1.3, urine analysis positive for nitrite, leukocyte esterase and bacteria.  Chest x-ray showed right eighth and ninth posterior rib fracture.  Patient was started on ceftriaxone, gentle hydration was administered.  Patient improved symptomatically, and her renal function returned to baseline.  Urine culture grew E. coli which was resistant to Bactrim and ciprofloxacin.  Pelvic ultrasound was unremarkable.  Patient was transitioned to oral amoxicillin based on her sensitivities, which will be continued to complete total 5 days of antibiotic therapy.  Her chronic trimethoprim was discontinued based on her urine culture sensitivities.  Her rib fracture was managed conservatively with pain control and incentive spirometry.  Patient was evaluated by physical and Occupational Therapy, and home health was ordered for continued physical and Occupational Therapy.     Therefore, she is discharged in fair and stable condition to home with organized home healthcare and close outpatient  follow-up.    Patient was an observation status admission.    Physical Exam   Constitutional: She is oriented to person, place, and time. She appears malnourished. No distress.   HENT:   Head: Normocephalic and atraumatic.   Mouth/Throat: Oropharynx is clear and moist.   Eyes: Pupils are equal, round, and reactive to light. Conjunctivae and EOM are normal.   Neck: Normal range of motion. Neck supple.   Cardiovascular: Normal rate, regular rhythm, normal heart sounds and intact distal pulses.   No murmur heard.  Pulmonary/Chest: Effort normal and breath sounds normal. No respiratory distress. She has no wheezes.   Abdominal: Soft. Bowel sounds are normal. She exhibits no distension. There is no abdominal tenderness.   Musculoskeletal: Normal range of motion.         General: Tenderness (right posterior lower ribs) present. No edema.   Neurological: She is alert and oriented to person, place, and time. No cranial nerve deficit. Coordination normal.   Skin: Skin is warm and dry. She is not diaphoretic. No erythema.   Psychiatric: Mood and affect normal. She expresses no homicidal and no suicidal ideation.       PHYSICAL EXAM ON DISCHARGE  Temp:  [36.8 °C (98.2 °F)-37 °C (98.6 °F)] 36.8 °C (98.3 °F)  Pulse:  [74-86] 77  Resp:  [16-19] 19  BP: ()/(55-77) 115/55  SpO2:  [92 %-96 %] 96 %    Discharge Date  09/24/2020    FOLLOW UP ITEMS POST DISCHARGE  With primary care physician for post-hospital follow-up.      DISCHARGE DIAGNOSES  Active Problems:    UTI (urinary tract infection) POA: Unknown    Acute kidney injury superimposed on chronic kidney disease (HCC) POA: Unknown    Rib fractures POA: Unknown    Recurrent falls POA: Unknown    Mild cognitive impairment POA: Unknown  Resolved Problems:    * No resolved hospital problems. *      FOLLOW UP  Future Appointments   Date Time Provider Department Center   10/5/2020  9:20 AM LETY Kulkarni [address]     No follow-up provider  specified.    MEDICATIONS ON DISCHARGE     Medication List      START taking these medications      Instructions   amoxicillin 500 MG Caps  Commonly known as: AMOXIL   Take 1 Cap by mouth 3 times a day for 3 days.  Dose: 500 mg     lidocaine 2 % Gel   Apply 1 mL to affected area(s) as needed.  Dose: 1 mL     mirtazapine 7.5 MG tablet  Commonly known as: REMERON   Take 1 Tab by mouth every bedtime.  Dose: 7.5 mg        CONTINUE taking these medications      Instructions   acetaminophen 500 MG Tabs  Commonly known as: TYLENOL   Take 1,000 mg by mouth Once. 2 tablets = 1000mg  Dose: 1,000 mg     atorvastatin 20 MG Tabs  Commonly known as: LIPITOR   TAKE ONE TABLET BY MOUTH DAILY     CALCARB 600 PO   Take 600 mg by mouth every morning.  Dose: 600 mg     famotidine 40 MG Tabs  Commonly known as: PEPCID   Take 40 mg by mouth every day.  Dose: 40 mg     Non Formulary Request   Place 2 Drops in both eyes every day. OCUVITE DROPS : 2 DROPS INTO BOTH EYES DAILY  Dose: 2 Drop     oxybutynin 5 MG Tabs  Commonly known as: DITROPAN   Take 5 mg by mouth 3 times a day.  Dose: 5 mg     sertraline 100 MG Tabs  Commonly known as: ZOLOFT   Take 150 mg by mouth every day. 1.5 tablets = 150mg  Dose: 150 mg        STOP taking these medications    trimethoprim 100 MG Tabs  Commonly known as: TRIMPEX            Allergies  Allergies   Allergen Reactions   • Sulfamethoxazole      Does not remember reaction        DIET  Orders Placed This Encounter   Procedures   • Diet Order Regular     Standing Status:   Standing     Number of Occurrences:   1     Order Specific Question:   Diet:     Answer:   Regular [1]       ACTIVITY  As tolerated.  Weight bearing as tolerated    CONSULTATIONS  Not applicable.    PROCEDURES  Not applicable.

## 2020-09-24 NOTE — DISCHARGE PLANNING
Care Transition Team Assessment    Spoke with patient at bedside. Lives with daughter, Son in law and grand kids in mobile home. PCP DR. Ordonez. Uses walker to ambulate. Has [employer] insurance. Daughter will be ride @ D/C.     Information Source  Orientation : Oriented x 4  Information Given By: Patient  Informant's Name: Edwin Martin    Readmission Evaluation  Is this a readmission?: No    Interdisciplinary Discharge Planning  Primary Care Physician: Dr. Ordonez  Lives with - Patient's Self Care Capacity: Adult Children  Patient or legal guardian wants to designate a caregiver: No  Support Systems: Children, Family Member(s)  Housing / Facility: Mobile Home  Do You Take your Prescribed Medications Regularly: Yes  Able to Return to Previous ADL's: Yes  Mobility Issues: No  Prior Services: Intermittent Physical Support for ADL Per Family  Patient Prefers to be Discharged to:: Home  Assistance Needed: No  Durable Medical Equipment: Walker    Discharge Preparedness  What are your discharge supports?: Child  Prior Functional Level: Uses Walker    Functional Assesment  Prior Functional Level: Uses Walker    Finances  Prescription Coverage: Yes    Discharge Risks or Barriers  Patient risk factors: Vulnerable adult    Anticipated Discharge Information  Discharge Disposition: D/T to home under A care in anticipation of covered skilled care (06)  Discharge Address: [facility] [address] [place] [address]  Discharge Contact Phone Number: [phone]

## 2020-09-24 NOTE — PROGRESS NOTES
Report received from MARCO Whitmore.  Patient sitting up in bed watching TV after finishing her dinner.  Patient A & O x 4.  No apparent signs of distress.  Safety precautions in place.  Patient educated to call for assistance.  Will continue to monitor.

## 2020-09-24 NOTE — DISCHARGE PLANNING
ATTN: Case Management  RE: Referral for Home Health    As of 9/24/2020, we have accepted the Home Health referral for the patient listed above.    A [facility] clinician will be out to see the patient within 48 hours. If you have any questions or concerns regarding the patient's transition to Home Health, please do not hesitate to contact us at [phone].      We look forward to collaborating with you,  [facility] Home Health Team

## 2020-09-24 NOTE — DISCHARGE INSTRUCTIONS
Discharge Instructions    Discharged to home by car with relative. Discharged via wheelchair, hospital escort: Yes.  Special equipment needed: Not Applicable    Be sure to schedule a follow-up appointment with your primary care doctor or any specialists as instructed.     Discharge Plan:   Diet Plan: Discussed  Activity Level: Discussed  Confirmed Follow up Appointment: Appointment Scheduled  Confirmed Symptoms Management: Discussed  Medication Reconciliation Updated: Yes  Influenza Vaccine Indication: Indicated: 65 years and older  Influenza Vaccine Given - only chart on this line when given: Influenza Vaccine Given (See MAR)    I understand that a diet low in cholesterol, fat, and sodium is recommended for good health. Unless I have been given specific instructions below for another diet, I accept this instruction as my diet prescription.   Other diet: Heart healthy    Special Instructions: None    · Is patient discharged on Warfarin / Coumadin?   No     Depression / Suicide Risk    As you are discharged from this [facility] facility, it is important to learn how to keep safe from harming yourself.    Recognize the warning signs:  · Abrupt changes in personality, positive or negative- including increase in energy   · Giving away possessions  · Change in eating patterns- significant weight changes-  positive or negative  · Change in sleeping patterns- unable to sleep or sleeping all the time   · Unwillingness or inability to communicate  · Depression  · Unusual sadness, discouragement and loneliness  · Talk of wanting to die  · Neglect of personal appearance   · Rebelliousness- reckless behavior  · Withdrawal from people/activities they love  · Confusion- inability to concentrate     If you or a loved one observes any of these behaviors or has concerns about self-harm, here's what you can do:  · Talk about it- your feelings and reasons for harming yourself  · Remove any means that you might use to hurt yourself  (examples: pills, rope, extension cords, firearm)  · Get professional help from the community (Mental Health, Substance Abuse, psychological counseling)  · Do not be alone:Call your Safe Contact- someone whom you trust who will be there for you.  · Call your local CRISIS HOTLINE 788-8718 or 911-552-4093  · Call your local Children's Mobile Crisis Response Team [place] (779) 302-3072 or www.[employer]  · Call the toll free National Suicide Prevention Hotlines   · National Suicide Prevention Lifeline 927-074-ZGSA (3087)  · [employer] Hope Line Network 800-SUICIDE (011-6699)      How To Use an Incentive Spirometer  An incentive spirometer is a tool that measures how well you are filling your lungs with each breath. Learning to take long, deep breaths using this tool can help you keep your lungs clear and active. This may help to reverse or lessen your chance of developing breathing (pulmonary) problems, especially infection. You may be asked to use a spirometer:  · After a surgery.  · If you have a lung problem or a history of smoking.  · After a long period of time when you have been unable to move or be active.  If the spirometer includes an indicator to show the highest number that you have reached, your health care provider or respiratory therapist will help you set a goal. Keep a list (log) of your progress as told by your health care provider.  What are the risks?  · Breathing too quickly may cause dizziness or cause you to pass out. Take your time so you do not get dizzy or light-headed.  · If you are in pain, you may need to take pain medicine before doing incentive spirometry. It is harder to take a deep breath if you are having pain.  How to use your incentive spirometer    1. Sit up on the edge of your bed or on a chair.  2. Hold the incentive spirometer so that it is in an upright position.  3. Before you use the spirometer, breathe out normally.  4. Place the mouthpiece in your mouth. Make sure  your lips are closed tightly around it.  5. Breathe in slowly and as deeply as you can through your mouth, causing the piston or the ball to rise toward the top of the chamber.  6. Hold your breath for 3-5 seconds, or for as long as possible.  ? If the spirometer includes a  indicator, use this to guide you in breathing. Slow down your breathing if the indicator goes above the marked areas.  7. Remove the mouthpiece from your mouth and breathe out normally. The piston or ball will return to the bottom of the chamber.  8. Rest for a few seconds, then repeat the steps 10 or more times.  ? Take your time and take a few normal breaths between deep breaths so that you do not get dizzy or light-headed.  ? Do this every 1-2 hours when you are awake.  9. If the spirometer includes a goal marker to show the highest number you have reached (best effort), use this as a goal to work toward during each repetition.  10. After each set of 10 deep breaths, cough a few times. This will help to make sure that your lungs are clear.  ? If you have an incision on your chest or abdomen from surgery, place a pillow or a rolled-up towel firmly against the incision when you cough. This can help to reduce pain from coughing.  General tips  · When you become able to get out of bed, walk around often and continue to cough to help clear your lungs.  · Keep using the incentive spirometer until your health care provider says it is okay to stop using it. If you have been in the hospital, you may be told to keep using the spirometer at home.  Contact a health care provider if:  · You are having difficulty using the spirometer.  · You have trouble using the spirometer as often as instructed.  · Your pain medicine is not giving enough relief for you to use the spirometer as told.  · You have a fever.  · You develop shortness of breath.  Get help right away if:  · You develop a cough with bloody mucus from the lungs (bloody sputum).  · You have  fluid or blood coming from an incision site after you cough.  Summary  · An incentive spirometer is a tool that can help you learn to take long, deep breaths to keep your lungs clear and active.  · You may be asked to use a spirometer after a surgery, if you have a lung problem or a history of smoking, or if you have been inactive for a long period of time.  · Use your incentive spirometer as instructed every 1-2 hours while you are awake.  · If you have an incision on your chest or abdomen, place a pillow or a rolled-up towel firmly against your incision when you cough. This will help to reduce pain.  This information is not intended to replace advice given to you by your health care provider. Make sure you discuss any questions you have with your health care provider.  Document Released: 04/29/2008 Document Revised: 01/10/2019 Document Reviewed: 10/31/2018  Elsevier Patient Education © 2020 [employer]

## 2020-09-24 NOTE — DISCHARGE PLANNING
Per note [facility] has accepted patient. Patient given copy of choice form for [facility] contact info.

## 2020-09-24 NOTE — PROGRESS NOTES
Assumed pt care at 0700. Received report from Nessa LARA. A&O x4 but does have moments of forgetfulness, needs reinforcement to use call light etc. Pt denies pain at this time. Respirations even and unlabored on RA.   Updated on POC, communication board updated. Up to BR with SBA and use of FWW. Bed locked and in lowest position. Call light and belongings within reach. Non-skid socks in place. Needs met, will continue to monitor.

## 2020-09-24 NOTE — FACE TO FACE
Face to Face Supporting Documentation - Home Health    The encounter with this patient was in whole or in part the primary reason for home health admission.    Date of encounter:   Patient:                    MRN:                       YOB: 2020  Herminia Jones  [id number]  1/18/1934     Home health to see patient for:  Physical Therapy evaluation and treatment and Occupational therapy evaluation and treatment    Skilled need for:  Exacerbation of Chronic Disease State Chronic debility, recurren tfalls, generalized weakness    Skilled nursing interventions to include:  Comment: PT, OT    Homebound status evidenced by:  Need the aid of supportive devices such as crutches, canes, wheelchairs or walkers. Leaving home requires a considerable and taxing effort. There is a normal inability to leave the home.    Community Physician to provide follow up care: LETY Kulkarni     Optional Interventions? No      I certify the face to face encounter for this home health care referral meets the CMS requirements and the encounter/clinical assessment with the patient was, in whole, or in part, for the medical condition(s) listed above, which is the primary reason for home health care. Based on my clinical findings: the service(s) are medically necessary, support the need for home health care, and the homebound criteria are met.  I certify that this patient has had a face to face encounter by myself.  Elba Schneider M.D. - NPI: [id number]

## 2020-09-25 ENCOUNTER — PATIENT OUTREACH (OUTPATIENT)
Dept: MEDICAL GROUP | Facility: MEDICAL CENTER | Age: 85
End: 2020-09-25

## 2020-09-26 ENCOUNTER — HOME CARE VISIT (OUTPATIENT)
Dept: HOME HEALTH SERVICES | Facility: HOME HEALTHCARE | Age: 85
End: 2020-09-26
Payer: MEDICARE

## 2020-09-26 PROCEDURE — G0493 RN CARE EA 15 MIN HH/HOSPICE: HCPCS

## 2020-09-26 PROCEDURE — 665001 SOC-HOME HEALTH

## 2020-09-26 ASSESSMENT — ENCOUNTER SYMPTOMS: POOR JUDGMENT: 1

## 2020-09-26 ASSESSMENT — FIBROSIS 4 INDEX: FIB4 SCORE: 1.31

## 2020-09-27 VITALS
HEIGHT: 65 IN | HEART RATE: 103 BPM | BODY MASS INDEX: 19.99 KG/M2 | SYSTOLIC BLOOD PRESSURE: 100 MMHG | TEMPERATURE: 98 F | DIASTOLIC BLOOD PRESSURE: 62 MMHG | RESPIRATION RATE: 18 BRPM | WEIGHT: 120 LBS | OXYGEN SATURATION: 98 %

## 2020-09-27 ASSESSMENT — PATIENT HEALTH QUESTIONNAIRE - PHQ9
CLINICAL INTERPRETATION OF PHQ2 SCORE: 0
2. FEELING DOWN, DEPRESSED, IRRITABLE, OR HOPELESS: 00
1. LITTLE INTEREST OR PLEASURE IN DOING THINGS: 00

## 2020-09-27 ASSESSMENT — ENCOUNTER SYMPTOMS: NAUSEA: DENIES ANY

## 2020-09-27 NOTE — PROGRESS NOTES
start 9/26/2020 [address] medicare  adding ST to assess degree of  cognitive impairment, MSW for respite, PT and OT are ordered.  patient tends to be verbally abusive to daughter by yelling at her during visit.

## 2020-09-28 ENCOUNTER — ANTICOAGULATION MONITORING (OUTPATIENT)
Dept: MEDICAL GROUP | Facility: PHYSICIAN GROUP | Age: 85
End: 2020-09-28

## 2020-09-28 ENCOUNTER — HOME CARE VISIT (OUTPATIENT)
Dept: HOME HEALTH SERVICES | Facility: HOME HEALTHCARE | Age: 85
End: 2020-09-28
Payer: MEDICARE

## 2020-09-28 VITALS
DIASTOLIC BLOOD PRESSURE: 55 MMHG | TEMPERATURE: 98.1 F | OXYGEN SATURATION: 98 % | HEART RATE: 86 BPM | RESPIRATION RATE: 18 BRPM | SYSTOLIC BLOOD PRESSURE: 102 MMHG

## 2020-09-28 PROCEDURE — G0151 HHCP-SERV OF PT,EA 15 MIN: HCPCS

## 2020-09-28 SDOH — ECONOMIC STABILITY: HOUSING INSECURITY
HOME SAFETY: PT, PATIENT AND DAUGHTER WORE MASKS THROUGHOUT VISIT.  EDUCATED IN STAYING HOME AS ABLE EXCEPT FOR ESSENTIAL THINGS AND AVOIDING LARGE GATHERINGS, ENCOURAGE USE OF MASK  AND HANDWASHING. DAUUGHTER ABLE TO SPEAKBACK VERBAL UNDERSTANDING

## 2020-09-28 ASSESSMENT — ACTIVITIES OF DAILY LIVING (ADL)
TRANSPORTATION COMMENTS: REQUIRES ASSIST OF ANOTHER PERSON AND AD OUT OF HOME ON UNEVEN SURFACES
AMBULATION ASSISTANCE ON FLAT SURFACES: 1

## 2020-09-28 ASSESSMENT — ENCOUNTER SYMPTOMS
POOR JUDGMENT: 1
CONTUSION: 1
MUSCLE WEAKNESS: 1

## 2020-09-28 NOTE — PROGRESS NOTES
Received referral from [facility]. Medications reviewed.     Drug-Drug: sertraline and mirtazapine  Additive serotonergic effects may occur during coadministration of selective serotonin reuptake inhibitors (SSRIs) and Serotonergic Non-Opioid CNS Depressants, and the risk of developing serotonin syndrome may be increased.    Albert Holloway, PharmD, MS, BCACP, [facility]  Phone [phone] fax [phone]    This note was created using voice recognition software (Dragon). The accuracy of the dictation is limited by the abilities of the software. I have reviewed the note prior to signing, however some errors in grammar and context are still possible. If you have any questions related to this note please do not hesitate to contact our office.

## 2020-09-29 ENCOUNTER — HOME CARE VISIT (OUTPATIENT)
Dept: HOME HEALTH SERVICES | Facility: HOME HEALTHCARE | Age: 85
End: 2020-09-29
Payer: MEDICARE

## 2020-09-29 VITALS
HEART RATE: 91 BPM | OXYGEN SATURATION: 98 % | RESPIRATION RATE: 16 BRPM | SYSTOLIC BLOOD PRESSURE: 100 MMHG | TEMPERATURE: 98 F | DIASTOLIC BLOOD PRESSURE: 60 MMHG

## 2020-09-29 PROCEDURE — G0153 HHCP-SVS OF S/L PATH,EA 15MN: HCPCS

## 2020-09-29 ASSESSMENT — ENCOUNTER SYMPTOMS
THOUGHT CONTENT - SUSPICIOUS: 1
DEPRESSED MOOD: 1
DIFFICULTY THINKING: 1

## 2020-09-29 NOTE — PROGRESS NOTES
PT completed evaluation onn 9/28/20.  Frequency 2 week 3 effective 9/28/20.  Please assist with authorization as needed.

## 2020-09-30 ENCOUNTER — HOME CARE VISIT (OUTPATIENT)
Dept: HOME HEALTH SERVICES | Facility: HOME HEALTHCARE | Age: 85
End: 2020-09-30
Payer: MEDICARE

## 2020-09-30 PROCEDURE — G0152 HHCP-SERV OF OT,EA 15 MIN: HCPCS

## 2020-09-30 PROCEDURE — G0299 HHS/HOSPICE OF RN EA 15 MIN: HCPCS

## 2020-09-30 ASSESSMENT — FIBROSIS 4 INDEX: FIB4 SCORE: 1.31

## 2020-10-01 ENCOUNTER — HOME CARE VISIT (OUTPATIENT)
Dept: HOME HEALTH SERVICES | Facility: HOME HEALTHCARE | Age: 85
End: 2020-10-01
Payer: MEDICARE

## 2020-10-01 VITALS
HEART RATE: 83 BPM | RESPIRATION RATE: 18 BRPM | OXYGEN SATURATION: 97 % | DIASTOLIC BLOOD PRESSURE: 52 MMHG | SYSTOLIC BLOOD PRESSURE: 92 MMHG | TEMPERATURE: 97.5 F

## 2020-10-01 VITALS
RESPIRATION RATE: 18 BRPM | SYSTOLIC BLOOD PRESSURE: 101 MMHG | TEMPERATURE: 97.8 F | BODY MASS INDEX: 20.57 KG/M2 | HEART RATE: 86 BPM | DIASTOLIC BLOOD PRESSURE: 57 MMHG | WEIGHT: 123.6 LBS | OXYGEN SATURATION: 97 %

## 2020-10-01 PROCEDURE — G0151 HHCP-SERV OF PT,EA 15 MIN: HCPCS

## 2020-10-01 ASSESSMENT — ENCOUNTER SYMPTOMS
DIFFICULTY THINKING: 1
POOR JUDGMENT: 1

## 2020-10-01 NOTE — PROGRESS NOTES
BP 92/52 at PT visit on 10/1/20.  Other vitals within parameters.  Patient denies any dizziness or light headedness.  Encouraged hydration and educated daughter in importance.  Daughter reports she only drank 1 1/2 cups yesterday and so far none today.  Daughter able to speakback verbal understanding but states patient resistant when she tries to get her to drink.    
128.8

## 2020-10-02 ASSESSMENT — ACTIVITIES OF DAILY LIVING (ADL): TRANSPORTATION COMMENTS: POOR MEMORY

## 2020-10-05 ENCOUNTER — OFFICE VISIT (OUTPATIENT)
Dept: MEDICAL GROUP | Facility: MEDICAL CENTER | Age: 85
End: 2020-10-05
Payer: MEDICARE

## 2020-10-05 ENCOUNTER — HOME CARE VISIT (OUTPATIENT)
Dept: HOME HEALTH SERVICES | Facility: HOME HEALTHCARE | Age: 85
End: 2020-10-05
Payer: MEDICARE

## 2020-10-05 VITALS
HEART RATE: 85 BPM | OXYGEN SATURATION: 96 % | HEIGHT: 63 IN | WEIGHT: 123 LBS | RESPIRATION RATE: 16 BRPM | SYSTOLIC BLOOD PRESSURE: 118 MMHG | TEMPERATURE: 97.6 F | DIASTOLIC BLOOD PRESSURE: 74 MMHG | BODY MASS INDEX: 21.79 KG/M2

## 2020-10-05 VITALS
HEART RATE: 76 BPM | SYSTOLIC BLOOD PRESSURE: 108 MMHG | OXYGEN SATURATION: 96 % | RESPIRATION RATE: 18 BRPM | TEMPERATURE: 97.5 F | DIASTOLIC BLOOD PRESSURE: 58 MMHG

## 2020-10-05 DIAGNOSIS — R29.6 RECURRENT FALLS: ICD-10-CM

## 2020-10-05 DIAGNOSIS — B37.9 CANDIDIASIS: ICD-10-CM

## 2020-10-05 DIAGNOSIS — S22.41XA CLOSED FRACTURE OF MULTIPLE RIBS OF RIGHT SIDE, INITIAL ENCOUNTER: ICD-10-CM

## 2020-10-05 DIAGNOSIS — E11.9 TYPE 2 DIABETES MELLITUS WITHOUT COMPLICATION, WITHOUT LONG-TERM CURRENT USE OF INSULIN (HCC): ICD-10-CM

## 2020-10-05 DIAGNOSIS — N30.00 ACUTE CYSTITIS WITHOUT HEMATURIA: ICD-10-CM

## 2020-10-05 PROBLEM — N18.9 ACUTE KIDNEY INJURY SUPERIMPOSED ON CHRONIC KIDNEY DISEASE (HCC): Status: RESOLVED | Noted: 2020-09-22 | Resolved: 2020-10-05

## 2020-10-05 PROBLEM — N18.32 STAGE 3B CHRONIC KIDNEY DISEASE: Status: ACTIVE | Noted: 2019-10-04

## 2020-10-05 PROBLEM — N17.9 ACUTE KIDNEY INJURY SUPERIMPOSED ON CHRONIC KIDNEY DISEASE (HCC): Status: RESOLVED | Noted: 2020-09-22 | Resolved: 2020-10-05

## 2020-10-05 PROBLEM — N39.0 UTI (URINARY TRACT INFECTION): Status: RESOLVED | Noted: 2019-09-25 | Resolved: 2020-10-05

## 2020-10-05 PROCEDURE — 99214 OFFICE O/P EST MOD 30 MIN: CPT | Performed by: NURSE PRACTITIONER

## 2020-10-05 PROCEDURE — G0151 HHCP-SERV OF PT,EA 15 MIN: HCPCS

## 2020-10-05 RX ORDER — KETOCONAZOLE 20 MG/G
1 CREAM TOPICAL DAILY
Qty: 30 G | Refills: 0 | Status: SHIPPED | OUTPATIENT
Start: 2020-10-05 | End: 2021-01-05

## 2020-10-05 ASSESSMENT — FIBROSIS 4 INDEX: FIB4 SCORE: 1.31

## 2020-10-05 ASSESSMENT — ENCOUNTER SYMPTOMS
MEMORY LOSS: 1
POOR JUDGMENT: 1

## 2020-10-05 NOTE — PROGRESS NOTES
Subjective:      Herminia Jones is a 86 y.o. female who presents with Hospital Follow-up        CC: Patient is here today accompanied by her daughter/caregiver for hospital follow-up after cystitis and low GFR.    HPI         1. Acute cystitis without hematuria  Patient's daughter had contacted me last month thinking her mother might have another UTI based on symptoms and a urinalysis came back showing suspicious for UTI and her GFR had dropped down to 25 so I recommended she go to the ER.  She does have prior history of uterine prolapse with repair.    She was admitted and given IV antibiotics and fluids with her last lab work showing normal CBC, GFR back to baseline, and normal lactic acid.  She has home health coming out to see her.  She has off antibiotics.  She denies dysuria or frequency and there has been no increased confusion.    2. Closed fracture of multiple ribs of right side, initial encounter  Patient seen at urgent care after a fall on 9/2/2020 but imaging studies did not show any fracture.  When she was seen at the hospital on 9/22/2020 it did show a right eighth and ninth rib fracture which could have been from another fall afterwards.  She still has some tenderness in this area but no severe pain.  She reports no breathing issues.    3. Candidiasis  Patient has had on and off erythema and discharge from the umbilical area.  Patient's daughter states she used over-the-counter antifungal that did help but then it returned again just before going to the hospital.  She has not had any pain in the area.    4. Type 2 diabetes mellitus without complication, without long-term current use of insulin (HCC)  Most recent hemoglobin A1c comes back within acceptable range at 6.8 and she is off all medicine including metformin due to her low GFR.    5. Recurrent falls  Patient's daughter again feels patient is being cared for adequately at home but she does have to be reminded to use her walker  frequently.  Past Medical History:   Diagnosis Date   • Arthritis     hands    • Dental disorder     dentures   • Diabetes (HCC)     oral medication   • Glaucoma     both eyes    • Gynecological disorder    • Heart burn    • Macular degeneration    • Psychiatric problem     depression   • Snoring    • Urinary bladder disorder    • Urinary incontinence      Social History     Socioeconomic History   • Marital status:      Spouse name: Not on file   • Number of children: Not on file   • Years of education: Not on file   • Highest education level: Not on file   Occupational History   • Not on file   Social Needs   • Financial resource strain: Not on file   • Food insecurity     Worry: Not on file     Inability: Not on file   • Transportation needs     Medical: Not on file     Non-medical: Not on file   Tobacco Use   • Smoking status: Never Smoker   • Smokeless tobacco: Never Used   Substance and Sexual Activity   • Alcohol use: No   • Drug use: No   • Sexual activity: Not Currently   Lifestyle   • Physical activity     Days per week: Not on file     Minutes per session: Not on file   • Stress: Not on file   Relationships   • Social connections     Talks on phone: Not on file     Gets together: Not on file     Attends Synagogue service: Not on file     Active member of club or organization: Not on file     Attends meetings of clubs or organizations: Not on file     Relationship status: Not on file   • Intimate partner violence     Fear of current or ex partner: Not on file     Emotionally abused: Not on file     Physically abused: Not on file     Forced sexual activity: Not on file   Other Topics Concern   •  Service Not Asked   • Blood Transfusions Not Asked   • Caffeine Concern Not Asked   • Occupational Exposure Not Asked   • Hobby Hazards Not Asked   • Sleep Concern Not Asked   • Stress Concern Not Asked   • Weight Concern Not Asked   • Special Diet Not Asked   • Back Care Not Asked   • Exercise Not  "Asked   • Bike Helmet Not Asked   • Seat Belt Not Asked   • Self-Exams Not Asked   Social History Narrative   • Not on file     Current Outpatient Medications   Medication Sig Dispense Refill   • ketoconazole (NIZORAL) 2 % Cream Apply 1 Application to affected area(s) every day. 30 g 0   • mirtazapine (REMERON) 7.5 MG tablet Take 1 Tab by mouth every bedtime. 30 Tab 0   • lidocaine 2 % Gel Apply 1 mL to affected area(s) as needed. 1 mL 0   • Calcium Carbonate (CALCARB 600 PO) Take 600 mg by mouth every morning.     • famotidine (PEPCID) 40 MG Tab Take 40 mg by mouth every day.     • sertraline (ZOLOFT) 100 MG Tab Take 150 mg by mouth every day. 1.5 tablets = 150mg     • atorvastatin (LIPITOR) 20 MG Tab TAKE ONE TABLET BY MOUTH DAILY 100 Tab 3   • oxybutynin (DITROPAN) 5 MG Tab Take 5 mg by mouth 3 times a day.     • Non Formulary Request Place 2 Drops in both eyes every day. OCUVITE DROPS : 2 DROPS INTO BOTH EYES DAILY     • acetaminophen (TYLENOL) 500 MG Tab Take 1,000 mg by mouth Once. 2 tablets = 1000mg       No current facility-administered medications for this visit.      Family History   Problem Relation Age of Onset   • Stroke Father    • Cancer Sister    • Cancer Brother    • Heart Disease Sister    • Cancer Brother          Review of Systems   Musculoskeletal: Positive for joint pain.   Skin: Positive for rash.   Psychiatric/Behavioral: Positive for memory loss.   All other systems reviewed and are negative.         Objective:     /74 (BP Location: Right arm, Patient Position: Sitting, BP Cuff Size: Adult)   Pulse 85   Temp 36.4 °C (97.6 °F) (Temporal)   Resp 16   Ht 1.6 m (5' 3\")   Wt 55.8 kg (123 lb)   SpO2 96%   BMI 21.79 kg/m²      Physical Exam  Vitals signs and nursing note reviewed.   Constitutional:       General: She is not in acute distress.     Appearance: She is well-developed. She is not diaphoretic.   HENT:      Head: Normocephalic and atraumatic.      Right Ear: External ear " normal.      Left Ear: External ear normal.      Nose: Nose normal.   Eyes:      General:         Right eye: No discharge.         Left eye: No discharge.   Neck:      Musculoskeletal: Normal range of motion and neck supple.      Thyroid: No thyromegaly.   Cardiovascular:      Rate and Rhythm: Normal rate and regular rhythm.      Heart sounds: Normal heart sounds. No murmur. No friction rub. No gallop.    Pulmonary:      Effort: Pulmonary effort is normal.      Breath sounds: Normal breath sounds. No wheezing or rales.   Musculoskeletal:         General: No tenderness.      Comments: Mild tenderness still in the right rib area where fractures occurred but lungs are clear   Skin:     General: Skin is warm and dry.      Findings: No rash.      Comments: No discharge from the umbilical area but it is slightly pink-colored around the site.   Neurological:      Mental Status: She is alert and oriented to person, place, and time.      Deep Tendon Reflexes: Reflexes normal.   Psychiatric:         Behavior: Behavior normal.         Thought Content: Thought content normal.         Judgment: Judgment normal.      Comments: Patient's information today provided by her daughter due to patient's confusion.                 Assessment/Plan:        1. Acute cystitis without hematuria  Patient again treated for UTI and this does tend to recur and she has previously been to  in urology for this.  She was on daily preventative antibiotics but her daughter did not feel it helped.  Her daughter also does not feel it would be useful following back with urology.  I advised her to be sure patient wipes front to back and keeps well-hydrated.  Any further symptoms should be tested immediately    2. Closed fracture of multiple ribs of right side, initial encounter  Patient to take deep breaths and work with therapy.    3. Candidiasis  I will try ketoconazole cream but if symptoms continue she may need imaging of the area.  Patient  agrees with plan.  - ketoconazole (NIZORAL) 2 % Cream; Apply 1 Application to affected area(s) every day.  Dispense: 30 g; Refill: 0    4. Type 2 diabetes mellitus without complication, without long-term current use of insulin ([facility])  Hemoglobin A1c at 6.8 and with her age and confusion, I would be reluctant to be aggressive with treating to prevent hypoglycemia.  She will do lab work in December.  - Comp Metabolic Panel; Future  - HEMOGLOBIN A1C; Future    5. Recurrent falls  Patient reminded to use her walker and she has home physical therapy and Occupational Therapy helping her.

## 2020-10-06 ENCOUNTER — HOME CARE VISIT (OUTPATIENT)
Dept: HOME HEALTH SERVICES | Facility: HOME HEALTHCARE | Age: 85
End: 2020-10-06
Payer: MEDICARE

## 2020-10-06 VITALS
OXYGEN SATURATION: 94 % | SYSTOLIC BLOOD PRESSURE: 101 MMHG | DIASTOLIC BLOOD PRESSURE: 57 MMHG | TEMPERATURE: 97.8 F | HEART RATE: 86 BPM | RESPIRATION RATE: 18 BRPM

## 2020-10-06 PROCEDURE — G0152 HHCP-SERV OF OT,EA 15 MIN: HCPCS

## 2020-10-06 PROCEDURE — G0155 HHCP-SVS OF CSW,EA 15 MIN: HCPCS

## 2020-10-06 ASSESSMENT — ENCOUNTER SYMPTOMS
POOR JUDGMENT: 1
DIFFICULTY THINKING: 1

## 2020-10-06 ASSESSMENT — ACTIVITIES OF DAILY LIVING (ADL): OASIS_M1830: 05

## 2020-10-07 ENCOUNTER — HOME CARE VISIT (OUTPATIENT)
Dept: HOME HEALTH SERVICES | Facility: HOME HEALTHCARE | Age: 85
End: 2020-10-07
Payer: MEDICARE

## 2020-10-07 VITALS
TEMPERATURE: 97.6 F | RESPIRATION RATE: 18 BRPM | DIASTOLIC BLOOD PRESSURE: 61 MMHG | OXYGEN SATURATION: 93 % | SYSTOLIC BLOOD PRESSURE: 104 MMHG | HEART RATE: 93 BPM

## 2020-10-07 PROCEDURE — G0299 HHS/HOSPICE OF RN EA 15 MIN: HCPCS

## 2020-10-07 RX ORDER — ERGOCALCIFEROL 1.25 MG/1
50000 CAPSULE ORAL
Qty: 12 CAP | Refills: 3 | Status: SHIPPED | OUTPATIENT
Start: 2020-10-07 | End: 2021-01-05

## 2020-10-07 ASSESSMENT — ENCOUNTER SYMPTOMS: POOR JUDGMENT: 1

## 2020-10-07 NOTE — PROGRESS NOTES
action needed please  Daughter is asking for Vit d 50,000 i/wk REFILL  [place] [facility] at [facility]    thank you.

## 2020-10-08 ENCOUNTER — HOME CARE VISIT (OUTPATIENT)
Dept: HOME HEALTH SERVICES | Facility: HOME HEALTHCARE | Age: 85
End: 2020-10-08
Payer: MEDICARE

## 2020-10-08 VITALS
DIASTOLIC BLOOD PRESSURE: 61 MMHG | SYSTOLIC BLOOD PRESSURE: 117 MMHG | RESPIRATION RATE: 17 BRPM | TEMPERATURE: 99 F | HEART RATE: 94 BPM | OXYGEN SATURATION: 94 %

## 2020-10-08 VITALS
RESPIRATION RATE: 17 BRPM | HEART RATE: 85 BPM | SYSTOLIC BLOOD PRESSURE: 98 MMHG | TEMPERATURE: 97.5 F | OXYGEN SATURATION: 97 % | DIASTOLIC BLOOD PRESSURE: 58 MMHG

## 2020-10-08 VITALS
TEMPERATURE: 97.4 F | DIASTOLIC BLOOD PRESSURE: 58 MMHG | RESPIRATION RATE: 17 BRPM | HEART RATE: 84 BPM | OXYGEN SATURATION: 98 % | SYSTOLIC BLOOD PRESSURE: 100 MMHG

## 2020-10-08 PROCEDURE — G0151 HHCP-SERV OF PT,EA 15 MIN: HCPCS

## 2020-10-08 PROCEDURE — G0152 HHCP-SERV OF OT,EA 15 MIN: HCPCS

## 2020-10-08 PROCEDURE — G0180 MD CERTIFICATION HHA PATIENT: HCPCS | Performed by: INTERNAL MEDICINE

## 2020-10-08 SDOH — ECONOMIC STABILITY: HOUSING INSECURITY
HOME SAFETY: EDUCATED TO CLEAR CLUTTER IN KITCHEN AREA, THEY ARE HAVING A BABY SHOWER PARTY TODAY, ABLE TO LOCK DOORS SECURELY.

## 2020-10-08 ASSESSMENT — ENCOUNTER SYMPTOMS
POOR JUDGMENT: 1
POOR JUDGMENT: 1
DIFFICULTY THINKING: 1
MUSCLE WEAKNESS: 1

## 2020-10-08 ASSESSMENT — ACTIVITIES OF DAILY LIVING (ADL): TRANSPORTATION COMMENTS: REQUIRES ASSIST OF ANOTHER PERSON AND AD OUT OF HOME

## 2020-10-12 ENCOUNTER — HOME CARE VISIT (OUTPATIENT)
Dept: HOME HEALTH SERVICES | Facility: HOME HEALTHCARE | Age: 85
End: 2020-10-12
Payer: MEDICARE

## 2020-10-12 VITALS
OXYGEN SATURATION: 98 % | TEMPERATURE: 97.9 F | SYSTOLIC BLOOD PRESSURE: 102 MMHG | DIASTOLIC BLOOD PRESSURE: 60 MMHG | RESPIRATION RATE: 16 BRPM | HEART RATE: 86 BPM

## 2020-10-12 PROCEDURE — G0151 HHCP-SERV OF PT,EA 15 MIN: HCPCS

## 2020-10-12 ASSESSMENT — ENCOUNTER SYMPTOMS: POOR JUDGMENT: 1

## 2020-10-12 ASSESSMENT — ACTIVITIES OF DAILY LIVING (ADL): TRANSPORTATION COMMENTS: REQUIRES ASSIST OUT OF HOME ON UNEVEN SURFACES AND STEPS

## 2020-10-13 ENCOUNTER — HOME CARE VISIT (OUTPATIENT)
Dept: HOME HEALTH SERVICES | Facility: HOME HEALTHCARE | Age: 85
End: 2020-10-13
Payer: MEDICARE

## 2020-10-13 PROCEDURE — G0152 HHCP-SERV OF OT,EA 15 MIN: HCPCS

## 2020-10-14 ENCOUNTER — HOME CARE VISIT (OUTPATIENT)
Dept: HOME HEALTH SERVICES | Facility: HOME HEALTHCARE | Age: 85
End: 2020-10-14
Payer: MEDICARE

## 2020-10-14 VITALS
DIASTOLIC BLOOD PRESSURE: 61 MMHG | TEMPERATURE: 97.5 F | RESPIRATION RATE: 16 BRPM | OXYGEN SATURATION: 96 % | SYSTOLIC BLOOD PRESSURE: 108 MMHG | HEART RATE: 93 BPM

## 2020-10-14 VITALS
SYSTOLIC BLOOD PRESSURE: 107 MMHG | RESPIRATION RATE: 16 BRPM | OXYGEN SATURATION: 98 % | TEMPERATURE: 97.4 F | HEART RATE: 81 BPM | DIASTOLIC BLOOD PRESSURE: 60 MMHG

## 2020-10-14 PROCEDURE — G0151 HHCP-SERV OF PT,EA 15 MIN: HCPCS

## 2020-10-14 PROCEDURE — G0299 HHS/HOSPICE OF RN EA 15 MIN: HCPCS

## 2020-10-14 ASSESSMENT — ENCOUNTER SYMPTOMS
POOR JUDGMENT: 1
DIFFICULTY THINKING: 1
POOR JUDGMENT: 1

## 2020-10-18 VITALS
DIASTOLIC BLOOD PRESSURE: 60 MMHG | SYSTOLIC BLOOD PRESSURE: 107 MMHG | RESPIRATION RATE: 16 BRPM | OXYGEN SATURATION: 98 % | TEMPERATURE: 97.4 F | HEART RATE: 81 BPM

## 2020-10-18 ASSESSMENT — ENCOUNTER SYMPTOMS: POOR JUDGMENT: 1

## 2020-10-21 ENCOUNTER — HOME CARE VISIT (OUTPATIENT)
Dept: HOME HEALTH SERVICES | Facility: HOME HEALTHCARE | Age: 85
End: 2020-10-21
Payer: MEDICARE

## 2020-10-21 PROCEDURE — G0493 RN CARE EA 15 MIN HH/HOSPICE: HCPCS

## 2020-10-22 VITALS
HEART RATE: 88 BPM | OXYGEN SATURATION: 99 % | RESPIRATION RATE: 17 BRPM | DIASTOLIC BLOOD PRESSURE: 60 MMHG | SYSTOLIC BLOOD PRESSURE: 112 MMHG | TEMPERATURE: 97.6 F

## 2020-10-22 ASSESSMENT — PATIENT HEALTH QUESTIONNAIRE - PHQ9: CLINICAL INTERPRETATION OF PHQ2 SCORE: 0

## 2020-10-22 ASSESSMENT — ACTIVITIES OF DAILY LIVING (ADL)
OASIS_M1830: 01
HOME_HEALTH_OASIS: 01

## 2020-12-08 DIAGNOSIS — K21.9 GASTROESOPHAGEAL REFLUX DISEASE WITHOUT ESOPHAGITIS: ICD-10-CM

## 2020-12-08 RX ORDER — FAMOTIDINE 20 MG/1
TABLET, FILM COATED ORAL
Qty: 180 TAB | Refills: 1 | Status: SHIPPED | OUTPATIENT
Start: 2020-12-08 | End: 2021-01-05 | Stop reason: SDUPTHER

## 2021-01-05 ENCOUNTER — OFFICE VISIT (OUTPATIENT)
Dept: MEDICAL GROUP | Facility: MEDICAL CENTER | Age: 86
End: 2021-01-05
Payer: MEDICARE

## 2021-01-05 ENCOUNTER — HOSPITAL ENCOUNTER (OUTPATIENT)
Facility: MEDICAL CENTER | Age: 86
End: 2021-01-05
Attending: NURSE PRACTITIONER
Payer: MEDICARE

## 2021-01-05 VITALS
RESPIRATION RATE: 16 BRPM | DIASTOLIC BLOOD PRESSURE: 72 MMHG | OXYGEN SATURATION: 94 % | WEIGHT: 131 LBS | BODY MASS INDEX: 23.21 KG/M2 | SYSTOLIC BLOOD PRESSURE: 102 MMHG | HEIGHT: 63 IN | HEART RATE: 70 BPM | TEMPERATURE: 98.2 F

## 2021-01-05 DIAGNOSIS — R33.9 RETENTION OF URINE: ICD-10-CM

## 2021-01-05 DIAGNOSIS — Z00.00 MEDICARE ANNUAL WELLNESS VISIT, SUBSEQUENT: ICD-10-CM

## 2021-01-05 DIAGNOSIS — H35.3233 EXUDATIVE AGE-RELATED MACULAR DEGENERATION OF BOTH EYES WITH INACTIVE SCAR (HCC): ICD-10-CM

## 2021-01-05 DIAGNOSIS — K21.9 GASTROESOPHAGEAL REFLUX DISEASE WITHOUT ESOPHAGITIS: ICD-10-CM

## 2021-01-05 DIAGNOSIS — F33.42 RECURRENT MAJOR DEPRESSIVE DISORDER, IN FULL REMISSION (HCC): ICD-10-CM

## 2021-01-05 DIAGNOSIS — E78.5 DYSLIPIDEMIA: ICD-10-CM

## 2021-01-05 DIAGNOSIS — N18.32 STAGE 3B CHRONIC KIDNEY DISEASE: ICD-10-CM

## 2021-01-05 DIAGNOSIS — R30.0 DYSURIA: ICD-10-CM

## 2021-01-05 DIAGNOSIS — Z91.81 RISK FOR FALLS: ICD-10-CM

## 2021-01-05 DIAGNOSIS — E11.8 DIABETES MELLITUS TYPE 2 WITH COMPLICATIONS (HCC): ICD-10-CM

## 2021-01-05 DIAGNOSIS — R80.9 MICROALBUMINURIA: ICD-10-CM

## 2021-01-05 DIAGNOSIS — E55.9 VITAMIN D DEFICIENCY: ICD-10-CM

## 2021-01-05 DIAGNOSIS — F02.80 DEMENTIA ASSOCIATED WITH OTHER UNDERLYING DISEASE WITHOUT BEHAVIORAL DISTURBANCE (HCC): ICD-10-CM

## 2021-01-05 DIAGNOSIS — I70.0 ATHEROSCLEROSIS OF AORTA (HCC): ICD-10-CM

## 2021-01-05 DIAGNOSIS — M85.80 OSTEOPENIA, UNSPECIFIED LOCATION: ICD-10-CM

## 2021-01-05 PROBLEM — S22.49XA RIB FRACTURES: Status: RESOLVED | Noted: 2020-09-22 | Resolved: 2021-01-05

## 2021-01-05 PROBLEM — R29.6 RECURRENT FALLS: Status: RESOLVED | Noted: 2020-09-22 | Resolved: 2021-01-05

## 2021-01-05 PROBLEM — G31.84 MILD COGNITIVE IMPAIRMENT: Status: RESOLVED | Noted: 2020-09-22 | Resolved: 2021-01-05

## 2021-01-05 PROBLEM — E11.9 TYPE 2 DIABETES MELLITUS WITHOUT COMPLICATION, WITHOUT LONG-TERM CURRENT USE OF INSULIN (HCC): Status: RESOLVED | Noted: 2017-12-01 | Resolved: 2021-01-05

## 2021-01-05 LAB
APPEARANCE UR: NORMAL
BILIRUB UR STRIP-MCNC: NORMAL MG/DL
COLOR UR AUTO: YELLOW
GLUCOSE UR STRIP.AUTO-MCNC: NORMAL MG/DL
KETONES UR STRIP.AUTO-MCNC: NORMAL MG/DL
LEUKOCYTE ESTERASE UR QL STRIP.AUTO: NORMAL
NITRITE UR QL STRIP.AUTO: NORMAL
PH UR STRIP.AUTO: 5.5 [PH] (ref 5–8)
PROT UR QL STRIP: 30 MG/DL
RBC UR QL AUTO: NORMAL
SP GR UR STRIP.AUTO: 1.02
UROBILINOGEN UR STRIP-MCNC: 0.2 MG/DL

## 2021-01-05 PROCEDURE — G0439 PPPS, SUBSEQ VISIT: HCPCS | Performed by: NURSE PRACTITIONER

## 2021-01-05 PROCEDURE — 87077 CULTURE AEROBIC IDENTIFY: CPT

## 2021-01-05 PROCEDURE — 87186 SC STD MICRODIL/AGAR DIL: CPT

## 2021-01-05 PROCEDURE — 81002 URINALYSIS NONAUTO W/O SCOPE: CPT | Performed by: NURSE PRACTITIONER

## 2021-01-05 PROCEDURE — 8041 PR SCP AHA: Performed by: NURSE PRACTITIONER

## 2021-01-05 PROCEDURE — 87086 URINE CULTURE/COLONY COUNT: CPT

## 2021-01-05 PROCEDURE — 81001 URINALYSIS AUTO W/SCOPE: CPT

## 2021-01-05 RX ORDER — MIRTAZAPINE 7.5 MG/1
7.5 TABLET, FILM COATED ORAL
Qty: 30 TAB | Refills: 11 | Status: SHIPPED | OUTPATIENT
Start: 2021-01-05 | End: 2021-02-01

## 2021-01-05 RX ORDER — TRIMETHOPRIM 100 MG/1
100 TABLET ORAL 2 TIMES DAILY
COMMUNITY
End: 2021-06-07

## 2021-01-05 RX ORDER — FAMOTIDINE 20 MG/1
TABLET, FILM COATED ORAL
Qty: 180 TAB | Refills: 3 | Status: SHIPPED | OUTPATIENT
Start: 2021-01-05 | End: 2021-03-01

## 2021-01-05 RX ORDER — CEFDINIR 300 MG/1
300 CAPSULE ORAL 2 TIMES DAILY
Qty: 10 CAP | Refills: 0 | Status: SHIPPED | OUTPATIENT
Start: 2021-01-05 | End: 2021-02-01

## 2021-01-05 ASSESSMENT — ACTIVITIES OF DAILY LIVING (ADL): BATHING_REQUIRES_ASSISTANCE: 0

## 2021-01-05 ASSESSMENT — FIBROSIS 4 INDEX: FIB4 SCORE: 1.31

## 2021-01-05 ASSESSMENT — PATIENT HEALTH QUESTIONNAIRE - PHQ9: CLINICAL INTERPRETATION OF PHQ2 SCORE: 0

## 2021-01-05 ASSESSMENT — ENCOUNTER SYMPTOMS: GENERAL WELL-BEING: EXCELLENT

## 2021-01-05 NOTE — PROGRESS NOTES
Annual Health Assessment Questions:    1.  Are you currently engaging in any exercise or physical activity? No    2.  How would you describe your mood or emotional well-being today? good    3.  Have you had any falls in the last year? Yes    4.  Have you noticed any problems with your balance or had difficulty walking? Yes    5.  In the last six months have you experienced any leakage of urine? No    6. DPA/Advanced Directive: Patient has Durable Power of  on file.

## 2021-01-05 NOTE — LETTER
January 5, 2021        Herminia Jones  [address] [place] [facility] [address]        To whom it may concern:    Herminia suffers from dementia for which she is cared for by her daughter and is on Remeron and Zoloft.    If you have any questions or concerns, please don't hesitate to call.        Sincerely,        MARY JO Kulkarni.P.DEV.    Electronically Signed

## 2021-01-05 NOTE — PROGRESS NOTES
CC: Patient is here today accompanied by her daughter/caregiver for annual Medicare wellness visit.    HPI:   Herminia presents today with the following.    1. Medicare annual wellness visit, subsequent  Screening performed below.    2. Dysuria  Patient has history of frequent UTIs for which she was going to urology but her daughter felt they were doing much for her so she stopped taking her.  She is currently on trimethoprim twice a day for prevention.  When patient has UTI she often has no symptoms except for increased confusion and more falls and daughter would like her mother checked today for UTI.    3. Dementia associated with other underlying disease without behavioral disturbance ([facility])  Patient's daughter reports no change in status and patient is able to answer some questions today although she repeats herself frequently.  She is currently being cared for at home by her daughter.  Her daughter is asking for a letter today regarding her dementia    4. Diabetes mellitus type 2 with complications ([facility])  Last A1c was at 6.8 in September and she has an A1c pending to be done.  She has not recently been on medicines because of her risk for hypoglycemia and decreased GFR with her age and dementia.    5. Recurrent major depressive disorder, in full remission ([facility])  Patient continues on Zoloft 150 mg which daughter feels patient needs for her depression.  She is also on mirtazapine after recent hospitalization    6. Exudative age-related macular degeneration of both eyes with inactive scar ([facility])  Patient follows with ophthalmology regularly and was apparently seen recently.    7. Atherosclerosis of aorta ([facility])  Patient is taking atorvastatin 20 mg daily    8. Stage 3b chronic kidney disease  Last GFR was stable at 36 and she is not on anti-inflammatories    9. Dyslipidemia  Patient on statin reports no myalgias    10. Risk for falls  Patient uses walker for mobility and her daughter states she has had occasional  falls at home but is walking without any problems through the halls today with just the assist of a walker.    11. Microalbuminuria  History of microalbuminuria but she is unable to take an ACE inhibitor due to hypotension from this    12. Urinary retention  Patient was following with urology and was being prescribed oxybutynin through them.    13. Osteopenia, unspecified location  History of osteopenia and she is currently taking calcium but not vitamin D.    14. Vitamin D deficiency  History of vitamin D deficiency for which she was on 50,000 units weekly but has been off for a few months.    15. Gastroesophageal reflux disease without esophagitis  Patient would like to continue on Pepcid which he uses for acid reflux.      Depression Screening    Little interest or pleasure in doing things?  0 - not at all  Feeling down, depressed , or hopeless? 0 - not at all  Patient Health Questionnaire Score: 0     If depressive symptoms identified deferred to follow up visit unless specifically addressed in assessment and plan.    Interpretation of PHQ-9 Total Score   Score Severity   1-4 No Depression   5-9 Mild Depression   10-14 Moderate Depression   15-19 Moderately Severe Depression   20-27 Severe Depression    Screening for Cognitive Impairment    Three Minute Recall (river, nation, finger) 0/3    Jarett clock face with all 12 numbers and set the hands to show 10 past 11.  No Pt is having issues seeing   Cognitive concerns identified deferred for follow up unless specifically addressed in assessment and plan.    Fall Risk Assessment    Has the patient had two or more falls in the last year or any fall with injury in the last year?  Yes    Safety Assessment    Throw rugs on floor.  Yes  Handrails on all stairs.  Yes  Good lighting in all hallways.  Yes  Difficulty hearing.  No  Patient counseled about all safety risks that were identified.    Functional Assessment ADLs    Are there any barriers preventing you from cooking  for yourself or meeting nutritional needs?  Yes.    Are there any barriers preventing you from driving safely or obtaining transportation?  No.    Are there any barriers preventing you from using a telephone or calling for help?  No.    Are there any barriers preventing you from shopping?  No.    Are there any barriers preventing you from taking care of your own finances?  Yes.    Are there any barriers preventing you from managing your medications?  Yes.    Are there any barriers preventing you from showering, bathing or dressing yourself?  No.    Are you currently engaging in any exercise or physical activity?  No.     What is your perception of your health?  Excellent.      Health Maintenance Summary                FASTING LIPID PROFILE Overdue 11/18/2020      Done 11/18/2019 LIPID PROFILE     Patient has more history with this topic...    URINE ACR / MICROALBUMIN Overdue 11/18/2020      Done 11/18/2019 MICROALBUMIN CREAT RATIO URINE     Patient has more history with this topic...    DIABETES MONOFILAMENT / LE EXAM Next Due 1/13/2021      Done 1/13/2020      Patient has more history with this topic...    IMM DTaP/Tdap/Td Vaccine Postponed 10/11/2021 Originally 1/18/1953. Patient Refused    IMM HEP B VACCINE Postponed 9/29/2022 Originally 1/18/1953. Insurance/Financial    IMM ZOSTER VACCINES Postponed 10/5/2022 Originally 1/18/1984. System: vaccine not available, other system reasons    A1C SCREENING Next Due 3/21/2021      Done 9/21/2020 HEMOGLOBIN A1C     Patient has more history with this topic...    SERUM CREATININE Next Due 9/24/2021      Done 9/24/2020 BASIC METABOLIC PANEL     Patient has more history with this topic...    RETINAL SCREENING Next Due 11/19/2021      Done 11/19/2020 dr Ospina     Patient has more history with this topic...    Annual Wellness Visit Next Due 1/6/2022      Done 1/5/2021 Visit Dx: Medicare annual wellness visit, subsequent     Patient has more history with this topic...    BONE  DENSITY Next Due 3/19/2023      Done 3/19/2018 DS-BONE DENSITY STUDY (DEXA)     Patient has more history with this topic...    COLONOSCOPY Next Due 8/7/2023      Done 8/7/2013           Patient Care Team:  LETY Kulkarni as PCP - General (Family Medicine)  Vinny Delgado M.D. as Consulting Physician (Ophthalmology)  Shayy Abel as    Tao Olivarez M.D. as Consulting Physician (Urology)  [facility] as Home Health Provider          Patient Active Problem List    Diagnosis Date Noted   • Urinary retention 09/25/2019     Priority: High   • Diabetes mellitus type 2 with complications ([facility]) 12/01/2017     Priority: Low   • Dementia associated with other underlying disease without behavioral disturbance ([facility]) 01/13/2020   • Exudative age-related macular degeneration of both eyes with inactive scar ([facility]) 01/13/2020   • Atherosclerosis of aorta ([facility]) 01/13/2020   • Stage 3b chronic kidney disease 10/04/2019   • Lung nodule 09/25/2019   • Microalbuminuria 10/02/2018   • Osteopenia 07/24/2018   • Recurrent major depressive disorder, in full remission ([facility]) 12/08/2017   • Vitamin D deficiency 12/08/2017   • Risk for falls 12/08/2017   • Uterine prolapse without mention of vaginal wall prolapse 06/02/2017   • Dyslipidemia 05/16/2017       Current Outpatient Medications   Medication Sig Dispense Refill   • trimethoprim (TRIMPEX) 100 MG Tab Take 100 mg by mouth 2 times a day.     • famotidine (PEPCID) 20 MG Tab TAKE ONE TABLET BY MOUTH TWICE A  Tab 3   • mirtazapine (REMERON) 7.5 MG tablet Take 1 Tab by mouth every bedtime. 30 Tab 11   • cefdinir (OMNICEF) 300 MG Cap Take 1 Cap by mouth 2 times a day for 5 days. 10 Cap 0   • Calcium Carbonate (CALCARB 600 PO) Take 600 mg by mouth every morning.     • sertraline (ZOLOFT) 100 MG Tab Take 150 mg by mouth every day. 1.5 tablets = 150mg     • atorvastatin (LIPITOR) 20 MG Tab TAKE ONE TABLET BY MOUTH DAILY 100 Tab 3   •  "oxybutynin (DITROPAN) 5 MG Tab Take 5 mg by mouth 3 times a day.       No current facility-administered medications for this visit.          Allergies as of 01/05/2021 - Reviewed 01/05/2021   Allergen Reaction Noted   • Sulfamethoxazole  12/02/2019        ROS: As per HPI.    /72 (BP Location: Left arm, Patient Position: Sitting, BP Cuff Size: Adult)   Pulse 70   Temp 36.8 °C (98.2 °F) (Temporal)   Resp 16   Ht 1.6 m (5' 3\")   Wt 59.4 kg (131 lb)   SpO2 94%   BMI 23.21 kg/m²     Physical Exam:  Gen:         Alert, able to answer some questions but most information is provided by her daughter  Dentition: Fair  Hearing: Good      Assessment and Plan.   86 y.o. female with the following issues.    1. Medicare annual wellness visit, subsequent  Annual wellness topics discussed, review of chronic medical problems completed    - Subsequent Annual Wellness Visit - Includes PPPS ()    2. Dysuria  Urine dip in the office shows positive for nitrates and large leukocytes despite her twice a day trimethoprim.  She is allergic to sulfa so I will start her on cefdinir for 5 days and await urine culture.  I did talk to daughter about following back with her urologist but she feels they were being charged too much and were not getting adequate service.  - POCT Urinalysis  - URINALYSIS,CULTURE IF INDICATED; Future  - cefdinir (OMNICEF) 300 MG Cap; Take 1 Cap by mouth 2 times a day for 5 days.  Dispense: 10 Cap; Refill: 0    3. Dementia associated with other underlying disease without behavioral disturbance ([facility])  Patient currently cared for by her daughter.    4. Diabetes mellitus type 2 with complications ([facility])  Patient not currently on medicines because of decreased GFR and risk for hypoglycemia at her age but if A1c should go higher she may need to go on medicine in the future    5. Recurrent major depressive disorder, in full remission ([facility])  Patient currently on mirtazapine and Zoloft which daughter feels is " helpful    6. Exudative age-related macular degeneration of both eyes with inactive scar (HCC)  Patient follows with ophthalmology    7. Atherosclerosis of aorta (HCC)  Patient on a statin    8. Stage 3b chronic kidney disease  GFR has been stable in the 30s but should she go lower she may need to see nephrology    9. Dyslipidemia  Cholesterol levels good on statin and she is due for lab work  - Lipid Profile; Future    10. Risk for falls  Patient recently had home physical therapy after hospitalization and daughter feels she can still adequately care for her at home    11. Microalbuminuria  Patient due for lab work and cannot take ACE or ARB  - MICROALBUMIN CREAT RATIO URINE; Future    12. Urinary retention  Patient currently on oxybutynin through urology    13. Osteopenia, unspecified location  Patient reminded she does need vitamin D with her calcium.    14. Vitamin D deficiency  Patient to start on over-the-counter vitamin D 2000 units daily    15. Gastroesophageal reflux disease without esophagitis    - famotidine (PEPCID) 20 MG Tab; TAKE ONE TABLET BY MOUTH TWICE A DAY  Dispense: 180 Tab; Refill: 3

## 2021-01-06 DIAGNOSIS — R30.0 DYSURIA: ICD-10-CM

## 2021-01-06 LAB
APPEARANCE UR: ABNORMAL
BACTERIA #/AREA URNS HPF: ABNORMAL /HPF
BILIRUB UR QL STRIP.AUTO: NEGATIVE
COLOR UR: YELLOW
EPI CELLS #/AREA URNS HPF: ABNORMAL /HPF
GLUCOSE UR STRIP.AUTO-MCNC: NEGATIVE MG/DL
HYALINE CASTS #/AREA URNS LPF: ABNORMAL /LPF
KETONES UR STRIP.AUTO-MCNC: NEGATIVE MG/DL
LEUKOCYTE ESTERASE UR QL STRIP.AUTO: ABNORMAL
MICRO URNS: ABNORMAL
NITRITE UR QL STRIP.AUTO: POSITIVE
PH UR STRIP.AUTO: 6 [PH] (ref 5–8)
PROT UR QL STRIP: 30 MG/DL
RBC # URNS HPF: ABNORMAL /HPF
RBC UR QL AUTO: ABNORMAL
SP GR UR STRIP.AUTO: 1.01
UROBILINOGEN UR STRIP.AUTO-MCNC: 0.2 MG/DL
WBC #/AREA URNS HPF: ABNORMAL /HPF

## 2021-01-11 DIAGNOSIS — Z23 NEED FOR VACCINATION: ICD-10-CM

## 2021-02-01 DIAGNOSIS — R30.0 DYSURIA: ICD-10-CM

## 2021-02-01 RX ORDER — MIRTAZAPINE 7.5 MG/1
7.5 TABLET, FILM COATED ORAL
Qty: 30 TAB | Refills: 10 | Status: ON HOLD | OUTPATIENT
Start: 2021-02-01 | End: 2021-06-14

## 2021-02-01 RX ORDER — SERTRALINE HYDROCHLORIDE 100 MG/1
TABLET, FILM COATED ORAL
Qty: 135 TAB | Refills: 11 | Status: ON HOLD | OUTPATIENT
Start: 2021-02-01 | End: 2021-06-14

## 2021-02-01 RX ORDER — CEFDINIR 300 MG/1
CAPSULE ORAL
Qty: 10 CAP | Refills: 0 | Status: SHIPPED | OUTPATIENT
Start: 2021-02-01 | End: 2021-02-26

## 2021-02-22 ENCOUNTER — TELEPHONE (OUTPATIENT)
Dept: MEDICAL GROUP | Facility: MEDICAL CENTER | Age: 86
End: 2021-02-22

## 2021-02-22 DIAGNOSIS — R30.0 DYSURIA: ICD-10-CM

## 2021-02-22 NOTE — TELEPHONE ENCOUNTER
Patient would like a urinalysis to be added to her labwork, she said she has been having frequency with urination. She will be going to the lab tomorrow

## 2021-02-23 ENCOUNTER — HOSPITAL ENCOUNTER (OUTPATIENT)
Dept: LAB | Facility: MEDICAL CENTER | Age: 86
End: 2021-02-23
Attending: NURSE PRACTITIONER
Payer: MEDICARE

## 2021-02-23 DIAGNOSIS — E78.5 DYSLIPIDEMIA: ICD-10-CM

## 2021-02-23 DIAGNOSIS — R30.0 DYSURIA: ICD-10-CM

## 2021-02-23 DIAGNOSIS — R80.9 MICROALBUMINURIA: ICD-10-CM

## 2021-02-23 DIAGNOSIS — E11.9 TYPE 2 DIABETES MELLITUS WITHOUT COMPLICATION, WITHOUT LONG-TERM CURRENT USE OF INSULIN (HCC): ICD-10-CM

## 2021-02-23 LAB
ALBUMIN SERPL BCP-MCNC: 3.8 G/DL (ref 3.2–4.9)
ALBUMIN/GLOB SERPL: 1.1 G/DL
ALP SERPL-CCNC: 52 U/L (ref 30–99)
ALT SERPL-CCNC: 8 U/L (ref 2–50)
ANION GAP SERPL CALC-SCNC: 12 MMOL/L (ref 7–16)
APPEARANCE UR: ABNORMAL
AST SERPL-CCNC: 15 U/L (ref 12–45)
BACTERIA #/AREA URNS HPF: NEGATIVE /HPF
BILIRUB SERPL-MCNC: 0.4 MG/DL (ref 0.1–1.5)
BILIRUB UR QL STRIP.AUTO: NEGATIVE
BUN SERPL-MCNC: 23 MG/DL (ref 8–22)
CALCIUM SERPL-MCNC: 9.5 MG/DL (ref 8.5–10.5)
CHLORIDE SERPL-SCNC: 106 MMOL/L (ref 96–112)
CHOLEST SERPL-MCNC: 164 MG/DL (ref 100–199)
CO2 SERPL-SCNC: 22 MMOL/L (ref 20–33)
COLOR UR: YELLOW
CREAT SERPL-MCNC: 1.45 MG/DL (ref 0.5–1.4)
CREAT UR-MCNC: 63.73 MG/DL
EPI CELLS #/AREA URNS HPF: ABNORMAL /HPF
EST. AVERAGE GLUCOSE BLD GHB EST-MCNC: 134 MG/DL
GLOBULIN SER CALC-MCNC: 3.6 G/DL (ref 1.9–3.5)
GLUCOSE SERPL-MCNC: 84 MG/DL (ref 65–99)
GLUCOSE UR STRIP.AUTO-MCNC: NEGATIVE MG/DL
HBA1C MFR BLD: 6.3 % (ref 4–5.6)
HDLC SERPL-MCNC: 58 MG/DL
HYALINE CASTS #/AREA URNS LPF: ABNORMAL /LPF
KETONES UR STRIP.AUTO-MCNC: NEGATIVE MG/DL
LDLC SERPL CALC-MCNC: 70 MG/DL
LEUKOCYTE ESTERASE UR QL STRIP.AUTO: ABNORMAL
MICRO URNS: ABNORMAL
MICROALBUMIN UR-MCNC: 3.3 MG/DL
MICROALBUMIN/CREAT UR: 52 MG/G (ref 0–30)
NITRITE UR QL STRIP.AUTO: NEGATIVE
PH UR STRIP.AUTO: 6 [PH] (ref 5–8)
POTASSIUM SERPL-SCNC: 4.4 MMOL/L (ref 3.6–5.5)
PROT SERPL-MCNC: 7.4 G/DL (ref 6–8.2)
PROT UR QL STRIP: NEGATIVE MG/DL
RBC # URNS HPF: ABNORMAL /HPF
RBC UR QL AUTO: ABNORMAL
SODIUM SERPL-SCNC: 140 MMOL/L (ref 135–145)
SP GR UR STRIP.AUTO: 1.01
TRIGL SERPL-MCNC: 181 MG/DL (ref 0–149)
UROBILINOGEN UR STRIP.AUTO-MCNC: 0.2 MG/DL
WBC #/AREA URNS HPF: ABNORMAL /HPF

## 2021-02-23 PROCEDURE — 83036 HEMOGLOBIN GLYCOSYLATED A1C: CPT

## 2021-02-23 PROCEDURE — 87086 URINE CULTURE/COLONY COUNT: CPT

## 2021-02-23 PROCEDURE — 87077 CULTURE AEROBIC IDENTIFY: CPT

## 2021-02-23 PROCEDURE — 80061 LIPID PANEL: CPT

## 2021-02-23 PROCEDURE — 87186 SC STD MICRODIL/AGAR DIL: CPT

## 2021-02-23 PROCEDURE — 82570 ASSAY OF URINE CREATININE: CPT

## 2021-02-23 PROCEDURE — 82043 UR ALBUMIN QUANTITATIVE: CPT

## 2021-02-23 PROCEDURE — 36415 COLL VENOUS BLD VENIPUNCTURE: CPT

## 2021-02-23 PROCEDURE — 81001 URINALYSIS AUTO W/SCOPE: CPT

## 2021-02-23 PROCEDURE — 80053 COMPREHEN METABOLIC PANEL: CPT

## 2021-02-26 ENCOUNTER — TELEPHONE (OUTPATIENT)
Dept: MEDICAL GROUP | Facility: MEDICAL CENTER | Age: 86
End: 2021-02-26

## 2021-02-26 DIAGNOSIS — R30.0 DYSURIA: ICD-10-CM

## 2021-02-26 RX ORDER — NITROFURANTOIN 25; 75 MG/1; MG/1
100 CAPSULE ORAL 2 TIMES DAILY
Qty: 10 CAPSULE | Refills: 0 | Status: SHIPPED | OUTPATIENT
Start: 2021-02-26 | End: 2021-03-03

## 2021-02-26 NOTE — TELEPHONE ENCOUNTER
Phone Number Called: [phone]    Call outcome: Did not leave a detailed message. Requested patient to call back.    Message: Called to inform patient of message below      ----- Message from LETY Kulkarni sent at 2/26/2021  9:53 AM PST -----  Please advise patient that her urine culture finally came back and I do not think the cefdinir will work but it does show sensitivity to nitrofurantoin which I have sent to the pharmacy to replace the cefdinir.

## 2021-03-01 DIAGNOSIS — K21.9 GASTROESOPHAGEAL REFLUX DISEASE WITHOUT ESOPHAGITIS: ICD-10-CM

## 2021-03-01 RX ORDER — FAMOTIDINE 20 MG/1
TABLET, FILM COATED ORAL
Qty: 180 TABLET | Refills: 2 | Status: ON HOLD | OUTPATIENT
Start: 2021-03-01 | End: 2021-06-14

## 2021-04-14 ENCOUNTER — PATIENT OUTREACH (OUTPATIENT)
Dept: HEALTH INFORMATION MANAGEMENT | Facility: OTHER | Age: 86
End: 2021-04-14

## 2021-04-14 NOTE — PROGRESS NOTES
Outcome: Left Message Unable leave voice mail  for Comprehensive Geriatric Assessment    Please transfer to Patient Outreach Team at [phone] when patient returns call.      Attempt #2

## 2021-04-20 ENCOUNTER — HOSPITAL ENCOUNTER (OUTPATIENT)
Facility: MEDICAL CENTER | Age: 86
End: 2021-04-20
Attending: PHYSICIAN ASSISTANT
Payer: MEDICARE

## 2021-04-20 PROCEDURE — 87086 URINE CULTURE/COLONY COUNT: CPT

## 2021-04-23 LAB
BACTERIA UR CULT: NORMAL
SIGNIFICANT IND 70042: NORMAL
SITE SITE: NORMAL
SOURCE SOURCE: NORMAL

## 2021-05-05 NOTE — CARE PLAN
Problem: Safety  Goal: Will remain free from injury  Outcome: PROGRESSING AS EXPECTED  Pt educated on safety precautions and precautions in place. Treaded socks on, bed locked and in lowest position, belongings and call light within reach.        Problem: Infection  Goal: Will remain free from infection  Outcome: PROGRESSING AS EXPECTED  Standard precautions in place for pt and educated on the importance of hand hygiene and self care. Pt educated on assessing for signs and symptoms of infection and precautions to reduce risk. Verbalized understanding and no additional questions at this time.  Educated on importance of infection prevention with selby catheters.     Problem: Discharge Barriers/Planning  Goal: Patient's continuum of care needs will be met  Outcome: PROGRESSING AS EXPECTED  Identified and discussed any barriers with pt. To be sent home with catheter per urology. Catheter education to be reinforced prior to discharge.      Epidermal Closure: running locked

## 2021-05-17 ENCOUNTER — TELEPHONE (OUTPATIENT)
Dept: MEDICAL GROUP | Facility: MEDICAL CENTER | Age: 86
End: 2021-05-17

## 2021-05-17 NOTE — TELEPHONE ENCOUNTER
ESTABLISHED PATIENT PRE-VISIT PLANNING     Patient was NOT contacted to complete PVP.  1.  Reviewed notes from the last few office visits within the medical group: Yes    2.  If any orders were placed at last visit or intended to be done for this visit (i.e. 6 mos follow-up), do we have Results/Consult Notes?         •  Labs - Labs ordered, completed on 2/23/21 and results are in chart.       •  Imaging - Imaging was not ordered at last office visit.       •  Referrals - No referrals were ordered at last office visit.    3. Is this appointment scheduled as a Hospital Follow-Up? No    4.  Immunizations were updated in Epic using Reconcile Outside Information activity? Yes    5.  Patient is due for the following Health Maintenance Topics:   Health Maintenance Due   Topic Date Due   • COVID-19 Vaccine (1) Never done   • DIABETES MONOFILAMENT / LE EXAM  01/13/2021         6.  AHA (P[employer]) form printed for Provider? Yes

## 2021-05-25 ENCOUNTER — OFFICE VISIT (OUTPATIENT)
Dept: MEDICAL GROUP | Facility: MEDICAL CENTER | Age: 86
End: 2021-05-25
Payer: MEDICARE

## 2021-05-25 VITALS
BODY MASS INDEX: 24.1 KG/M2 | WEIGHT: 136 LBS | HEART RATE: 75 BPM | DIASTOLIC BLOOD PRESSURE: 58 MMHG | SYSTOLIC BLOOD PRESSURE: 104 MMHG | TEMPERATURE: 98.5 F | RESPIRATION RATE: 14 BRPM | OXYGEN SATURATION: 99 % | HEIGHT: 63 IN

## 2021-05-25 DIAGNOSIS — E11.21 CONTROLLED TYPE 2 DIABETES MELLITUS WITH DIABETIC NEPHROPATHY, WITHOUT LONG-TERM CURRENT USE OF INSULIN (HCC): ICD-10-CM

## 2021-05-25 DIAGNOSIS — F33.42 RECURRENT MAJOR DEPRESSIVE DISORDER, IN FULL REMISSION (HCC): ICD-10-CM

## 2021-05-25 DIAGNOSIS — N18.32 STAGE 3B CHRONIC KIDNEY DISEASE: ICD-10-CM

## 2021-05-25 DIAGNOSIS — F02.80 DEMENTIA ASSOCIATED WITH OTHER UNDERLYING DISEASE WITHOUT BEHAVIORAL DISTURBANCE (HCC): ICD-10-CM

## 2021-05-25 DIAGNOSIS — H35.3233 EXUDATIVE AGE-RELATED MACULAR DEGENERATION OF BOTH EYES WITH INACTIVE SCAR (HCC): ICD-10-CM

## 2021-05-25 DIAGNOSIS — M85.89 OSTEOPENIA OF MULTIPLE SITES: ICD-10-CM

## 2021-05-25 DIAGNOSIS — I70.0 ATHEROSCLEROSIS OF AORTA (HCC): ICD-10-CM

## 2021-05-25 DIAGNOSIS — R33.9 RETENTION OF URINE: ICD-10-CM

## 2021-05-25 DIAGNOSIS — E11.8 DIABETES MELLITUS TYPE 2 WITH COMPLICATIONS (HCC): ICD-10-CM

## 2021-05-25 DIAGNOSIS — E78.5 DYSLIPIDEMIA: ICD-10-CM

## 2021-05-25 DIAGNOSIS — R80.9 MICROALBUMINURIA: ICD-10-CM

## 2021-05-25 DIAGNOSIS — E55.9 VITAMIN D DEFICIENCY: ICD-10-CM

## 2021-05-25 PROBLEM — R91.1 LUNG NODULE: Status: RESOLVED | Noted: 2019-09-25 | Resolved: 2021-05-25

## 2021-05-25 PROBLEM — N81.4 UTERINE PROLAPSE: Status: RESOLVED | Noted: 2017-06-02 | Resolved: 2021-05-25

## 2021-05-25 PROCEDURE — 99214 OFFICE O/P EST MOD 30 MIN: CPT | Performed by: FAMILY MEDICINE

## 2021-05-25 RX ORDER — OXYBUTYNIN CHLORIDE 5 MG/1
5-10 TABLET, EXTENDED RELEASE ORAL 2 TIMES DAILY
Status: ON HOLD | COMMUNITY
End: 2021-06-14

## 2021-05-25 RX ORDER — ATORVASTATIN CALCIUM 20 MG/1
20 TABLET, FILM COATED ORAL DAILY
Qty: 100 TABLET | Refills: 3 | Status: ON HOLD | OUTPATIENT
Start: 2021-05-25 | End: 2021-06-08 | Stop reason: SDUPTHER

## 2021-05-25 ASSESSMENT — PATIENT HEALTH QUESTIONNAIRE - PHQ9
5. POOR APPETITE OR OVEREATING: NOT AT ALL
9. THOUGHTS THAT YOU WOULD BE BETTER OFF DEAD, OR OF HURTING YOURSELF: NOT AT ALL
7. TROUBLE CONCENTRATING ON THINGS, SUCH AS READING THE NEWSPAPER OR WATCHING TELEVISION: NOT AT ALL
SUM OF ALL RESPONSES TO PHQ QUESTIONS 1-9: 12
8. MOVING OR SPEAKING SO SLOWLY THAT OTHER PEOPLE COULD HAVE NOTICED. OR THE OPPOSITE, BEING SO FIGETY OR RESTLESS THAT YOU HAVE BEEN MOVING AROUND A LOT MORE THAN USUAL: NOT AT ALL
SUM OF ALL RESPONSES TO PHQ9 QUESTIONS 1 AND 2: 6
1. LITTLE INTEREST OR PLEASURE IN DOING THINGS: NEARLY EVERY DAY
2. FEELING DOWN, DEPRESSED, IRRITABLE, OR HOPELESS: NEARLY EVERY DAY
4. FEELING TIRED OR HAVING LITTLE ENERGY: NEARLY EVERY DAY
6. FEELING BAD ABOUT YOURSELF - OR THAT YOU ARE A FAILURE OR HAVE LET YOURSELF OR YOUR FAMILY DOWN: NOT AL ALL
3. TROUBLE FALLING OR STAYING ASLEEP OR SLEEPING TOO MUCH: NEARLY EVERY DAY

## 2021-05-25 ASSESSMENT — FIBROSIS 4 INDEX: FIB4 SCORE: 1.86

## 2021-05-25 NOTE — PROGRESS NOTES
Chief Complaint   Patient presents with   • Diabetes Mellitus   • Chronic Kidney Disease   • Hyperlipidemia   • Memory Loss       Subjective:     HPI:   Herminia Jones presents today with the followin. Controlled type 2 diabetes mellitus with diabetic nephropathy, without long-term current use of insulin (HCC)/Diabetes mellitus type 2 with complications (HCC)  Patient has overall well-controlled type 2 diabetes with nephropathy.  Her granddaughter is primarily cooking for her and sometimes has to remind her to eat.  However, weight is stable.  Follow-up lab orders discussed and placed.    2. Stage 3b chronic kidney disease  Patient has overall stable 3B chronic kidney disease.  We will continue to check at least every 6 months.    3. Microalbuminuria  Patient has moderate microalbuminuria.  Blood pressure today is excellent.  I do not think it is appropriate to add an ARB or an ACE as her blood pressure is already so low.    4. Dyslipidemia  Patient denies chest pain, chest pressure, palpitations or exertional shortness of breath. Patient denies muscle aches or muscle weakness from the atorvastatin medication. Patient is a never smoker. Patient takes no aspirin daily. Patient has no history of myocardial infarction or stroke.  Patient does have aortic atherosclerosis without aneurysm.  Lipid control is good overall on her January readings.  Follow-up lab orders discussed and placed.    5. Atherosclerosis of aorta (HCC)  Patient does have aortic atherosclerosis without aneurysm    7. Dementia associated with other underlying disease without behavioral disturbance (HCC)  Patient has progressing dementia primarily manifesting as short-term memory loss.  Patient does seem to get confused by tasks that are more than 1 step.  Her daughter who was her primary caregiver  suddenly and unexpectedly from what I gather.  Her granddaughter has assumed care.  She lives with her granddaughter and her  great-grandchildren.  The granddaughter sees a deterioration in short-term memory and some increased irritability.  Patient does not always want to use her walker even though she is somewhat unsteady and did fall backwards unexpectedly in the [place].  I have reinforced using the walker today.  Follow-up lab order discussed and placed.    8. Urinary retention  She is following with urology and has urinary retention with incontinence.  Her medication has recently been adjusted and per the granddaughter seems to be a little more successful.    9. Exudative age-related macular degeneration of both eyes with inactive scar ([facility])  Patient has very significant visual acuity loss from this.  A referral to ophthalmology again is placed but they did tell her in January it was stable and no treatment was contemplated at this time.  Apparently according to the granddaughter this has been explained to the patient.  She seems to grasp some of this but perhaps not all of it.    10. Recurrent major depressive disorder, in full remission ([facility])  Patient states she does not have depression and does not take antidepressant medication.  However with some prompting from the granddaughter she remembers that she is taking the medication but states she feels fine.  Granddaughter feels the medication is currently working well for her grandmother.    11. Vitamin D deficiency/Osteopenia of multiple sites  Patient has history of osteopenia and vitamin D deficiency.  The granddaughter is making sure that she gets her vitamin D regularly.  Follow-up lab orders are discussed and placed.    She will be having her second Covid vaccine in a week or so.  Patient and granddaughter did not see any major reaction to the first vaccine.    Patient Active Problem List    Diagnosis Date Noted   • Urinary retention 09/25/2019   • Diabetes mellitus type 2 with complications ([facility]) 12/01/2017   • Controlled type 2 diabetes mellitus with diabetic nephropathy,  "without long-term current use of insulin ([facility]) 05/25/2021   • Dementia associated with other underlying disease without behavioral disturbance ([facility]) 01/13/2020   • Exudative age-related macular degeneration of both eyes with inactive scar ([facility]) 01/13/2020   • Atherosclerosis of aorta ([facility]) 01/13/2020   • Stage 3b chronic kidney disease 10/04/2019   • Microalbuminuria 10/02/2018   • Osteopenia 07/24/2018   • Recurrent major depressive disorder, in full remission ([facility]) 12/08/2017   • Vitamin D deficiency 12/08/2017   • Risk for falls 12/08/2017   • Dyslipidemia 05/16/2017       Current medicines (including changes today)  Current Outpatient Medications   Medication Sig Dispense Refill   • atorvastatin (LIPITOR) 20 MG Tab Take 1 tablet by mouth every day. 100 tablet 3   • oxybutynin SR (DITROPAN-XL) 5 MG TABLET SR 24 HR Take 5 mg by mouth every day.     • famotidine (PEPCID) 20 MG Tab TAKE ONE TABLET BY MOUTH TWICE A  tablet 2   • sertraline (ZOLOFT) 100 MG Tab TAKE 1 AND A HALF TABLET BY MOUTH DAILY 135 Tab 11   • mirtazapine (REMERON) 7.5 MG tablet TAKE 1 TAB BY MOUTH EVERY BEDTIME. 30 Tab 10   • trimethoprim (TRIMPEX) 100 MG Tab Take 100 mg by mouth 2 times a day.     • Calcium Carbonate (CALCARB 600 PO) Take 600 mg by mouth every morning.       No current facility-administered medications for this visit.       Allergies   Allergen Reactions   • Sulfamethoxazole      Does not remember reaction        ROS: As per HPI       Objective:     /58   Pulse 75   Temp 36.9 °C (98.5 °F)   Resp 14   Ht 1.6 m (5' 3\")   Wt 61.7 kg (136 lb)   SpO2 99%  Body mass index is 24.09 kg/m².    Physical Exam:  Constitutional: Well-developed and well-nourished. Not diaphoretic. No distress. Lucid and fluent.  Patient, granddaughter, physician and staff all tests.  Skin: Skin is warm and dry. No rash noted.  Head: Atraumatic without lesions.  Eyes: Conjunctivae and extraocular motions are normal. Pupils are equal, round, " "and reactive to light. No scleral icterus.   Ears:  External ears unremarkable.   Neck: Supple, trachea midline. No thyromegaly present. No cervical or supraclavicular lymphadenopathy. No JVD or carotid bruits appreciated  Cardiovascular: Regular rate and rhythm.  Normal S1, S2 without murmur appreciated.  Chest: Effort normal. Clear to auscultation throughout. No adventitious sounds.   Abdomen: Soft, non tender, and without distention. Active bowel sounds in all four quadrants. No rebound, guarding, masses or hepatosplenomegaly.  Extremities: No cyanosis, clubbing, erythema, nor edema.   Neurological: Alert and oriented x 3.  No tremor noted.  Using her walker.  Psychiatric:  Behavior, mood, and affect are appropriate.    MMSE    -What is the:  Year, season, date, day, month.               1/5 points    -Where are we:   State, county, town, hospital, floor.      2/5 points    -3 objects immediate recall.                                             3/3 points    -World backwards or serial sevens.                                 4/5 points    -Three-minute recall.                                                         0/3 points    -Name 2 objects.                                                               2/2 points    -Repeat the following.      \"No ifs and is or buts\"                                                    1/1 point    -Follow a 3 stage command.      Paper right hand, fold in half, place on floor.                 2/3 points    -Follow a written command.       Close your eyes                                                          Unable to read due to her macular/1 point    -Write a complete sentence.                                           Unable to write due to macular/1 point    -Copy a design.                                                                Unable due to macular/1 point    Total                                                                                  15/30    Cutoff of " 24 indicating dementia.  This value is consistent with dementia.  Even if she would have been able to complete the others she would qualify.    She does not drive, voluntarily surrendered her keys.       Assessment and Plan:     87 y.o. female with the following issues:    1. Controlled type 2 diabetes mellitus with diabetic nephropathy, without long-term current use of insulin ([facility])  HEMOGLOBIN A1C    MICROALBUMIN CREAT RATIO URINE    Comp Metabolic Panel    TSH    CBC WITHOUT DIFFERENTIAL    Diabetic Monofilament Lower Extremity Exam   2. Diabetes mellitus type 2 with complications ([facility])  HEMOGLOBIN A1C    MICROALBUMIN CREAT RATIO URINE    Comp Metabolic Panel    TSH    Diabetic Monofilament Lower Extremity Exam   3. Stage 3b chronic kidney disease  Comp Metabolic Panel    TSH    CBC WITHOUT DIFFERENTIAL   4. Microalbuminuria  MICROALBUMIN CREAT RATIO URINE   5. Dyslipidemia  Comp Metabolic Panel    Lipid Profile    TSH    atorvastatin (LIPITOR) 20 MG Tab   6. Atherosclerosis of aorta ([facility])     7. Urinary retention     8. Dementia associated with other underlying disease without behavioral disturbance ([facility])     9. Exudative age-related macular degeneration of both eyes with inactive scar ([facility])  REFERRAL TO OPHTHALMOLOGY   10. Recurrent major depressive disorder, in full remission ([facility])     11. Vitamin D deficiency     12. Osteopenia of multiple sites       Letter written regarding dementia and care.    Followup: Return in about 6 months (around 11/25/2021), or if symptoms worsen or fail to improve.

## 2021-05-25 NOTE — LETTER
May 25, 2021        Patient: Herminia Jones   YOB: 1934   Date of Visit: 5/25/2021           To Whom It May Concern:    It is my medical opinion that Herminia Jones suffers from dementia for which she is on Remeron and Zoloft.  She is cared for by her grandchild Lupe who has POA for healthcare..    If you have any questions or concerns, please don't hesitate to call.    Sincerely,      Fransisco Delgado M.D.  Electronically Signed    [facility] [address]   [facility] [address] [place] [id number] [phone] (Phone)  [phone] (Fax)

## 2021-06-05 ENCOUNTER — HOSPITAL ENCOUNTER (OUTPATIENT)
Facility: MEDICAL CENTER | Age: 86
DRG: 552 | End: 2021-06-05
Attending: PHYSICIAN ASSISTANT
Payer: MEDICARE

## 2021-06-05 ENCOUNTER — HOSPITAL ENCOUNTER (OUTPATIENT)
Dept: RADIOLOGY | Facility: MEDICAL CENTER | Age: 86
DRG: 552 | End: 2021-06-05
Attending: PHYSICIAN ASSISTANT
Payer: MEDICARE

## 2021-06-05 ENCOUNTER — HOSPITAL ENCOUNTER (OUTPATIENT)
Dept: LAB | Facility: MEDICAL CENTER | Age: 86
DRG: 552 | End: 2021-06-05
Attending: FAMILY MEDICINE
Payer: MEDICARE

## 2021-06-05 ENCOUNTER — OFFICE VISIT (OUTPATIENT)
Dept: URGENT CARE | Facility: PHYSICIAN GROUP | Age: 86
End: 2021-06-05
Payer: MEDICARE

## 2021-06-05 VITALS
DIASTOLIC BLOOD PRESSURE: 48 MMHG | SYSTOLIC BLOOD PRESSURE: 108 MMHG | WEIGHT: 127 LBS | OXYGEN SATURATION: 97 % | RESPIRATION RATE: 20 BRPM | HEIGHT: 65 IN | BODY MASS INDEX: 21.16 KG/M2 | TEMPERATURE: 97.3 F | HEART RATE: 72 BPM

## 2021-06-05 DIAGNOSIS — E11.21 CONTROLLED TYPE 2 DIABETES MELLITUS WITH DIABETIC NEPHROPATHY, WITHOUT LONG-TERM CURRENT USE OF INSULIN (HCC): ICD-10-CM

## 2021-06-05 DIAGNOSIS — M79.605 LOW BACK PAIN RADIATING TO BOTH LEGS: ICD-10-CM

## 2021-06-05 DIAGNOSIS — N18.32 STAGE 3B CHRONIC KIDNEY DISEASE: ICD-10-CM

## 2021-06-05 DIAGNOSIS — M79.604 LOW BACK PAIN RADIATING TO BOTH LEGS: ICD-10-CM

## 2021-06-05 DIAGNOSIS — E78.5 DYSLIPIDEMIA: ICD-10-CM

## 2021-06-05 DIAGNOSIS — Z87.440 HISTORY OF RECURRENT UTI (URINARY TRACT INFECTION): ICD-10-CM

## 2021-06-05 DIAGNOSIS — E11.8 DIABETES MELLITUS TYPE 2 WITH COMPLICATIONS (HCC): ICD-10-CM

## 2021-06-05 DIAGNOSIS — R80.9 MICROALBUMINURIA: ICD-10-CM

## 2021-06-05 DIAGNOSIS — M54.50 LOW BACK PAIN RADIATING TO BOTH LEGS: ICD-10-CM

## 2021-06-05 DIAGNOSIS — R31.9 HEMATURIA, UNSPECIFIED TYPE: ICD-10-CM

## 2021-06-05 DIAGNOSIS — Z91.81 RISK FOR FALLS: ICD-10-CM

## 2021-06-05 DIAGNOSIS — F02.80 DEMENTIA ASSOCIATED WITH OTHER UNDERLYING DISEASE WITHOUT BEHAVIORAL DISTURBANCE (HCC): ICD-10-CM

## 2021-06-05 LAB
ALBUMIN SERPL BCP-MCNC: 4.4 G/DL (ref 3.2–4.9)
ALBUMIN/GLOB SERPL: 1.1 G/DL
ALP SERPL-CCNC: 64 U/L (ref 30–99)
ALT SERPL-CCNC: 8 U/L (ref 2–50)
ANION GAP SERPL CALC-SCNC: 11 MMOL/L (ref 7–16)
APPEARANCE UR: NORMAL
AST SERPL-CCNC: 12 U/L (ref 12–45)
BILIRUB SERPL-MCNC: 0.3 MG/DL (ref 0.1–1.5)
BILIRUB UR STRIP-MCNC: NEGATIVE MG/DL
BUN SERPL-MCNC: 37 MG/DL (ref 8–22)
CALCIUM SERPL-MCNC: 10.1 MG/DL (ref 8.5–10.5)
CHLORIDE SERPL-SCNC: 106 MMOL/L (ref 96–112)
CHOLEST SERPL-MCNC: 176 MG/DL (ref 100–199)
CO2 SERPL-SCNC: 24 MMOL/L (ref 20–33)
COLOR UR AUTO: NORMAL
CREAT SERPL-MCNC: 1.52 MG/DL (ref 0.5–1.4)
CREAT UR-MCNC: 63.83 MG/DL
ERYTHROCYTE [DISTWIDTH] IN BLOOD BY AUTOMATED COUNT: 48.1 FL (ref 35.9–50)
EST. AVERAGE GLUCOSE BLD GHB EST-MCNC: 134 MG/DL
FASTING STATUS PATIENT QL REPORTED: NORMAL
GLOBULIN SER CALC-MCNC: 4 G/DL (ref 1.9–3.5)
GLUCOSE SERPL-MCNC: 109 MG/DL (ref 65–99)
GLUCOSE UR STRIP.AUTO-MCNC: NEGATIVE MG/DL
HBA1C MFR BLD: 6.3 % (ref 4–5.6)
HCT VFR BLD AUTO: 46.7 % (ref 37–47)
HDLC SERPL-MCNC: 52 MG/DL
HGB BLD-MCNC: 15 G/DL (ref 12–16)
KETONES UR STRIP.AUTO-MCNC: NEGATIVE MG/DL
LDLC SERPL CALC-MCNC: 95 MG/DL
LEUKOCYTE ESTERASE UR QL STRIP.AUTO: NORMAL
MCH RBC QN AUTO: 29.1 PG (ref 27–33)
MCHC RBC AUTO-ENTMCNC: 32.1 G/DL (ref 33.6–35)
MCV RBC AUTO: 90.5 FL (ref 81.4–97.8)
MICROALBUMIN UR-MCNC: 8.5 MG/DL
MICROALBUMIN/CREAT UR: 133 MG/G (ref 0–30)
NITRITE UR QL STRIP.AUTO: NEGATIVE
PH UR STRIP.AUTO: 5.5 [PH] (ref 5–8)
PLATELET # BLD AUTO: 246 K/UL (ref 164–446)
PMV BLD AUTO: 10.4 FL (ref 9–12.9)
POTASSIUM SERPL-SCNC: 4.3 MMOL/L (ref 3.6–5.5)
PROT SERPL-MCNC: 8.4 G/DL (ref 6–8.2)
PROT UR QL STRIP: 30 MG/DL
RBC # BLD AUTO: 5.16 M/UL (ref 4.2–5.4)
RBC UR QL AUTO: NORMAL
SODIUM SERPL-SCNC: 141 MMOL/L (ref 135–145)
SP GR UR STRIP.AUTO: 1.02
TRIGL SERPL-MCNC: 145 MG/DL (ref 0–149)
TSH SERPL DL<=0.005 MIU/L-ACNC: 3.73 UIU/ML (ref 0.38–5.33)
UROBILINOGEN UR STRIP-MCNC: 0.2 MG/DL
WBC # BLD AUTO: 11.4 K/UL (ref 4.8–10.8)

## 2021-06-05 PROCEDURE — 84443 ASSAY THYROID STIM HORMONE: CPT

## 2021-06-05 PROCEDURE — 85027 COMPLETE CBC AUTOMATED: CPT

## 2021-06-05 PROCEDURE — 87086 URINE CULTURE/COLONY COUNT: CPT

## 2021-06-05 PROCEDURE — 72100 X-RAY EXAM L-S SPINE 2/3 VWS: CPT

## 2021-06-05 PROCEDURE — 83036 HEMOGLOBIN GLYCOSYLATED A1C: CPT

## 2021-06-05 PROCEDURE — 82570 ASSAY OF URINE CREATININE: CPT

## 2021-06-05 PROCEDURE — 80053 COMPREHEN METABOLIC PANEL: CPT

## 2021-06-05 PROCEDURE — 36415 COLL VENOUS BLD VENIPUNCTURE: CPT

## 2021-06-05 PROCEDURE — 82043 UR ALBUMIN QUANTITATIVE: CPT

## 2021-06-05 PROCEDURE — 80061 LIPID PANEL: CPT

## 2021-06-05 PROCEDURE — 99214 OFFICE O/P EST MOD 30 MIN: CPT | Performed by: PHYSICIAN ASSISTANT

## 2021-06-05 PROCEDURE — 81002 URINALYSIS NONAUTO W/O SCOPE: CPT | Performed by: PHYSICIAN ASSISTANT

## 2021-06-05 RX ORDER — NITROFURANTOIN 25; 75 MG/1; MG/1
100 CAPSULE ORAL 2 TIMES DAILY
Qty: 10 CAPSULE | Refills: 0 | Status: ON HOLD | OUTPATIENT
Start: 2021-06-05 | End: 2021-06-14

## 2021-06-05 ASSESSMENT — ENCOUNTER SYMPTOMS
DIZZINESS: 0
COUGH: 0
MYALGIAS: 0
ARTHRALGIAS: 1
BACK PAIN: 1
FEVER: 0
FALLS: 1

## 2021-06-05 ASSESSMENT — FIBROSIS 4 INDEX: FIB4 SCORE: 1.86

## 2021-06-05 NOTE — PROGRESS NOTES
Subjective:      Herminia Jones is a 87 y.o. female who presents with Low Back Pain (1x week;) and Other (Pt's granddaughter states that she might of fall and also is prone to bladder infections)    Medications:    • atorvastatin Tabs  • CALCARB 600 PO  • famotidine Tabs  • mirtazapine  • oxybutynin SR Tb24  • sertraline Tabs  • trimethoprim Tabs    Allergies: Sulfamethoxazole    Problem List: Herminia Jones does not have any pertinent problems on file.    Surgical History:  Past Surgical History:   Procedure Laterality Date   • ANTERIOR AND POSTERIOR REPAIR  12/3/2019    Procedure: COLPORRHAPHY, COMBINED ANTEROPOSTERIOR;  Surgeon: Marjan Henley M.D.;  Location: SURGERY SAME DAY [facility];  Service: Gynecology   • BLADDER SLING FEMALE N/A 4/20/2018    Procedure: BLADDER SLING FEMALE- TOT;  Surgeon: Espinoza Bryan M.D.;  Location: SURGERY SAME DAY [facility];  Service: Gynecology   • ANTERIOR REPAIR N/A 4/20/2018    Procedure: ANTERIOR REPAIR- COLPHORRHAPHY;  Surgeon: Espinoza Bryan M.D.;  Location: SURGERY SAME DAY [facility];  Service: Gynecology   • PELVISCOPY N/A 4/20/2018    Procedure: PELVISCOPY- FOR SACRAL SPINOUS FIXATION;  Surgeon: Espinoza Bryan M.D.;  Location: SURGERY SAME DAY [facility];  Service: Gynecology   • HIP CANNULATED SCREW Left 12/1/2017    Procedure: HIP CANNULATED SCREW;  Surgeon: Abhinav Askew M.D.;  Location: SURGERY [facility];  Service: Orthopedics   • LEFORT COLPOCLESIS  6/2/2017    Procedure: LEFORT COLPOCLESIS, perineorrhaphy, posterior colporraphy;  Surgeon: Espinoza Bryan M.D.;  Location: SURGERY SAME DAY [facility];  Service:    • CYSTOSCOPY  6/2/2017    Procedure: CYSTOSCOPY;  Surgeon: Espinoza Bryan M.D.;  Location: SURGERY SAME DAY [facility];  Service:    • ABDOMINAL HYSTERECTOMY TOTAL         Past Social Hx: Herminia Jones  reports that she has never smoked. She has never used smokeless tobacco. She reports that she does not  drink alcohol and does not use drugs.     Past Family Hx:  Herminia Jones family history includes Cancer in her brother, brother, and sister; Heart Disease in her sister; Stroke in her father.     Problem list, medications, and allergies reviewed by myself today in Epic.           Patient brought into clinic today by her granddaughter with complaint of bilateral low back pain x7 days.  Patient has a history of frequent falls, but also has a history of recurrent UTI so the patient's granddaughter brought her in for evaluation today.  Patient denies history of fall, granddaughter denies observing patient having fallen, any evidence of bruising or abrasions but she is concerned about a fracture.  She is also concerned about a UTI may be causing her grandmother's balance to be a little off.  Patient does use a walker for ambulation.  Patient denies any urinary symptoms today.   Patient admits to pain in low back with position change for example: Rolling over in bed, getting out of bed, sitting to standing, crossing her legs. Patient denies abdominal pain nausea, dizziness, chills, malaise numbness or tingling to extremities, urinary fecal incontinence.  No reported history of vertebral or pelvic fracture in the past.    Other  This is a new problem. The current episode started in the past 7 days. The problem occurs constantly. The problem has been gradually worsening. Associated symptoms include arthralgias. Pertinent negatives include no coughing, fever or myalgias. The symptoms are aggravated by walking and standing (Lying down, rolling over in bed is painful.). She has tried position changes, rest and acetaminophen for the symptoms. The treatment provided no relief.       Review of Systems   Unable to perform ROS: Dementia   Constitutional: Negative for fever.   Respiratory: Negative for cough.    Genitourinary: Positive for frequency.   Musculoskeletal: Positive for arthralgias, back pain and falls (History of  "frequent falls). Negative for myalgias.   Neurological: Negative for dizziness.   All other systems reviewed and are negative.         Objective:     /48 (BP Location: Left arm, Patient Position: Sitting, BP Cuff Size: Adult)   Pulse 72   Temp 36.3 °C (97.3 °F) (Temporal)   Resp 20   Ht 1.651 m (5' 5\")   Wt 57.6 kg (127 lb)   SpO2 97%   BMI 21.13 kg/m²      Physical Exam  Vitals and nursing note reviewed. Exam conducted with a chaperone present.   Constitutional:       General: She is not in acute distress.     Appearance: Normal appearance. She is well-developed and normal weight. She is not ill-appearing or toxic-appearing.   HENT:      Head: Normocephalic and atraumatic.      Right Ear: Tympanic membrane normal.      Left Ear: Tympanic membrane normal.      Nose: Nose normal.      Mouth/Throat:      Lips: Pink.      Mouth: Mucous membranes are moist.      Pharynx: Oropharynx is clear. Uvula midline.   Eyes:      Extraocular Movements: Extraocular movements intact.      Conjunctiva/sclera: Conjunctivae normal.      Pupils: Pupils are equal, round, and reactive to light.   Cardiovascular:      Rate and Rhythm: Normal rate and regular rhythm.      Pulses: Normal pulses.      Heart sounds: Normal heart sounds.   Pulmonary:      Effort: Pulmonary effort is normal.      Breath sounds: Normal breath sounds.   Abdominal:      General: Bowel sounds are normal.      Palpations: Abdomen is soft.   Musculoskeletal:         General: Normal range of motion.      Cervical back: Normal, normal range of motion and neck supple.      Thoracic back: Normal.      Lumbar back: Tenderness and bony tenderness present. No swelling, edema, signs of trauma or lacerations. Normal range of motion.        Back:       Right lower leg: No edema.      Left lower leg: No edema.      Comments: Negative seated SLR bilaterally, but pain with crossing legs, left worse than right.   No abrasions, bruising or signs of trauma on physical " exam of Face, KANDACE/BLE, abdomen or back   Skin:     General: Skin is warm and dry.      Capillary Refill: Capillary refill takes less than 2 seconds.   Neurological:      General: No focal deficit present.      Mental Status: She is alert and oriented to person, place, and time. Mental status is at baseline.      Cranial Nerves: No cranial nerve deficit.      Sensory: No sensory deficit.      Motor: Motor function is intact. No weakness.      Coordination: Coordination is intact. Coordination normal.      Gait: Gait normal.      Deep Tendon Reflexes: Reflexes normal.   Psychiatric:         Mood and Affect: Mood normal.              UA results interpreted by me:  Results for JAS LANE (MRN [id number]) as of 6/5/2021 10:38   Ref. Range 6/5/2021 10:01   POC Color Latest Ref Range: Negative  Light Yellow   POC Appearance Latest Ref Range: Negative  Cloudy   POC Specific Gravity Latest Ref Range: <1.005 - >1.030  1.020   POC Urine PH Latest Ref Range: 5.0 - 8.0  5.5   POC Glucose Latest Ref Range: Negative mg/dL Negative   POC Ketones Latest Ref Range: Negative mg/dL Negative   POC Protein Latest Ref Range: Negative mg/dL 30   POC Nitrites Latest Ref Range: Negative  Negative   POC Leukocyte Esterase Latest Ref Range: Negative  Large   POC Blood Latest Ref Range: Negative  Moderate   POC Bilirubin Latest Ref Range: Negative mg/dL Negative   POC Urobiligen Latest Ref Range: Negative (0.2) mg/dL 0.2       Xray images viewed and interpreted by me, confirmed by radiology:      RADIOLOGY RESULTS   DX-LUMBAR SPINE-2 OR 3 VIEWS    Result Date: 6/5/2021 6/5/2021 10:19 AM HISTORY/REASON FOR EXAM:  Atraumatic low back pain for one week. TECHNIQUE/ EXAM DESCRIPTION AND NUMBER OF VIEWS:  3 views of the lumbar spine. COMPARISON: 9/2/2020 FINDINGS: The alignment of the lumbar spine is within normal limits. Bones are osteopenic. There is mild loss of height at the of the L4 vertebral body with associated degenerative endplate  sclerosis and spurring. This is likely chronic without a history of acute fall. It has progressed since the prior study. There is multilevel degenerative disc space narrowing with endplate degenerative spurring most prominent at the L4-5 and L5-S1 levels. There is bilateral arthropathy throughout the lower 3 levels of the lumbar spine. Visualized bony pelvis is intact. There is postoperative change in the left proximal femur. There is aortic atherosclerosis.     1.  There is mild loss of height at the L4 vertebral body which has developed during the interval which is age indeterminate. It is probably subacute or older rather than acute as there is no history of a recent fall. 2.  There is diffuse osteopenia. 3.  There is multilevel degenerative disc disease and arthropathy predominantly in the lower levels of the lumbar spine.              Assessment/Plan:     1. Low back pain radiating to both legs  DX-LUMBAR SPINE-2 OR 3 VIEWS    POCT Urinalysis    URINE CULTURE(NEW)    nitrofurantoin (MACROBID) 100 MG Cap   2. Risk for falls  DX-LUMBAR SPINE-2 OR 3 VIEWS    POCT Urinalysis    URINE CULTURE(NEW)    nitrofurantoin (MACROBID) 100 MG Cap   3. Dementia associated with other underlying disease without behavioral disturbance (HCC)  DX-LUMBAR SPINE-2 OR 3 VIEWS    POCT Urinalysis    URINE CULTURE(NEW)    nitrofurantoin (MACROBID) 100 MG Cap   4. History of recurrent UTI (urinary tract infection)  DX-LUMBAR SPINE-2 OR 3 VIEWS    POCT Urinalysis    URINE CULTURE(NEW)    nitrofurantoin (MACROBID) 100 MG Cap   5. Hematuria, unspecified type   URINE CULTURE(NEW)    nitrofurantoin (MACROBID) 100 MG Cap     I reviewed patient x-ray images with granddaughter, explained that the radiologist did not see any acute fracture more likely a subacute fracture meaning could be from a previous fall but not likely to be causing patient's pain today.    Patient also has positive UA for blood as well as leukoesterase, so we will treat for  UTI.  Urine culture was sent, patient's granddaughter was instructed to begin antibiotics today rather than waiting for urine culture results.  Patient has been sensitive to nitrofurantoin in the past.    Patient can take over-the-counter acetaminophen/NSAIDs for pain.  Patient and her granddaughter were instructed to use warm compress or cool compress whichever is most comforting to her for her back discomfort.  Patient was encouraged to walk short distances, do gentle stretching for back discomfort.  She verbalized understanding of this.  Granddaughter also verbalized understanding of these instructions.    PT should follow up with PCP in 1-2 days for re-evaluation if symptoms have not improved.      Discussed red flags and reasons to return to UC or ED.      Pt and/or family verbalized understanding of diagnosis and follow up instructions and was offered informational handout on diagnosis.  PT discharged.

## 2021-06-06 DIAGNOSIS — F02.80 DEMENTIA ASSOCIATED WITH OTHER UNDERLYING DISEASE WITHOUT BEHAVIORAL DISTURBANCE (HCC): ICD-10-CM

## 2021-06-06 DIAGNOSIS — M54.50 LOW BACK PAIN RADIATING TO BOTH LEGS: ICD-10-CM

## 2021-06-06 DIAGNOSIS — Z91.81 RISK FOR FALLS: ICD-10-CM

## 2021-06-06 DIAGNOSIS — M79.605 LOW BACK PAIN RADIATING TO BOTH LEGS: ICD-10-CM

## 2021-06-06 DIAGNOSIS — R31.9 HEMATURIA, UNSPECIFIED TYPE: ICD-10-CM

## 2021-06-06 DIAGNOSIS — M79.604 LOW BACK PAIN RADIATING TO BOTH LEGS: ICD-10-CM

## 2021-06-06 DIAGNOSIS — Z87.440 HISTORY OF RECURRENT UTI (URINARY TRACT INFECTION): ICD-10-CM

## 2021-06-07 ENCOUNTER — HOSPITAL ENCOUNTER (INPATIENT)
Facility: MEDICAL CENTER | Age: 86
LOS: 7 days | DRG: 552 | End: 2021-06-14
Attending: EMERGENCY MEDICINE | Admitting: HOSPITALIST
Payer: MEDICARE

## 2021-06-07 ENCOUNTER — APPOINTMENT (OUTPATIENT)
Dept: RADIOLOGY | Facility: MEDICAL CENTER | Age: 86
DRG: 552 | End: 2021-06-07
Attending: HOSPITALIST
Payer: MEDICARE

## 2021-06-07 ENCOUNTER — APPOINTMENT (OUTPATIENT)
Dept: RADIOLOGY | Facility: MEDICAL CENTER | Age: 86
DRG: 552 | End: 2021-06-07
Attending: EMERGENCY MEDICINE
Payer: MEDICARE

## 2021-06-07 DIAGNOSIS — R29.6 RECURRENT FALLS: ICD-10-CM

## 2021-06-07 DIAGNOSIS — S32.040A COMPRESSION FRACTURE OF L4 VERTEBRA, INITIAL ENCOUNTER (HCC): ICD-10-CM

## 2021-06-07 DIAGNOSIS — M54.50 ACUTE LEFT-SIDED LOW BACK PAIN WITHOUT SCIATICA: ICD-10-CM

## 2021-06-07 DIAGNOSIS — F03.90 DEMENTIA WITHOUT BEHAVIORAL DISTURBANCE, UNSPECIFIED DEMENTIA TYPE: ICD-10-CM

## 2021-06-07 DIAGNOSIS — N39.0 URINARY TRACT INFECTION WITHOUT HEMATURIA, SITE UNSPECIFIED: ICD-10-CM

## 2021-06-07 DIAGNOSIS — S32.040A CLOSED COMPRESSION FRACTURE OF L4 LUMBAR VERTEBRA, INITIAL ENCOUNTER (HCC): ICD-10-CM

## 2021-06-07 DIAGNOSIS — R26.2 UNABLE TO AMBULATE: ICD-10-CM

## 2021-06-07 LAB
ALBUMIN SERPL BCP-MCNC: 3.9 G/DL (ref 3.2–4.9)
ALBUMIN/GLOB SERPL: 1 G/DL
ALP SERPL-CCNC: 66 U/L (ref 30–99)
ALT SERPL-CCNC: 10 U/L (ref 2–50)
ANION GAP SERPL CALC-SCNC: 11 MMOL/L (ref 7–16)
APPEARANCE UR: ABNORMAL
AST SERPL-CCNC: 13 U/L (ref 12–45)
BACTERIA #/AREA URNS HPF: NEGATIVE /HPF
BASOPHILS # BLD AUTO: 0.4 % (ref 0–1.8)
BASOPHILS # BLD: 0.04 K/UL (ref 0–0.12)
BILIRUB SERPL-MCNC: 0.3 MG/DL (ref 0.1–1.5)
BILIRUB UR QL STRIP.AUTO: NEGATIVE
BUN SERPL-MCNC: 34 MG/DL (ref 8–22)
CALCIUM SERPL-MCNC: 9.9 MG/DL (ref 8.5–10.5)
CHLORIDE SERPL-SCNC: 106 MMOL/L (ref 96–112)
CO2 SERPL-SCNC: 20 MMOL/L (ref 20–33)
COLOR UR: YELLOW
CREAT SERPL-MCNC: 1.21 MG/DL (ref 0.5–1.4)
EOSINOPHIL # BLD AUTO: 0.3 K/UL (ref 0–0.51)
EOSINOPHIL NFR BLD: 3.3 % (ref 0–6.9)
EPI CELLS #/AREA URNS HPF: NEGATIVE /HPF
ERYTHROCYTE [DISTWIDTH] IN BLOOD BY AUTOMATED COUNT: 44.8 FL (ref 35.9–50)
GLOBULIN SER CALC-MCNC: 4 G/DL (ref 1.9–3.5)
GLUCOSE SERPL-MCNC: 108 MG/DL (ref 65–99)
GLUCOSE UR STRIP.AUTO-MCNC: NEGATIVE MG/DL
HCT VFR BLD AUTO: 43.5 % (ref 37–47)
HGB BLD-MCNC: 14.6 G/DL (ref 12–16)
HYALINE CASTS #/AREA URNS LPF: ABNORMAL /LPF
IMM GRANULOCYTES # BLD AUTO: 0.06 K/UL (ref 0–0.11)
IMM GRANULOCYTES NFR BLD AUTO: 0.7 % (ref 0–0.9)
KETONES UR STRIP.AUTO-MCNC: NEGATIVE MG/DL
LEUKOCYTE ESTERASE UR QL STRIP.AUTO: ABNORMAL
LYMPHOCYTES # BLD AUTO: 1.81 K/UL (ref 1–4.8)
LYMPHOCYTES NFR BLD: 20 % (ref 22–41)
MCH RBC QN AUTO: 29.1 PG (ref 27–33)
MCHC RBC AUTO-ENTMCNC: 33.6 G/DL (ref 33.6–35)
MCV RBC AUTO: 86.8 FL (ref 81.4–97.8)
MICRO URNS: ABNORMAL
MONOCYTES # BLD AUTO: 0.59 K/UL (ref 0–0.85)
MONOCYTES NFR BLD AUTO: 6.5 % (ref 0–13.4)
NEUTROPHILS # BLD AUTO: 6.23 K/UL (ref 2–7.15)
NEUTROPHILS NFR BLD: 69.1 % (ref 44–72)
NITRITE UR QL STRIP.AUTO: NEGATIVE
NRBC # BLD AUTO: 0 K/UL
NRBC BLD-RTO: 0 /100 WBC
PH UR STRIP.AUTO: 5 [PH] (ref 5–8)
PLATELET # BLD AUTO: 240 K/UL (ref 164–446)
PMV BLD AUTO: 9.6 FL (ref 9–12.9)
POTASSIUM SERPL-SCNC: 3.9 MMOL/L (ref 3.6–5.5)
PROT SERPL-MCNC: 7.9 G/DL (ref 6–8.2)
PROT UR QL STRIP: 30 MG/DL
RBC # BLD AUTO: 5.01 M/UL (ref 4.2–5.4)
RBC # URNS HPF: ABNORMAL /HPF
RBC UR QL AUTO: ABNORMAL
SARS-COV-2 RNA RESP QL NAA+PROBE: NOTDETECTED
SODIUM SERPL-SCNC: 137 MMOL/L (ref 135–145)
SP GR UR STRIP.AUTO: 1.02
SPECIMEN SOURCE: NORMAL
UROBILINOGEN UR STRIP.AUTO-MCNC: 0.2 MG/DL
WBC # BLD AUTO: 9 K/UL (ref 4.8–10.8)
WBC #/AREA URNS HPF: ABNORMAL /HPF

## 2021-06-07 PROCEDURE — 96375 TX/PRO/DX INJ NEW DRUG ADDON: CPT

## 2021-06-07 PROCEDURE — 700105 HCHG RX REV CODE 258: Performed by: EMERGENCY MEDICINE

## 2021-06-07 PROCEDURE — 700102 HCHG RX REV CODE 250 W/ 637 OVERRIDE(OP): Performed by: HOSPITALIST

## 2021-06-07 PROCEDURE — 770006 HCHG ROOM/CARE - MED/SURG/GYN SEMI*

## 2021-06-07 PROCEDURE — U0005 INFEC AGEN DETEC AMPLI PROBE: HCPCS

## 2021-06-07 PROCEDURE — 700111 HCHG RX REV CODE 636 W/ 250 OVERRIDE (IP): Performed by: HOSPITALIST

## 2021-06-07 PROCEDURE — 81001 URINALYSIS AUTO W/SCOPE: CPT

## 2021-06-07 PROCEDURE — U0003 INFECTIOUS AGENT DETECTION BY NUCLEIC ACID (DNA OR RNA); SEVERE ACUTE RESPIRATORY SYNDROME CORONAVIRUS 2 (SARS-COV-2) (CORONAVIRUS DISEASE [COVID-19]), AMPLIFIED PROBE TECHNIQUE, MAKING USE OF HIGH THROUGHPUT TECHNOLOGIES AS DESCRIBED BY CMS-2020-01-R: HCPCS

## 2021-06-07 PROCEDURE — A9270 NON-COVERED ITEM OR SERVICE: HCPCS | Performed by: HOSPITALIST

## 2021-06-07 PROCEDURE — 73700 CT LOWER EXTREMITY W/O DYE: CPT | Mod: LT,ME

## 2021-06-07 PROCEDURE — 700111 HCHG RX REV CODE 636 W/ 250 OVERRIDE (IP): Performed by: EMERGENCY MEDICINE

## 2021-06-07 PROCEDURE — 80053 COMPREHEN METABOLIC PANEL: CPT

## 2021-06-07 PROCEDURE — 700102 HCHG RX REV CODE 250 W/ 637 OVERRIDE(OP): Performed by: INTERNAL MEDICINE

## 2021-06-07 PROCEDURE — 85025 COMPLETE CBC W/AUTO DIFF WBC: CPT

## 2021-06-07 PROCEDURE — 700101 HCHG RX REV CODE 250: Performed by: EMERGENCY MEDICINE

## 2021-06-07 PROCEDURE — 99285 EMERGENCY DEPT VISIT HI MDM: CPT

## 2021-06-07 PROCEDURE — 99222 1ST HOSP IP/OBS MODERATE 55: CPT | Mod: AI | Performed by: HOSPITALIST

## 2021-06-07 PROCEDURE — A9270 NON-COVERED ITEM OR SERVICE: HCPCS | Performed by: INTERNAL MEDICINE

## 2021-06-07 PROCEDURE — 96374 THER/PROPH/DIAG INJ IV PUSH: CPT

## 2021-06-07 PROCEDURE — 72131 CT LUMBAR SPINE W/O DYE: CPT | Mod: ME

## 2021-06-07 RX ORDER — MIRTAZAPINE 15 MG/1
7.5 TABLET, FILM COATED ORAL
Status: DISCONTINUED | OUTPATIENT
Start: 2021-06-07 | End: 2021-06-10

## 2021-06-07 RX ORDER — HEPARIN SODIUM 5000 [USP'U]/ML
5000 INJECTION, SOLUTION INTRAVENOUS; SUBCUTANEOUS EVERY 8 HOURS
Status: DISCONTINUED | OUTPATIENT
Start: 2021-06-07 | End: 2021-06-13

## 2021-06-07 RX ORDER — LABETALOL HYDROCHLORIDE 5 MG/ML
10 INJECTION, SOLUTION INTRAVENOUS EVERY 4 HOURS PRN
Status: DISCONTINUED | OUTPATIENT
Start: 2021-06-07 | End: 2021-06-14 | Stop reason: HOSPADM

## 2021-06-07 RX ORDER — SODIUM CHLORIDE 9 MG/ML
1000 INJECTION, SOLUTION INTRAVENOUS ONCE
Status: COMPLETED | OUTPATIENT
Start: 2021-06-07 | End: 2021-06-07

## 2021-06-07 RX ORDER — VITAMIN B COMPLEX
1000 TABLET ORAL DAILY
Status: DISCONTINUED | OUTPATIENT
Start: 2021-06-08 | End: 2021-06-10

## 2021-06-07 RX ORDER — BISACODYL 10 MG
10 SUPPOSITORY, RECTAL RECTAL
Status: DISCONTINUED | OUTPATIENT
Start: 2021-06-07 | End: 2021-06-10

## 2021-06-07 RX ORDER — MULTIVIT WITH MINERALS/LUTEIN
1000 TABLET ORAL DAILY
Status: ON HOLD | COMMUNITY
End: 2021-06-14

## 2021-06-07 RX ORDER — ACETAMINOPHEN 325 MG/1
650 TABLET ORAL EVERY 6 HOURS PRN
Status: DISCONTINUED | OUTPATIENT
Start: 2021-06-07 | End: 2021-06-10

## 2021-06-07 RX ORDER — OXYBUTYNIN CHLORIDE 5 MG/1
5-10 TABLET, EXTENDED RELEASE ORAL 2 TIMES DAILY
Status: DISCONTINUED | OUTPATIENT
Start: 2021-06-07 | End: 2021-06-07

## 2021-06-07 RX ORDER — AMOXICILLIN 250 MG
2 CAPSULE ORAL 2 TIMES DAILY
Status: DISCONTINUED | OUTPATIENT
Start: 2021-06-07 | End: 2021-06-10

## 2021-06-07 RX ORDER — OXYCODONE HYDROCHLORIDE 5 MG/1
2.5-5 TABLET ORAL EVERY 4 HOURS PRN
Status: DISCONTINUED | OUTPATIENT
Start: 2021-06-07 | End: 2021-06-09

## 2021-06-07 RX ORDER — OXYBUTYNIN CHLORIDE 5 MG/1
5 TABLET, EXTENDED RELEASE ORAL DAILY
Status: DISCONTINUED | OUTPATIENT
Start: 2021-06-08 | End: 2021-06-10

## 2021-06-07 RX ORDER — CYCLOBENZAPRINE HCL 10 MG
5 TABLET ORAL EVERY 8 HOURS PRN
Status: DISCONTINUED | OUTPATIENT
Start: 2021-06-07 | End: 2021-06-10

## 2021-06-07 RX ORDER — ONDANSETRON 2 MG/ML
4 INJECTION INTRAMUSCULAR; INTRAVENOUS EVERY 4 HOURS PRN
Status: DISCONTINUED | OUTPATIENT
Start: 2021-06-07 | End: 2021-06-14 | Stop reason: HOSPADM

## 2021-06-07 RX ORDER — OXYBUTYNIN CHLORIDE 10 MG/1
10 TABLET, EXTENDED RELEASE ORAL EVERY EVENING
Status: DISCONTINUED | OUTPATIENT
Start: 2021-06-07 | End: 2021-06-10

## 2021-06-07 RX ORDER — SERTRALINE HYDROCHLORIDE 100 MG/1
100 TABLET, FILM COATED ORAL DAILY
Status: DISCONTINUED | OUTPATIENT
Start: 2021-06-08 | End: 2021-06-10

## 2021-06-07 RX ORDER — SERTRALINE HYDROCHLORIDE 100 MG/1
50-100 TABLET, FILM COATED ORAL 2 TIMES DAILY
Status: DISCONTINUED | OUTPATIENT
Start: 2021-06-07 | End: 2021-06-07

## 2021-06-07 RX ORDER — VITAMIN B COMPLEX
1000 TABLET ORAL DAILY
Status: ON HOLD | COMMUNITY
End: 2021-06-14

## 2021-06-07 RX ORDER — ATORVASTATIN CALCIUM 20 MG/1
20 TABLET, FILM COATED ORAL DAILY
Status: DISCONTINUED | OUTPATIENT
Start: 2021-06-08 | End: 2021-06-10

## 2021-06-07 RX ORDER — POLYETHYLENE GLYCOL 3350 17 G/17G
1 POWDER, FOR SOLUTION ORAL
Status: DISCONTINUED | OUTPATIENT
Start: 2021-06-07 | End: 2021-06-10

## 2021-06-07 RX ORDER — ENALAPRILAT 1.25 MG/ML
1.25 INJECTION INTRAVENOUS EVERY 6 HOURS PRN
Status: DISCONTINUED | OUTPATIENT
Start: 2021-06-07 | End: 2021-06-14 | Stop reason: HOSPADM

## 2021-06-07 RX ORDER — ASCORBIC ACID 500 MG
1000 TABLET ORAL DAILY
Status: DISCONTINUED | OUTPATIENT
Start: 2021-06-08 | End: 2021-06-10

## 2021-06-07 RX ORDER — ONDANSETRON 4 MG/1
4 TABLET, ORALLY DISINTEGRATING ORAL EVERY 4 HOURS PRN
Status: DISCONTINUED | OUTPATIENT
Start: 2021-06-07 | End: 2021-06-10

## 2021-06-07 RX ORDER — FAMOTIDINE 20 MG/1
20 TABLET, FILM COATED ORAL DAILY
Status: DISCONTINUED | OUTPATIENT
Start: 2021-06-08 | End: 2021-06-10

## 2021-06-07 RX ADMIN — DOCUSATE SODIUM 50 MG AND SENNOSIDES 8.6 MG 2 TABLET: 8.6; 5 TABLET, FILM COATED ORAL at 17:16

## 2021-06-07 RX ADMIN — OXYCODONE 5 MG: 5 TABLET ORAL at 14:10

## 2021-06-07 RX ADMIN — FENTANYL CITRATE 100 MCG: 50 INJECTION, SOLUTION INTRAMUSCULAR; INTRAVENOUS at 11:14

## 2021-06-07 RX ADMIN — OXYCODONE 5 MG: 5 TABLET ORAL at 18:14

## 2021-06-07 RX ADMIN — SERTRALINE HYDROCHLORIDE 50 MG: 50 TABLET ORAL at 21:20

## 2021-06-07 RX ADMIN — SODIUM CHLORIDE 1000 ML: 9 INJECTION, SOLUTION INTRAVENOUS at 10:26

## 2021-06-07 RX ADMIN — MIRTAZAPINE 7.5 MG: 15 TABLET, FILM COATED ORAL at 21:21

## 2021-06-07 RX ADMIN — HEPARIN SODIUM 5000 UNITS: 5000 INJECTION, SOLUTION INTRAVENOUS; SUBCUTANEOUS at 21:46

## 2021-06-07 RX ADMIN — CEFTRIAXONE SODIUM 1 G: 10 INJECTION, POWDER, FOR SOLUTION INTRAVENOUS at 12:15

## 2021-06-07 RX ADMIN — HEPARIN SODIUM 5000 UNITS: 5000 INJECTION, SOLUTION INTRAVENOUS; SUBCUTANEOUS at 14:11

## 2021-06-07 RX ADMIN — OXYBUTYNIN CHLORIDE 10 MG: 10 TABLET, EXTENDED RELEASE ORAL at 17:16

## 2021-06-07 RX ADMIN — CYCLOBENZAPRINE 5 MG: 10 TABLET, FILM COATED ORAL at 21:19

## 2021-06-07 RX ADMIN — ACETAMINOPHEN 650 MG: 325 TABLET, FILM COATED ORAL at 18:14

## 2021-06-07 ASSESSMENT — COGNITIVE AND FUNCTIONAL STATUS - GENERAL
HELP NEEDED FOR BATHING: A LOT
SUGGESTED CMS G CODE MODIFIER MOBILITY: CL
SUGGESTED CMS G CODE MODIFIER DAILY ACTIVITY: CK
TURNING FROM BACK TO SIDE WHILE IN FLAT BAD: A LOT
MOVING FROM LYING ON BACK TO SITTING ON SIDE OF FLAT BED: A LOT
DAILY ACTIVITIY SCORE: 14
PERSONAL GROOMING: A LOT
DRESSING REGULAR LOWER BODY CLOTHING: A LOT
TOILETING: A LOT
MOVING TO AND FROM BED TO CHAIR: A LOT
MOBILITY SCORE: 12
DRESSING REGULAR UPPER BODY CLOTHING: A LOT
CLIMB 3 TO 5 STEPS WITH RAILING: A LOT
WALKING IN HOSPITAL ROOM: A LOT
STANDING UP FROM CHAIR USING ARMS: A LOT

## 2021-06-07 ASSESSMENT — ENCOUNTER SYMPTOMS
NAUSEA: 0
VOMITING: 0
FEVER: 0
BACK PAIN: 1
SHORTNESS OF BREATH: 0
FALLS: 1
HEADACHES: 0

## 2021-06-07 ASSESSMENT — LIFESTYLE VARIABLES
HOW MANY TIMES IN THE PAST YEAR HAVE YOU HAD 5 OR MORE DRINKS IN A DAY: 0
TOTAL SCORE: 0
TOTAL SCORE: 0
EVER HAD A DRINK FIRST THING IN THE MORNING TO STEADY YOUR NERVES TO GET RID OF A HANGOVER: NO
ON A TYPICAL DAY WHEN YOU DRINK ALCOHOL HOW MANY DRINKS DO YOU HAVE: 0
EVER FELT BAD OR GUILTY ABOUT YOUR DRINKING: NO
HAVE PEOPLE ANNOYED YOU BY CRITICIZING YOUR DRINKING: NO
CONSUMPTION TOTAL: NEGATIVE
AVERAGE NUMBER OF DAYS PER WEEK YOU HAVE A DRINK CONTAINING ALCOHOL: 0
TOTAL SCORE: 0
HAVE YOU EVER FELT YOU SHOULD CUT DOWN ON YOUR DRINKING: NO
ALCOHOL_USE: NO

## 2021-06-07 ASSESSMENT — PATIENT HEALTH QUESTIONNAIRE - PHQ9
1. LITTLE INTEREST OR PLEASURE IN DOING THINGS: NOT AT ALL
2. FEELING DOWN, DEPRESSED, IRRITABLE, OR HOPELESS: NOT AT ALL
SUM OF ALL RESPONSES TO PHQ9 QUESTIONS 1 AND 2: 0

## 2021-06-07 ASSESSMENT — PAIN DESCRIPTION - PAIN TYPE
TYPE: ACUTE PAIN

## 2021-06-07 ASSESSMENT — FIBROSIS 4 INDEX: FIB4 SCORE: 1.5

## 2021-06-07 NOTE — ASSESSMENT & PLAN NOTE
This has been a problem for the patient for some time now.  We will need to consider placement on discharge if the family is unable to watch more closely as her functional status is likely to be lower.  OT recommending SNF placement.  Referral requested on 6/9/2021.  Fall precautions.

## 2021-06-07 NOTE — PROGRESS NOTES
Pt A&Ox3, disoriented to situation. Strict bed rest. 2L oxygen. Purewick for voiding. Oxycodone given for pain. Tolerating regular diet. Plan for lumbar CT this afternoon.

## 2021-06-07 NOTE — ASSESSMENT & PLAN NOTE
Patient had been started on Macrobid which she took for 24 hours.  We will start on Rocephin for another 48 hours  Follow urine culture  6/9: Completed a course of antibiotics.

## 2021-06-07 NOTE — ASSESSMENT & PLAN NOTE
Patient does not appear to have suffered any recent falls, she did have a fall going back on May 10 however the granddaughter states that she does ambulate on her own in the bathroom and so she is uncertain if she may have fallen and not told anybody.  I discussed today with radiology, they would like a dedicated CT of the lumbar spine to assess whether kyphoplasty would be appropriate.  We will check CT L-spine, consult PT and OT  As needed support  6/8: CT of the lumbar spine showed acute to subacute L4 compression fracture.  MRI pending.  PT/OT pending.  No intervention per neurosurgery for now.  TLSO, PT/OT   6/9: MRI of lumbar spine still pending.  6/13: MRI of the lumbar spine showed acute/subacute inferior endplate L4 compression fracture.  Continue with TLSO.

## 2021-06-07 NOTE — PROGRESS NOTES
4 Eyes Skin Assessment Completed by Diana RN and MARCO Cano.    Head WDL  Ears WDL  Nose WDL  Mouth WDL  Neck WDL  Breast/Chest WDL  Shoulder Blades WDL  Spine WDL  (R) Arm/Elbow/Hand WDL  (L) Arm/Elbow/Hand WDL  Abdomen WDL  Groin WDL  Scrotum/Coccyx/Buttocks WDL  (R) Leg WDL  (L) Leg WDL  (R) Heel/Foot/Toe WDL  (L) Heel/Foot/Toe WDL          Devices In Places Nasal Cannula      Interventions In Place Gray Ear Foams, Waffle Overlay and Pillows    Possible Skin Injury No    Pictures Uploaded Into Epic N/A  Wound Consult Placed N/A  RN Wound Prevention Protocol Ordered No

## 2021-06-07 NOTE — ED PROVIDER NOTES
ED Provider Note    ED Provider Note    Primary care provider: LETY Kulkarni  Means of arrival: POV  History obtained from: Granddaughter   History limited by: Dementia    CHIEF COMPLAINT  Chief Complaint   Patient presents with   • Low Back Pain     Pt has dementia at baseline, BIB EMS for low back/sciatic pain on the left side, pt has trouble ambulating at home. Per EMS pt was seen here recently and received lumbar Xrays and treated for a UTI.         ANNI Jones is a 87 y.o. female who presents to the Emergency Department accompanied by her granddaughter who lives with and cares for her.  Patient has a history of dementia.  She is complaining of low back pain.  Patient states she is always had low back pain but it has been worse in the last week.  Her granddaughter notes that normally, she gets around with a walker but she has not been able to walk for the last week.  They were seen in the urgent care on Saturday she was diagnosed with a urinary tract infection and started on Bactrim.  She also had x-rays done which were unrevealing.  As far as the granddaughter knows, she has not had any falls recently, she did fall on May 10.  She does have a history of hip fracture and surgical repair few years ago.  She has not noted any fever cough cold symptoms, no nausea or vomiting.  She has not complained or demonstrated shortness of breath or increased work of breathing.    REVIEW OF SYSTEMS  Review of Systems   Constitutional: Negative for fever.   Respiratory: Negative for shortness of breath.    Cardiovascular: Negative for chest pain.   Gastrointestinal: Negative for nausea and vomiting.   Genitourinary: Positive for dysuria and frequency.   Musculoskeletal: Positive for back pain and falls.   Neurological: Negative for headaches.   All other systems reviewed and are negative.      PAST MEDICAL HISTORY   has a past medical history of Arthritis, Dental disorder, Diabetes (HCC), Glaucoma,  Gynecological disorder, Heart burn, Macular degeneration, Psychiatric problem, Snoring, Urinary bladder disorder, Urinary incontinence, and Uterine prolapse without mention of vaginal wall prolapse (6/2/2017).    SURGICAL HISTORY   has a past surgical history that includes abdominal hysterectomy total; lefort colpoclesis (6/2/2017); cystoscopy (6/2/2017); hip cannulated screw (Left, 12/1/2017); bladder sling female (N/A, 4/20/2018); anterior repair (N/A, 4/20/2018); pelviscopy (N/A, 4/20/2018); and anterior and posterior repair (12/3/2019).    SOCIAL HISTORY  Social History     Tobacco Use   • Smoking status: Never Smoker   • Smokeless tobacco: Never Used   Vaping Use   • Vaping Use: Never used   Substance Use Topics   • Alcohol use: No   • Drug use: No      Social History     Substance and Sexual Activity   Drug Use No       FAMILY HISTORY  Family History   Problem Relation Age of Onset   • Stroke Father    • Cancer Sister    • Cancer Brother    • Heart Disease Sister    • Cancer Brother        CURRENT MEDICATIONS  Home Medications     Reviewed by Terry Noel (Pharmacy Tech) on 06/07/21 at 1208  Med List Status: Complete   Medication Last Dose Status   Ascorbic Acid (VITAMIN C) 1000 MG Tab 6/7/2021 Active   atorvastatin (LIPITOR) 20 MG Tab 6/7/2021 Active   Calcium Carbonate (CALCARB 600 PO) 6/7/2021 Active   Cranberry-Vitamin C 07749-984 MG Cap 6/7/2021 Active   D-MANNOSE PO 6/7/2021 Active   famotidine (PEPCID) 20 MG Tab 6/7/2021 Active   mirtazapine (REMERON) 7.5 MG tablet 6/6/2021 Active   nitrofurantoin (MACROBID) 100 MG Cap 6/7/2021 Active   oxybutynin SR (DITROPAN-XL) 5 MG TABLET SR 24 HR 6/7/2021 Active   sertraline (ZOLOFT) 100 MG Tab 6/7/2021 Active   vitamin D (CHOLECALCIFEROL) 1000 Unit (25 mcg) Tab 6/7/2021 Active                ALLERGIES  Allergies   Allergen Reactions   • Sulfamethoxazole      Does not remember reaction        PHYSICAL EXAM  VITAL SIGNS: /60   Pulse 82   Temp (!)  "35.6 °C (96 °F) (Oral)   Resp 18   Ht 1.651 m (5' 5\")   Wt 54.4 kg (120 lb)   SpO2 96%   BMI 19.97 kg/m²   Vitals reviewed.  Constitutional: Patient is oriented to person, place, and time. Appears well-developed and well-nourished.  Mild distress.    Head: Normocephalic and atraumatic.   Ears: Normal external ears bilaterally.   Mouth/Throat: Oropharynx is clear and moist  Eyes: Conjunctivae are normal. Pupils are equal and round  Neck: Normal range of motion. Neck supple.  Cardiovascular: Normal rate, regular rhythm and normal heart sounds. Normal peripheral pulses.  Pulmonary/Chest: Effort normal and breath sounds normal. No respiratory distress, no wheezes, rhonchi, or rales. No chest wall tenderness.  Abdominal: Soft. Bowel sounds are normal. There is no tenderness. No rebound or guarding, or peritoneal signs. No CVA tenderness.  Musculoskeletal: No edema and no tenderness, except as noted.  There is tenderness to the bilateral lumbar paraspinal muscles without overlying skin changes.   Neurological: No focal deficits.   Skin: Skin is warm and dry. No erythema. No pallor.   Psychiatric: Patient has a normal mood and affect.     LABS  Results for orders placed or performed during the hospital encounter of 06/07/21   URINALYSIS    Specimen: Urine, Clean Catch   Result Value Ref Range    Color Yellow     Character Cloudy (A)     Specific Gravity 1.016 <1.035    Ph 5.0 5.0 - 8.0    Glucose Negative Negative mg/dL    Ketones Negative Negative mg/dL    Protein 30 (A) Negative mg/dL    Bilirubin Negative Negative    Urobilinogen, Urine 0.2 Negative    Nitrite Negative Negative    Leukocyte Esterase Large (A) Negative    Occult Blood Large (A) Negative    Micro Urine Req Microscopic    CMP   Result Value Ref Range    Sodium 137 135 - 145 mmol/L    Potassium 3.9 3.6 - 5.5 mmol/L    Chloride 106 96 - 112 mmol/L    Co2 20 20 - 33 mmol/L    Anion Gap 11.0 7.0 - 16.0    Glucose 108 (H) 65 - 99 mg/dL    Bun 34 (H) 8 - 22 " mg/dL    Creatinine 1.21 0.50 - 1.40 mg/dL    Calcium 9.9 8.5 - 10.5 mg/dL    AST(SGOT) 13 12 - 45 U/L    ALT(SGPT) 10 2 - 50 U/L    Alkaline Phosphatase 66 30 - 99 U/L    Total Bilirubin 0.3 0.1 - 1.5 mg/dL    Albumin 3.9 3.2 - 4.9 g/dL    Total Protein 7.9 6.0 - 8.2 g/dL    Globulin 4.0 (H) 1.9 - 3.5 g/dL    A-G Ratio 1.0 g/dL   CBC WITH DIFFERENTIAL   Result Value Ref Range    WBC 9.0 4.8 - 10.8 K/uL    RBC 5.01 4.20 - 5.40 M/uL    Hemoglobin 14.6 12.0 - 16.0 g/dL    Hematocrit 43.5 37.0 - 47.0 %    MCV 86.8 81.4 - 97.8 fL    MCH 29.1 27.0 - 33.0 pg    MCHC 33.6 33.6 - 35.0 g/dL    RDW 44.8 35.9 - 50.0 fL    Platelet Count 240 164 - 446 K/uL    MPV 9.6 9.0 - 12.9 fL    Neutrophils-Polys 69.10 44.00 - 72.00 %    Lymphocytes 20.00 (L) 22.00 - 41.00 %    Monocytes 6.50 0.00 - 13.40 %    Eosinophils 3.30 0.00 - 6.90 %    Basophils 0.40 0.00 - 1.80 %    Immature Granulocytes 0.70 0.00 - 0.90 %    Nucleated RBC 0.00 /100 WBC    Neutrophils (Absolute) 6.23 2.00 - 7.15 K/uL    Lymphs (Absolute) 1.81 1.00 - 4.80 K/uL    Monos (Absolute) 0.59 0.00 - 0.85 K/uL    Eos (Absolute) 0.30 0.00 - 0.51 K/uL    Baso (Absolute) 0.04 0.00 - 0.12 K/uL    Immature Granulocytes (abs) 0.06 0.00 - 0.11 K/uL    NRBC (Absolute) 0.00 K/uL   URINE MICROSCOPIC (W/UA)   Result Value Ref Range    WBC Packed (A) /hpf    RBC 20-50 (A) /hpf    Bacteria Negative None /hpf    Epithelial Cells Negative /hpf    Hyaline Cast 0-2 /lpf   ESTIMATED GFR   Result Value Ref Range    GFR If  51 (A) >60 mL/min/1.73 m 2    GFR If Non  42 (A) >60 mL/min/1.73 m 2       All labs reviewed by me.    RADIOLOGY  CT-HIP W/O PLUS RECONS LEFT   Final Result         1. Acute to subacute compression fracture of L4 vertebral body, only partially visualized.   2. No acute fracture or dislocation in the bony pelvis.   3. Stable sequela of prior left femoral ORIF, without evidence of hardware complication.   4. Moderately distended bladder with  dilatation of the visualized ureters.        The radiologist's interpretation of all radiological studies have been reviewed by me.    COURSE & MEDICAL DECISION MAKING  Pertinent Labs & Imaging studies reviewed. (See chart for details)    Obtained and reviewed past medical records.  Patient was admitted to the hospital in September of last year for abnormal labs and a urinary tract infection.    10:08 AM - Patient seen and examined at bedside.  Patient seen at the bedside.  She is accompanied by her granddaughter who gives most of the history.  Fall on May 10.  Recent diagnosis of urinary tract infection.  Unable to ambulate which she normally can with a walker.  Complaining of severe back pain.  Given her unrevealing x-rays, I have ordered a CT scan of her pelvis and left hip.  Her urine at the bedside, is dark and cloudy and I am concerned that she has a UTI despite being on Bactrim.  I anticipate, that the patient will not be able to go home as she is not able to ambulate.  I have ordered IV start as well as a CBC and a chemistry for further evaluation.    12 PM discussed with Dr. Bella, hospitalist, who agrees to admit this patient to their service.  Patient is a an appropriate candidate for telemetry.  Await response from neurosurgery.    12:15 PM patient's reevaluated at the bedside.  Granddaughter is at the bedside.  I discussed with her CT findings consistent with an L4 compression fracture which I suspect is likely from a fall on May 10 which she reported.  She also has evidence of urinary tract infection although her CBC and chemistry are unrevealing.  Her urine analysis is packed with WBCs and antibiotics have been initiated.    I have messaged Dr. Aparicio, on-call for neurosurgery who is currently in the operating room to please see the patient in consultation.  He is also been notified through his answering service.    Patient is admitted in stable but guarded    FINAL IMPRESSION  1. Urinary tract  infection without hematuria, site unspecified    2. Compression fracture of L4 vertebra, initial encounter (HCC)    3. Acute left-sided low back pain without sciatica    4. Unable to ambulate    5. Dementia without behavioral disturbance, unspecified dementia type (HCC)

## 2021-06-07 NOTE — H&P
Hospital Medicine History & Physical Note    Date of Service  6/7/2021    Primary Care Physician  LETY Kulkarni    Consultants      Code Status  Full Code    Chief Complaint  Chief Complaint   Patient presents with   • Low Back Pain     Pt has dementia at baseline, BIB EMS for low back/sciatic pain on the left side, pt has trouble ambulating at home. Per EMS pt was seen here recently and received lumbar Xrays and treated for a UTI.         History of Presenting Illness  87 y.o. female who presented 6/7/2021 with previous medical history that is significant for diabetes, glaucoma, macular degeneration, dementia.    Patient is being cared for by her granddaughter.  History is primarily through her granddaughter, as the patient herself is quite confused due to her dementia.  Granddaughter reports that last week they had gone to a doctor's appointment, and the patient had been able to ambulate without any more difficulty than is normal for her.  At the doctor's appointment, she was found to have a UTI, and prescription for antibiotics were written.  They then went to eat breakfast.  Patient again had no difficulty ambulating into the restaurant, however when she stood to get out and walk back to the car she had complained of significant back pain.  Pain has persisted since that time, to the degree that the patient is now barely ambulatory at all without maximal assist.  The patient seems to be otherwise in her normal state of health and is no more confused than is normal for her.  There is been no issues with loss of bowel or bladder control.  Patient herself denies any sensation loss or weakness.  She does admit to back pain in her lower back, which is worse when she moves.    Work-up in the ED included CT of the hip, and lab work.  Labs are benign with exception of UA which indicates UTI.  CT of the hip itself has been significant for arthritic changes however otherwise unremarkable of the hip however notes  an incidentally visualized L4 compression fracture which appears to be acute/subacute.    Review of Systems  Review of Systems   Unable to perform ROS: Dementia   Musculoskeletal: Positive for back pain.       Past Medical History   has a past medical history of Arthritis, Dental disorder, Diabetes (HCC), Glaucoma, Gynecological disorder, Heart burn, Macular degeneration, Psychiatric problem, Snoring, Urinary bladder disorder, Urinary incontinence, and Uterine prolapse without mention of vaginal wall prolapse (6/2/2017).    Surgical History   has a past surgical history that includes abdominal hysterectomy total; lefort colpoclesis (6/2/2017); cystoscopy (6/2/2017); hip cannulated screw (Left, 12/1/2017); bladder sling female (N/A, 4/20/2018); anterior repair (N/A, 4/20/2018); pelviscopy (N/A, 4/20/2018); and anterior and posterior repair (12/3/2019).     Family History  family history includes Cancer in her brother, brother, and sister; Heart Disease in her sister; Stroke in her father.     Social History   reports that she has never smoked. She has never used smokeless tobacco. She reports that she does not drink alcohol and does not use drugs.    Allergies  Allergies   Allergen Reactions   • Sulfamethoxazole      Does not remember reaction        Medications  Prior to Admission Medications   Prescriptions Last Dose Informant Patient Reported? Taking?   Ascorbic Acid (VITAMIN C) 1000 MG Tab 6/7/2021 at am Family Member Yes Yes   Sig: Take 1,000 mg by mouth every day.   Calcium Carbonate (CALCARB 600 PO) 6/7/2021 at am Family Member Yes No   Sig: Take 600 mg by mouth every morning.   Cranberry-Vitamin C 08592-401 MG Cap 6/7/2021 at am Family Member Yes Yes   Sig: Take 1 capsule by mouth every day.   D-MANNOSE PO 6/7/2021 at am Family Member Yes Yes   Sig: Take 1 Each by mouth 2 times a day.   atorvastatin (LIPITOR) 20 MG Tab 6/7/2021 at am Family Member No No   Sig: Take 1 tablet by mouth every day.   famotidine  (PEPCID) 20 MG Tab 6/7/2021 at am Family Member No No   Sig: TAKE ONE TABLET BY MOUTH TWICE A DAY   Patient taking differently: Take 20 mg by mouth every day.   mirtazapine (REMERON) 7.5 MG tablet 6/6/2021 at PM Family Member No No   Sig: TAKE 1 TAB BY MOUTH EVERY BEDTIME.   nitrofurantoin (MACROBID) 100 MG Cap 6/7/2021 at am Family Member No No   Sig: Take 1 capsule by mouth 2 times a day for 5 days.   Patient taking differently: Take 100 mg by mouth 2 times a day. Started 5 day regimen 6/5/21 in the evening. Scheduled to be completed 6/10/21 in the morning.   oxybutynin SR (DITROPAN-XL) 5 MG TABLET SR 24 HR 6/7/2021 at am Family Member Yes No   Sig: Take 5-10 mg by mouth 2 times a day. 5mg in the am  10mg in the pm   sertraline (ZOLOFT) 100 MG Tab 6/7/2021 at am Family Member No No   Sig: TAKE 1 AND A HALF TABLET BY MOUTH DAILY   Patient taking differently: Take  mg by mouth 2 times a day. 100mg in the morning  50mg in the evening  50mg = 0.5 tab   vitamin D (CHOLECALCIFEROL) 1000 Unit (25 mcg) Tab 6/7/2021 at am Family Member Yes Yes   Sig: Take 1,000 Units by mouth every day.      Facility-Administered Medications: None       Physical Exam  Temp:  [35.6 °C (96 °F)-36.3 °C (97.3 °F)] 36.3 °C (97.3 °F)  Pulse:  [71-82] 73  Resp:  [16-18] 16  BP: (122-135)/(60-94) 129/94  SpO2:  [87 %-100 %] 100 %    Physical Exam  Vitals reviewed.   Constitutional:       General: She is not in acute distress.     Appearance: Normal appearance. She is well-developed. She is not diaphoretic.   HENT:      Head: Normocephalic and atraumatic.   Neck:      Vascular: No JVD.   Cardiovascular:      Rate and Rhythm: Normal rate and regular rhythm.   Pulmonary:      Effort: Pulmonary effort is normal. No respiratory distress.      Breath sounds: No stridor. No wheezing or rales.   Abdominal:      Palpations: Abdomen is soft.      Tenderness: There is no abdominal tenderness. There is no guarding or rebound.   Musculoskeletal:          General: No tenderness.      Right lower leg: No edema.      Left lower leg: No edema.   Skin:     General: Skin is warm and dry.      Findings: No rash.   Neurological:      Mental Status: She is alert and oriented to person, place, and time. Mental status is at baseline.   Psychiatric:         Thought Content: Thought content normal.         Laboratory:  Recent Labs     06/05/21 0920 06/07/21  1024   WBC 11.4* 9.0   RBC 5.16 5.01   HEMOGLOBIN 15.0 14.6   HEMATOCRIT 46.7 43.5   MCV 90.5 86.8   MCH 29.1 29.1   MCHC 32.1* 33.6   RDW 48.1 44.8   PLATELETCT 246 240   MPV 10.4 9.6     Recent Labs     06/05/21 0920 06/07/21  1024   SODIUM 141 137   POTASSIUM 4.3 3.9   CHLORIDE 106 106   CO2 24 20   GLUCOSE 109* 108*   BUN 37* 34*   CREATININE 1.52* 1.21   CALCIUM 10.1 9.9     Recent Labs     06/05/21 0920 06/07/21  1024   ALTSGPT 8 10   ASTSGOT 12 13   ALKPHOSPHAT 64 66   TBILIRUBIN 0.3 0.3   GLUCOSE 109* 108*         No results for input(s): NTPROBNP in the last 72 hours.  Recent Labs     06/05/21  0920   TRIGLYCERIDE 145   HDL 52   LDL 95     No results for input(s): TROPONINT in the last 72 hours.    Imaging:  CT-HIP W/O PLUS RECONS LEFT   Final Result         1. Acute to subacute compression fracture of L4 vertebral body, only partially visualized.   2. No acute fracture or dislocation in the bony pelvis.   3. Stable sequela of prior left femoral ORIF, without evidence of hardware complication.   4. Moderately distended bladder with dilatation of the visualized ureters.      CT-LSPINE W/O PLUS RECONS    (Results Pending)         Assessment/Plan:  I anticipate this patient will require at least two midnights for appropriate medical management, necessitating inpatient admission.    Closed compression fracture of L4 lumbar vertebra, initial encounter ([facility])  Assessment & Plan  Patient does not appear to have suffered any recent falls, she did have a fall going back on May 10 however the granddaughter states that she  does ambulate on her own in the bathroom and so she is uncertain if she may have fallen and not told anybody.  I discussed today with radiology, they would like a dedicated CT of the lumbar spine to assess whether kyphoplasty would be appropriate.  We will check CT L-spine, consult PT and OT  As needed support    Recurrent falls- (present on admission)  Assessment & Plan  This has been a problem for the patient for some time now.  We will need to consider placement on discharge if the family is unable to watch more closely as her functional status is likely to be lower.    Dementia associated with other underlying disease without behavioral disturbance (HCC)- (present on admission)  Assessment & Plan  Patient appears to be at her baseline per her family at the bedside    Stage 3b chronic kidney disease- (present on admission)  Assessment & Plan  Renal function appears to be around the baseline for this patient.  Renally dose medications as appropriate  Follow BMP    UTI (urinary tract infection)- (present on admission)  Assessment & Plan  Patient had been started on Macrobid which she took for 24 hours.  We will start on Rocephin for another 48 hours  Follow urine culture    Diabetes mellitus type 2 with complications (HCC)- (present on admission)  Assessment & Plan  Patient's last A1c was 6.3, this was 2 days ago.  Carb consistent diet  Follow-up fingersticks    Dyslipidemia- (present on admission)  Assessment & Plan  Continue statin

## 2021-06-07 NOTE — ASSESSMENT & PLAN NOTE
Patient appears to be at her baseline per her family at the bedside  High risk for hospital-acquired delirium.  Avoid benzodiazepines and anticholinergic medications.  Minimize narcotics, hypnotics, sedatives.  Minimize lines.  Daily orientation.

## 2021-06-07 NOTE — ASSESSMENT & PLAN NOTE
Renal function appears to be around the baseline for this patient.  Renally dose medications as appropriate  Follow BMP

## 2021-06-07 NOTE — ED NOTES
Med Rec completed: per pt's family at bedside.     Pt started 5 day course of Macrobid 100mg twice daily on 6/5/21, scheduled to be completed 6/10/21.    Preferred Pharmacy: [facility] P: [phone]    Pt confirmed following allergies:  Allergies   Allergen Reactions   • Sulfamethoxazole      Does not remember reaction         Pt's home medications:   Medication Sig   • D-MANNOSE PO Take 1 Each by mouth 2 times a day.   • Ascorbic Acid (VITAMIN C) 1000 MG Tab Take 1,000 mg by mouth every day.   • vitamin D (CHOLECALCIFEROL) 1000 Unit (25 mcg) Tab Take 1,000 Units by mouth every day.   • Cranberry-Vitamin C 62685-383 MG Cap Take 1 capsule by mouth every day.   • nitrofurantoin (MACROBID) 100 MG Cap Take 1 capsule by mouth 2 times a day for 5 days. (Patient taking differently: Take 100 mg by mouth 2 times a day. Started 5 day regimen 6/5/21 in the evening. Scheduled to be completed 6/10/21 in the morning.)   • oxybutynin SR (DITROPAN-XL) 5 MG TABLET SR 24 HR Take 5-10 mg by mouth 2 times a day. 5mg in the am  10mg in the pm   • atorvastatin (LIPITOR) 20 MG Tab Take 1 tablet by mouth every day.   • famotidine (PEPCID) 20 MG Tab TAKE ONE TABLET BY MOUTH TWICE A DAY (Patient taking differently: Take 20 mg by mouth every day.)   • sertraline (ZOLOFT) 100 MG Tab TAKE 1 AND A HALF TABLET BY MOUTH DAILY (Patient taking differently: Take  mg by mouth 2 times a day. 100mg in the morning  50mg in the evening  50mg = 0.5 tab)   • mirtazapine (REMERON) 7.5 MG tablet TAKE 1 TAB BY MOUTH EVERY BEDTIME.   • Calcium Carbonate (CALCARB 600 PO) Take 600 mg by mouth every morning.

## 2021-06-07 NOTE — CARE PLAN
The patient is Stable - Low risk of patient condition declining or worsening         Progress made toward(s) clinical / shift goals:      Problem: Pain - Standard  Goal: Alleviation of pain or a reduction in pain to the patient’s comfort goal  Outcome: Progressing  Pt c/o 10/10 pain on admission to unit, oxycodone ordered and given.     Problem: Knowledge Deficit - Standard  Goal: Patient and family/care givers will demonstrate understanding of plan of care, disease process/condition, diagnostic tests and medications  Outcome: Progressing  Pt updated throughout the evening regarding plan of care, agreeable. All questions answered.

## 2021-06-07 NOTE — ED TRIAGE NOTES
"Chief Complaint   Patient presents with   • Low Back Pain     Pt has dementia at baseline, BIB EMS for low back/sciatic pain on the left side, pt has trouble ambulating at home. Per EMS pt was seen here recently and received lumbar Xrays and treated for a UTI.       /65   Pulse 71   Temp (!) 35.6 °C (96 °F) (Oral)   Resp 16   Ht 1.651 m (5' 5\")   Wt 54.4 kg (120 lb)   SpO2 95%   BMI 19.97 kg/m²     Pt able to transfer from the EMS [facility] to the ED [facility] with a hand held assistance.   "

## 2021-06-08 ENCOUNTER — PATIENT OUTREACH (OUTPATIENT)
Dept: HEALTH INFORMATION MANAGEMENT | Facility: OTHER | Age: 86
End: 2021-06-08

## 2021-06-08 ENCOUNTER — APPOINTMENT (OUTPATIENT)
Dept: RADIOLOGY | Facility: MEDICAL CENTER | Age: 86
DRG: 552 | End: 2021-06-08
Attending: FAMILY MEDICINE
Payer: MEDICARE

## 2021-06-08 DIAGNOSIS — E78.5 DYSLIPIDEMIA: ICD-10-CM

## 2021-06-08 LAB
BACTERIA UR CULT: NORMAL
SIGNIFICANT IND 70042: NORMAL
SITE SITE: NORMAL
SOURCE SOURCE: NORMAL

## 2021-06-08 PROCEDURE — 99233 SBSQ HOSP IP/OBS HIGH 50: CPT | Performed by: FAMILY MEDICINE

## 2021-06-08 PROCEDURE — 700102 HCHG RX REV CODE 250 W/ 637 OVERRIDE(OP): Performed by: INTERNAL MEDICINE

## 2021-06-08 PROCEDURE — 76775 US EXAM ABDO BACK WALL LIM: CPT

## 2021-06-08 PROCEDURE — 700111 HCHG RX REV CODE 636 W/ 250 OVERRIDE (IP): Performed by: INTERNAL MEDICINE

## 2021-06-08 PROCEDURE — A9270 NON-COVERED ITEM OR SERVICE: HCPCS | Performed by: INTERNAL MEDICINE

## 2021-06-08 PROCEDURE — 302093 ICE PACK LG: Performed by: HOSPITALIST

## 2021-06-08 PROCEDURE — 700111 HCHG RX REV CODE 636 W/ 250 OVERRIDE (IP): Performed by: HOSPITALIST

## 2021-06-08 PROCEDURE — 770006 HCHG ROOM/CARE - MED/SURG/GYN SEMI*

## 2021-06-08 PROCEDURE — 92610 EVALUATE SWALLOWING FUNCTION: CPT

## 2021-06-08 PROCEDURE — A9270 NON-COVERED ITEM OR SERVICE: HCPCS | Performed by: FAMILY MEDICINE

## 2021-06-08 PROCEDURE — 700102 HCHG RX REV CODE 250 W/ 637 OVERRIDE(OP): Performed by: FAMILY MEDICINE

## 2021-06-08 PROCEDURE — 700102 HCHG RX REV CODE 250 W/ 637 OVERRIDE(OP): Performed by: HOSPITALIST

## 2021-06-08 PROCEDURE — 51798 US URINE CAPACITY MEASURE: CPT

## 2021-06-08 PROCEDURE — 97161 PT EVAL LOW COMPLEX 20 MIN: CPT

## 2021-06-08 PROCEDURE — A9270 NON-COVERED ITEM OR SERVICE: HCPCS | Performed by: HOSPITALIST

## 2021-06-08 RX ORDER — TAMSULOSIN HYDROCHLORIDE 0.4 MG/1
0.4 CAPSULE ORAL
Status: DISCONTINUED | OUTPATIENT
Start: 2021-06-08 | End: 2021-06-10

## 2021-06-08 RX ORDER — KETOROLAC TROMETHAMINE 30 MG/ML
15 INJECTION, SOLUTION INTRAMUSCULAR; INTRAVENOUS ONCE
Status: COMPLETED | OUTPATIENT
Start: 2021-06-08 | End: 2021-06-08

## 2021-06-08 RX ORDER — ATORVASTATIN CALCIUM 20 MG/1
20 TABLET, FILM COATED ORAL DAILY
Qty: 100 TABLET | Refills: 0 | Status: SHIPPED | OUTPATIENT
Start: 2021-06-08 | End: 2021-06-14

## 2021-06-08 RX ADMIN — OXYBUTYNIN CHLORIDE 5 MG: 5 TABLET, EXTENDED RELEASE ORAL at 05:43

## 2021-06-08 RX ADMIN — OXYCODONE 5 MG: 5 TABLET ORAL at 00:11

## 2021-06-08 RX ADMIN — OXYCODONE 5 MG: 5 TABLET ORAL at 16:41

## 2021-06-08 RX ADMIN — DOCUSATE SODIUM 50 MG AND SENNOSIDES 8.6 MG 2 TABLET: 8.6; 5 TABLET, FILM COATED ORAL at 16:41

## 2021-06-08 RX ADMIN — DOCUSATE SODIUM 50 MG AND SENNOSIDES 8.6 MG 2 TABLET: 8.6; 5 TABLET, FILM COATED ORAL at 05:32

## 2021-06-08 RX ADMIN — HEPARIN SODIUM 5000 UNITS: 5000 INJECTION, SOLUTION INTRAVENOUS; SUBCUTANEOUS at 14:27

## 2021-06-08 RX ADMIN — Medication 1000 UNITS: at 05:32

## 2021-06-08 RX ADMIN — ACETAMINOPHEN 650 MG: 325 TABLET, FILM COATED ORAL at 00:20

## 2021-06-08 RX ADMIN — ACETAMINOPHEN 650 MG: 325 TABLET, FILM COATED ORAL at 21:14

## 2021-06-08 RX ADMIN — KETOROLAC TROMETHAMINE 15 MG: 30 INJECTION, SOLUTION INTRAMUSCULAR; INTRAVENOUS at 01:46

## 2021-06-08 RX ADMIN — OXYBUTYNIN CHLORIDE 10 MG: 10 TABLET, EXTENDED RELEASE ORAL at 17:18

## 2021-06-08 RX ADMIN — CYCLOBENZAPRINE 5 MG: 10 TABLET, FILM COATED ORAL at 14:26

## 2021-06-08 RX ADMIN — FAMOTIDINE 20 MG: 20 TABLET ORAL at 05:31

## 2021-06-08 RX ADMIN — SERTRALINE HYDROCHLORIDE 100 MG: 100 TABLET ORAL at 05:31

## 2021-06-08 RX ADMIN — CEFTRIAXONE SODIUM 1 G: 10 INJECTION, POWDER, FOR SOLUTION INTRAVENOUS at 05:32

## 2021-06-08 RX ADMIN — HEPARIN SODIUM 5000 UNITS: 5000 INJECTION, SOLUTION INTRAVENOUS; SUBCUTANEOUS at 05:32

## 2021-06-08 RX ADMIN — OXYCODONE 5 MG: 5 TABLET ORAL at 12:46

## 2021-06-08 RX ADMIN — MIRTAZAPINE 7.5 MG: 15 TABLET, FILM COATED ORAL at 21:14

## 2021-06-08 RX ADMIN — SERTRALINE HYDROCHLORIDE 50 MG: 50 TABLET ORAL at 21:14

## 2021-06-08 RX ADMIN — CYCLOBENZAPRINE 5 MG: 10 TABLET, FILM COATED ORAL at 22:06

## 2021-06-08 RX ADMIN — ATORVASTATIN CALCIUM 20 MG: 20 TABLET, FILM COATED ORAL at 05:31

## 2021-06-08 RX ADMIN — HEPARIN SODIUM 5000 UNITS: 5000 INJECTION, SOLUTION INTRAVENOUS; SUBCUTANEOUS at 21:14

## 2021-06-08 RX ADMIN — TAMSULOSIN HYDROCHLORIDE 0.4 MG: 0.4 CAPSULE ORAL at 14:27

## 2021-06-08 RX ADMIN — OXYCODONE HYDROCHLORIDE AND ACETAMINOPHEN 1000 MG: 500 TABLET ORAL at 05:32

## 2021-06-08 RX ADMIN — OXYCODONE 5 MG: 5 TABLET ORAL at 21:14

## 2021-06-08 ASSESSMENT — COGNITIVE AND FUNCTIONAL STATUS - GENERAL
WALKING IN HOSPITAL ROOM: TOTAL
MOBILITY SCORE: 10
MOVING TO AND FROM BED TO CHAIR: A LOT
STANDING UP FROM CHAIR USING ARMS: A LOT
CLIMB 3 TO 5 STEPS WITH RAILING: TOTAL
SUGGESTED CMS G CODE MODIFIER MOBILITY: CL
TURNING FROM BACK TO SIDE WHILE IN FLAT BAD: A LOT
MOVING FROM LYING ON BACK TO SITTING ON SIDE OF FLAT BED: A LOT

## 2021-06-08 ASSESSMENT — PAIN DESCRIPTION - PAIN TYPE
TYPE: ACUTE PAIN

## 2021-06-08 ASSESSMENT — ENCOUNTER SYMPTOMS
BACK PAIN: 1
FALLS: 1

## 2021-06-08 ASSESSMENT — GAIT ASSESSMENTS: GAIT LEVEL OF ASSIST: UNABLE TO PARTICIPATE

## 2021-06-08 NOTE — CARE PLAN
The patient is Stable - Low risk of patient condition declining or worsening    Shift Goals  Clinical Goals: pain control    Progress made toward(s) clinical / shift goals: During assessment, pt denies any pain or discomfort. At 2055, pt c/o of 10/10 on her left leg. Distress and crying. Pain medication is not due in 2 hours. Paged on call MD with new medication ordered. Administered to pt with cold pack and pt calmed down and verbalized relief from pain. Staff will continue to monitor pt for pain.    Patient is not progressing towards the following goals:

## 2021-06-08 NOTE — ASSESSMENT & PLAN NOTE
Was seen  Periodic bladder scan.  Patient has history of hydronephrosis.  Started on Flomax.  Straight catheter parameters.  Check kidney ultrasound  6/9: Kidney ultrasound showed mild bilateral hydronephrosis and distended bladder.  Mckoy catheter was placed.

## 2021-06-08 NOTE — THERAPY
"Physical Therapy   Initial Evaluation     Patient Name: Herminia Jones  Age:  87 y.o., Sex:  female  Medical Record #: [id number]  Today's Date: 6/8/2021     Precautions: Fall Risk, Spinal / Back Precautions     Assessment  Patient is 87 y.o. female with a diagnosis of L4 compression fracture .  Additional factors influencing patient status / progress : today, pt reports controlled pain at start of conversation, then onset L LE pain during conversation of PLF with pt lying in bed. Pt needed mod A to log roll and come to EOB and L LE pain made standing too painful, pt needed back to bed. PT to follow for MRI results and plan for TLSO and progress as able.     Plan    Recommend Physical Therapy 3 times per week until therapy goals are met for the following treatments:  Bed Mobility, Gait Training, Neuro Re-Education / Balance and Therapeutic Activities    DC Equipment Recommendations: Unable to determine at this time  Discharge Recommendations: Recommend post-acute placement for additional physical therapy services prior to discharge home          Objective       06/08/21 1537   Prior Living Situation   Prior Services Intermittent Physical Support for ADL Per Family  (\"they don't let me do anything but sit on couch\")   Housing / Facility Mobile Home   Steps Into Home 3   Steps In Home 0   Rail Right Rail (Steps into Home)   Elevator No   Equipment Owned Front-Wheel Walker   Lives with - Patient's Self Care Capacity Other (Comments)  (dght/LETTY, other son, always someone home. )   Prior Level of Functional Mobility   Bed Mobility Independent   Transfer Status Independent   Ambulation Independent   Distance Ambulation (Feet)   (household)   Assistive Devices Used None  (says she does not use the walker)   Stairs Required Assist   Vision   Vision Comments pt reports limited vision, can't read, can't watch tv but can avoid obstacles around the house.    Balance Assessment   Comments unable to tolerate standing for long " due to pain down L leg   Gait Analysis   Gait Level Of Assist Unable to Participate   Bed Mobility    Supine to Sit Moderate Assist   Sit to Supine Moderate Assist   Scooting Moderate Assist   Rolling Moderate Assist to Rt.   Functional Mobility   Sit to Stand Minimal Assist  (unable to maintain standing due to L LE pain. )   Short Term Goals    Short Term Goal # 1 Pt will perform bed mobility with supervision in 6 visits   Short Term Goal # 2 Pt will transfer with supervision in 6 viists to improve functional indep.    Short Term Goal # 3 Pt will ambulate using FWW with min A in 6 visits to improve functional indep.    Problem List    Problems Impaired Balance;Pain;Impaired Bed Mobility;Impaired Transfers;Impaired Ambulation;Decreased Activity Tolerance;Impaired Vision   Anticipated Discharge Equipment and Recommendations   DC Equipment Recommendations Unable to determine at this time   Discharge Recommendations Recommend post-acute placement for additional physical therapy services prior to discharge home

## 2021-06-08 NOTE — PROGRESS NOTES
[facility] Medicine Daily Progress Note    Date of Service  6/8/2021    Chief Complaint  87 y.o. female admitted 6/7/2021 with low back pain.    Hospital Course  This is 87 years old female who has past medical history of type 2 diabetes mellitus, macular degeneration, dementia, and falls comes in with low back pain that started a week ago and worsened over the course of time.  Was seen by urgent care recently and was diagnosed with urinary tract infection and started on oral antibiotics.  She presented to the hospital for further evaluation.  Noted to be afebrile per hemodynamically stable.  Imaging studies with a CT of hip and lumbar spine showed acute to subacute compression fracture of L4.  Neurosurgery consulted.  Recommended MRI of the lumbar spine without contrast.  Also noted to have urinary retention.  UA positive for UTI.  Was started on antibiotics and cultures were sent.  Interval Problem Update  Resting in bed comfortably.  Pleasantly confused.  Afebrile.  Dynamically stable.  No acute distress noted.  No issues overnight per staff.    Consultants/Specialty  Neurosurgery    Code Status  Full Code    Disposition  Pending clinical course  Pending PT/OT eval    Review of Systems  Review of Systems   Unable to perform ROS: Dementia   Musculoskeletal: Positive for back pain and falls.        Physical Exam  Temp:  [36.2 °C (97.2 °F)-36.6 °C (97.9 °F)] 36.2 °C (97.2 °F)  Pulse:  [65-88] 65  Resp:  [16-18] 18  BP: (109-125)/(52-66) 113/52  SpO2:  [97 %-99 %] 98 %    Physical Exam  Constitutional:       General: She is not in acute distress.  HENT:      Head: Normocephalic and atraumatic.   Eyes:      Extraocular Movements: Extraocular movements intact.      Pupils: Pupils are equal, round, and reactive to light.   Cardiovascular:      Rate and Rhythm: Normal rate and regular rhythm.      Heart sounds: No friction rub. No gallop.    Abdominal:      General: There is no distension.      Palpations: There is no  mass.   Skin:     Coloration: Skin is not jaundiced.   Neurological:      Mental Status: She is alert. She is disoriented.   Psychiatric:         Mood and Affect: Mood normal.         Fluids    Intake/Output Summary (Last 24 hours) at 6/8/2021 1515  Last data filed at 6/8/2021 1009  Gross per 24 hour   Intake 555 ml   Output 500 ml   Net 55 ml       Laboratory  Recent Labs     06/07/21  1024   WBC 9.0   RBC 5.01   HEMOGLOBIN 14.6   HEMATOCRIT 43.5   MCV 86.8   MCH 29.1   MCHC 33.6   RDW 44.8   PLATELETCT 240   MPV 9.6     Recent Labs     06/07/21  1024   SODIUM 137   POTASSIUM 3.9   CHLORIDE 106   CO2 20   GLUCOSE 108*   BUN 34*   CREATININE 1.21   CALCIUM 9.9                   Imaging  CT-LSPINE W/O PLUS RECONS   Final Result      1.  Age-indeterminate L4 compression deformity involving the inferior endplate with approximately 30% loss of height. Loss of height is similar to the prior radiograph on 6/5/2021.      2.  Multilevel degenerative disc disease and facet arthropathy as described. Central canal and foraminal narrowing is most pronounced at L4-5 with vacuum disc phenomenon.      CT-HIP W/O PLUS RECONS LEFT   Final Result         1. Acute to subacute compression fracture of L4 vertebral body, only partially visualized.   2. No acute fracture or dislocation in the bony pelvis.   3. Stable sequela of prior left femoral ORIF, without evidence of hardware complication.   4. Moderately distended bladder with dilatation of the visualized ureters.      MR-LUMBAR SPINE-W/O    (Results Pending)   US-RENAL    (Results Pending)        Assessment/Plan  Closed compression fracture of L4 lumbar vertebra, initial encounter ([facility])  Assessment & Plan  Patient does not appear to have suffered any recent falls, she did have a fall going back on May 10 however the granddaughter states that she does ambulate on her own in the bathroom and so she is uncertain if she may have fallen and not told anybody.  I discussed today with  radiology, they would like a dedicated CT of the lumbar spine to assess whether kyphoplasty would be appropriate.  We will check CT L-spine, consult PT and OT  As needed support  6/8: CT of the lumbar spine showed acute to subacute L4 compression fracture.  MRI pending.  PT/OT pending.  No intervention per neurosurgery for now.  TLSO per PT.     Recurrent falls- (present on admission)  Assessment & Plan  This has been a problem for the patient for some time now.  We will need to consider placement on discharge if the family is unable to watch more closely as her functional status is likely to be lower.  PT/OT eval pending.  We will follow precautions.    Dementia associated with other underlying disease without behavioral disturbance (HCC)- (present on admission)  Assessment & Plan  Patient appears to be at her baseline per her family at the bedside  High risk for hospital-acquired delirium.  Avoid benzodiazepines and anticholinergic medications.  Minimize narcotics, hypnotics, sedatives.  Minimize lines.  Daily orientation.    Stage 3b chronic kidney disease- (present on admission)  Assessment & Plan  Renal function appears to be around the baseline for this patient.  Renally dose medications as appropriate  Follow BMP    Urinary retention  Assessment & Plan  Was seen  Periodic bladder scan.  Patient has history of hydronephrosis.  Started on Flomax.  Straight catheter parameters.  Check kidney ultrasound    UTI (urinary tract infection)- (present on admission)  Assessment & Plan  Patient had been started on Macrobid which she took for 24 hours.  We will start on Rocephin for another 48 hours  Follow urine culture    Diabetes mellitus type 2 with complications (HCC)- (present on admission)  Assessment & Plan  Patient's last A1c was 6.3, this was 2 days ago.  Carb consistent diet  Follow-up fingersticks    Dyslipidemia- (present on admission)  Assessment & Plan  Continue statin       VTE prophylaxis: Heparin

## 2021-06-08 NOTE — PROGRESS NOTES
Assumed care of patient this am. Pt a/ox3, VSS at this time. Resting in bed comfortably. Pt denies pain at this time. Spoke with son in law Chatt this am, who will be in later.

## 2021-06-08 NOTE — PROGRESS NOTES
Assume care of pt at 1900. Report received from day RN. Pt is A/O x3, disoriented to time. Pt denies any pain at this time. Pt is resting in bed. Remains on purewick, draining well with dark yellow urine. Bed in lowest and locked position, call light in reach, hourly rounding in place. Bed alarm in place. Labs reviewed. Communication board updated. Will continue to monitor.

## 2021-06-08 NOTE — CONSULTS
Neurosurgery Consultation  6/7/2021 1500     Primary care provider: LETY Kulkarni     CHIEF COMPLAINT back pain   HPI  Herminia Jones is a 87 y.o. female with low back pain which has been worse in the last week.  Upon questioning, she localizes her pain the the lateral pelvic rim region, not the midline.  Her granddaughter notes that normally, she gets around with a walker but she has not been able to walk for the last week.  They were seen in the urgent care on Saturday she was diagnosed with a urinary tract infection and started on Bactrim.  She also had x-rays done which were unrevealing. She apparently fell on May 10.  She has a history of hip fracture and surgical repair few years ago.       REVIEW OF SYSTEMS  Review of Systems   Constitutional: Negative for fever.   Respiratory: Negative for shortness of breath.    Cardiovascular: Negative for chest pain.   Gastrointestinal: Negative for nausea and vomiting.   Genitourinary: Positive for dysuria and frequency.   Musculoskeletal: Positive for back pain and falls.   Neurological: Negative for headaches.   All other systems reviewed and are negative.        PAST MEDICAL HISTORY   has a past medical history of Arthritis, Dental disorder, Diabetes (HCC), Glaucoma, Gynecological disorder, Heart burn, Macular degeneration, Psychiatric problem, Snoring, Urinary bladder disorder, Urinary incontinence, and Uterine prolapse without mention of vaginal wall prolapse (6/2/2017).     SURGICAL HISTORY   has a past surgical history that includes abdominal hysterectomy total; lefort colpoclesis (6/2/2017); cystoscopy (6/2/2017); hip cannulated screw (Left, 12/1/2017); bladder sling female (N/A, 4/20/2018); anterior repair (N/A, 4/20/2018); pelviscopy (N/A, 4/20/2018); and anterior and posterior repair (12/3/2019).     SOCIAL HISTORY  Social History           Tobacco Use   • Smoking status: Never Smoker   • Smokeless tobacco: Never Used   Vaping Use   • Vaping  "Use: Never used   Substance Use Topics   • Alcohol use: No   • Drug use: No      Social History          Substance and Sexual Activity   Drug Use No         FAMILY HISTORY  Family History         Family History   Problem Relation Age of Onset   • Stroke Father     • Cancer Sister     • Cancer Brother     • Heart Disease Sister     • Cancer Brother              CURRENT MEDICATIONS      Home Medications              Reviewed by Carlos Arroyo PhT (Pharmacy Tech) on 06/07/21 at 1208  Med List Status: Complete          Medication Last Dose Status    Ascorbic Acid (VITAMIN C) 1000 MG Tab 6/7/2021 Active    atorvastatin (LIPITOR) 20 MG Tab 6/7/2021 Active    Calcium Carbonate (CALCARB 600 PO) 6/7/2021 Active    Cranberry-Vitamin C 55854-470 MG Cap 6/7/2021 Active    D-MANNOSE PO 6/7/2021 Active    famotidine (PEPCID) 20 MG Tab 6/7/2021 Active    mirtazapine (REMERON) 7.5 MG tablet 6/6/2021 Active    nitrofurantoin (MACROBID) 100 MG Cap 6/7/2021 Active    oxybutynin SR (DITROPAN-XL) 5 MG TABLET SR 24 HR 6/7/2021 Active    sertraline (ZOLOFT) 100 MG Tab 6/7/2021 Active    vitamin D (CHOLECALCIFEROL) 1000 Unit (25 mcg) Tab 6/7/2021 Active                     ALLERGIES        Allergies   Allergen Reactions   • Sulfamethoxazole         Does not remember reaction          PHYSICAL EXAM  VITAL SIGNS: /60   Pulse 82   Temp (!) 35.6 °C (96 °F) (Oral)   Resp 18   Ht 1.651 m (5' 5\")   Wt 54.4 kg (120 lb)   SpO2 96%   BMI 19.97 kg/m²   Vitals reviewed.    Awake, alert   Speech fluent appropriate  In no apparent distress  Affect, mood appropriate  Pupils 3 mm midline, reactive.  Conjugate gaze.  Face symmetric  Facial sensation intact light touch  Hearing intact to conversation, light finger rub bilaterally  Motor:  Bilateral bicep, tricep, ,                IP, thigh adduction, thigh abduction, dorsiflexion,                Plantar flexion 5/5 no pronator drift  Sensation:  Intact touch face, neck, torso, four " extremities  No midline tenderness to palpation.  Lateral pelvic rim region tender to palpation several inches off midline.    LABS        Results for orders placed or performed during the hospital encounter of 06/07/21   URINALYSIS     Specimen: Urine, Clean Catch   Result Value Ref Range     Color Yellow       Character Cloudy (A)       Specific Gravity 1.016 <1.035     Ph 5.0 5.0 - 8.0     Glucose Negative Negative mg/dL     Ketones Negative Negative mg/dL     Protein 30 (A) Negative mg/dL     Bilirubin Negative Negative     Urobilinogen, Urine 0.2 Negative     Nitrite Negative Negative     Leukocyte Esterase Large (A) Negative     Occult Blood Large (A) Negative     Micro Urine Req Microscopic     CMP   Result Value Ref Range     Sodium 137 135 - 145 mmol/L     Potassium 3.9 3.6 - 5.5 mmol/L     Chloride 106 96 - 112 mmol/L     Co2 20 20 - 33 mmol/L     Anion Gap 11.0 7.0 - 16.0     Glucose 108 (H) 65 - 99 mg/dL     Bun 34 (H) 8 - 22 mg/dL     Creatinine 1.21 0.50 - 1.40 mg/dL     Calcium 9.9 8.5 - 10.5 mg/dL     AST(SGOT) 13 12 - 45 U/L     ALT(SGPT) 10 2 - 50 U/L     Alkaline Phosphatase 66 30 - 99 U/L     Total Bilirubin 0.3 0.1 - 1.5 mg/dL     Albumin 3.9 3.2 - 4.9 g/dL     Total Protein 7.9 6.0 - 8.2 g/dL     Globulin 4.0 (H) 1.9 - 3.5 g/dL     A-G Ratio 1.0 g/dL   CBC WITH DIFFERENTIAL   Result Value Ref Range     WBC 9.0 4.8 - 10.8 K/uL     RBC 5.01 4.20 - 5.40 M/uL     Hemoglobin 14.6 12.0 - 16.0 g/dL     Hematocrit 43.5 37.0 - 47.0 %     MCV 86.8 81.4 - 97.8 fL     MCH 29.1 27.0 - 33.0 pg     MCHC 33.6 33.6 - 35.0 g/dL     RDW 44.8 35.9 - 50.0 fL     Platelet Count 240 164 - 446 K/uL     MPV 9.6 9.0 - 12.9 fL     Neutrophils-Polys 69.10 44.00 - 72.00 %     Lymphocytes 20.00 (L) 22.00 - 41.00 %     Monocytes 6.50 0.00 - 13.40 %     Eosinophils 3.30 0.00 - 6.90 %     Basophils 0.40 0.00 - 1.80 %     Immature Granulocytes 0.70 0.00 - 0.90 %     Nucleated RBC 0.00 /100 WBC     Neutrophils (Absolute) 6.23  2.00 - 7.15 K/uL     Lymphs (Absolute) 1.81 1.00 - 4.80 K/uL     Monos (Absolute) 0.59 0.00 - 0.85 K/uL     Eos (Absolute) 0.30 0.00 - 0.51 K/uL     Baso (Absolute) 0.04 0.00 - 0.12 K/uL     Immature Granulocytes (abs) 0.06 0.00 - 0.11 K/uL     NRBC (Absolute) 0.00 K/uL   URINE MICROSCOPIC (W/UA)   Result Value Ref Range     WBC Packed (A) /hpf     RBC 20-50 (A) /hpf     Bacteria Negative None /hpf     Epithelial Cells Negative /hpf     Hyaline Cast 0-2 /lpf   ESTIMATED GFR   Result Value Ref Range     GFR If  51 (A) >60 mL/min/1.73 m 2     GFR If Non  42 (A) >60 mL/min/1.73 m 2         All labs reviewed by me.     RADIOLOGY  CT-HIP W/O PLUS RECONS LEFT   Final Result           1. Acute to subacute compression fracture of L4 vertebral body, only partially visualized.   2. No acute fracture or dislocation in the bony pelvis.   3. Stable sequela of prior left femoral ORIF, without evidence of hardware complication.   4. Moderately distended bladder with dilatation of the visualized ureters.     Assessment and plan: 87-year-old female with low back pain.  She has no midline tenderness.  X-ray obtained on 6/5/2021 for same complaints demonstrated no compression fracture.  CT of her hip obtained today suggested an L4 compression fracture whether this is not well visualized.    Recommend MRI lumbar spine no contrast.  Should increased T2 or STIR signal be noted, would recommend TLSO.  Otherwise no treatment indicated.

## 2021-06-08 NOTE — CARE PLAN
The patient is Stable - Low risk of patient condition declining or worsening    Shift Goals  Clinical Goals: pain control     Progress made toward(s) clinical / shift goals:  pt denies pain at this time, PRN ordered.    Patient is not progressing towards the following goals:

## 2021-06-08 NOTE — PROGRESS NOTES
MD rounding at bedside, pt bladder scan 503ml, pt actively voiding in purwick at this time, clear/yellow color. No abdominal pain. MD ok to recheck bladder scan in a few hours and see if patient continues to retain. No intervention at this time.

## 2021-06-09 ENCOUNTER — APPOINTMENT (OUTPATIENT)
Dept: RADIOLOGY | Facility: MEDICAL CENTER | Age: 86
DRG: 552 | End: 2021-06-09
Attending: FAMILY MEDICINE
Payer: MEDICARE

## 2021-06-09 PROBLEM — R13.19 ESOPHAGEAL DYSPHAGIA: Status: ACTIVE | Noted: 2021-06-09

## 2021-06-09 PROCEDURE — 51798 US URINE CAPACITY MEASURE: CPT

## 2021-06-09 PROCEDURE — 92526 ORAL FUNCTION THERAPY: CPT

## 2021-06-09 PROCEDURE — 770006 HCHG ROOM/CARE - MED/SURG/GYN SEMI*

## 2021-06-09 PROCEDURE — 700111 HCHG RX REV CODE 636 W/ 250 OVERRIDE (IP): Performed by: FAMILY MEDICINE

## 2021-06-09 PROCEDURE — 74210 X-RAY XM PHRNX&/CRV ESOPH C+: CPT

## 2021-06-09 PROCEDURE — A9270 NON-COVERED ITEM OR SERVICE: HCPCS | Performed by: FAMILY MEDICINE

## 2021-06-09 PROCEDURE — 99233 SBSQ HOSP IP/OBS HIGH 50: CPT | Performed by: FAMILY MEDICINE

## 2021-06-09 PROCEDURE — A9270 NON-COVERED ITEM OR SERVICE: HCPCS | Performed by: HOSPITALIST

## 2021-06-09 PROCEDURE — 97166 OT EVAL MOD COMPLEX 45 MIN: CPT

## 2021-06-09 PROCEDURE — 700102 HCHG RX REV CODE 250 W/ 637 OVERRIDE(OP): Performed by: HOSPITALIST

## 2021-06-09 PROCEDURE — 700102 HCHG RX REV CODE 250 W/ 637 OVERRIDE(OP): Performed by: FAMILY MEDICINE

## 2021-06-09 PROCEDURE — 700111 HCHG RX REV CODE 636 W/ 250 OVERRIDE (IP): Performed by: HOSPITALIST

## 2021-06-09 RX ORDER — GABAPENTIN 100 MG/1
100 CAPSULE ORAL 2 TIMES DAILY
Status: DISCONTINUED | OUTPATIENT
Start: 2021-06-09 | End: 2021-06-10

## 2021-06-09 RX ORDER — MORPHINE SULFATE 4 MG/ML
1 INJECTION, SOLUTION INTRAMUSCULAR; INTRAVENOUS EVERY 4 HOURS PRN
Status: DISCONTINUED | OUTPATIENT
Start: 2021-06-09 | End: 2021-06-14 | Stop reason: HOSPADM

## 2021-06-09 RX ORDER — MORPHINE SULFATE 15 MG/1
7.5-15 TABLET ORAL EVERY 6 HOURS PRN
Status: DISCONTINUED | OUTPATIENT
Start: 2021-06-09 | End: 2021-06-10

## 2021-06-09 RX ADMIN — ATORVASTATIN CALCIUM 20 MG: 20 TABLET, FILM COATED ORAL at 05:26

## 2021-06-09 RX ADMIN — DOCUSATE SODIUM 50 MG AND SENNOSIDES 8.6 MG 2 TABLET: 8.6; 5 TABLET, FILM COATED ORAL at 05:26

## 2021-06-09 RX ADMIN — MORPHINE SULFATE 1 MG: 4 INJECTION INTRAVENOUS at 08:55

## 2021-06-09 RX ADMIN — MORPHINE SULFATE 1 MG: 4 INJECTION INTRAVENOUS at 12:59

## 2021-06-09 RX ADMIN — FAMOTIDINE 20 MG: 20 TABLET ORAL at 05:26

## 2021-06-09 RX ADMIN — HEPARIN SODIUM 5000 UNITS: 5000 INJECTION, SOLUTION INTRAVENOUS; SUBCUTANEOUS at 05:26

## 2021-06-09 RX ADMIN — OXYCODONE HYDROCHLORIDE AND ACETAMINOPHEN 1000 MG: 500 TABLET ORAL at 05:26

## 2021-06-09 RX ADMIN — HEPARIN SODIUM 5000 UNITS: 5000 INJECTION, SOLUTION INTRAVENOUS; SUBCUTANEOUS at 18:13

## 2021-06-09 RX ADMIN — MORPHINE SULFATE 1 MG: 4 INJECTION INTRAVENOUS at 21:16

## 2021-06-09 RX ADMIN — SERTRALINE HYDROCHLORIDE 100 MG: 100 TABLET ORAL at 05:26

## 2021-06-09 RX ADMIN — MORPHINE SULFATE 15 MG: 15 TABLET ORAL at 08:57

## 2021-06-09 RX ADMIN — OXYBUTYNIN CHLORIDE 5 MG: 5 TABLET, EXTENDED RELEASE ORAL at 05:26

## 2021-06-09 RX ADMIN — CEFTRIAXONE SODIUM 1 G: 10 INJECTION, POWDER, FOR SOLUTION INTRAVENOUS at 05:32

## 2021-06-09 RX ADMIN — OXYCODONE 5 MG: 5 TABLET ORAL at 01:08

## 2021-06-09 RX ADMIN — Medication 1000 UNITS: at 05:26

## 2021-06-09 ASSESSMENT — ENCOUNTER SYMPTOMS
MEMORY LOSS: 1
COUGH: 1
NERVOUS/ANXIOUS: 0
FALLS: 1
CHILLS: 0
FEVER: 0
VOMITING: 1
BRUISES/BLEEDS EASILY: 1
EYE DISCHARGE: 0
SHORTNESS OF BREATH: 0
SORE THROAT: 1
BACK PAIN: 1
EYE REDNESS: 0
SPUTUM PRODUCTION: 0
NAUSEA: 0
SEIZURES: 0
PALPITATIONS: 0
STRIDOR: 0
ABDOMINAL PAIN: 0
POLYDIPSIA: 0
FLANK PAIN: 0

## 2021-06-09 ASSESSMENT — COGNITIVE AND FUNCTIONAL STATUS - GENERAL
DRESSING REGULAR UPPER BODY CLOTHING: A LITTLE
HELP NEEDED FOR BATHING: A LOT
SUGGESTED CMS G CODE MODIFIER DAILY ACTIVITY: CK
TOILETING: A LOT
DAILY ACTIVITIY SCORE: 17
DRESSING REGULAR LOWER BODY CLOTHING: A LOT

## 2021-06-09 ASSESSMENT — ACTIVITIES OF DAILY LIVING (ADL): TOILETING: INDEPENDENT

## 2021-06-09 ASSESSMENT — PAIN DESCRIPTION - PAIN TYPE
TYPE: ACUTE PAIN

## 2021-06-09 NOTE — THERAPY
"Speech Language Pathology  Daily Treatment     Patient Name: Herminia Jones  Age:  87 y.o., Sex:  female  Medical Record #: [id number]  Today's Date: 6/9/2021     Precautions  Precautions: (P) Fall Risk, Swallow Precautions ( See Comments)  Comments: (P) Possible TLSO depending on MRI results per PT/SLP notes 6/8    Assessment    Pt seen with breakfast tray in place.  Pt had taken one bite of pancake and complains of it being lodged in throat.  Small sips of thin liquid to wash solid down are unsuccessful.  Pt is spitting up small amounts post observed 'swallow.' Concerned regarding prolonged and effortful swallow.  Oral suction reveals suction of recent sips of thin liquid.  RN called to room, Md notified as well.  Stat esophagram ordered.  Pt may need deep suction and/or GI consult to further assess status.  Suspect severe esophageal dysphagia.  Additional po held.    Plan    Continue current treatment plan.  Recommend GI consult.  NPO except for small sips of liquid, pending additional assessment.    Discharge Recommendations: (P) Recommend post-acute placement for additional speech therapy services prior to discharge home     Objective       06/09/21 0952   Voice   Comments clear   Dysphagia    Dysphagia X   Diet / Liquid Recommendation NPO   Nutritional Liquid Intake Rating Scale Nothing by mouth   Nutritional Food Intake Rating Scale Nothing by mouth   Nursing Communication   (RN at bedside and Md contacted face-to-face)   Skilled Intervention Compensatory Strategies   Recommended Route of Medication Administration   Medication Administration  Other (See Comments)  (non-oral if able)   Patient / Family Goals   Patient / Family Goal #1 \"I can't swallow\"   Short Term Goals   Short Term Goal # 1 Patient will consume an EC7/TN0 diet with no overt s/sx of aspiration given min cues to swallow precautions.    Goal Outcome # 1 Goal not met   Education Group   Additional Comments Needs ongoing reinforcement  "   Interdisciplinary Plan of Care Collaboration   Collaboration Comments Md/RN aware of npo rec; O2 sats are fxnl, good air exchange. Stat diagnostic ordered   Session Information   Date / Session Number 2, 6/9/21 (2/3-6/14)   Priority 4  (3x.Dementia.S/s esophageal dysphagia. NPO pending esophagram)

## 2021-06-09 NOTE — THERAPY
Speech Language Pathology   Clinical Swallow Evaluation     Patient Name: Herminia Jones  AGE:  87 y.o., SEX:  female  Medical Record #: [id number]  Today's Date: 6/8/2021       Precautions: Fall Risk, Swallow Precautions ( See Comments), Spinal / Back Precautions   Comments: possible TLSO depending on MRI results, Still awaiting pt to go to MRI    Assessment    Patient is 87 y.o. female was brought in by granddaughter who lives with and cares for her on 6/7/21 for low back pain. Recent dx of UTI. PMHx: dementia. Outpatient MBSS on 11/20/2018 revealed the following results: No penetration nor aspiration appreciated during today's exam. Oral swallow phase WFL. Pharyngeal dysphagia characterized by trace base of tongue residue for thin liquids and trace vallecular residue for thin liquids. Esophageal swallow phase characterized by mid-esophageal retention with retrograde flow below UES. Recommendations were made for a Regular/Thin Liquid diet and f/u with GI for possibly under-managed reflux symptoms. Additional factors influencing patient status/progress: hx of dementia, ?baseline esophageal dysphagia.     Patient was seen on this date for a clinical swallow evaluation s/p PT session. Patient carefully and slowly repositioned to upright position. Speech intact and AAOx3 with confusion regarding day/month/year. Pt was a non-historian regarding dysphagia. Oral motor examination revealed no gross asymmetry or weakness. Upper denture plate in place. PO trials were administered and consisted of MTL, thin liquids, applesauce, pudding, soft solids, and regular solids. Swallow trigger was timely. Patient had throat clear x1 following consecutive sips from cup and x1 following dry solids. Otherwise, had no other s/sx of aspiration with all trials tested. Mastication prolonged with regular solids and stated difficulty eating harder foods such as meat. Education provided to pt regarding current status and SLP recs.      Plan    Recommend an easy-to-chew (level 7) and thin liquid (level 0) diet given close monitoring and implementation of swallow strategies. Patient to take single sips and bites and consume PO at a reduced rate. Ok for meds whole with liquid wash, or as tolerated. May benefit from esophagram and/or GI consult with ongoing difficulty.     Recommend Speech Therapy 3 times per week until therapy goals are met for the following treatments:  Dysphagia Training and Patient / Family / Caregiver Education.    Discharge Recommendations: Anticipate that the patient will have no further speech therapy needs after discharge from the hospital     Objective       06/08/21 1555   Vitals   O2 Delivery Device None - Room Air   Prior Level Of Function   Communication Impaired  (baseline dementia)   Swallow Impaired  (?esophageal dysphagia)   Dentition Edentulous  (lower teeth intact)   Dentures Uppers   Hearing Within Functional Limits for Evaluation   Hearing Aid None   Vision Within Functional Limits for Evaluation   Patient's Primary Language English   Oral Motor Eval    Is Patient Able to Complete Oral Motor Eval Yes, Within Normal Limits   Laryngeal Function   Voice Quality Within Functional Limits   Volutional Cough Within Functional Limits   Excursion Upon Swallow Complete   Oral Food Presentation   Ice Chips Within Functional Limits   Single Swallow Mildly Thick (2) - (Nectar Thick)  Within Functional Limits   Single Swallow Thin (0) Within Functional Limits   Liquidised (3) Within Functional Limits   Pureed (4) Within Functional Limits   Soft & Bite-Sized (6) - (Dysphagia III) Within Functional Limits   Regular (7) Minimal   Regular-Easy to Chew (7) Within Functional Limits   Self Feeding Independent   Dysphagia Strategies / Recommendations   Compensatory Strategies Monitor During Meals;Head of Bed 90 Degrees During Eating / Drinking;Single Sips / Bites   Diet / Liquid Recommendation Regular - Easy to Chew (7);Thin (0)    Medication Administration  Whole with Liquid Wash  (or as tolerated )   Therapy Interventions Dysphagia Therapy By Speech Language Pathologist   Short Term Goals   Short Term Goal # 1 Patient will consume an EC7/TN0 diet with no overt s/sx of aspiration given min cues to swallow precautions.

## 2021-06-09 NOTE — CONSULTS
Gastroenterology Consultation (GIC)            Patient ID:  Herminia Jones  [id number]  87 y.o. female  1/18/1934     Referring: Dr. Mike Pires     History:  Primary Diagnosis: aspiration     HPI or History of Presenting Illness  87 y.o. female who presented on 6/7/2021 with previous medical history that is significant for diabetes, glaucoma, macular degeneration, dementia. Patient was being cared for by her granddaughter.  Admitting hospitalist obtained History primarily through her granddaughter, as the patient herself was initially quite confused due to her dementia.  Granddaughter reported that one week prior to hospitalization, that they went  to a doctor's appointment, and that the patient had been able to ambulate without any more difficulty than what was normal for her.  At the doctor's appointment, she was found to have a UTI, and prescription for antibiotics were written and given to patient.  They then went to eat breakfast.  Patient again had no difficulty ambulating into the restaurant, however when she stood to get out and walk back to the car at that time, she complained of significant back pain.  Her pain persisted since that time, to the degree that the patient was barely ambulatory at all without maximal assist.  The patient seemed to be otherwise in her normal state of health and was no more confused than normal for her.  There denied any issues with loss of bowel or bladder control.  Patient herself denied any sensation loss or weakness.  She admitted to back pain in her lower back, which was worse when she moved.  Work-up in the ED included CT of the hip, and lab work.  Labs are benign with exception of UA which indicates UTI.  CT of the hip itself showed significant for arthritic changes however otherwise unremarkable of the hip however noted to have an incidentally visualized L4 compression fracture which appearred to be acute/subacute.     Speech pathologist saw patient and  "commented the following \"Pt seen with breakfast tray in place.  Pt had taken one bite of pancake and complains of it being lodged in throat.  Small sips of thin liquid to wash solid down are unsuccessful.  Pt is spitting up small amounts post observed 'swallow.' Concerned regarding prolonged and effortful swallow.  Oral suction reveals suction of recent sips of thin liquid.  RN called to room, Md notified as well.  Stat esophagram ordered.  Pt may need deep suction and/or GI consult to further assess status.  Suspect severe esophageal dysphagia.  Additional po held.\"  She advised the following plan \"Continue current treatment plan.  Recommend GI consult.  NPO except for small sips of liquid, pending additional assessment. Discharge Recommendations: (P) Recommend post-acute placement for additional speech therapy services prior to discharge home.\"     Esophagram showed \"Esophageal obstruction at the level of the GE junction. Etiology unknown. Large volume of retained ingested material within the esophagus. Presbyesophagus.     She is now NPO.     Imaging was as follows:     Imaging:  CT-HIP W/O PLUS RECONS LEFT   Final Result           1. Acute to subacute compression fracture of L4 vertebral body, only partially visualized.   2. No acute fracture or dislocation in the bony pelvis.   3. Stable sequela of prior left femoral ORIF, without evidence of hardware complication.   4. Moderately distended bladder with dilatation of the visualized ureters.       CT-LSPINE W/O PLUS RECONS    (Results Pending)         Denied further modifying factors, associated symptoms or timing issues.  Of note, I spoke to patient's son and daughter-in-law about patient to obtain further history.          Past Medical History   has a past medical history of Arthritis, Dental disorder, Diabetes (HCC), Glaucoma, Gynecological disorder, Heart burn, Macular degeneration, Psychiatric problem, Snoring, Urinary bladder disorder, Urinary " incontinence, and Uterine prolapse without mention of vaginal wall prolapse (6/2/2017).     Surgical History   has a past surgical history that includes abdominal hysterectomy total; lefort colpoclesis (6/2/2017); cystoscopy (6/2/2017); hip cannulated screw (Left, 12/1/2017); bladder sling female (N/A, 4/20/2018); anterior repair (N/A, 4/20/2018); pelviscopy (N/A, 4/20/2018); and anterior and posterior repair (12/3/2019).      Family History  family history includes Cancer in her brother, brother, and sister; Heart Disease in her sister; Stroke in her father.      Social History   reports that she has never smoked. She has never used smokeless tobacco. She reports that she does not drink alcohol and does not use drugs.     Allergies            Allergies   Allergen Reactions   • Sulfamethoxazole         Does not remember reaction          Outpatient Medications      Prior to Admission Medications    Prescriptions Last Dose Informant Patient Reported? Taking?   Ascorbic Acid (VITAMIN C) 1000 MG Tab 6/7/2021 at am Family Member Yes Yes   Sig: Take 1,000 mg by mouth every day.   Calcium Carbonate (CALCARB 600 PO) 6/7/2021 at am Family Member Yes No   Sig: Take 600 mg by mouth every morning.   Cranberry-Vitamin C 86195-710 MG Cap 6/7/2021 at am Family Member Yes Yes   Sig: Take 1 capsule by mouth every day.   D-MANNOSE PO 6/7/2021 at am Family Member Yes Yes   Sig: Take 1 Each by mouth 2 times a day.   atorvastatin (LIPITOR) 20 MG Tab 6/7/2021 at am Family Member No No   Sig: Take 1 tablet by mouth every day.   famotidine (PEPCID) 20 MG Tab 6/7/2021 at am Family Member No No   Sig: TAKE ONE TABLET BY MOUTH TWICE A DAY   Patient taking differently: Take 20 mg by mouth every day.   mirtazapine (REMERON) 7.5 MG tablet 6/6/2021 at PM Family Member No No   Sig: TAKE 1 TAB BY MOUTH EVERY BEDTIME.   nitrofurantoin (MACROBID) 100 MG Cap 6/7/2021 at am Family Member No No   Sig: Take 1 capsule by mouth 2 times a day for 5 days.    Patient taking differently: Take 100 mg by mouth 2 times a day. Started 5 day regimen 6/5/21 in the evening. Scheduled to be completed 6/10/21 in the morning.   oxybutynin SR (DITROPAN-XL) 5 MG TABLET SR 24 HR 6/7/2021 at am Family Member Yes No   Sig: Take 5-10 mg by mouth 2 times a day. 5mg in the am  10mg in the pm   sertraline (ZOLOFT) 100 MG Tab 6/7/2021 at am Family Member No No   Sig: TAKE 1 AND A HALF TABLET BY MOUTH DAILY   Patient taking differently: Take  mg by mouth 2 times a day. 100mg in the morning  50mg in the evening  50mg = 0.5 tab   vitamin D (CHOLECALCIFEROL) 1000 Unit (25 mcg) Tab 6/7/2021 at am Family Member Yes Yes   Sig: Take 1,000 Units by mouth every day.      Facility-Administered Medications: None             Allergies: Sulfamethoxazole     Current Medications:          Prior to Admission medications    Medication Sig Start Date End Date Taking? Authorizing Provider   D-MANNOSE PO Take 1 Each by mouth 2 times a day.     Yes Physician Outpatient   Ascorbic Acid (VITAMIN C) 1000 MG Tab Take 1,000 mg by mouth every day.     Yes Physician Outpatient   vitamin D (CHOLECALCIFEROL) 1000 Unit (25 mcg) Tab Take 1,000 Units by mouth every day.     Yes Physician Outpatient   Cranberry-Vitamin C 23439-979 MG Cap Take 1 capsule by mouth every day.     Yes Physician Outpatient   atorvastatin (LIPITOR) 20 MG Tab Take 1 tablet by mouth every day. 6/8/21     Berta Mercado M.D.   nitrofurantoin (MACROBID) 100 MG Cap Take 1 capsule by mouth 2 times a day for 5 days.  Patient taking differently: Take 100 mg by mouth 2 times a day. Started 5 day regimen 6/5/21 in the evening. Scheduled to be completed 6/10/21 in the morning. 6/5/21 6/10/21   Madyson Jaquez P.A.-C.   oxybutynin SR (DITROPAN-XL) 5 MG TABLET SR 24 HR Take 5-10 mg by mouth 2 times a day. 5mg in the am  10mg in the pm       Physician Outpatient   famotidine (PEPCID) 20 MG Tab TAKE ONE TABLET BY MOUTH TWICE A DAY  Patient taking  "differently: Take 20 mg by mouth every day. 3/1/21     Jules Ordonez ADajaP.N.   sertraline (ZOLOFT) 100 MG Tab TAKE 1 AND A HALF TABLET BY MOUTH DAILY  Patient taking differently: Take  mg by mouth 2 times a day. 100mg in the morning  50mg in the evening  50mg = 0.5 tab 2/1/21     SARAH KulkarniP.N.   mirtazapine (REMERON) 7.5 MG tablet TAKE 1 TAB BY MOUTH EVERY BEDTIME. 2/1/21     SARAH KulkarniP.N.   Calcium Carbonate (CALCARB 600 PO) Take 600 mg by mouth every morning.       Physician Outpatient         Review of Systems:  Review of Systems   Constitutional: Negative for chills and fever.   HENT: Positive for sore throat.    Eyes: Negative for discharge and redness.   Respiratory: Positive for cough. Negative for sputum production, shortness of breath and stridor.    Cardiovascular: Negative for chest pain and palpitations.   Gastrointestinal: Positive for vomiting. Negative for abdominal pain and nausea.   Genitourinary: Positive for dysuria, frequency and urgency. Negative for flank pain and hematuria.   Musculoskeletal: Positive for back pain, falls and joint pain.   Skin: Negative for itching and rash.   Neurological: Negative for seizures.   Endo/Heme/Allergies: Negative for polydipsia. Bruises/bleeds easily.   Psychiatric/Behavioral: Positive for memory loss. The patient is not nervous/anxious.    All other systems reviewed and are negative.     /58   Pulse 72   Temp 36.8 °C (98.3 °F) (Temporal)   Resp 16   Ht 1.651 m (5' 5\")   Wt 54.4 kg (120 lb)   SpO2 98%      Physical Examination:  Physical Exam  Vitals and nursing note reviewed. Exam conducted with a chaperone present.   Constitutional:       General: She is not in acute distress.     Appearance: Normal appearance. She is normal weight. She is ill-appearing. She is not toxic-appearing or diaphoretic.   HENT:      Head: Normocephalic and atraumatic.      Nose: Nose normal. No congestion or rhinorrhea.      Mouth/Throat:      " Mouth: Mucous membranes are moist.      Pharynx: Oropharynx is clear. No oropharyngeal exudate.   Eyes:      General: No scleral icterus.        Left eye: No discharge.      Extraocular Movements: Extraocular movements intact.      Conjunctiva/sclera: Conjunctivae normal.      Pupils: Pupils are equal, round, and reactive to light.   Cardiovascular:      Rate and Rhythm: Normal rate and regular rhythm.      Pulses: Normal pulses.   Pulmonary:      Effort: Pulmonary effort is normal. No respiratory distress.      Breath sounds: Normal breath sounds. No stridor.   Abdominal:      General: Abdomen is flat. Bowel sounds are normal. There is no distension.      Palpations: Abdomen is soft.      Tenderness: There is no abdominal tenderness. There is no guarding or rebound.   Musculoskeletal:         General: Normal range of motion.      Cervical back: Normal range of motion and neck supple.      Right lower leg: No edema.      Left lower leg: No edema.   Skin:     General: Skin is warm and dry.      Coloration: Skin is not jaundiced or pale.      Findings: Bruising present.   Neurological:      General: No focal deficit present.      Mental Status: She is alert. Mental status is at baseline.   Psychiatric:         Attention and Perception: Attention and perception normal.         Mood and Affect: Mood and affect normal.         Speech: Speech normal.         Behavior: Behavior normal. Behavior is cooperative.         Thought Content: Thought content normal.         Cognition and Memory: Cognition normal. She exhibits impaired recent memory.         Judgment: Judgment normal.         Problems:  1. Esophageal dysphagia  2. Abnormal GI x-rays showed distal esophageal stricture  3. GERD  4. Dementia  5. Urinary tract infection being treated with rocephin  6. Closed L4 compression fracture  7. Recurrent falls  8. Chronic kidney disease, stage 3b  9. Diabetes mellitus type 2  10. Dyslipidemia  11. BMI of 19  12. Macular  degeneration  13. Urinary bladder disorder, Urinary incontinence  14. History of uterine prolapse  15. History of abdominal hysterectomy total; lefort colpoclesis (6/2/2017); cystoscopy (6/2/2017); hip cannulated screw (Left, 12/1/2017); bladder sling female (N/A, 4/20/2018); anterior repair (N/A, 4/20/2018); pelviscopy (N/A, 4/20/2018); and anterior and posterior repair (12/3/2019).   16. Increased complexity of medical decision making due to aforementioned.  17. COVID negative 6/7     Recommendations:  1. Keep NPO  2. Esophagogastroduodenoscopy with biopsies and esophageal dilatation and/or other endotherapy with possible percutaneous endoscopic gastrostomy tube placement under anesthesia scheduled for 1pm tomorrow.      Risks, benefits, and alternatives of aforementioned procedures were discussed with patient and family member(son and daughter-in-law) in detail.  Consenting persons were given opportunities to ask questions and discuss other options.  Risks including but not limited to perforation, infection, bleeding, missed lesion(s), possible need for surgery(ies) and/or interventional radiology, possible need for repeat procedure(s) and/or additional testing, hospitalization possibly prolonged, cardiac and/or pulmonary event, aspiration, hypoxia, stroke, medication (s) and/or anesthesia reaction(s), indefinite diagnosis, discomfort/pain, unsuccessful and/or incomplete procedure, ineffective therapy, persistent symptoms, damage to adjacent organs/structure and/or vascular, inadvertent gastrostomy tube placement through intestines, liver laceration, and other adverse event(s) possibly life-threatening.  Interactive discussion was undertaken with Layman's terms.  I answered questions in full and to satisfaction.  Consenting persons stated understanding and acceptance of these risks, and wished to proceed.   Informed consent was given in clear state of mind.     ---  Dear Dr. Mike Pires,  Thank you for  asking us to see your patient in consultation.  Please refer to my consult note as per above for details and recommendations.         Gt Long M.D., [facility], FACG  6/9/2021

## 2021-06-09 NOTE — CARE PLAN
Problem: Pain - Standard  Goal: Alleviation of pain or a reduction in pain to the patient’s comfort goal  Outcome: Not Progressing     Problem: Fall Risk  Goal: Patient will remain free from falls  Outcome: Progressing   The patient is Stable - Low risk of patient condition declining or worsening    Shift Goals  Clinical Goals: proper diet  Patient Goals: control pain    Progress made toward(s) clinical / shift goals: Pt is unable to swallow food liquids and is currently at a strict NPO status. She continues to complain of high pain and pain medication is given as needed.    Patient is not progressing towards the following goals:      Problem: Pain - Standard  Goal: Alleviation of pain or a reduction in pain to the patient’s comfort goal  Outcome: Not Progressing

## 2021-06-09 NOTE — THERAPY
Occupational Therapy   Initial Evaluation     Patient Name: Herminia Jones  Age:  87 y.o., Sex:  female  Medical Record #: [id number]  Today's Date: 6/9/2021     Precautions  Precautions: Fall Risk, Swallow Precautions ( See Comments)  Comments: Possible TLSO depending on MRI results per PT/SLP notes 6/8    Assessment  Patient is 87 y.o. female with a diagnosis of L4 comp Fx.  Pt currently limited by decreased functional mobility, activity tolerance, cognition, balance, and pain which are affecting pt's ability to complete ADLs/IADLs at baseline. Pt would benefit from OT services in the acute care setting to maximize functional recovery.       Plan    Recommend Occupational Therapy 3 times per week until therapy goals are met for the following treatments:  Orthotics Treatment and Therapeutic Activities.       Discharge Recommendations: Recommend post-acute placement for additional occupational therapy services prior to discharge home        06/09/21 0816   Prior Living Situation   Prior Services Intermittent Physical Support for ADL Per Family   Housing / Facility Mobile Home   Steps Into Home 3   Steps In Home 0   Equipment Owned Front-Wheel Walker   Lives with - Patient's Self Care Capacity Adult Children  (DTR and LETTY, and a son, always someone home)   Prior Level of ADL Function   Self Feeding Independent   Grooming / Hygiene Independent   Bathing Independent   Dressing Independent   Toileting Independent   ADL Assessment   Grooming Supervision   Upper Body Dressing Minimal Assist   Lower Body Dressing Maximal Assist   Functional Mobility   Sit to Stand Minimal Assist   Bed, Chair, Wheelchair Transfer Unable to Participate   Short Term Goals   Short Term Goal # 1 supervised with LB dressing   Short Term Goal # 2 supervised with ADL txfs   Short Term Goal # 3 tolerate standing at sink 8min for ADL activity

## 2021-06-09 NOTE — PROGRESS NOTES
Pt's bladder scan result was 500mL. Pt able to void 100mL via purewick. Straight cath pt with output of 400mL. Will continue to monitor pt.

## 2021-06-09 NOTE — PROGRESS NOTES
Assumed care of pt at 0700 from the night shift nurse. Pt is A&Ox4, she complains of 10/10 pain for which pain medication was given. Pt assessed, fall precautions are in place. Pt denies all other needs at this time and hourly rounding is in place.

## 2021-06-09 NOTE — CARE PLAN
The patient is Stable - Low risk of patient condition declining or worsening    Shift Goals  Clinical Goals: pain control    Progress made toward(s) clinical / shift goals:  Pt c/o 10/10 on her left leg. Administered pain medication as needed for pain with minimal relief. Will update MD in AM regarding pain regimen.     Patient is not progressing towards the following goals:      Problem: Pain - Standard  Goal: Alleviation of pain or a reduction in pain to the patient’s comfort goal  Outcome: Not Progressing  Note: Plan to advance goal:  Staff will collaborate with MD regarding stronger pain medication to help with pt's pain level. Assess pt more frequently and administer pain medication as well as warm packs to help with the pain.

## 2021-06-09 NOTE — H&P
Gastroenterology Consultation (GIC)         Patient ID:  Herminia Jones  [id number]  87 y.o. female  1/18/1934    Referring: Dr. Mike Pires    History:  Primary Diagnosis: aspiration    HPI or History of Presenting Illness  87 y.o. female who presented on 6/7/2021 with previous medical history that is significant for diabetes, glaucoma, macular degeneration, dementia. Patient was being cared for by her granddaughter.  Admitting hospitalist obtained History primarily through her granddaughter, as the patient herself was initially quite confused due to her dementia.  Granddaughter reported that one week prior to hospitalization, that they went  to a doctor's appointment, and that the patient had been able to ambulate without any more difficulty than what was normal for her.  At the doctor's appointment, she was found to have a UTI, and prescription for antibiotics were written and given to patient.  They then went to eat breakfast.  Patient again had no difficulty ambulating into the restaurant, however when she stood to get out and walk back to the car at that time, she complained of significant back pain.  Her pain persisted since that time, to the degree that the patient was barely ambulatory at all without maximal assist.  The patient seemed to be otherwise in her normal state of health and was no more confused than normal for her.  There denied any issues with loss of bowel or bladder control.  Patient herself denied any sensation loss or weakness.  She admitted to back pain in her lower back, which was worse when she moved.  Work-up in the ED included CT of the hip, and lab work.  Labs are benign with exception of UA which indicates UTI.  CT of the hip itself showed significant for arthritic changes however otherwise unremarkable of the hip however noted to have an incidentally visualized L4 compression fracture which appearred to be acute/subacute.    Speech pathologist saw patient and commented the  "following \"Pt seen with breakfast tray in place.  Pt had taken one bite of pancake and complains of it being lodged in throat.  Small sips of thin liquid to wash solid down are unsuccessful.  Pt is spitting up small amounts post observed 'swallow.' Concerned regarding prolonged and effortful swallow.  Oral suction reveals suction of recent sips of thin liquid.  RN called to room, Md notified as well.  Stat esophagram ordered.  Pt may need deep suction and/or GI consult to further assess status.  Suspect severe esophageal dysphagia.  Additional po held.\"  She advised the following plan \"Continue current treatment plan.  Recommend GI consult.  NPO except for small sips of liquid, pending additional assessment. Discharge Recommendations: (P) Recommend post-acute placement for additional speech therapy services prior to discharge home.\"    Esophagram showed \"Esophageal obstruction at the level of the GE junction. Etiology unknown. Large volume of retained ingested material within the esophagus. Presbyesophagus.    She is now NPO.    Imaging was as follows:     Imaging:  CT-HIP W/O PLUS RECONS LEFT   Final Result           1. Acute to subacute compression fracture of L4 vertebral body, only partially visualized.   2. No acute fracture or dislocation in the bony pelvis.   3. Stable sequela of prior left femoral ORIF, without evidence of hardware complication.   4. Moderately distended bladder with dilatation of the visualized ureters.       CT-LSPINE W/O PLUS RECONS    (Results Pending)       Denied further modifying factors, associated symptoms or timing issues.  Of note, I spoke to patient's son and daughter-in-law about patient to obtain further history.         Past Medical History   has a past medical history of Arthritis, Dental disorder, Diabetes (HCC), Glaucoma, Gynecological disorder, Heart burn, Macular degeneration, Psychiatric problem, Snoring, Urinary bladder disorder, Urinary incontinence, and Uterine " prolapse without mention of vaginal wall prolapse (6/2/2017).     Surgical History   has a past surgical history that includes abdominal hysterectomy total; lefort colpoclesis (6/2/2017); cystoscopy (6/2/2017); hip cannulated screw (Left, 12/1/2017); bladder sling female (N/A, 4/20/2018); anterior repair (N/A, 4/20/2018); pelviscopy (N/A, 4/20/2018); and anterior and posterior repair (12/3/2019).      Family History  family history includes Cancer in her brother, brother, and sister; Heart Disease in her sister; Stroke in her father.      Social History   reports that she has never smoked. She has never used smokeless tobacco. She reports that she does not drink alcohol and does not use drugs.     Allergies        Allergies   Allergen Reactions   • Sulfamethoxazole         Does not remember reaction          Outpatient Medications  Prior to Admission Medications   Prescriptions Last Dose Informant Patient Reported? Taking?   Ascorbic Acid (VITAMIN C) 1000 MG Tab 6/7/2021 at am Family Member Yes Yes   Sig: Take 1,000 mg by mouth every day.   Calcium Carbonate (CALCARB 600 PO) 6/7/2021 at am Family Member Yes No   Sig: Take 600 mg by mouth every morning.   Cranberry-Vitamin C 25281-530 MG Cap 6/7/2021 at am Family Member Yes Yes   Sig: Take 1 capsule by mouth every day.   D-MANNOSE PO 6/7/2021 at am Family Member Yes Yes   Sig: Take 1 Each by mouth 2 times a day.   atorvastatin (LIPITOR) 20 MG Tab 6/7/2021 at am Family Member No No   Sig: Take 1 tablet by mouth every day.   famotidine (PEPCID) 20 MG Tab 6/7/2021 at am Family Member No No   Sig: TAKE ONE TABLET BY MOUTH TWICE A DAY   Patient taking differently: Take 20 mg by mouth every day.   mirtazapine (REMERON) 7.5 MG tablet 6/6/2021 at PM Family Member No No   Sig: TAKE 1 TAB BY MOUTH EVERY BEDTIME.   nitrofurantoin (MACROBID) 100 MG Cap 6/7/2021 at am Family Member No No   Sig: Take 1 capsule by mouth 2 times a day for 5 days.   Patient taking differently: Take  100 mg by mouth 2 times a day. Started 5 day regimen 6/5/21 in the evening. Scheduled to be completed 6/10/21 in the morning.   oxybutynin SR (DITROPAN-XL) 5 MG TABLET SR 24 HR 6/7/2021 at am Family Member Yes No   Sig: Take 5-10 mg by mouth 2 times a day. 5mg in the am  10mg in the pm   sertraline (ZOLOFT) 100 MG Tab 6/7/2021 at am Family Member No No   Sig: TAKE 1 AND A HALF TABLET BY MOUTH DAILY   Patient taking differently: Take  mg by mouth 2 times a day. 100mg in the morning  50mg in the evening  50mg = 0.5 tab   vitamin D (CHOLECALCIFEROL) 1000 Unit (25 mcg) Tab 6/7/2021 at am Family Member Yes Yes   Sig: Take 1,000 Units by mouth every day.      Facility-Administered Medications: None          Allergies: Sulfamethoxazole    Current Medications:  Prior to Admission medications    Medication Sig Start Date End Date Taking? Authorizing Provider   D-MANNOSE PO Take 1 Each by mouth 2 times a day.   Yes Physician Outpatient   Ascorbic Acid (VITAMIN C) 1000 MG Tab Take 1,000 mg by mouth every day.   Yes Physician Outpatient   vitamin D (CHOLECALCIFEROL) 1000 Unit (25 mcg) Tab Take 1,000 Units by mouth every day.   Yes Physician Outpatient   Cranberry-Vitamin C 57267-801 MG Cap Take 1 capsule by mouth every day.   Yes Physician Outpatient   atorvastatin (LIPITOR) 20 MG Tab Take 1 tablet by mouth every day. 6/8/21   Berta Mercado M.D.   nitrofurantoin (MACROBID) 100 MG Cap Take 1 capsule by mouth 2 times a day for 5 days.  Patient taking differently: Take 100 mg by mouth 2 times a day. Started 5 day regimen 6/5/21 in the evening. Scheduled to be completed 6/10/21 in the morning. 6/5/21 6/10/21  Madyson Jaquez P.A.-C.   oxybutynin SR (DITROPAN-XL) 5 MG TABLET SR 24 HR Take 5-10 mg by mouth 2 times a day. 5mg in the am  10mg in the pm    Physician Outpatient   famotidine (PEPCID) 20 MG Tab TAKE ONE TABLET BY MOUTH TWICE A DAY  Patient taking differently: Take 20 mg by mouth every day. 3/1/21   Jules CAMARGO  "José Luis A.P.N.   sertraline (ZOLOFT) 100 MG Tab TAKE 1 AND A HALF TABLET BY MOUTH DAILY  Patient taking differently: Take  mg by mouth 2 times a day. 100mg in the morning  50mg in the evening  50mg = 0.5 tab 2/1/21   Jules Ordonez ADajaP.N.   mirtazapine (REMERON) 7.5 MG tablet TAKE 1 TAB BY MOUTH EVERY BEDTIME. 2/1/21   Jules Ordonez A.P.N.   Calcium Carbonate (CALCARB 600 PO) Take 600 mg by mouth every morning.    Physician Outpatient       Review of Systems:  Review of Systems   Constitutional: Negative for chills and fever.   HENT: Positive for sore throat.    Eyes: Negative for discharge and redness.   Respiratory: Positive for cough. Negative for sputum production, shortness of breath and stridor.    Cardiovascular: Negative for chest pain and palpitations.   Gastrointestinal: Positive for vomiting. Negative for abdominal pain and nausea.   Genitourinary: Positive for dysuria, frequency and urgency. Negative for flank pain and hematuria.   Musculoskeletal: Positive for back pain, falls and joint pain.   Skin: Negative for itching and rash.   Neurological: Negative for seizures.   Endo/Heme/Allergies: Negative for polydipsia. Bruises/bleeds easily.   Psychiatric/Behavioral: Positive for memory loss. The patient is not nervous/anxious.    All other systems reviewed and are negative.    /58   Pulse 72   Temp 36.8 °C (98.3 °F) (Temporal)   Resp 16   Ht 1.651 m (5' 5\")   Wt 54.4 kg (120 lb)   SpO2 98%     Physical Examination:  Physical Exam  Vitals and nursing note reviewed. Exam conducted with a chaperone present.   Constitutional:       General: She is not in acute distress.     Appearance: Normal appearance. She is normal weight. She is ill-appearing. She is not toxic-appearing or diaphoretic.   HENT:      Head: Normocephalic and atraumatic.      Nose: Nose normal. No congestion or rhinorrhea.      Mouth/Throat:      Mouth: Mucous membranes are moist.      Pharynx: Oropharynx is clear. No " oropharyngeal exudate.   Eyes:      General: No scleral icterus.        Left eye: No discharge.      Extraocular Movements: Extraocular movements intact.      Conjunctiva/sclera: Conjunctivae normal.      Pupils: Pupils are equal, round, and reactive to light.   Cardiovascular:      Rate and Rhythm: Normal rate and regular rhythm.      Pulses: Normal pulses.   Pulmonary:      Effort: Pulmonary effort is normal. No respiratory distress.      Breath sounds: Normal breath sounds. No stridor.   Abdominal:      General: Abdomen is flat. Bowel sounds are normal. There is no distension.      Palpations: Abdomen is soft.      Tenderness: There is no abdominal tenderness. There is no guarding or rebound.   Musculoskeletal:         General: Normal range of motion.      Cervical back: Normal range of motion and neck supple.      Right lower leg: No edema.      Left lower leg: No edema.   Skin:     General: Skin is warm and dry.      Coloration: Skin is not jaundiced or pale.      Findings: Bruising present.   Neurological:      General: No focal deficit present.      Mental Status: She is alert. Mental status is at baseline.   Psychiatric:         Attention and Perception: Attention and perception normal.         Mood and Affect: Mood and affect normal.         Speech: Speech normal.         Behavior: Behavior normal. Behavior is cooperative.         Thought Content: Thought content normal.         Cognition and Memory: Cognition normal. She exhibits impaired recent memory.         Judgment: Judgment normal.       Problems:  1. Esophageal dysphagia  2. Abnormal GI x-rays showed distal esophageal stricture  3. GERD  4. Dementia  5. Urinary tract infection being treated with rocephin  6. Closed L4 compression fracture  7. Recurrent falls  8. Chronic kidney disease, stage 3b  9. Diabetes mellitus type 2  10. Dyslipidemia  11. BMI of 19  12. Macular degeneration  13. Urinary bladder disorder, Urinary incontinence  14. History of  uterine prolapse  15. History of abdominal hysterectomy total; lefort colpoclesis (6/2/2017); cystoscopy (6/2/2017); hip cannulated screw (Left, 12/1/2017); bladder sling female (N/A, 4/20/2018); anterior repair (N/A, 4/20/2018); pelviscopy (N/A, 4/20/2018); and anterior and posterior repair (12/3/2019).   16. Increased complexity of medical decision making due to aforementioned.  17. COVID negative 6/7    Recommendations:  1. Keep NPO  2. Esophagogastroduodenoscopy with biopsies and esophageal dilatation and/or other endotherapy with possible percutaneous endoscopic gastrostomy tube placement under anesthesia scheduled for 1pm tomorrow.     Risks, benefits, and alternatives of aforementioned procedures were discussed with patient and family member(son and daughter-in-law) in detail.  Consenting persons were given opportunities to ask questions and discuss other options.  Risks including but not limited to perforation, infection, bleeding, missed lesion(s), possible need for surgery(ies) and/or interventional radiology, possible need for repeat procedure(s) and/or additional testing, hospitalization possibly prolonged, cardiac and/or pulmonary event, aspiration, hypoxia, stroke, medication (s) and/or anesthesia reaction(s), indefinite diagnosis, discomfort/pain, unsuccessful and/or incomplete procedure, ineffective therapy, persistent symptoms, damage to adjacent organs/structure and/or vascular, inadvertent gastrostomy tube placement through intestines, liver laceration, and other adverse event(s) possibly life-threatening.  Interactive discussion was undertaken with Layman's terms.  I answered questions in full and to satisfaction.  Consenting persons stated understanding and acceptance of these risks, and wished to proceed.   Informed consent was given in clear state of mind.    ---  Dear Dr. Mike Pires,  Thank you for asking us to see your patient in consultation.  Please refer to my consult note as per  above for details and recommendations.       Gt Long M.D., [facility], FACG  6/9/2021

## 2021-06-09 NOTE — PROGRESS NOTES
Pt's bladder scan result is 413mL, encouraged pt to void and pt voided with 130mL. Will repeat bladder scan in 6 hours.

## 2021-06-09 NOTE — PROGRESS NOTES
Assume care of pt at 1900. Report received from day RN. Pt is A/O x4. Pain is 5/10, warm pack applied. Pt is sitting up in bed. Bed in lowest and locked position, call light in reach, hourly rounding in place. Labs reviewed. Communication board updated. Will continue to monitor.

## 2021-06-10 ENCOUNTER — ANESTHESIA (OUTPATIENT)
Dept: SURGERY | Facility: MEDICAL CENTER | Age: 86
DRG: 552 | End: 2021-06-10
Payer: MEDICARE

## 2021-06-10 ENCOUNTER — ANESTHESIA EVENT (OUTPATIENT)
Dept: SURGERY | Facility: MEDICAL CENTER | Age: 86
DRG: 552 | End: 2021-06-10
Payer: MEDICARE

## 2021-06-10 LAB
ALBUMIN SERPL BCP-MCNC: 3.8 G/DL (ref 3.2–4.9)
ALBUMIN/GLOB SERPL: 1 G/DL
ALP SERPL-CCNC: 72 U/L (ref 30–99)
ALT SERPL-CCNC: 9 U/L (ref 2–50)
ANION GAP SERPL CALC-SCNC: 13 MMOL/L (ref 7–16)
AST SERPL-CCNC: 18 U/L (ref 12–45)
BASOPHILS # BLD AUTO: 0.8 % (ref 0–1.8)
BASOPHILS # BLD: 0.09 K/UL (ref 0–0.12)
BILIRUB SERPL-MCNC: 0.3 MG/DL (ref 0.1–1.5)
BUN SERPL-MCNC: 28 MG/DL (ref 8–22)
CALCIUM SERPL-MCNC: 9.5 MG/DL (ref 8.5–10.5)
CHLORIDE SERPL-SCNC: 106 MMOL/L (ref 96–112)
CO2 SERPL-SCNC: 22 MMOL/L (ref 20–33)
CREAT SERPL-MCNC: 1.17 MG/DL (ref 0.5–1.4)
EOSINOPHIL # BLD AUTO: 0.77 K/UL (ref 0–0.51)
EOSINOPHIL NFR BLD: 7.2 % (ref 0–6.9)
ERYTHROCYTE [DISTWIDTH] IN BLOOD BY AUTOMATED COUNT: 45.8 FL (ref 35.9–50)
GLOBULIN SER CALC-MCNC: 3.8 G/DL (ref 1.9–3.5)
GLUCOSE SERPL-MCNC: 95 MG/DL (ref 65–99)
HCT VFR BLD AUTO: 46.7 % (ref 37–47)
HGB BLD-MCNC: 15.3 G/DL (ref 12–16)
IMM GRANULOCYTES # BLD AUTO: 0.11 K/UL (ref 0–0.11)
IMM GRANULOCYTES NFR BLD AUTO: 1 % (ref 0–0.9)
LYMPHOCYTES # BLD AUTO: 2.69 K/UL (ref 1–4.8)
LYMPHOCYTES NFR BLD: 25.2 % (ref 22–41)
MCH RBC QN AUTO: 28.9 PG (ref 27–33)
MCHC RBC AUTO-ENTMCNC: 32.8 G/DL (ref 33.6–35)
MCV RBC AUTO: 88.1 FL (ref 81.4–97.8)
MONOCYTES # BLD AUTO: 0.78 K/UL (ref 0–0.85)
MONOCYTES NFR BLD AUTO: 7.3 % (ref 0–13.4)
NEUTROPHILS # BLD AUTO: 6.25 K/UL (ref 2–7.15)
NEUTROPHILS NFR BLD: 58.5 % (ref 44–72)
NRBC # BLD AUTO: 0 K/UL
NRBC BLD-RTO: 0 /100 WBC
PATHOLOGY CONSULT NOTE: NORMAL
PLATELET # BLD AUTO: 247 K/UL (ref 164–446)
PMV BLD AUTO: 9.8 FL (ref 9–12.9)
POTASSIUM SERPL-SCNC: 4.1 MMOL/L (ref 3.6–5.5)
PROT SERPL-MCNC: 7.6 G/DL (ref 6–8.2)
RBC # BLD AUTO: 5.3 M/UL (ref 4.2–5.4)
SODIUM SERPL-SCNC: 141 MMOL/L (ref 135–145)
WBC # BLD AUTO: 10.7 K/UL (ref 4.8–10.8)

## 2021-06-10 PROCEDURE — C9113 INJ PANTOPRAZOLE SODIUM, VIA: HCPCS | Performed by: FAMILY MEDICINE

## 2021-06-10 PROCEDURE — 160009 HCHG ANES TIME/MIN: Performed by: INTERNAL MEDICINE

## 2021-06-10 PROCEDURE — 0DB68ZX EXCISION OF STOMACH, VIA NATURAL OR ARTIFICIAL OPENING ENDOSCOPIC, DIAGNOSTIC: ICD-10-PCS | Performed by: INTERNAL MEDICINE

## 2021-06-10 PROCEDURE — 85025 COMPLETE CBC W/AUTO DIFF WBC: CPT

## 2021-06-10 PROCEDURE — 700105 HCHG RX REV CODE 258: Performed by: ANESTHESIOLOGY

## 2021-06-10 PROCEDURE — 160002 HCHG RECOVERY MINUTES (STAT): Performed by: INTERNAL MEDICINE

## 2021-06-10 PROCEDURE — 0DB28ZX EXCISION OF MIDDLE ESOPHAGUS, VIA NATURAL OR ARTIFICIAL OPENING ENDOSCOPIC, DIAGNOSTIC: ICD-10-PCS | Performed by: INTERNAL MEDICINE

## 2021-06-10 PROCEDURE — 160036 HCHG PACU - EA ADDL 30 MINS PHASE I: Performed by: INTERNAL MEDICINE

## 2021-06-10 PROCEDURE — 88305 TISSUE EXAM BY PATHOLOGIST: CPT | Mod: 59

## 2021-06-10 PROCEDURE — 770006 HCHG ROOM/CARE - MED/SURG/GYN SEMI*

## 2021-06-10 PROCEDURE — 80053 COMPREHEN METABOLIC PANEL: CPT

## 2021-06-10 PROCEDURE — 110372 HCHG SHELL REV 278: Performed by: INTERNAL MEDICINE

## 2021-06-10 PROCEDURE — 700111 HCHG RX REV CODE 636 W/ 250 OVERRIDE (IP): Performed by: FAMILY MEDICINE

## 2021-06-10 PROCEDURE — 700111 HCHG RX REV CODE 636 W/ 250 OVERRIDE (IP): Performed by: HOSPITALIST

## 2021-06-10 PROCEDURE — 700101 HCHG RX REV CODE 250: Performed by: FAMILY MEDICINE

## 2021-06-10 PROCEDURE — 99232 SBSQ HOSP IP/OBS MODERATE 35: CPT | Performed by: FAMILY MEDICINE

## 2021-06-10 PROCEDURE — 160203 HCHG ENDO MINUTES - 1ST 30 MINS LEVEL 4: Performed by: INTERNAL MEDICINE

## 2021-06-10 PROCEDURE — 160048 HCHG OR STATISTICAL LEVEL 1-5: Performed by: INTERNAL MEDICINE

## 2021-06-10 PROCEDURE — 700111 HCHG RX REV CODE 636 W/ 250 OVERRIDE (IP): Performed by: ANESTHESIOLOGY

## 2021-06-10 PROCEDURE — 160035 HCHG PACU - 1ST 60 MINS PHASE I: Performed by: INTERNAL MEDICINE

## 2021-06-10 PROCEDURE — A9270 NON-COVERED ITEM OR SERVICE: HCPCS | Performed by: INTERNAL MEDICINE

## 2021-06-10 PROCEDURE — 88312 SPECIAL STAINS GROUP 1: CPT

## 2021-06-10 PROCEDURE — 36415 COLL VENOUS BLD VENIPUNCTURE: CPT

## 2021-06-10 PROCEDURE — 0DH63UZ INSERTION OF FEEDING DEVICE INTO STOMACH, PERCUTANEOUS APPROACH: ICD-10-PCS | Performed by: INTERNAL MEDICINE

## 2021-06-10 PROCEDURE — 700102 HCHG RX REV CODE 250 W/ 637 OVERRIDE(OP): Performed by: INTERNAL MEDICINE

## 2021-06-10 DEVICE — KIT 20 FRENCH PULL SAFETY PEG: Type: IMPLANTABLE DEVICE | Site: ABDOMEN | Status: FUNCTIONAL

## 2021-06-10 RX ORDER — HALOPERIDOL 5 MG/ML
1 INJECTION INTRAMUSCULAR
Status: DISCONTINUED | OUTPATIENT
Start: 2021-06-10 | End: 2021-06-10 | Stop reason: HOSPADM

## 2021-06-10 RX ORDER — BISACODYL 10 MG
10 SUPPOSITORY, RECTAL RECTAL
Status: DISCONTINUED | OUTPATIENT
Start: 2021-06-10 | End: 2021-06-14 | Stop reason: HOSPADM

## 2021-06-10 RX ORDER — OXYBUTYNIN CHLORIDE 5 MG/1
2.5 TABLET ORAL EVERY MORNING
Status: DISCONTINUED | OUTPATIENT
Start: 2021-06-10 | End: 2021-06-13

## 2021-06-10 RX ORDER — CEFAZOLIN SODIUM 1 G/3ML
INJECTION, POWDER, FOR SOLUTION INTRAMUSCULAR; INTRAVENOUS PRN
Status: DISCONTINUED | OUTPATIENT
Start: 2021-06-10 | End: 2021-06-10 | Stop reason: SURG

## 2021-06-10 RX ORDER — POLYETHYLENE GLYCOL 3350 17 G/17G
1 POWDER, FOR SOLUTION ORAL
Status: DISCONTINUED | OUTPATIENT
Start: 2021-06-10 | End: 2021-06-14 | Stop reason: HOSPADM

## 2021-06-10 RX ORDER — AMOXICILLIN 250 MG
2 CAPSULE ORAL 2 TIMES DAILY
Status: DISCONTINUED | OUTPATIENT
Start: 2021-06-10 | End: 2021-06-14 | Stop reason: HOSPADM

## 2021-06-10 RX ORDER — DIPHENHYDRAMINE HYDROCHLORIDE 50 MG/ML
12.5 INJECTION INTRAMUSCULAR; INTRAVENOUS
Status: DISCONTINUED | OUTPATIENT
Start: 2021-06-10 | End: 2021-06-10 | Stop reason: HOSPADM

## 2021-06-10 RX ORDER — ASCORBIC ACID 500 MG
1000 TABLET ORAL DAILY
Status: DISCONTINUED | OUTPATIENT
Start: 2021-06-11 | End: 2021-06-14 | Stop reason: HOSPADM

## 2021-06-10 RX ORDER — MORPHINE SULFATE 15 MG/1
7.5-15 TABLET ORAL EVERY 6 HOURS PRN
Status: DISCONTINUED | OUTPATIENT
Start: 2021-06-10 | End: 2021-06-14 | Stop reason: HOSPADM

## 2021-06-10 RX ORDER — PANTOPRAZOLE SODIUM 40 MG/10ML
40 INJECTION, POWDER, LYOPHILIZED, FOR SOLUTION INTRAVENOUS DAILY
Status: DISCONTINUED | OUTPATIENT
Start: 2021-06-10 | End: 2021-06-11

## 2021-06-10 RX ORDER — MIRTAZAPINE 15 MG/1
7.5 TABLET, FILM COATED ORAL
Status: DISCONTINUED | OUTPATIENT
Start: 2021-06-10 | End: 2021-06-14 | Stop reason: HOSPADM

## 2021-06-10 RX ORDER — SODIUM CHLORIDE, SODIUM LACTATE, POTASSIUM CHLORIDE, CALCIUM CHLORIDE 600; 310; 30; 20 MG/100ML; MG/100ML; MG/100ML; MG/100ML
INJECTION, SOLUTION INTRAVENOUS
Status: DISCONTINUED | OUTPATIENT
Start: 2021-06-10 | End: 2021-06-10 | Stop reason: SURG

## 2021-06-10 RX ORDER — GABAPENTIN 100 MG/1
100 CAPSULE ORAL 2 TIMES DAILY
Status: DISCONTINUED | OUTPATIENT
Start: 2021-06-10 | End: 2021-06-14 | Stop reason: HOSPADM

## 2021-06-10 RX ORDER — SERTRALINE HYDROCHLORIDE 100 MG/1
100 TABLET, FILM COATED ORAL DAILY
Status: DISCONTINUED | OUTPATIENT
Start: 2021-06-11 | End: 2021-06-14 | Stop reason: HOSPADM

## 2021-06-10 RX ORDER — DEXTROSE MONOHYDRATE, SODIUM CHLORIDE, AND POTASSIUM CHLORIDE 50; 1.49; 4.5 G/1000ML; G/1000ML; G/1000ML
INJECTION, SOLUTION INTRAVENOUS CONTINUOUS
Status: DISCONTINUED | OUTPATIENT
Start: 2021-06-10 | End: 2021-06-11

## 2021-06-10 RX ORDER — ONDANSETRON 2 MG/ML
4 INJECTION INTRAMUSCULAR; INTRAVENOUS
Status: DISCONTINUED | OUTPATIENT
Start: 2021-06-10 | End: 2021-06-10 | Stop reason: HOSPADM

## 2021-06-10 RX ORDER — CYCLOBENZAPRINE HCL 10 MG
5 TABLET ORAL EVERY 8 HOURS PRN
Status: DISCONTINUED | OUTPATIENT
Start: 2021-06-10 | End: 2021-06-14 | Stop reason: HOSPADM

## 2021-06-10 RX ORDER — ONDANSETRON 4 MG/1
4 TABLET, ORALLY DISINTEGRATING ORAL EVERY 4 HOURS PRN
Status: DISCONTINUED | OUTPATIENT
Start: 2021-06-10 | End: 2021-06-14 | Stop reason: HOSPADM

## 2021-06-10 RX ORDER — SODIUM CHLORIDE, SODIUM LACTATE, POTASSIUM CHLORIDE, CALCIUM CHLORIDE 600; 310; 30; 20 MG/100ML; MG/100ML; MG/100ML; MG/100ML
INJECTION, SOLUTION INTRAVENOUS CONTINUOUS
Status: DISCONTINUED | OUTPATIENT
Start: 2021-06-10 | End: 2021-06-10 | Stop reason: HOSPADM

## 2021-06-10 RX ORDER — VITAMIN B COMPLEX
1000 TABLET ORAL DAILY
Status: DISCONTINUED | OUTPATIENT
Start: 2021-06-11 | End: 2021-06-14 | Stop reason: HOSPADM

## 2021-06-10 RX ORDER — OXYBUTYNIN CHLORIDE 5 MG/1
5 TABLET ORAL EVERY EVENING
Status: DISCONTINUED | OUTPATIENT
Start: 2021-06-10 | End: 2021-06-13

## 2021-06-10 RX ORDER — ACETAMINOPHEN 325 MG/1
650 TABLET ORAL EVERY 6 HOURS PRN
Status: DISCONTINUED | OUTPATIENT
Start: 2021-06-10 | End: 2021-06-14 | Stop reason: HOSPADM

## 2021-06-10 RX ORDER — ATORVASTATIN CALCIUM 20 MG/1
20 TABLET, FILM COATED ORAL DAILY
Status: DISCONTINUED | OUTPATIENT
Start: 2021-06-11 | End: 2021-06-14 | Stop reason: HOSPADM

## 2021-06-10 RX ADMIN — OXYBUTYNIN CHLORIDE 5 MG: 5 TABLET ORAL at 17:53

## 2021-06-10 RX ADMIN — MORPHINE SULFATE 1 MG: 4 INJECTION INTRAVENOUS at 14:29

## 2021-06-10 RX ADMIN — GABAPENTIN 100 MG: 100 CAPSULE ORAL at 17:53

## 2021-06-10 RX ADMIN — SERTRALINE HYDROCHLORIDE 50 MG: 50 TABLET ORAL at 21:14

## 2021-06-10 RX ADMIN — MORPHINE SULFATE 1 MG: 4 INJECTION INTRAVENOUS at 10:17

## 2021-06-10 RX ADMIN — POTASSIUM CHLORIDE, DEXTROSE MONOHYDRATE AND SODIUM CHLORIDE: 150; 5; 450 INJECTION, SOLUTION INTRAVENOUS at 10:18

## 2021-06-10 RX ADMIN — CEFAZOLIN 2 G: 330 INJECTION, POWDER, FOR SOLUTION INTRAMUSCULAR; INTRAVENOUS at 12:11

## 2021-06-10 RX ADMIN — FENTANYL CITRATE 25 MCG: 50 INJECTION, SOLUTION INTRAMUSCULAR; INTRAVENOUS at 12:40

## 2021-06-10 RX ADMIN — SODIUM CHLORIDE, POTASSIUM CHLORIDE, SODIUM LACTATE AND CALCIUM CHLORIDE: 600; 310; 30; 20 INJECTION, SOLUTION INTRAVENOUS at 12:01

## 2021-06-10 RX ADMIN — DOCUSATE SODIUM 50 MG AND SENNOSIDES 8.6 MG 2 TABLET: 8.6; 5 TABLET, FILM COATED ORAL at 17:54

## 2021-06-10 RX ADMIN — HEPARIN SODIUM 5000 UNITS: 5000 INJECTION, SOLUTION INTRAVENOUS; SUBCUTANEOUS at 21:15

## 2021-06-10 RX ADMIN — CYCLOBENZAPRINE 5 MG: 10 TABLET, FILM COATED ORAL at 21:20

## 2021-06-10 RX ADMIN — FENTANYL CITRATE 25 MCG: 50 INJECTION, SOLUTION INTRAMUSCULAR; INTRAVENOUS at 12:50

## 2021-06-10 RX ADMIN — PROPOFOL 40 MG: 10 INJECTION, EMULSION INTRAVENOUS at 12:08

## 2021-06-10 RX ADMIN — PANTOPRAZOLE SODIUM 40 MG: 40 INJECTION, POWDER, LYOPHILIZED, FOR SOLUTION INTRAVENOUS at 18:06

## 2021-06-10 RX ADMIN — PROPOFOL 40 MG: 10 INJECTION, EMULSION INTRAVENOUS at 12:10

## 2021-06-10 RX ADMIN — FENTANYL CITRATE 25 MCG: 50 INJECTION, SOLUTION INTRAMUSCULAR; INTRAVENOUS at 12:45

## 2021-06-10 RX ADMIN — HEPARIN SODIUM 5000 UNITS: 5000 INJECTION, SOLUTION INTRAVENOUS; SUBCUTANEOUS at 18:07

## 2021-06-10 RX ADMIN — MORPHINE SULFATE 1 MG: 4 INJECTION INTRAVENOUS at 06:11

## 2021-06-10 RX ADMIN — PROPOFOL 60 MG: 10 INJECTION, EMULSION INTRAVENOUS at 12:04

## 2021-06-10 RX ADMIN — MIRTAZAPINE 7.5 MG: 15 TABLET, FILM COATED ORAL at 21:15

## 2021-06-10 RX ADMIN — FENTANYL CITRATE 25 MCG: 50 INJECTION, SOLUTION INTRAMUSCULAR; INTRAVENOUS at 12:35

## 2021-06-10 RX ADMIN — MORPHINE SULFATE 15 MG: 15 TABLET ORAL at 17:53

## 2021-06-10 ASSESSMENT — ENCOUNTER SYMPTOMS
BACK PAIN: 1
FALLS: 1

## 2021-06-10 ASSESSMENT — PAIN DESCRIPTION - PAIN TYPE
TYPE: ACUTE PAIN
TYPE: SURGICAL PAIN
TYPE: SURGICAL PAIN
TYPE: ACUTE PAIN
TYPE: SURGICAL PAIN
TYPE: ACUTE PAIN
TYPE: SURGICAL PAIN

## 2021-06-10 ASSESSMENT — PAIN SCALES - GENERAL: PAIN_LEVEL: 6

## 2021-06-10 NOTE — PROGRESS NOTES
[facility] Medicine Daily Progress Note    Date of Service  6/9/2021    Chief Complaint  87 y.o. female admitted 6/7/2021 with low back pain.    Hospital Course  This is 87 years old female who has past medical history of type 2 diabetes mellitus, macular degeneration, dementia, and falls comes in with low back pain that started a week ago and worsened over the course of time.  Was seen by urgent care recently and was diagnosed with urinary tract infection and started on oral antibiotics.  She presented to the hospital for further evaluation.  Noted to be afebrile per hemodynamically stable.  Imaging studies with a CT of hip and lumbar spine showed acute to subacute compression fracture of L4.  Neurosurgery consulted.  Recommended MRI of the lumbar spine without contrast.  Also noted to have urinary retention.  UA positive for UTI.  Was started on antibiotics and cultures were sent.  Interval Problem Update  Resting in bed comfortably.  Pleasantly confused.  Afebrile.  Dynamically stable.  No acute distress noted.  No issues overnight per staff.  6/9: Patient choked on a piece of pancake this morning. Was not in respiratory distress. Was sent down for esophageal gram which showed distal esophageal obstruction. GI consulted. Planning for EGD tomorrow. Continue to retain urine. Mckoy catheter placed considering bilateral hydronephrosis and significantly distended bladder seen on ultrasound. Spoke with VENKATA Almazan over the phone and updated her.  Consultants/Specialty  Neurosurgery    Code Status  Full Code    Disposition  Pending clinical course  Pending PT/OT eval    Review of Systems  Review of Systems   Unable to perform ROS: Dementia   Musculoskeletal: Positive for back pain and falls.        Physical Exam  Temp:  [36.1 °C (97 °F)-36.8 °C (98.3 °F)] 36.8 °C (98.3 °F)  Pulse:  [68-87] 73  Resp:  [16-18] 16  BP: (104-116)/(50-60) 116/54  SpO2:  [96 %-98 %] 97 %    Physical Exam  Constitutional:       General: She is not  in acute distress.  HENT:      Head: Normocephalic and atraumatic.   Eyes:      Extraocular Movements: Extraocular movements intact.      Pupils: Pupils are equal, round, and reactive to light.   Cardiovascular:      Rate and Rhythm: Normal rate and regular rhythm.      Heart sounds: No friction rub. No gallop.    Pulmonary:      Breath sounds: Rales present.   Abdominal:      General: There is no distension.      Palpations: There is no mass.   Skin:     Coloration: Skin is not jaundiced.   Neurological:      Mental Status: She is alert. Mental status is at baseline. She is disoriented.   Psychiatric:         Mood and Affect: Mood normal.         Fluids    Intake/Output Summary (Last 24 hours) at 6/9/2021 1803  Last data filed at 6/8/2021 1900  Gross per 24 hour   Intake --   Output 815 ml   Net -815 ml       Laboratory  Recent Labs     06/07/21  1024   WBC 9.0   RBC 5.01   HEMOGLOBIN 14.6   HEMATOCRIT 43.5   MCV 86.8   MCH 29.1   MCHC 33.6   RDW 44.8   PLATELETCT 240   MPV 9.6     Recent Labs     06/07/21  1024   SODIUM 137   POTASSIUM 3.9   CHLORIDE 106   CO2 20   GLUCOSE 108*   BUN 34*   CREATININE 1.21   CALCIUM 9.9                   Imaging  DX-ESOPH. FLUORO (BARIUM SWALLOW)   Final Result      1.  Esophageal obstruction at the level of the GE junction. Etiology unknown.      2.  Large volume of retained ingested material within the esophagus.      3.  Presbyesophagus.      4.  Findings were discussed with ANA STERLING on 6/9/2021 12:50 PM.      US-RENAL   Final Result      1.  Mild bilateral hydronephrosis. Left hydronephrosis appears similar to prior exam.      2.  There is significant post void residual in the urinary bladder.      CT-LSPINE W/O PLUS RECONS   Final Result      1.  Age-indeterminate L4 compression deformity involving the inferior endplate with approximately 30% loss of height. Loss of height is similar to the prior radiograph on 6/5/2021.      2.  Multilevel degenerative disc disease  and facet arthropathy as described. Central canal and foraminal narrowing is most pronounced at L4-5 with vacuum disc phenomenon.      CT-HIP W/O PLUS RECONS LEFT   Final Result         1. Acute to subacute compression fracture of L4 vertebral body, only partially visualized.   2. No acute fracture or dislocation in the bony pelvis.   3. Stable sequela of prior left femoral ORIF, without evidence of hardware complication.   4. Moderately distended bladder with dilatation of the visualized ureters.      MR-LUMBAR SPINE-W/O    (Results Pending)        Assessment/Plan  Esophageal dysphagia- (present on admission)  Assessment & Plan  Patient choked on a piece of pancake.  Esophageal gram showed distal obstruction in the esophagus. GI consulted. Planning for EGD with biopsies and dilation and possible gastrostomy tomorrow 6/10/2021.    Closed compression fracture of L4 lumbar vertebra, initial encounter ([facility])  Assessment & Plan  Patient does not appear to have suffered any recent falls, she did have a fall going back on May 10 however the granddaughter states that she does ambulate on her own in the bathroom and so she is uncertain if she may have fallen and not told anybody.  I discussed today with radiology, they would like a dedicated CT of the lumbar spine to assess whether kyphoplasty would be appropriate.  We will check CT L-spine, consult PT and OT  As needed support  6/8: CT of the lumbar spine showed acute to subacute L4 compression fracture.  MRI pending.  PT/OT pending.  No intervention per neurosurgery for now.  TLSO per PT.   6/9: MRI of lumbar spine still pending.    Recurrent falls- (present on admission)  Assessment & Plan  This has been a problem for the patient for some time now.  We will need to consider placement on discharge if the family is unable to watch more closely as her functional status is likely to be lower.  OT recommending SNF placement.  Referral requested on 6/9/2021.  Fall  precautions.      Dementia associated with other underlying disease without behavioral disturbance (HCC)- (present on admission)  Assessment & Plan  Patient appears to be at her baseline per her family at the bedside  High risk for hospital-acquired delirium.  Avoid benzodiazepines and anticholinergic medications.  Minimize narcotics, hypnotics, sedatives.  Minimize lines.  Daily orientation.    Stage 3b chronic kidney disease- (present on admission)  Assessment & Plan  Renal function appears to be around the baseline for this patient.  Renally dose medications as appropriate  Follow BMP    Urinary retention  Assessment & Plan  Was seen  Periodic bladder scan.  Patient has history of hydronephrosis.  Started on Flomax.  Straight catheter parameters.  Check kidney ultrasound  6/9: Kidney ultrasound showed mild bilateral hydronephrosis and distended bladder.  Mckoy catheter was placed.    UTI (urinary tract infection)- (present on admission)  Assessment & Plan  Patient had been started on Macrobid which she took for 24 hours.  We will start on Rocephin for another 48 hours  Follow urine culture  6/9: Completed a course of antibiotics.    Diabetes mellitus type 2 with complications (HCC)- (present on admission)  Assessment & Plan  Patient's last A1c was 6.3, this was 2 days ago.  Carb consistent diet  Follow-up fingersticks    Dyslipidemia- (present on admission)  Assessment & Plan  Continue statin       VTE prophylaxis: Heparin

## 2021-06-10 NOTE — ANESTHESIA TIME REPORT
Anesthesia Start and Stop Event Times     Date Time Event    6/10/2021 1150 Ready for Procedure     1157 Anesthesia Start     1226 Anesthesia Stop        Responsible Staff  06/10/21    Name Role Begin End    Ivonne Bal M.D. Anesth 1157 1226        Preop Diagnosis (Free Text):  Pre-op Diagnosis     Esophageal dysphagia        Preop Diagnosis (Codes):    Post op Diagnosis  Dysphagia      Premium Reason  Non-Premium    Comments:

## 2021-06-10 NOTE — PROCEDURES
Pre-procedure Diagnoses   Esophageal dysphagia [R13.10]   Abnormal esophagram [R93.3]   Gastroesophageal reflux disease, unspecified whether esophagitis present [K21.9]   Dementia without behavioral disturbance, unspecified dementia type (HCC) [F03.90]   Post-procedure Diagnoses   Presbyesophagus [K22.8]   Acute superficial gastritis without hemorrhage [K29.00]   Gastrostomy status (HCC) [Z93.1]   Procedures   ESOPHAGOGASTRODUODENOSCOPY WITH PLACEMENT OF PERCUTANEOUS GASTROSTOMY TUBE [72174 (CPT®)]   ESOPHAGOGASTRODUODENOSCOPY OR UPPER GI ENDOSCOPY WITH BIOPSIES       Endoscopist: Gt Long MD, [facility], [facility] [place]    Anesthesiologist: Ivonne Bal M.D.    Premedications: Ancef 2 grams IV    Consent: Patient seen and examined before proceeding with esophagogastroduodenoscopy with biopsies and esophageal dilatation and/or other endotherapy with possible percutaneous endoscopic gastrostomy tube placement under anesthesia.  Risks, benefits, and alternatives of aforementioned procedures were again discussed with patient and granddaughter, discussed with son and daughter-in-law yesterday in detail before proceeding.  Consenting persons were given opportunities to ask questions and discuss other options.  Risks including but not limited to perforation, infection, bleeding, missed lesion(s), possible need for surgery(ies) and/or interventional radiology, possible need for repeat procedure(s) and/or additional testing, hospitalization possibly prolonged, cardiac and/or pulmonary event, aspiration, hypoxia, stroke, medication (s) and/or anesthesia reaction(s), indefinite diagnosis, discomfort/pain, unsuccessful and/or incomplete procedure, ineffective therapy, persistent symptoms, damage to adjacent organs/structure and/or vascular, inadvertent gastrostomy tube placement through intestines, liver laceration, and other adverse event(s) possibly life-threatening.  Interactive discussion was undertaken with Layman's terms.   I answered questions in full and to satisfaction.  Consenting persons stated understanding and acceptance of these risks, and wished to proceed.  I gave opportunity to cancel, delay and/or reschedule if not completely comfortable with proceeding.  Consenting person(s) stated understanding and acceptance of these risks, and wished to proceed.   Informed consent was given in clear state of mind and paper permit was confirmed to have been signed before proceeding.      Endoscopic procedures in detail: Diagnostic flexible Olympus endoscope was inserted from mouth into second portion of the duodenum, retroflexion was performed in the stomach.  Mid-esophageal and gastric biopsies were obtained and sent in separate containers.  I suctioned stomach fluid contents.  Stomach was then fully insufflated with air then transillumination of stomach wall was achieved with 1:1 movement noted upon palpation of abdominal wall. Area was prepped with betadine and chlorhexidine, sterile field was draped and maintained. Ponsky pull technique was used to secure bumper gastrostomy at 3 cm at the skin.  Reintubation of esophagus to stomach confirmed ideal tension of bumper gastrostomy which was photo-documented.  I suctioned insufflated air and stomach fluid contents upon removal.    Procedure times:  - In-room 11:56  - Start 12:05  - Completed 12:16  - Out of room per nursing records    Esophagogastroduodenoscopy Findings:  - Esophagus: presbyesophagus tortuous but no stricture, mid-esophageal biopsies obtained to exclude eosinophilic esophagitis.   - Stomach: mild erosive body gastritis, biopsied.   - Duodenum: mild non-erosive bulbar duodenitis.  - Therapy: 20-Chinese 3cm bumper gastrostomy tube placed.    Impression:  1. Presbyesophagus, tortuous but no stricture  2. Uneventful percutaneous endoscopic gastrostomy tube placement  3. Gastroduodenits    Recommendations:   1.  Routine post-endoscopy anesthesia recovery care.  Transfer back  to prior hospital room when when recovery criteria are met.  Aspiration and fall precautions x 24 hours.   2.  Prilosec 40mg QD x 21 days.  3.  Nutritionist consult for tube feedings.  4.  Avoid NSAIDs for 8 weeks.  5. GIC signed off but please feel free to contact us with problems, questions, concerns and/or unanticipated changes in patient's clinical status.   6.  My office will call patient to schedule esophageal manometry and clinic follow-up with me afterwards.  7.  I spoke to patient, granddaughter (Lupe Alexandra) and recovery nurse about impression, diagnosis and recommendations.  I answered questions in full and to satisfaction

## 2021-06-10 NOTE — PROGRESS NOTES
Spoke with grand daughter Lupe who is POA and she states she will attempt to be here at the hospital around 1130 to consent for procedure. Can be reached by phone if she doesn't make it down here.

## 2021-06-10 NOTE — OR NURSING
1222 received patient from OR. Report from Anesthesiologist and OR RN. Patient on 4L at 94 % O2. VSS. Monitor connected. S/P PEG tube placement, incision on abdomen covered with gauze and tape. Per anesthesiologist, patient has been having severe back pain and L hip pain, patient crying in pain and stating it's from her abdomen where were peg tube was placed. Patient repositioned on R side with pillows and Dr. Bal updated. Order for Fentanyl IV    1235 25mcg Fentanyl given for severe pain    1240 Subsequent dose, O2 at 95% on 3L NC    1245 Patient still crying in pain, subsequent dose given    1250 Subsequent dose given for 7/10 pain in abdomen     1300 Patient states pain has decreased, falling in and out of sleep. Report given to Nate RN    1329 Patient transferred back to room via patient transport with chart and 3L NC. Dentures placed back in patient's mouth before departure from PACU. Left voicemail for patient's POA that patient heading back to room.

## 2021-06-10 NOTE — DISCHARGE PLANNING
Anticipated Discharge Disposition: SNF    Action: LSW discussed PT/OT recommendations over the phone with Pt's granddaughter Lupe. Lupe agreed to SNF and gave choice for [facility] and [facility]. LSW faxed choice to DPA.     Per Lupe, she had applied to Medicaid for Pt about a month ago but isn't sure if Pt was accepted or not. She asked LSW if the hospital has a way to check if it's still pending of if she was accepted. LSW will f/u with PFA.     Barriers to Discharge: Medical Clearance, SNF acceptance    Plan: LSW to f/u with PFA about possible Medicaid.

## 2021-06-10 NOTE — CARE PLAN
Problem: Pain - Standard  Goal: Alleviation of pain or a reduction in pain to the patient’s comfort goal  Outcome: Not Progressing     Problem: Skin Integrity  Goal: Skin integrity is maintained or improved  Outcome: Progressing   The patient is Stable - Low risk of patient condition declining or worsening    Shift Goals  Clinical Goals: pain control  Patient Goals: eat something    Progress made toward(s) clinical / shift goals: Pain assessed q4hrs, pain medication given as needed.     Patient is not progressing towards the following goals: Pt is still NPO, PEG tube is placed and she can have tube feeding tomorrow.

## 2021-06-10 NOTE — CARE PLAN
The patient is Watcher - Medium risk of patient condition declining or worsening    Shift Goals  Clinical Goals: pain control    Progress made toward(s) clinical / shift goals:  Pt c/o pain on her left leg, medicated pt per MAR. Pt is sleeping comfortably in bed.     Patient is not progressing towards the following goals:

## 2021-06-10 NOTE — PROGRESS NOTES
Assume care of pt at 1900. Report received from day RN. Pt is A/O x2 with episode of confusion. Pt is grimacing and moaning, medicated per MAR. Pt is resting in bed. Bed in lowest and locked position, call light in reach, hourly rounding in place. Bed alarm in place. Labs reviewed. Communication board updated. Will continue to monitor.     Pt is currently NPO for procedure tomorrow.

## 2021-06-10 NOTE — DISCHARGE PLANNING
Received Choice form at 9210  Agency/Facility Name: [facility]  Referral sent per Choice form @ 4835

## 2021-06-10 NOTE — PROGRESS NOTES
Assumed care of pt at 0700 from the night shift nurse. Pt is A&Ox3, disoriented to time. She complains of high pain but is manageable at this time. Pt is NPO still d/t obstruction in the esophagus. Comfort provided for the pt. She's aware of her procedure at 1300. She denies all other needs at this time. Hourly rounding is in place.

## 2021-06-10 NOTE — PROGRESS NOTES
Pt arrived back on unit from the PACU. Post op vitals are stable. Pt complains on 10/10 pain in her left leg. Dressings in place and are clean, dry, and intact.

## 2021-06-10 NOTE — ANESTHESIA PREPROCEDURE EVALUATION
86yo F     Relevant Problems      (positive) Stage 3b chronic kidney disease      ENDO   (positive) Controlled type 2 diabetes mellitus with diabetic nephropathy, without long-term current use of insulin (HCC)   (positive) Diabetes mellitus type 2 with complications (HCC)      Other   (positive) Closed compression fracture of L4 lumbar vertebra, initial encounter (HCC)   (positive) Dementia associated with other underlying disease without behavioral disturbance (HCC)   (positive) Esophageal dysphagia       Physical Exam    Airway   Mallampati: II  TM distance: >3 FB  Neck ROM: full       Cardiovascular - normal exam  Rhythm: regular  Rate: normal  (-) murmur     Dental - normal exam  (+) upper dentures, lower dentures           Pulmonary - normal exam  Breath sounds clear to auscultation     Abdominal    Neurological - normal exam                 Anesthesia Plan    ASA 2       Plan - general       Airway plan will be natural airway          Induction: intravenous    Postoperative Plan: Postoperative administration of opioids is intended.    Pertinent diagnostic labs and testing reviewed    Informed Consent:    Anesthetic plan and risks discussed with healthcare power of .    Use of blood products discussed with: healthcare power of  whom consented to blood products.

## 2021-06-10 NOTE — ANESTHESIA POSTPROCEDURE EVALUATION
Patient: Herminia Jones    Procedure Summary     Date: 06/10/21 Room / Location: [facility] ROOM 26 / SURGERY SAME DAY [facility]    Anesthesia Start: 1157 Anesthesia Stop: 1226    Procedures:       GASTROSCOPY, WITH BIOPSY (N/A Esophagus)      GASTROSCOPY (N/A Esophagus)      INSERTION, PEG TUBE (N/A Abdomen) Diagnosis: (Esophageal dysphagia, presbyesophagus)    Surgeons: Gt Long M.D. Responsible Provider: Ivonne Bal M.D.    Anesthesia Type: general ASA Status: 2          Final Anesthesia Type: general  Last vitals  BP   Blood Pressure : (!) 97/62    Temp   36.7 °C (98.1 °F)    Pulse   88   Resp   20    SpO2   95 %      Anesthesia Post Evaluation    Patient location during evaluation: PACU  Patient participation: complete - patient participated  Level of consciousness: awake and alert  Pain score: 6    Airway patency: patent  Anesthetic complications: no  Cardiovascular status: hemodynamically stable  Respiratory status: acceptable  Hydration status: euvolemic    PONV: none          No complications documented.

## 2021-06-10 NOTE — ASSESSMENT & PLAN NOTE
Patient choked on a piece of pancake.  Esophageal gram showed distal obstruction in the esophagus. GI consulted. Planning for EGD with biopsies and dilation and possible gastrostomy tomorrow 6/10/2021.  6/10: Planning for EGD today.  6/11: EGD showed Presbyesophagus, tortuous but no stricture.  There was gastroduodenitis.  PEG tube was placed.  Tube feeding initiated.  Tolerating tube feeding.

## 2021-06-10 NOTE — THERAPY
SLP Contact Note    Per EMR, pt currently NPO for esophageal dilation/biopsies at 1pm today. SLP will hold tx and see as medically appropriate.

## 2021-06-11 LAB
CRP SERPL HS-MCNC: 3.29 MG/DL (ref 0–0.75)
PREALB SERPL-MCNC: 23.7 MG/DL (ref 18–38)

## 2021-06-11 PROCEDURE — A9270 NON-COVERED ITEM OR SERVICE: HCPCS | Performed by: INTERNAL MEDICINE

## 2021-06-11 PROCEDURE — A9270 NON-COVERED ITEM OR SERVICE: HCPCS | Performed by: FAMILY MEDICINE

## 2021-06-11 PROCEDURE — 36415 COLL VENOUS BLD VENIPUNCTURE: CPT

## 2021-06-11 PROCEDURE — 700102 HCHG RX REV CODE 250 W/ 637 OVERRIDE(OP): Performed by: FAMILY MEDICINE

## 2021-06-11 PROCEDURE — 700111 HCHG RX REV CODE 636 W/ 250 OVERRIDE (IP): Performed by: HOSPITALIST

## 2021-06-11 PROCEDURE — 99232 SBSQ HOSP IP/OBS MODERATE 35: CPT | Performed by: FAMILY MEDICINE

## 2021-06-11 PROCEDURE — 700102 HCHG RX REV CODE 250 W/ 637 OVERRIDE(OP): Performed by: INTERNAL MEDICINE

## 2021-06-11 PROCEDURE — 86140 C-REACTIVE PROTEIN: CPT

## 2021-06-11 PROCEDURE — L0637 LSO SC R ANT/POS PNL PRE CST: HCPCS

## 2021-06-11 PROCEDURE — 770006 HCHG ROOM/CARE - MED/SURG/GYN SEMI*

## 2021-06-11 PROCEDURE — 84134 ASSAY OF PREALBUMIN: CPT

## 2021-06-11 RX ORDER — OMEPRAZOLE 20 MG/1
40 CAPSULE, DELAYED RELEASE ORAL DAILY
Status: DISCONTINUED | OUTPATIENT
Start: 2021-06-11 | End: 2021-06-11

## 2021-06-11 RX ORDER — OMEPRAZOLE 20 MG/1
40 CAPSULE, DELAYED RELEASE ORAL DAILY
Status: CANCELLED | OUTPATIENT
Start: 2021-06-11

## 2021-06-11 RX ADMIN — OXYBUTYNIN CHLORIDE 2.5 MG: 5 TABLET ORAL at 05:58

## 2021-06-11 RX ADMIN — MIRTAZAPINE 7.5 MG: 15 TABLET, FILM COATED ORAL at 20:09

## 2021-06-11 RX ADMIN — DOCUSATE SODIUM 50 MG AND SENNOSIDES 8.6 MG 2 TABLET: 8.6; 5 TABLET, FILM COATED ORAL at 05:57

## 2021-06-11 RX ADMIN — MORPHINE SULFATE 15 MG: 15 TABLET ORAL at 08:45

## 2021-06-11 RX ADMIN — OXYCODONE HYDROCHLORIDE AND ACETAMINOPHEN 1000 MG: 500 TABLET ORAL at 05:58

## 2021-06-11 RX ADMIN — OMEPRAZOLE 40 MG: KIT at 17:20

## 2021-06-11 RX ADMIN — CYCLOBENZAPRINE 5 MG: 10 TABLET, FILM COATED ORAL at 08:45

## 2021-06-11 RX ADMIN — GABAPENTIN 100 MG: 100 CAPSULE ORAL at 17:19

## 2021-06-11 RX ADMIN — GABAPENTIN 100 MG: 100 CAPSULE ORAL at 05:58

## 2021-06-11 RX ADMIN — ATORVASTATIN CALCIUM 20 MG: 20 TABLET, FILM COATED ORAL at 05:58

## 2021-06-11 RX ADMIN — Medication 1000 UNITS: at 06:00

## 2021-06-11 RX ADMIN — OXYBUTYNIN CHLORIDE 5 MG: 5 TABLET ORAL at 17:20

## 2021-06-11 RX ADMIN — MORPHINE SULFATE 15 MG: 15 TABLET ORAL at 14:10

## 2021-06-11 RX ADMIN — HEPARIN SODIUM 5000 UNITS: 5000 INJECTION, SOLUTION INTRAVENOUS; SUBCUTANEOUS at 14:11

## 2021-06-11 RX ADMIN — HEPARIN SODIUM 5000 UNITS: 5000 INJECTION, SOLUTION INTRAVENOUS; SUBCUTANEOUS at 21:12

## 2021-06-11 RX ADMIN — SERTRALINE HYDROCHLORIDE 50 MG: 50 TABLET ORAL at 20:09

## 2021-06-11 RX ADMIN — DOCUSATE SODIUM 50 MG AND SENNOSIDES 8.6 MG 2 TABLET: 8.6; 5 TABLET, FILM COATED ORAL at 17:20

## 2021-06-11 RX ADMIN — SERTRALINE HYDROCHLORIDE 100 MG: 100 TABLET ORAL at 05:59

## 2021-06-11 ASSESSMENT — ENCOUNTER SYMPTOMS
BACK PAIN: 1
FALLS: 1

## 2021-06-11 ASSESSMENT — PAIN DESCRIPTION - PAIN TYPE
TYPE: SURGICAL PAIN
TYPE: SURGICAL PAIN
TYPE: ACUTE PAIN
TYPE: SURGICAL PAIN
TYPE: SURGICAL PAIN

## 2021-06-11 NOTE — CARE PLAN
The patient is Watcher - Medium risk of patient condition declining or worsening    Shift Goals  Clinical Goals: Pain management  Patient Goals:     Progress made toward(s) clinical / shift goals:  Pt pain level was controlled with repositioning and rest.    Patient is not progressing towards the following goals:

## 2021-06-11 NOTE — DISCHARGE PLANNING
Agency/Facility Name: [facility]   Outcome: Per EPIC response, Pt accepted     Agency/Facility Name: [facility]   Outcome: Per vmail response, Pt accepted     Agency/Facility Name: [facility]   Spoke To: Madeline  Outcome: Per Madeline, she will review this referral and call this DPA back

## 2021-06-11 NOTE — PROGRESS NOTES
Pt A&Ox3, disoriented to situation. C/o severe pain this morning, morphine and flexeril given, pt states this is effective. Fibersource tube feeding started at 25ml/hr at 1248. Orders to increase to goal of 45ml/hr at 2048. Pt appears to be tolerating tube feedings so far. Mckoy catheter in place draining clear yellow urine. Family at bedside throughout the day.

## 2021-06-11 NOTE — DIETARY
"Nutrition Support Assessment     Nutrition services:   Day 4 of admit.  88 yo female with admitting diagnosis: L4 lumbar vertebra, back pain    Current problem list:  1. Esophageal dysphagia  2. L4 lumbar compression fracture   3. Recurrent falls  4. UTI  5. History of CKD 3, DM 2, dementia    Assessment:  Estimated Nutritional Needs: based on: height 5'5\", weight 54.4 kg, IBW 56.7 kg, BMI 19.97    Calculation/Equation: MSJ x 1.2 = 1177 kcals  Calories/day: 1200 - 1350 kcals (22 - 25 kcals/kg)  Protein/day: 54 - 65 g (1.0 - 1.2 g/kg)    Evaluation:   1. Seen by SLP for swallow evaluation on 6/8 and an easy to chew thin liquid diet was recommended. She was seen again on 6/9 and found to be choking on a pancake. RN reports pt choking on fluids the night prior and family reports she does this at home as well. GI consult for EGD yesterday and an obstruction was found at the GE junction and a large volume of retained ingested material within the esophagus. Presbyesophagus.   2. PEG tube placed by GI yesterday and tube feedings starting today.  3. Discharge pending acceptance to SNF and ultimately pt to go home with family per RN.    Malnutrition Risk: not identified at this time    Recommendations/Plan:  1. Start and advance TF per protocol  2. Fibersource HN @ 45 ml/hr will provide 1296 kcals, 57 g protein, 879 ml HL20/day.   · Pt will need an additional 500 ml H20/day to meet hydration needs.   · 125 ml  4 x per day at 0800, 1200, 1600, 2000.   · Can incorporate these fluids with provision of medications.  3. If desire transition to bolus feedings: Isosource 1.5 - 3 1/2 cartons per day  · 1 carton for breakfast, 1 carton for lunch and 1 1/2 cartons for dinner  · Additional 800 ml H20/day to meet hydration needs   · 200 ml 4 x per day between bolus feedings  · Can incorporate these fluids with provision of medications    "

## 2021-06-11 NOTE — PROGRESS NOTES
[facility] Medicine Daily Progress Note    Date of Service  6/10/2021    Chief Complaint  87 y.o. female admitted 6/7/2021 with low back pain.    Hospital Course  This is 87 years old female who has past medical history of type 2 diabetes mellitus, macular degeneration, dementia, and falls comes in with low back pain that started a week ago and worsened over the course of time.  Was seen by urgent care recently and was diagnosed with urinary tract infection and started on oral antibiotics.  She presented to the hospital for further evaluation.  Noted to be afebrile per hemodynamically stable.  Imaging studies with a CT of hip and lumbar spine showed acute to subacute compression fracture of L4.  Neurosurgery consulted.  Recommended MRI of the lumbar spine without contrast.  Also noted to have urinary retention.  UA positive for UTI.  Was started on antibiotics and cultures were sent.  Interval Problem Update  Resting in bed comfortably.  Pleasantly confused.  Afebrile.  Dynamically stable.  No acute distress noted.  No issues overnight per staff.  6/9: Patient choked on a piece of pancake this morning. Was not in respiratory distress. Was sent down for esophageal gram which showed distal esophageal obstruction. GI consulted. Planning for EGD tomorrow. Continue to retain urine. Mckoy catheter placed considering bilateral hydronephrosis and significantly distended bladder seen on ultrasound. Spoke with VENKATA Almazan over the phone and updated her.  6/10: Resting in bed comfortably.  Has been n.p.o. since midnight for EGD.  No acute distress noted.  No issues overnight per staff.  Consultants/Specialty  Neurosurgery    Code Status  Full Code    Disposition  Pending clinical course  Pending PT/OT eval    Review of Systems  Review of Systems   Unable to perform ROS: Dementia   Musculoskeletal: Positive for back pain and falls.        Physical Exam  Temp:  [36.1 °C (97 °F)-37 °C (98.6 °F)] 36.6 °C (97.9 °F)  Pulse:  []  87  Resp:  [14-20] 14  BP: ()/(51-99) 126/76  SpO2:  [93 %-99 %] 99 %    Physical Exam  Constitutional:       General: She is not in acute distress.  HENT:      Head: Normocephalic and atraumatic.   Eyes:      Extraocular Movements: Extraocular movements intact.      Pupils: Pupils are equal, round, and reactive to light.   Cardiovascular:      Rate and Rhythm: Normal rate and regular rhythm.      Heart sounds: No friction rub.   Pulmonary:      Breath sounds: Rales present.   Abdominal:      General: There is no distension.      Palpations: There is no mass.   Skin:     Coloration: Skin is not jaundiced or pale.   Neurological:      Mental Status: She is alert. Mental status is at baseline. She is disoriented.   Psychiatric:         Mood and Affect: Mood normal.         Fluids    Intake/Output Summary (Last 24 hours) at 6/10/2021 1836  Last data filed at 6/10/2021 1600  Gross per 24 hour   Intake 1000 ml   Output 1355 ml   Net -355 ml       Laboratory  Recent Labs     06/10/21  0058   WBC 10.7   RBC 5.30   HEMOGLOBIN 15.3   HEMATOCRIT 46.7   MCV 88.1   MCH 28.9   MCHC 32.8*   RDW 45.8   PLATELETCT 247   MPV 9.8     Recent Labs     06/10/21  0058   SODIUM 141   POTASSIUM 4.1   CHLORIDE 106   CO2 22   GLUCOSE 95   BUN 28*   CREATININE 1.17   CALCIUM 9.5                   Imaging  DX-ESOPH. FLUORO (BARIUM SWALLOW)   Final Result      1.  Esophageal obstruction at the level of the GE junction. Etiology unknown.      2.  Large volume of retained ingested material within the esophagus.      3.  Presbyesophagus.      4.  Findings were discussed with ANA STERLING on 6/9/2021 12:50 PM.      US-RENAL   Final Result      1.  Mild bilateral hydronephrosis. Left hydronephrosis appears similar to prior exam.      2.  There is significant post void residual in the urinary bladder.      CT-LSPINE W/O PLUS RECONS   Final Result      1.  Age-indeterminate L4 compression deformity involving the inferior endplate with  approximately 30% loss of height. Loss of height is similar to the prior radiograph on 6/5/2021.      2.  Multilevel degenerative disc disease and facet arthropathy as described. Central canal and foraminal narrowing is most pronounced at L4-5 with vacuum disc phenomenon.      CT-HIP W/O PLUS RECONS LEFT   Final Result         1. Acute to subacute compression fracture of L4 vertebral body, only partially visualized.   2. No acute fracture or dislocation in the bony pelvis.   3. Stable sequela of prior left femoral ORIF, without evidence of hardware complication.   4. Moderately distended bladder with dilatation of the visualized ureters.      MR-LUMBAR SPINE-W/O    (Results Pending)        Assessment/Plan  Esophageal dysphagia- (present on admission)  Assessment & Plan  Patient choked on a piece of pancake.  Esophageal gram showed distal obstruction in the esophagus. GI consulted. Planning for EGD with biopsies and dilation and possible gastrostomy tomorrow 6/10/2021.  6/10: Planning for EGD today.    Closed compression fracture of L4 lumbar vertebra, initial encounter ([facility])  Assessment & Plan  Patient does not appear to have suffered any recent falls, she did have a fall going back on May 10 however the granddaughter states that she does ambulate on her own in the bathroom and so she is uncertain if she may have fallen and not told anybody.  I discussed today with radiology, they would like a dedicated CT of the lumbar spine to assess whether kyphoplasty would be appropriate.  We will check CT L-spine, consult PT and OT  As needed support  6/8: CT of the lumbar spine showed acute to subacute L4 compression fracture.  MRI pending.  PT/OT pending.  No intervention per neurosurgery for now.  TLSO per PT.   6/9: MRI of lumbar spine still pending.    Recurrent falls- (present on admission)  Assessment & Plan  This has been a problem for the patient for some time now.  We will need to consider placement on discharge if  the family is unable to watch more closely as her functional status is likely to be lower.  OT recommending SNF placement.  Referral requested on 6/9/2021.  Fall precautions.      Dementia associated with other underlying disease without behavioral disturbance (HCC)- (present on admission)  Assessment & Plan  Patient appears to be at her baseline per her family at the bedside  High risk for hospital-acquired delirium.  Avoid benzodiazepines and anticholinergic medications.  Minimize narcotics, hypnotics, sedatives.  Minimize lines.  Daily orientation.    Stage 3b chronic kidney disease- (present on admission)  Assessment & Plan  Renal function appears to be around the baseline for this patient.  Renally dose medications as appropriate  Follow BMP    Urinary retention  Assessment & Plan  Was seen  Periodic bladder scan.  Patient has history of hydronephrosis.  Started on Flomax.  Straight catheter parameters.  Check kidney ultrasound  6/9: Kidney ultrasound showed mild bilateral hydronephrosis and distended bladder.  Mckoy catheter was placed.    UTI (urinary tract infection)- (present on admission)  Assessment & Plan  Patient had been started on Macrobid which she took for 24 hours.  We will start on Rocephin for another 48 hours  Follow urine culture  6/9: Completed a course of antibiotics.    Diabetes mellitus type 2 with complications (HCC)- (present on admission)  Assessment & Plan  Patient's last A1c was 6.3, this was 2 days ago.  Carb consistent diet  Follow-up fingersticks    Dyslipidemia- (present on admission)  Assessment & Plan  Continue statin       VTE prophylaxis: Heparin

## 2021-06-11 NOTE — CARE PLAN
The patient is Stable - Low risk of patient condition declining or worsening    Shift Goals  Clinical Goals: Pain management  Patient Goals: eat something    Progress made toward(s) clinical / shift goals:        Problem: Pain - Standard  Goal: Alleviation of pain or a reduction in pain to the patient’s comfort goal  Outcome: Progressing    Pt c/o 10/10 pain in left leg, morphine and flexeril given and effective.      Problem: Skin Integrity  Goal: Skin integrity is maintained or improved  Outcome: Progressing    Pt repositioned every 2 hours to promote comfort and skin integrity.

## 2021-06-11 NOTE — PROGRESS NOTES
Bedside report received. Pt is A&O X2 disoriented to time and situation. She is resting in bed, and states She  POC discussed with pt; all questions answered at this time.

## 2021-06-12 ENCOUNTER — APPOINTMENT (OUTPATIENT)
Dept: RADIOLOGY | Facility: MEDICAL CENTER | Age: 86
DRG: 552 | End: 2021-06-12
Attending: FAMILY MEDICINE
Payer: MEDICARE

## 2021-06-12 LAB
ALBUMIN SERPL BCP-MCNC: 3.8 G/DL (ref 3.2–4.9)
ALBUMIN/GLOB SERPL: 1.1 G/DL
ALP SERPL-CCNC: 86 U/L (ref 30–99)
ALT SERPL-CCNC: 15 U/L (ref 2–50)
ANION GAP SERPL CALC-SCNC: 9 MMOL/L (ref 7–16)
AST SERPL-CCNC: 19 U/L (ref 12–45)
BILIRUB SERPL-MCNC: 0.3 MG/DL (ref 0.1–1.5)
BUN SERPL-MCNC: 33 MG/DL (ref 8–22)
CALCIUM SERPL-MCNC: 9.4 MG/DL (ref 8.5–10.5)
CHLORIDE SERPL-SCNC: 101 MMOL/L (ref 96–112)
CO2 SERPL-SCNC: 29 MMOL/L (ref 20–33)
CREAT SERPL-MCNC: 1.16 MG/DL (ref 0.5–1.4)
CRP SERPL HS-MCNC: 10.49 MG/DL (ref 0–0.75)
GLOBULIN SER CALC-MCNC: 3.6 G/DL (ref 1.9–3.5)
GLUCOSE SERPL-MCNC: 140 MG/DL (ref 65–99)
POTASSIUM SERPL-SCNC: 4.4 MMOL/L (ref 3.6–5.5)
PREALB SERPL-MCNC: 20.9 MG/DL (ref 18–38)
PROT SERPL-MCNC: 7.4 G/DL (ref 6–8.2)
SODIUM SERPL-SCNC: 139 MMOL/L (ref 135–145)

## 2021-06-12 PROCEDURE — 80053 COMPREHEN METABOLIC PANEL: CPT

## 2021-06-12 PROCEDURE — 84134 ASSAY OF PREALBUMIN: CPT

## 2021-06-12 PROCEDURE — A9270 NON-COVERED ITEM OR SERVICE: HCPCS | Performed by: INTERNAL MEDICINE

## 2021-06-12 PROCEDURE — 700102 HCHG RX REV CODE 250 W/ 637 OVERRIDE(OP): Performed by: FAMILY MEDICINE

## 2021-06-12 PROCEDURE — 700102 HCHG RX REV CODE 250 W/ 637 OVERRIDE(OP): Performed by: INTERNAL MEDICINE

## 2021-06-12 PROCEDURE — 36415 COLL VENOUS BLD VENIPUNCTURE: CPT

## 2021-06-12 PROCEDURE — 770006 HCHG ROOM/CARE - MED/SURG/GYN SEMI*

## 2021-06-12 PROCEDURE — A9270 NON-COVERED ITEM OR SERVICE: HCPCS | Performed by: FAMILY MEDICINE

## 2021-06-12 PROCEDURE — 700111 HCHG RX REV CODE 636 W/ 250 OVERRIDE (IP): Performed by: HOSPITALIST

## 2021-06-12 PROCEDURE — 74018 RADEX ABDOMEN 1 VIEW: CPT

## 2021-06-12 PROCEDURE — 72148 MRI LUMBAR SPINE W/O DYE: CPT | Mod: ME

## 2021-06-12 PROCEDURE — 86140 C-REACTIVE PROTEIN: CPT

## 2021-06-12 PROCEDURE — 99231 SBSQ HOSP IP/OBS SF/LOW 25: CPT | Performed by: FAMILY MEDICINE

## 2021-06-12 PROCEDURE — 700111 HCHG RX REV CODE 636 W/ 250 OVERRIDE (IP): Performed by: FAMILY MEDICINE

## 2021-06-12 RX ADMIN — MORPHINE SULFATE 1 MG: 4 INJECTION INTRAVENOUS at 14:23

## 2021-06-12 RX ADMIN — MORPHINE SULFATE 1 MG: 4 INJECTION INTRAVENOUS at 19:00

## 2021-06-12 RX ADMIN — OXYBUTYNIN CHLORIDE 2.5 MG: 5 TABLET ORAL at 04:57

## 2021-06-12 RX ADMIN — HEPARIN SODIUM 5000 UNITS: 5000 INJECTION, SOLUTION INTRAVENOUS; SUBCUTANEOUS at 04:58

## 2021-06-12 RX ADMIN — ATORVASTATIN CALCIUM 20 MG: 20 TABLET, FILM COATED ORAL at 04:57

## 2021-06-12 RX ADMIN — MIRTAZAPINE 7.5 MG: 15 TABLET, FILM COATED ORAL at 21:35

## 2021-06-12 RX ADMIN — GABAPENTIN 100 MG: 100 CAPSULE ORAL at 17:32

## 2021-06-12 RX ADMIN — MAGNESIUM HYDROXIDE 30 ML: 400 SUSPENSION ORAL at 04:56

## 2021-06-12 RX ADMIN — OXYCODONE HYDROCHLORIDE AND ACETAMINOPHEN 1000 MG: 500 TABLET ORAL at 04:56

## 2021-06-12 RX ADMIN — HEPARIN SODIUM 5000 UNITS: 5000 INJECTION, SOLUTION INTRAVENOUS; SUBCUTANEOUS at 21:36

## 2021-06-12 RX ADMIN — DOCUSATE SODIUM 50 MG AND SENNOSIDES 8.6 MG 2 TABLET: 8.6; 5 TABLET, FILM COATED ORAL at 17:31

## 2021-06-12 RX ADMIN — Medication 1000 UNITS: at 04:58

## 2021-06-12 RX ADMIN — SERTRALINE HYDROCHLORIDE 100 MG: 100 TABLET ORAL at 04:58

## 2021-06-12 RX ADMIN — GABAPENTIN 100 MG: 100 CAPSULE ORAL at 04:56

## 2021-06-12 RX ADMIN — CYCLOBENZAPRINE 5 MG: 10 TABLET, FILM COATED ORAL at 17:32

## 2021-06-12 RX ADMIN — OMEPRAZOLE 40 MG: KIT at 17:32

## 2021-06-12 RX ADMIN — SERTRALINE HYDROCHLORIDE 50 MG: 50 TABLET ORAL at 21:36

## 2021-06-12 RX ADMIN — OXYBUTYNIN CHLORIDE 5 MG: 5 TABLET ORAL at 17:32

## 2021-06-12 RX ADMIN — MORPHINE SULFATE 1 MG: 4 INJECTION INTRAVENOUS at 05:44

## 2021-06-12 RX ADMIN — DOCUSATE SODIUM 50 MG AND SENNOSIDES 8.6 MG 2 TABLET: 8.6; 5 TABLET, FILM COATED ORAL at 05:14

## 2021-06-12 ASSESSMENT — PAIN DESCRIPTION - PAIN TYPE
TYPE: ACUTE PAIN
TYPE: ACUTE PAIN
TYPE: ACUTE PAIN;SURGICAL PAIN
TYPE: ACUTE PAIN

## 2021-06-12 ASSESSMENT — ENCOUNTER SYMPTOMS
BACK PAIN: 1
FALLS: 1

## 2021-06-12 ASSESSMENT — FIBROSIS 4 INDEX: FIB4 SCORE: 2.11

## 2021-06-12 NOTE — PROGRESS NOTES
[facility] Medicine Daily Progress Note    Date of Service  6/11/2021    Chief Complaint  87 y.o. female admitted 6/7/2021 with low back pain.    Hospital Course  This is 87 years old female who has past medical history of type 2 diabetes mellitus, macular degeneration, dementia, and falls comes in with low back pain that started a week ago and worsened over the course of time.  Was seen by urgent care recently and was diagnosed with urinary tract infection and started on oral antibiotics.  She presented to the hospital for further evaluation.  Noted to be afebrile per hemodynamically stable.  Imaging studies with a CT of hip and lumbar spine showed acute to subacute compression fracture of L4.  Neurosurgery consulted.  Recommended MRI of the lumbar spine without contrast.  Also noted to have urinary retention.  UA positive for UTI.  Was started on antibiotics and cultures were sent.  Interval Problem Update  Resting in bed comfortably.  Pleasantly confused.  Afebrile.  Dynamically stable.  No acute distress noted.  No issues overnight per staff.  6/9: Patient choked on a piece of pancake this morning. Was not in respiratory distress. Was sent down for esophageal gram which showed distal esophageal obstruction. GI consulted. Planning for EGD tomorrow. Continue to retain urine. Mckoy catheter placed considering bilateral hydronephrosis and significantly distended bladder seen on ultrasound. Spoke with VENKATA Almazan over the phone and updated her.  6/10: Resting in bed comfortably.  Has been n.p.o. since midnight for EGD.  No acute distress noted.  No issues overnight per staff.  6/11: Resting in bed comfortably.  Had EGD yesterday that showed Presbyesophagus, tortuous but no stricture.  PEG tube was placed.  Started tube feeding today.  No acute distress noted.  No issues overnight per staff.  Consultants/Specialty  Neurosurgery  GI  Code Status  Full Code    Disposition  SNF once medically cleared    Review of  Systems  Review of Systems   Unable to perform ROS: Dementia   Musculoskeletal: Positive for back pain and falls.        Physical Exam  Temp:  [36.2 °C (97.2 °F)-36.6 °C (97.9 °F)] 36.4 °C (97.6 °F)  Pulse:  [79-99] 99  Resp:  [16-18] 17  BP: (100-135)/(51-63) 100/51  SpO2:  [96 %-100 %] 100 %    Physical Exam  Constitutional:       General: She is not in acute distress.     Appearance: She is not ill-appearing.   HENT:      Head: Normocephalic and atraumatic.   Eyes:      Extraocular Movements: Extraocular movements intact.      Pupils: Pupils are equal, round, and reactive to light.   Cardiovascular:      Rate and Rhythm: Normal rate and regular rhythm.      Heart sounds: No friction rub.   Pulmonary:      Effort: No respiratory distress.   Abdominal:      General: There is no distension.      Palpations: There is no mass.      Comments: PEG tube in place   Skin:     Coloration: Skin is not jaundiced or pale.   Neurological:      Mental Status: She is alert. Mental status is at baseline. She is disoriented.   Psychiatric:         Mood and Affect: Mood normal.         Fluids    Intake/Output Summary (Last 24 hours) at 6/11/2021 1920  Last data filed at 6/11/2021 1200  Gross per 24 hour   Intake --   Output 1400 ml   Net -1400 ml       Laboratory  Recent Labs     06/10/21  0058   WBC 10.7   RBC 5.30   HEMOGLOBIN 15.3   HEMATOCRIT 46.7   MCV 88.1   MCH 28.9   MCHC 32.8*   RDW 45.8   PLATELETCT 247   MPV 9.8     Recent Labs     06/10/21  0058   SODIUM 141   POTASSIUM 4.1   CHLORIDE 106   CO2 22   GLUCOSE 95   BUN 28*   CREATININE 1.17   CALCIUM 9.5                   Imaging  DX-ESOPH. FLUORO (BARIUM SWALLOW)   Final Result      1.  Esophageal obstruction at the level of the GE junction. Etiology unknown.      2.  Large volume of retained ingested material within the esophagus.      3.  Presbyesophagus.      4.  Findings were discussed with ANA STERLING on 6/9/2021 12:50 PM.      US-RENAL   Final Result      1.   Mild bilateral hydronephrosis. Left hydronephrosis appears similar to prior exam.      2.  There is significant post void residual in the urinary bladder.      CT-LSPINE W/O PLUS RECONS   Final Result      1.  Age-indeterminate L4 compression deformity involving the inferior endplate with approximately 30% loss of height. Loss of height is similar to the prior radiograph on 6/5/2021.      2.  Multilevel degenerative disc disease and facet arthropathy as described. Central canal and foraminal narrowing is most pronounced at L4-5 with vacuum disc phenomenon.      CT-HIP W/O PLUS RECONS LEFT   Final Result         1. Acute to subacute compression fracture of L4 vertebral body, only partially visualized.   2. No acute fracture or dislocation in the bony pelvis.   3. Stable sequela of prior left femoral ORIF, without evidence of hardware complication.   4. Moderately distended bladder with dilatation of the visualized ureters.      MR-LUMBAR SPINE-W/O    (Results Pending)        Assessment/Plan  Esophageal dysphagia- (present on admission)  Assessment & Plan  Patient choked on a piece of pancake.  Esophageal gram showed distal obstruction in the esophagus. GI consulted. Planning for EGD with biopsies and dilation and possible gastrostomy tomorrow 6/10/2021.  6/10: Planning for EGD today.  6/11: EGD showed Presbyesophagus, tortuous but no stricture.  There was gastroduodenitis.  PEG tube was placed.  Tube feeding initiated.    Closed compression fracture of L4 lumbar vertebra, initial encounter ([facility])  Assessment & Plan  Patient does not appear to have suffered any recent falls, she did have a fall going back on May 10 however the granddaughter states that she does ambulate on her own in the bathroom and so she is uncertain if she may have fallen and not told anybody.  I discussed today with radiology, they would like a dedicated CT of the lumbar spine to assess whether kyphoplasty would be appropriate.  We will check CT  L-spine, consult PT and OT  As needed support  6/8: CT of the lumbar spine showed acute to subacute L4 compression fracture.  MRI pending.  PT/OT pending.  No intervention per neurosurgery for now.  LSO, PT/OT   6/9: MRI of lumbar spine still pending.    Recurrent falls- (present on admission)  Assessment & Plan  This has been a problem for the patient for some time now.  We will need to consider placement on discharge if the family is unable to watch more closely as her functional status is likely to be lower.  OT recommending SNF placement.  Referral requested on 6/9/2021.  Fall precautions.      Dementia associated with other underlying disease without behavioral disturbance (HCC)- (present on admission)  Assessment & Plan  Patient appears to be at her baseline per her family at the bedside  High risk for hospital-acquired delirium.  Avoid benzodiazepines and anticholinergic medications.  Minimize narcotics, hypnotics, sedatives.  Minimize lines.  Daily orientation.    Stage 3b chronic kidney disease- (present on admission)  Assessment & Plan  Renal function appears to be around the baseline for this patient.  Renally dose medications as appropriate  Follow BMP    Urinary retention  Assessment & Plan  Was seen  Periodic bladder scan.  Patient has history of hydronephrosis.  Started on Flomax.  Straight catheter parameters.  Check kidney ultrasound  6/9: Kidney ultrasound showed mild bilateral hydronephrosis and distended bladder.  Mckoy catheter was placed.    UTI (urinary tract infection)- (present on admission)  Assessment & Plan  Patient had been started on Macrobid which she took for 24 hours.  We will start on Rocephin for another 48 hours  Follow urine culture  6/9: Completed a course of antibiotics.    Diabetes mellitus type 2 with complications (HCC)- (present on admission)  Assessment & Plan  Patient's last A1c was 6.3, this was 2 days ago.  Carb consistent diet  Follow-up fingersticks    Dyslipidemia-  (present on admission)  Assessment & Plan  Continue statin       VTE prophylaxis: Heparin

## 2021-06-12 NOTE — PROGRESS NOTES
"Received alert and oriented x 2. Kept NPO. Tube feeding continued via PEG tube. Check vitals sign and recorded accordingly and due med given per MAR. Monitor sign and symptoms of respiratory distress and treatment given accordingly per MAR.Medicated per MAR and reassessed every 2 hours per protocol. Call light within reach. Bed alarm in placed. Needs attended. Will continue to monitor./52   Pulse 86   Temp 36.8 °C (98.2 °F) (Temporal)   Resp 17   Ht 1.651 m (5' 5\")   Wt 54.4 kg (120 lb)   SpO2 91%   BMI 19.97 kg/m² .  "

## 2021-06-12 NOTE — PROGRESS NOTES
[facility] Medicine Daily Progress Note    Date of Service  6/12/2021    Chief Complaint  87 y.o. female admitted 6/7/2021 with low back pain.    Hospital Course  This is 87 years old female who has past medical history of type 2 diabetes mellitus, macular degeneration, dementia, and falls comes in with low back pain that started a week ago and worsened over the course of time.  Was seen by urgent care recently and was diagnosed with urinary tract infection and started on oral antibiotics.  She presented to the hospital for further evaluation.  Noted to be afebrile per hemodynamically stable.  Imaging studies with a CT of hip and lumbar spine showed acute to subacute compression fracture of L4.  Neurosurgery consulted.  Recommended MRI of the lumbar spine without contrast.  Also noted to have urinary retention.  UA positive for UTI.  Was started on antibiotics and cultures were sent.  Interval Problem Update  Resting in bed comfortably.  Pleasantly confused.  Afebrile.  Dynamically stable.  No acute distress noted.  No issues overnight per staff.  6/9: Patient choked on a piece of pancake this morning. Was not in respiratory distress. Was sent down for esophageal gram which showed distal esophageal obstruction. GI consulted. Planning for EGD tomorrow. Continue to retain urine. Mckoy catheter placed considering bilateral hydronephrosis and significantly distended bladder seen on ultrasound. Spoke with VENKATA Almazan over the phone and updated her.  6/10: Resting in bed comfortably.  Has been n.p.o. since midnight for EGD.  No acute distress noted.  No issues overnight per staff.  6/11: Resting in bed comfortably.  Had EGD yesterday that showed Presbyesophagus, tortuous but no stricture.  PEG tube was placed.  Started tube feeding today.  No acute distress noted.  No issues overnight per staff.  6/12: Resting in bed comfortable.  Tolerating tube feeding.  Has been afebrile.  Hemodynamically stable.  No acute distress noted.   No issues overnight per staff.  Consultants/Specialty  Neurosurgery  GI  Code Status  Full Code    Disposition  SNF once medically cleared    Review of Systems  Review of Systems   Unable to perform ROS: Dementia   Musculoskeletal: Positive for back pain and falls.        Physical Exam  Temp:  [36.1 °C (96.9 °F)-36.8 °C (98.2 °F)] 36.4 °C (97.5 °F)  Pulse:  [78-99] 86  Resp:  [16-17] 16  BP: ()/(47-55) 98/47  SpO2:  [91 %-100 %] 95 %    Physical Exam  Constitutional:       General: She is not in acute distress.  Eyes:      Extraocular Movements: Extraocular movements intact.      Pupils: Pupils are equal, round, and reactive to light.   Cardiovascular:      Rate and Rhythm: Normal rate and regular rhythm.      Heart sounds: No friction rub.   Pulmonary:      Effort: No respiratory distress.   Abdominal:      General: There is no distension.      Palpations: There is no mass.      Comments: PEG tube in place   Skin:     Coloration: Skin is not jaundiced or pale.   Neurological:      Mental Status: She is alert. Mental status is at baseline. She is disoriented.         Fluids    Intake/Output Summary (Last 24 hours) at 6/12/2021 1435  Last data filed at 6/12/2021 0730  Gross per 24 hour   Intake 100 ml   Output 740 ml   Net -640 ml       Laboratory  Recent Labs     06/10/21  0058   WBC 10.7   RBC 5.30   HEMOGLOBIN 15.3   HEMATOCRIT 46.7   MCV 88.1   MCH 28.9   MCHC 32.8*   RDW 45.8   PLATELETCT 247   MPV 9.8     Recent Labs     06/10/21  0058 06/12/21  1235   SODIUM 141 139   POTASSIUM 4.1 4.4   CHLORIDE 106 101   CO2 22 29   GLUCOSE 95 140*   BUN 28* 33*   CREATININE 1.17 1.16   CALCIUM 9.5 9.4                   Imaging  DX-ESOPH. FLUORO (BARIUM SWALLOW)   Final Result      1.  Esophageal obstruction at the level of the GE junction. Etiology unknown.      2.  Large volume of retained ingested material within the esophagus.      3.  Presbyesophagus.      4.  Findings were discussed with ANA STERLING on  6/9/2021 12:50 PM.      US-RENAL   Final Result      1.  Mild bilateral hydronephrosis. Left hydronephrosis appears similar to prior exam.      2.  There is significant post void residual in the urinary bladder.      CT-LSPINE W/O PLUS RECONS   Final Result      1.  Age-indeterminate L4 compression deformity involving the inferior endplate with approximately 30% loss of height. Loss of height is similar to the prior radiograph on 6/5/2021.      2.  Multilevel degenerative disc disease and facet arthropathy as described. Central canal and foraminal narrowing is most pronounced at L4-5 with vacuum disc phenomenon.      CT-HIP W/O PLUS RECONS LEFT   Final Result         1. Acute to subacute compression fracture of L4 vertebral body, only partially visualized.   2. No acute fracture or dislocation in the bony pelvis.   3. Stable sequela of prior left femoral ORIF, without evidence of hardware complication.   4. Moderately distended bladder with dilatation of the visualized ureters.      MR-LUMBAR SPINE-W/O    (Results Pending)        Assessment/Plan  Esophageal dysphagia- (present on admission)  Assessment & Plan  Patient choked on a piece of pancake.  Esophageal gram showed distal obstruction in the esophagus. GI consulted. Planning for EGD with biopsies and dilation and possible gastrostomy tomorrow 6/10/2021.  6/10: Planning for EGD today.  6/11: EGD showed Presbyesophagus, tortuous but no stricture.  There was gastroduodenitis.  PEG tube was placed.  Tube feeding initiated.  Tolerating tube feeding.    Closed compression fracture of L4 lumbar vertebra, initial encounter ([facility])  Assessment & Plan  Patient does not appear to have suffered any recent falls, she did have a fall going back on May 10 however the granddaughter states that she does ambulate on her own in the bathroom and so she is uncertain if she may have fallen and not told anybody.  I discussed today with radiology, they would like a dedicated CT of the  lumbar spine to assess whether kyphoplasty would be appropriate.  We will check CT L-spine, consult PT and OT  As needed support  6/8: CT of the lumbar spine showed acute to subacute L4 compression fracture.  MRI pending.  PT/OT pending.  No intervention per neurosurgery for now.  LSO, PT/OT   6/9: MRI of lumbar spine still pending.    Recurrent falls- (present on admission)  Assessment & Plan  This has been a problem for the patient for some time now.  We will need to consider placement on discharge if the family is unable to watch more closely as her functional status is likely to be lower.  OT recommending SNF placement.  Referral requested on 6/9/2021.  Fall precautions.      Dementia associated with other underlying disease without behavioral disturbance (HCC)- (present on admission)  Assessment & Plan  Patient appears to be at her baseline per her family at the bedside  High risk for hospital-acquired delirium.  Avoid benzodiazepines and anticholinergic medications.  Minimize narcotics, hypnotics, sedatives.  Minimize lines.  Daily orientation.    Stage 3b chronic kidney disease- (present on admission)  Assessment & Plan  Renal function appears to be around the baseline for this patient.  Renally dose medications as appropriate  Follow BMP    Urinary retention  Assessment & Plan  Was seen  Periodic bladder scan.  Patient has history of hydronephrosis.  Started on Flomax.  Straight catheter parameters.  Check kidney ultrasound  6/9: Kidney ultrasound showed mild bilateral hydronephrosis and distended bladder.  Mckoy catheter was placed.    UTI (urinary tract infection)- (present on admission)  Assessment & Plan  Patient had been started on Macrobid which she took for 24 hours.  We will start on Rocephin for another 48 hours  Follow urine culture  6/9: Completed a course of antibiotics.    Diabetes mellitus type 2 with complications (HCC)- (present on admission)  Assessment & Plan  Patient's last A1c was 6.3,  this was 2 days ago.  Carb consistent diet  Follow-up fingersticks    Dyslipidemia- (present on admission)  Assessment & Plan  Continue statin       VTE prophylaxis: Heparin

## 2021-06-12 NOTE — CARE PLAN
The patient is Stable - Low risk of patient condition declining or worsening    Shift Goals  Clinical Goals: Pain management  Patient Goals: eat something    Progress made toward(s) clinical / shift goals:  offered due pain med and heat packs for comfort.    Problem: Pain - Standard  Goal: Alleviation of pain or a reduction in pain to the patient’s comfort goal  Outcome: Progressing     Problem: Knowledge Deficit - Standard  Goal: Patient and family/care givers will demonstrate understanding of plan of care, disease process/condition, diagnostic tests and medications  Outcome: Progressing     Problem: Skin Integrity  Goal: Skin integrity is maintained or improved  Outcome: Progressing     Problem: Fall Risk  Goal: Patient will remain free from falls  Outcome: Progressing

## 2021-06-12 NOTE — PROGRESS NOTES
Assumed care at 0700 after report received from night RN. Pt is A/Ox4, on RA, pain 3/10 in abd, appears to be constipation related, prns given. Tube feed rate at 45 mL per hour, HOB elevated. PEG site WNL, flushed. PIV site WNL. Await discharge

## 2021-06-12 NOTE — THERAPY
Missed Therapy     Patient Name: Herminia Jones  Age:  87 y.o., Sex:  female  Medical Record #: [id number]  Today's Date: 6/12/2021 06/12/21 1403   Interdisciplinary Plan of Care Collaboration   Collaboration Comments Attempted therapy follow up session pt currently leaving floor for MRI. Will follow up as able.

## 2021-06-12 NOTE — CARE PLAN
Problem: Fall Risk  Goal: Patient will remain free from falls  Outcome: Progressing  Note: All fall precautions in place     Problem: Pain - Standard  Goal: Alleviation of pain or a reduction in pain to the patient’s comfort goal  6/12/2021 1600 by Nina Zaman, R.N.  Outcome: Progressing  6/12/2021 1554 by Nina Zaman, R.N.  Note: Prn morphine given for patient to tolerate MRI, pt able to tolerate laying flat and [place]   The patient is Stable - Low risk of patient condition declining or worsening    Shift Goals  Clinical Goals: goal rate on tube feed  Patient Goals: discharge    Progress made toward(s) clinical / shift goals:  tolerating tube feed, constipated and prn given for BM    Patient is not progressing towards the following goals: awaiting SNF acceptance

## 2021-06-13 PROBLEM — K59.01 SLOW TRANSIT CONSTIPATION: Status: ACTIVE | Noted: 2021-06-13

## 2021-06-13 LAB
ALBUMIN SERPL BCP-MCNC: 3.6 G/DL (ref 3.2–4.9)
ALBUMIN/GLOB SERPL: 0.9 G/DL
ALP SERPL-CCNC: 86 U/L (ref 30–99)
ALT SERPL-CCNC: 19 U/L (ref 2–50)
ANION GAP SERPL CALC-SCNC: 10 MMOL/L (ref 7–16)
AST SERPL-CCNC: 19 U/L (ref 12–45)
BASOPHILS # BLD AUTO: 0.6 % (ref 0–1.8)
BASOPHILS # BLD: 0.07 K/UL (ref 0–0.12)
BILIRUB SERPL-MCNC: 0.4 MG/DL (ref 0.1–1.5)
BUN SERPL-MCNC: 35 MG/DL (ref 8–22)
CALCIUM SERPL-MCNC: 9.5 MG/DL (ref 8.5–10.5)
CHLORIDE SERPL-SCNC: 101 MMOL/L (ref 96–112)
CO2 SERPL-SCNC: 28 MMOL/L (ref 20–33)
CREAT SERPL-MCNC: 1.02 MG/DL (ref 0.5–1.4)
EOSINOPHIL # BLD AUTO: 0.73 K/UL (ref 0–0.51)
EOSINOPHIL NFR BLD: 6.5 % (ref 0–6.9)
ERYTHROCYTE [DISTWIDTH] IN BLOOD BY AUTOMATED COUNT: 46.5 FL (ref 35.9–50)
GLOBULIN SER CALC-MCNC: 3.9 G/DL (ref 1.9–3.5)
GLUCOSE SERPL-MCNC: 113 MG/DL (ref 65–99)
HCT VFR BLD AUTO: 45.8 % (ref 37–47)
HGB BLD-MCNC: 14.7 G/DL (ref 12–16)
IMM GRANULOCYTES # BLD AUTO: 0.19 K/UL (ref 0–0.11)
IMM GRANULOCYTES NFR BLD AUTO: 1.7 % (ref 0–0.9)
LYMPHOCYTES # BLD AUTO: 2.31 K/UL (ref 1–4.8)
LYMPHOCYTES NFR BLD: 20.7 % (ref 22–41)
MCH RBC QN AUTO: 29 PG (ref 27–33)
MCHC RBC AUTO-ENTMCNC: 32.1 G/DL (ref 33.6–35)
MCV RBC AUTO: 90.3 FL (ref 81.4–97.8)
MONOCYTES # BLD AUTO: 0.91 K/UL (ref 0–0.85)
MONOCYTES NFR BLD AUTO: 8.2 % (ref 0–13.4)
NEUTROPHILS # BLD AUTO: 6.95 K/UL (ref 2–7.15)
NEUTROPHILS NFR BLD: 62.3 % (ref 44–72)
NRBC # BLD AUTO: 0 K/UL
NRBC BLD-RTO: 0 /100 WBC
PLATELET # BLD AUTO: 257 K/UL (ref 164–446)
PMV BLD AUTO: 10.1 FL (ref 9–12.9)
POTASSIUM SERPL-SCNC: 4.5 MMOL/L (ref 3.6–5.5)
PROT SERPL-MCNC: 7.5 G/DL (ref 6–8.2)
RBC # BLD AUTO: 5.07 M/UL (ref 4.2–5.4)
SODIUM SERPL-SCNC: 139 MMOL/L (ref 135–145)
WBC # BLD AUTO: 11.2 K/UL (ref 4.8–10.8)

## 2021-06-13 PROCEDURE — 700111 HCHG RX REV CODE 636 W/ 250 OVERRIDE (IP): Performed by: HOSPITALIST

## 2021-06-13 PROCEDURE — A9270 NON-COVERED ITEM OR SERVICE: HCPCS | Performed by: INTERNAL MEDICINE

## 2021-06-13 PROCEDURE — 700102 HCHG RX REV CODE 250 W/ 637 OVERRIDE(OP): Performed by: INTERNAL MEDICINE

## 2021-06-13 PROCEDURE — 85025 COMPLETE CBC W/AUTO DIFF WBC: CPT

## 2021-06-13 PROCEDURE — 80053 COMPREHEN METABOLIC PANEL: CPT

## 2021-06-13 PROCEDURE — 36415 COLL VENOUS BLD VENIPUNCTURE: CPT

## 2021-06-13 PROCEDURE — 700111 HCHG RX REV CODE 636 W/ 250 OVERRIDE (IP): Performed by: FAMILY MEDICINE

## 2021-06-13 PROCEDURE — A9270 NON-COVERED ITEM OR SERVICE: HCPCS | Performed by: FAMILY MEDICINE

## 2021-06-13 PROCEDURE — 770006 HCHG ROOM/CARE - MED/SURG/GYN SEMI*

## 2021-06-13 PROCEDURE — 99232 SBSQ HOSP IP/OBS MODERATE 35: CPT | Performed by: FAMILY MEDICINE

## 2021-06-13 PROCEDURE — 700102 HCHG RX REV CODE 250 W/ 637 OVERRIDE(OP): Performed by: FAMILY MEDICINE

## 2021-06-13 RX ORDER — OXYBUTYNIN CHLORIDE 5 MG/1
2.5 TABLET ORAL
Status: DISCONTINUED | OUTPATIENT
Start: 2021-06-13 | End: 2021-06-14 | Stop reason: HOSPADM

## 2021-06-13 RX ORDER — OXYBUTYNIN CHLORIDE 5 MG/1
5 TABLET ORAL NIGHTLY PRN
Status: DISCONTINUED | OUTPATIENT
Start: 2021-06-13 | End: 2021-06-14 | Stop reason: HOSPADM

## 2021-06-13 RX ADMIN — MIRTAZAPINE 7.5 MG: 15 TABLET, FILM COATED ORAL at 21:19

## 2021-06-13 RX ADMIN — Medication 1000 UNITS: at 05:02

## 2021-06-13 RX ADMIN — DOCUSATE SODIUM 50 MG AND SENNOSIDES 8.6 MG 2 TABLET: 8.6; 5 TABLET, FILM COATED ORAL at 17:54

## 2021-06-13 RX ADMIN — GABAPENTIN 100 MG: 100 CAPSULE ORAL at 17:54

## 2021-06-13 RX ADMIN — ENOXAPARIN SODIUM 40 MG: 40 INJECTION SUBCUTANEOUS at 17:57

## 2021-06-13 RX ADMIN — OXYBUTYNIN CHLORIDE 2.5 MG: 5 TABLET ORAL at 05:01

## 2021-06-13 RX ADMIN — OXYCODONE HYDROCHLORIDE AND ACETAMINOPHEN 1000 MG: 500 TABLET ORAL at 05:01

## 2021-06-13 RX ADMIN — SERTRALINE HYDROCHLORIDE 50 MG: 50 TABLET ORAL at 21:19

## 2021-06-13 RX ADMIN — GABAPENTIN 100 MG: 100 CAPSULE ORAL at 05:02

## 2021-06-13 RX ADMIN — MORPHINE SULFATE 15 MG: 15 TABLET ORAL at 12:37

## 2021-06-13 RX ADMIN — HEPARIN SODIUM 5000 UNITS: 5000 INJECTION, SOLUTION INTRAVENOUS; SUBCUTANEOUS at 05:04

## 2021-06-13 RX ADMIN — MORPHINE SULFATE 15 MG: 15 TABLET ORAL at 05:05

## 2021-06-13 RX ADMIN — DOCUSATE SODIUM 50 MG AND SENNOSIDES 8.6 MG 2 TABLET: 8.6; 5 TABLET, FILM COATED ORAL at 05:04

## 2021-06-13 RX ADMIN — CYCLOBENZAPRINE 5 MG: 10 TABLET, FILM COATED ORAL at 12:37

## 2021-06-13 RX ADMIN — ATORVASTATIN CALCIUM 20 MG: 20 TABLET, FILM COATED ORAL at 06:00

## 2021-06-13 RX ADMIN — BISACODYL 10 MG: 10 SUPPOSITORY RECTAL at 03:24

## 2021-06-13 RX ADMIN — SERTRALINE HYDROCHLORIDE 100 MG: 100 TABLET ORAL at 05:01

## 2021-06-13 RX ADMIN — OMEPRAZOLE 40 MG: KIT at 17:55

## 2021-06-13 ASSESSMENT — PAIN DESCRIPTION - PAIN TYPE
TYPE: ACUTE PAIN

## 2021-06-13 ASSESSMENT — ENCOUNTER SYMPTOMS
BACK PAIN: 1
FALLS: 1

## 2021-06-13 NOTE — PROGRESS NOTES
[facility] Medicine Daily Progress Note    Date of Service  6/13/2021    Chief Complaint  87 y.o. female admitted 6/7/2021 with low back pain.    Hospital Course  This is 87 years old female who has past medical history of type 2 diabetes mellitus, macular degeneration, dementia, and falls comes in with low back pain that started a week ago and worsened over the course of time.  Was seen by urgent care recently and was diagnosed with urinary tract infection and started on oral antibiotics.  She presented to the hospital for further evaluation.  Noted to be afebrile per hemodynamically stable.  Imaging studies with a CT of hip and lumbar spine showed acute to subacute compression fracture of L4.  Neurosurgery consulted.  Recommended MRI of the lumbar spine without contrast.  Also noted to have urinary retention.  UA positive for UTI.  Was started on antibiotics and cultures were sent.  Interval Problem Update  Resting in bed comfortably.  Pleasantly confused.  Afebrile.  Dynamically stable.  No acute distress noted.  No issues overnight per staff.  6/9: Patient choked on a piece of pancake this morning. Was not in respiratory distress. Was sent down for esophageal gram which showed distal esophageal obstruction. GI consulted. Planning for EGD tomorrow. Continue to retain urine. Mckoy catheter placed considering bilateral hydronephrosis and significantly distended bladder seen on ultrasound. Spoke with VENKATA Almazan over the phone and updated her.  6/10: Resting in bed comfortably.  Has been n.p.o. since midnight for EGD.  No acute distress noted.  No issues overnight per staff.  6/11: Resting in bed comfortably.  Had EGD yesterday that showed Presbyesophagus, tortuous but no stricture.  PEG tube was placed.  Started tube feeding today.  No acute distress noted.  No issues overnight per staff.  6/12: Resting in bed comfortable.  Tolerating tube feeding.  Has been afebrile.  Hemodynamically stable.  No acute distress noted.   No issues overnight per staff.  6/13: Resting in bed comfortable.  Complaining of abdominal pain yesterday.  X-ray negative for bowel obstruction but showed stool with old contrast material.  Change oxycodone to as needed.  Added magnesium citrate.  MRI lumbar spine results noted.  No nausea or vomiting.  Hemodynamically stable.  Afebrile.  No acute distress noted.  No issues overnight per staff.  Consultants/Specialty  Neurosurgery  GI  Code Status  Full Code    Disposition  SNF once medically cleared    Review of Systems  Review of Systems   Unable to perform ROS: Dementia   Musculoskeletal: Positive for back pain and falls.        Physical Exam  Temp:  [36 °C (96.8 °F)-37.5 °C (99.5 °F)] 36.7 °C (98.1 °F)  Pulse:  [81-97] 81  Resp:  [16-18] 18  BP: (100-105)/(48-59) 104/53  SpO2:  [91 %-98 %] 93 %    Physical Exam  Constitutional:       General: She is not in acute distress.     Appearance: She is not ill-appearing.   Eyes:      Extraocular Movements: Extraocular movements intact.      Pupils: Pupils are equal, round, and reactive to light.   Cardiovascular:      Rate and Rhythm: Normal rate and regular rhythm.      Heart sounds: No friction rub.   Pulmonary:      Effort: No respiratory distress.   Abdominal:      General: Bowel sounds are decreased. There is no distension.      Palpations: There is no mass.      Tenderness: There is no abdominal tenderness.      Comments: PEG tube in place   Skin:     Coloration: Skin is not jaundiced or pale.   Neurological:      Mental Status: She is alert. Mental status is at baseline. She is disoriented.   Psychiatric:         Mood and Affect: Mood normal.         Fluids    Intake/Output Summary (Last 24 hours) at 6/13/2021 1539  Last data filed at 6/13/2021 0800  Gross per 24 hour   Intake 30 ml   Output 425 ml   Net -395 ml       Laboratory  Recent Labs     06/13/21  0419   WBC 11.2*   RBC 5.07   HEMOGLOBIN 14.7   HEMATOCRIT 45.8   MCV 90.3   MCH 29.0   MCHC 32.1*   RDW  46.5   PLATELETCT 257   MPV 10.1     Recent Labs     06/12/21  1235 06/13/21  0419   SODIUM 139 139   POTASSIUM 4.4 4.5   CHLORIDE 101 101   CO2 29 28   GLUCOSE 140* 113*   BUN 33* 35*   CREATININE 1.16 1.02   CALCIUM 9.4 9.5                   Imaging  [id number] VIEW   Final Result      No evidence of bowel obstruction.      Stool and contrast within the colon from recent esophagram.      MR-LUMBAR SPINE-W/O   Final Result      1.  L4 acute or recent subacute inferior endplate insufficiency compression fracture.   2.  L4-5 small central disc protrusion superimposed on mild diffuse disc bulging along with significant hypertrophic facet arthropathy and marked ligamentum flavum hypertrophy resulting in severe central spinal stenosis. Severe bilateral foraminal    stenosis, left greater than right.   3.  L5-S1 mild central disc bulging. Mild hypertrophic facet arthropathy right greater than left. Mild bilateral foraminal stenosis.      DX-ESOPH. FLUORO (BARIUM SWALLOW)   Final Result      1.  Esophageal obstruction at the level of the GE junction. Etiology unknown.      2.  Large volume of retained ingested material within the esophagus.      3.  Presbyesophagus.      4.  Findings were discussed with ANA STERLING on 6/9/2021 12:50 PM.      US-RENAL   Final Result      1.  Mild bilateral hydronephrosis. Left hydronephrosis appears similar to prior exam.      2.  There is significant post void residual in the urinary bladder.      CT-LSPINE W/O PLUS RECONS   Final Result      1.  Age-indeterminate L4 compression deformity involving the inferior endplate with approximately 30% loss of height. Loss of height is similar to the prior radiograph on 6/5/2021.      2.  Multilevel degenerative disc disease and facet arthropathy as described. Central canal and foraminal narrowing is most pronounced at L4-5 with vacuum disc phenomenon.      CT-HIP W/O PLUS RECONS LEFT   Final Result         1. Acute to subacute compression  fracture of L4 vertebral body, only partially visualized.   2. No acute fracture or dislocation in the bony pelvis.   3. Stable sequela of prior left femoral ORIF, without evidence of hardware complication.   4. Moderately distended bladder with dilatation of the visualized ureters.           Assessment/Plan  Slow transit constipation- (present on admission)  Assessment & Plan  Minimize narcotics per  Aggressive bowel regimen.    Esophageal dysphagia- (present on admission)  Assessment & Plan  Patient choked on a piece of pancake.  Esophageal gram showed distal obstruction in the esophagus. GI consulted. Planning for EGD with biopsies and dilation and possible gastrostomy tomorrow 6/10/2021.  6/10: Planning for EGD today.  6/11: EGD showed Presbyesophagus, tortuous but no stricture.  There was gastroduodenitis.  PEG tube was placed.  Tube feeding initiated.  Tolerating tube feeding.    Closed compression fracture of L4 lumbar vertebra, initial encounter ([facility])  Assessment & Plan  Patient does not appear to have suffered any recent falls, she did have a fall going back on May 10 however the granddaughter states that she does ambulate on her own in the bathroom and so she is uncertain if she may have fallen and not told anybody.  I discussed today with radiology, they would like a dedicated CT of the lumbar spine to assess whether kyphoplasty would be appropriate.  We will check CT L-spine, consult PT and OT  As needed support  6/8: CT of the lumbar spine showed acute to subacute L4 compression fracture.  MRI pending.  PT/OT pending.  No intervention per neurosurgery for now.  TLSO, PT/OT   6/9: MRI of lumbar spine still pending.  6/13: MRI of the lumbar spine showed acute/subacute inferior endplate L4 compression fracture.  Continue with TLSO.    Recurrent falls- (present on admission)  Assessment & Plan  This has been a problem for the patient for some time now.  We will need to consider placement on discharge if the  family is unable to watch more closely as her functional status is likely to be lower.  OT recommending SNF placement.  Referral requested on 6/9/2021.  Fall precautions.      Dementia associated with other underlying disease without behavioral disturbance (HCC)- (present on admission)  Assessment & Plan  Patient appears to be at her baseline per her family at the bedside  High risk for hospital-acquired delirium.  Avoid benzodiazepines and anticholinergic medications.  Minimize narcotics, hypnotics, sedatives.  Minimize lines.  Daily orientation.    Stage 3b chronic kidney disease- (present on admission)  Assessment & Plan  Renal function appears to be around the baseline for this patient.  Renally dose medications as appropriate  Follow BMP    Urinary retention  Assessment & Plan  Was seen  Periodic bladder scan.  Patient has history of hydronephrosis.  Started on Flomax.  Straight catheter parameters.  Check kidney ultrasound  6/9: Kidney ultrasound showed mild bilateral hydronephrosis and distended bladder.  Mckoy catheter was placed.    UTI (urinary tract infection)- (present on admission)  Assessment & Plan  Patient had been started on Macrobid which she took for 24 hours.  We will start on Rocephin for another 48 hours  Follow urine culture  6/9: Completed a course of antibiotics.    Diabetes mellitus type 2 with complications (HCC)- (present on admission)  Assessment & Plan  Patient's last A1c was 6.3, this was 2 days ago.  Carb consistent diet  Follow-up fingersticks    Dyslipidemia- (present on admission)  Assessment & Plan  Continue statin       VTE prophylaxis: Lovenox

## 2021-06-13 NOTE — PROGRESS NOTES
"Received alert and oriented x 3. Check vitals sign and recorded accordingly and due med given per MAR. Monitor sign and symptoms of respiratory distress and treatment given accordingly per MAR.Medicated per MAR and reassessed every 2 hours per protocol. Call light within reach. Bed alarm in placed. Needs attended. Will continue to monitor./57   Pulse 92   Temp 37.5 °C (99.5 °F) (Temporal)   Resp 18   Ht 1.651 m (5' 5\")   Wt 55 kg (121 lb 4.1 oz)   SpO2 91%   BMI 20.18 kg/m² . Kept NPO ice chips ok. Restarted feeding at 45 cc/hr. Will continued to observe.  "

## 2021-06-13 NOTE — PROGRESS NOTES
Assumed care at 0700 after report received from night RN. Pt is A/Ox4, on RA, no complaints of pain Tube feed rate at 45 mL per hour, HOB elevated. PEG site WNL, flushed. PIV site WNL. Await discharge    Pt declined use of mag citrate for further BM, pt educated on use of medication.

## 2021-06-13 NOTE — CARE PLAN
Problem: Pain - Standard  Goal: Alleviation of pain or a reduction in pain to the patient’s comfort goal  6/13/2021 1546 by Nina Zaman R.N.  Outcome: Progressing  Note: Pain is much improved today with a large bowel movement over night and using morphine pills crushed in PEG  6/13/2021 1417 by Nina Zaman R.N.  Outcome: Progressing  Note: Prn given as needed for pain     Problem: Fall Risk  Goal: Patient will remain free from falls  Outcome: Progressing  Note: All fall precautions in place   The patient is Stable - Low risk of patient condition declining or worsening    Shift Goals  Clinical Goals: goal rate on tube feed  Patient Goals: discharge    Progress made toward(s) clinical / shift goals:  pt remains at goal with no abd pain    Patient is not progressing towards the following goals:

## 2021-06-13 NOTE — PROGRESS NOTES
Pt complaining of upper abd pain. Tube feed stopped at this time. MD notified and abd xray ordered. Pt bowel sounds are decreased from this morning and not passing gas

## 2021-06-13 NOTE — CARE PLAN
The patient is Stable - Low risk of patient condition declining or worsening    Shift Goals  Clinical Goals: goal rate on tube feed  Patient Goals: discharge    Progress made toward(s) clinical / shift goals: restarted tube feeding and will observed any untowards symptoms or pain upon starting.    Patient is not progressing towards the following goals:

## 2021-06-14 ENCOUNTER — PATIENT OUTREACH (OUTPATIENT)
Dept: HEALTH INFORMATION MANAGEMENT | Facility: OTHER | Age: 86
End: 2021-06-14

## 2021-06-14 VITALS
TEMPERATURE: 99 F | SYSTOLIC BLOOD PRESSURE: 98 MMHG | DIASTOLIC BLOOD PRESSURE: 76 MMHG | RESPIRATION RATE: 18 BRPM | HEART RATE: 79 BPM | BODY MASS INDEX: 20.2 KG/M2 | OXYGEN SATURATION: 92 % | HEIGHT: 65 IN | WEIGHT: 121.25 LBS

## 2021-06-14 PROBLEM — N39.0 UTI (URINARY TRACT INFECTION): Status: RESOLVED | Noted: 2019-09-25 | Resolved: 2021-06-14

## 2021-06-14 LAB
ALBUMIN SERPL BCP-MCNC: 3.4 G/DL (ref 3.2–4.9)
ALBUMIN/GLOB SERPL: 1 G/DL
ALP SERPL-CCNC: 84 U/L (ref 30–99)
ALT SERPL-CCNC: 16 U/L (ref 2–50)
ANION GAP SERPL CALC-SCNC: 7 MMOL/L (ref 7–16)
AST SERPL-CCNC: 19 U/L (ref 12–45)
BASOPHILS # BLD AUTO: 0.5 % (ref 0–1.8)
BASOPHILS # BLD: 0.05 K/UL (ref 0–0.12)
BILIRUB SERPL-MCNC: 0.3 MG/DL (ref 0.1–1.5)
BUN SERPL-MCNC: 35 MG/DL (ref 8–22)
CALCIUM SERPL-MCNC: 9.4 MG/DL (ref 8.5–10.5)
CHLORIDE SERPL-SCNC: 97 MMOL/L (ref 96–112)
CO2 SERPL-SCNC: 30 MMOL/L (ref 20–33)
CREAT SERPL-MCNC: 0.98 MG/DL (ref 0.5–1.4)
CRP SERPL HS-MCNC: 5.84 MG/DL (ref 0–0.75)
EOSINOPHIL # BLD AUTO: 0.84 K/UL (ref 0–0.51)
EOSINOPHIL NFR BLD: 8.1 % (ref 0–6.9)
ERYTHROCYTE [DISTWIDTH] IN BLOOD BY AUTOMATED COUNT: 44.6 FL (ref 35.9–50)
GLOBULIN SER CALC-MCNC: 3.5 G/DL (ref 1.9–3.5)
GLUCOSE SERPL-MCNC: 120 MG/DL (ref 65–99)
HCT VFR BLD AUTO: 42.4 % (ref 37–47)
HGB BLD-MCNC: 13.8 G/DL (ref 12–16)
IMM GRANULOCYTES # BLD AUTO: 0.17 K/UL (ref 0–0.11)
IMM GRANULOCYTES NFR BLD AUTO: 1.6 % (ref 0–0.9)
LACTATE BLD-SCNC: 1.2 MMOL/L (ref 0.5–2)
LYMPHOCYTES # BLD AUTO: 1.79 K/UL (ref 1–4.8)
LYMPHOCYTES NFR BLD: 17.2 % (ref 22–41)
MCH RBC QN AUTO: 28.9 PG (ref 27–33)
MCHC RBC AUTO-ENTMCNC: 32.5 G/DL (ref 33.6–35)
MCV RBC AUTO: 88.9 FL (ref 81.4–97.8)
MONOCYTES # BLD AUTO: 0.88 K/UL (ref 0–0.85)
MONOCYTES NFR BLD AUTO: 8.4 % (ref 0–13.4)
NEUTROPHILS # BLD AUTO: 6.7 K/UL (ref 2–7.15)
NEUTROPHILS NFR BLD: 64.2 % (ref 44–72)
NRBC # BLD AUTO: 0 K/UL
NRBC BLD-RTO: 0 /100 WBC
PLATELET # BLD AUTO: 226 K/UL (ref 164–446)
PMV BLD AUTO: 9.8 FL (ref 9–12.9)
POTASSIUM SERPL-SCNC: 4.3 MMOL/L (ref 3.6–5.5)
PREALB SERPL-MCNC: 17.7 MG/DL (ref 18–38)
PROT SERPL-MCNC: 6.9 G/DL (ref 6–8.2)
RBC # BLD AUTO: 4.77 M/UL (ref 4.2–5.4)
SODIUM SERPL-SCNC: 134 MMOL/L (ref 135–145)
WBC # BLD AUTO: 10.4 K/UL (ref 4.8–10.8)

## 2021-06-14 PROCEDURE — 700102 HCHG RX REV CODE 250 W/ 637 OVERRIDE(OP): Performed by: INTERNAL MEDICINE

## 2021-06-14 PROCEDURE — 84134 ASSAY OF PREALBUMIN: CPT

## 2021-06-14 PROCEDURE — 80053 COMPREHEN METABOLIC PANEL: CPT

## 2021-06-14 PROCEDURE — 700105 HCHG RX REV CODE 258: Performed by: FAMILY MEDICINE

## 2021-06-14 PROCEDURE — 700111 HCHG RX REV CODE 636 W/ 250 OVERRIDE (IP): Performed by: FAMILY MEDICINE

## 2021-06-14 PROCEDURE — 36415 COLL VENOUS BLD VENIPUNCTURE: CPT

## 2021-06-14 PROCEDURE — 85025 COMPLETE CBC W/AUTO DIFF WBC: CPT

## 2021-06-14 PROCEDURE — A9270 NON-COVERED ITEM OR SERVICE: HCPCS | Performed by: INTERNAL MEDICINE

## 2021-06-14 PROCEDURE — 97530 THERAPEUTIC ACTIVITIES: CPT

## 2021-06-14 PROCEDURE — 99239 HOSP IP/OBS DSCHRG MGMT >30: CPT | Performed by: FAMILY MEDICINE

## 2021-06-14 PROCEDURE — 86140 C-REACTIVE PROTEIN: CPT

## 2021-06-14 PROCEDURE — 83605 ASSAY OF LACTIC ACID: CPT

## 2021-06-14 PROCEDURE — 97116 GAIT TRAINING THERAPY: CPT

## 2021-06-14 RX ORDER — SODIUM CHLORIDE, SODIUM LACTATE, POTASSIUM CHLORIDE, AND CALCIUM CHLORIDE .6; .31; .03; .02 G/100ML; G/100ML; G/100ML; G/100ML
500 INJECTION, SOLUTION INTRAVENOUS ONCE
Status: COMPLETED | OUTPATIENT
Start: 2021-06-14 | End: 2021-06-14

## 2021-06-14 RX ORDER — GABAPENTIN 100 MG/1
100 CAPSULE ORAL 2 TIMES DAILY
Qty: 60 CAPSULE | Status: SHIPPED
Start: 2021-06-14 | End: 2021-06-14

## 2021-06-14 RX ORDER — SERTRALINE HYDROCHLORIDE 100 MG/1
100 TABLET, FILM COATED ORAL DAILY
Qty: 30 TABLET | Refills: 0 | Status: SHIPPED
Start: 2021-06-15

## 2021-06-14 RX ORDER — MORPHINE SULFATE 15 MG/1
7.5-15 TABLET ORAL EVERY 6 HOURS PRN
Qty: 20 TABLET | Refills: 0 | Status: SHIPPED | OUTPATIENT
Start: 2021-06-14 | End: 2021-06-19

## 2021-06-14 RX ORDER — ATORVASTATIN CALCIUM 20 MG/1
20 TABLET, FILM COATED ORAL DAILY
Qty: 30 TABLET | Status: SHIPPED
Start: 2021-06-15

## 2021-06-14 RX ORDER — AMOXICILLIN 250 MG
2 CAPSULE ORAL 2 TIMES DAILY
Qty: 30 TABLET | Refills: 0 | Status: SHIPPED | OUTPATIENT
Start: 2021-06-14

## 2021-06-14 RX ORDER — SODIUM CHLORIDE 9 MG/ML
500 INJECTION, SOLUTION INTRAVENOUS ONCE
Status: COMPLETED | OUTPATIENT
Start: 2021-06-14 | End: 2021-06-14

## 2021-06-14 RX ORDER — CYCLOBENZAPRINE HCL 5 MG
5 TABLET ORAL EVERY 8 HOURS PRN
Qty: 30 TABLET | Refills: 0 | Status: SHIPPED | OUTPATIENT
Start: 2021-06-14

## 2021-06-14 RX ORDER — OXYBUTYNIN CHLORIDE 5 MG/1
2.5 TABLET ORAL
Qty: 90 TABLET | Status: SHIPPED
Start: 2021-06-14

## 2021-06-14 RX ORDER — BISACODYL 10 MG
10 SUPPOSITORY, RECTAL RECTAL
Refills: 0 | Status: SHIPPED
Start: 2021-06-14

## 2021-06-14 RX ORDER — OXYBUTYNIN CHLORIDE 5 MG/1
5 TABLET ORAL NIGHTLY PRN
Qty: 90 TABLET | Status: SHIPPED
Start: 2021-06-14

## 2021-06-14 RX ORDER — MIRTAZAPINE 7.5 MG/1
7.5 TABLET, FILM COATED ORAL
Qty: 30 TABLET | Status: SHIPPED
Start: 2021-06-14

## 2021-06-14 RX ORDER — MORPHINE SULFATE 15 MG/1
7.5-15 TABLET ORAL EVERY 6 HOURS PRN
Qty: 20 TABLET | Refills: 0 | Status: SHIPPED | OUTPATIENT
Start: 2021-06-14 | End: 2021-06-14

## 2021-06-14 RX ORDER — POLYETHYLENE GLYCOL 3350 17 G/17G
17 POWDER, FOR SOLUTION ORAL
Refills: 3 | Status: SHIPPED
Start: 2021-06-14

## 2021-06-14 RX ORDER — GABAPENTIN 100 MG/1
100 CAPSULE ORAL 2 TIMES DAILY
Qty: 60 CAPSULE | Refills: 0 | Status: SHIPPED | OUTPATIENT
Start: 2021-06-14

## 2021-06-14 RX ADMIN — GABAPENTIN 100 MG: 100 CAPSULE ORAL at 05:28

## 2021-06-14 RX ADMIN — SERTRALINE HYDROCHLORIDE 100 MG: 100 TABLET ORAL at 05:29

## 2021-06-14 RX ADMIN — Medication 1000 UNITS: at 05:29

## 2021-06-14 RX ADMIN — SODIUM CHLORIDE, POTASSIUM CHLORIDE, SODIUM LACTATE AND CALCIUM CHLORIDE 500 ML: 600; 310; 30; 20 INJECTION, SOLUTION INTRAVENOUS at 09:31

## 2021-06-14 RX ADMIN — MORPHINE SULFATE 1 MG: 4 INJECTION INTRAVENOUS at 13:35

## 2021-06-14 RX ADMIN — ENOXAPARIN SODIUM 40 MG: 40 INJECTION SUBCUTANEOUS at 05:31

## 2021-06-14 RX ADMIN — OXYCODONE HYDROCHLORIDE AND ACETAMINOPHEN 1000 MG: 500 TABLET ORAL at 05:28

## 2021-06-14 RX ADMIN — ATORVASTATIN CALCIUM 20 MG: 20 TABLET, FILM COATED ORAL at 05:28

## 2021-06-14 RX ADMIN — DOCUSATE SODIUM 50 MG AND SENNOSIDES 8.6 MG 2 TABLET: 8.6; 5 TABLET, FILM COATED ORAL at 05:31

## 2021-06-14 RX ADMIN — SODIUM CHLORIDE 500 ML: 9 INJECTION, SOLUTION INTRAVENOUS at 12:22

## 2021-06-14 ASSESSMENT — COGNITIVE AND FUNCTIONAL STATUS - GENERAL
CLIMB 3 TO 5 STEPS WITH RAILING: TOTAL
WALKING IN HOSPITAL ROOM: A LOT
MOVING TO AND FROM BED TO CHAIR: A LOT
SUGGESTED CMS G CODE MODIFIER MOBILITY: CL
STANDING UP FROM CHAIR USING ARMS: A LOT
MOBILITY SCORE: 11
MOVING FROM LYING ON BACK TO SITTING ON SIDE OF FLAT BED: A LOT
TURNING FROM BACK TO SIDE WHILE IN FLAT BAD: A LOT

## 2021-06-14 ASSESSMENT — PAIN DESCRIPTION - PAIN TYPE
TYPE: ACUTE PAIN
TYPE: ACUTE PAIN

## 2021-06-14 ASSESSMENT — GAIT ASSESSMENTS
GAIT LEVEL OF ASSIST: MODERATE ASSIST
DISTANCE (FEET): 3
ASSISTIVE DEVICE: FRONT WHEEL WALKER
DEVIATION: SHUFFLED GAIT

## 2021-06-14 NOTE — PROGRESS NOTES
Received bedside report from day RN. Assumed care at 1900. Patient is awake/alert. Assessment completed. Pt on room air. PIV flushed and saline locked. PEG tube in left abdomen running [employer] at 45 (goal). Bed locked in lowest position. Call light within reach. Whiteboard updates with nurse's and CNA's numbers. Hourly rounding in place. Bed alarm in place.

## 2021-06-14 NOTE — DISCHARGE PLANNING
Received Transport Form @ 1321  Transport is scheduled for 6/14 @8901 going to [place].    SW, RN CM, and BS RN notified of scheduled transport via Voalte @4656.

## 2021-06-14 NOTE — CARE PLAN
The patient is Watcher - Medium risk of patient condition declining or worsening    Shift Goals  Clinical Goals: Pt's pain will be well controlled and pt will be able to rest and sleep  Patient Goals: Pt will have a safe discharge to SNF  Family Goals: NA     Progress made toward(s) clinical / shift goals:      Problem: Pain - Standard  Goal: Alleviation of pain or a reduction in pain to the patient’s comfort goal  Outcome: Progressing  Note: Patient pain level assessed using the 1-10 scale. RN encouraged position changes and rest. Pt denies any pain at the moment or any pain medication interventions.      Problem: Skin Integrity  Goal: Skin integrity is maintained or improved  Outcome: Progressing  Note: Patient on pressure relieving mattress with waffle cushion. Turned every two hours. Moisturizer in use. Heels floated with pillows. Barrier paste used. Q2 hour turns in place.

## 2021-06-14 NOTE — PROGRESS NOTES
Neurosurgery Progress Note    Subjective:  No acute events overnight. No c/o back pain while in bed. Pt states she has not been up with PT/OT.   LSO at bedside.    PEG tube placed 21.     Exam:  A&O x3. Fluent Speech.   PERRL, EOMI.   Strength: Bilateral IP, DF, PF 5/5. No Clonus.   Sensation: Intact   Eating, drinking. Denies n/v.  Pain controlled.       BP  Min: 85/46  Max: 116/52  Pulse  Av.5  Min: 81  Max: 86  Resp  Av.8  Min: 16  Max: 18  Temp  Av.6 °C (97.9 °F)  Min: 36.5 °C (97.7 °F)  Max: 36.7 °C (98.1 °F)  SpO2  Av.7 %  Min: 92 %  Max: 96 %    No data recorded    Recent Labs     21  0419 21  1024   WBC 11.2* 10.4   RBC 5.07 4.77   HEMOGLOBIN 14.7 13.8   HEMATOCRIT 45.8 42.4   MCV 90.3 88.9   MCH 29.0 28.9   MCHC 32.1* 32.5*   RDW 46.5 44.6   PLATELETCT 257 226   MPV 10.1 9.8     Recent Labs     21  1235 21  0419 21  1024   SODIUM 139 139 134*   POTASSIUM 4.4 4.5 4.3   CHLORIDE 101 101 97   CO2 29 28 30   GLUCOSE 140* 113* 120*   BUN 33* 35* 35*   CREATININE 1.16 1.02 0.98   CALCIUM 9.4 9.5 9.4               Intake/Output                       21 07 - 21 0659 21 07 - 06/15/21 0659      Total  Total                 Intake    Other  250  -- 250  --  -- --    Medications (PO/Enteral Liquids) 250 -- 250 -- -- --    NG/GT  1293  -- 1293  --  -- --    Intake (mL) (Enteral Tube 06/10/21 Abdomen) 1293 -- 1293 -- -- --    Enteral  --  90 90  50  -- 50    Free Water / Tube Flush -- 90 90 50 -- 50    Total Intake 1543 90 1633 50 -- 50       Output    Urine  --  600 600  --  -- --    Output (mL) (Urethral Catheter 16 Fr.) -- 600 600 -- -- --    Total Output -- 600 600 -- -- --       Net I/O     1543 -510 1033 50 -- 50            Intake/Output Summary (Last 24 hours) at 2021 1325  Last data filed at 2021 0844  Gross per 24 hour   Intake 1528 ml   Output 600 ml   Net 928 ml            • oxybutynin   2.5 mg QAM PRN    And   • oxybutynin  5 mg HS PRN   • enoxaparin (LOVENOX) injection  40 mg DAILY   • omeprazole  40 mg DAILY   • Pharmacy  1 Each PHARMACY TO DOSE   • acetaminophen  650 mg Q6HRS PRN   • ascorbic acid  1,000 mg DAILY   • atorvastatin  20 mg DAILY   • cyclobenzaprine  5 mg Q8HRS PRN   • gabapentin  100 mg BID   • magnesium hydroxide  30 mL QDAY PRN    And   • senna-docusate  2 tablet BID    And   • polyethylene glycol/lytes  1 Packet QDAY PRN    And   • bisacodyl  10 mg QDAY PRN   • mirtazapine  7.5 mg QHS   • morphine  7.5-15 mg Q6HRS PRN   • ondansetron  4 mg Q4HRS PRN   • sertraline  100 mg DAILY    And   • sertraline  50 mg QHS   • vitamin D  1,000 Units DAILY   • morphine injection  1 mg Q4HRS PRN   • ondansetron  4 mg Q4HRS PRN   • enalaprilat  1.25 mg Q6HRS PRN   • labetalol  10 mg Q4HRS PRN       Assessment and Plan:  Hospital day #7    Plan:  Stable neuro exam  MRI Lumbar spine shows acute fx of L4 vertebral body.   LSO at bedside. Pt to wear when OOB.   PT/OT therapies to evaluate and mobilize patient.   She has no pain at this time while lying in bed. If pain is manageable with LSO then plan to tx for 3 months with LSO bracing. Should her back pain become severe enough limiting her mobility then kyphoplasty can be considered. I discussed this with patient at bedside this morning and she verbalizes understanding.     Clear from NSG pov to tx to rehab/snf once medically stable.   We will have her follow in our office in 4 weeks with lumbar ap/lateral xrays. Sooner if pain significantly worsens and is unmanageable.     NSG will sign off at this time. Please call/Voalte with any questions.

## 2021-06-14 NOTE — THERAPY
SLP Contact Note    Per conversation w/ MD on 6/11/21, SLP will no longer actively follow as pt is NPO with a PEG due to severe esophageal dysphagia. Please reconsult with any new concerns re: oropharyngeal swallow function.

## 2021-06-14 NOTE — DISCHARGE SUMMARY
Discharge Summary    CHIEF COMPLAINT ON ADMISSION  Chief Complaint   Patient presents with   • Low Back Pain     Pt has dementia at baseline, BIB EMS for low back/sciatic pain on the left side, pt has trouble ambulating at home. Per EMS pt was seen here recently and received lumbar Xrays and treated for a UTI.         Reason for Admission  EMS     CODE STATUS  Full Code    HPI & HOSPITAL COURSE  This is 87 years old female who has past medical history of type 2 diabetes mellitus, macular degeneration, dementia, and falls comes in with low back pain that started a week ago and worsened over the course of time.  Was seen by urgent care recently and was diagnosed with urinary tract infection and started on oral antibiotics.  She presented to the hospital for further evaluation.  Noted to be afebrile per hemodynamically stable.  Imaging studies with a CT of hip and lumbar spine showed acute to subacute compression fracture of L4.  Neurosurgery consulted.  Recommended MRI of the lumbar spine without contrast.  Also noted to have urinary retention.  UA positive for UTI.  Was started on antibiotics and cultures were sent.  Patient completed course of antibiotics.  Urine culture were negative.  MRI of the lumbar spine showed L4 acute inferior endplate compression fracture.  Pain was fairly controlled.  Neurosurgery recommended TLSO.  Patient developed urinary retention on multiple bladder scans required multiple straight cath.  Kidney ultrasound showed mild bilateral hydronephrosis and distended bladder.  Mckoy catheter was placed.  Also patient noted to have dysphagia and choked on a piece of pancake.  This esophageogram showed distal obstruction in esophagus.  GI consulted.  Patient had EGD that showed presbyesophagus with tortuous esophagus but no stricture.  There was also gastroduodenitis.  PEG tube was placed and tube feeding was initiated.  Also patient noted to have occasional abdominal pain with imaging showed  constipation with retained oral contrast material few days after the esophageogram.  Patient was on scheduled narcotics which was switched to as needed.  Continue to be hemodynamically clinically stable.  Evaluated by physical occupational therapy recommended SNF placement.  Referral was sent and patient was accepted.  Patient was cleared for discharge from medical standpoint.          Therefore, she is discharged in guarded and stable condition to skilled nursing facility.    The patient met 2-midnight criteria for an inpatient stay at the time of discharge.      FOLLOW UP ITEMS POST DISCHARGE  Follow-up with MD at SNF as per recommendations    DISCHARGE DIAGNOSES  Active Problems:    Dyslipidemia POA: Yes    Diabetes mellitus type 2 with complications (HCC) POA: Yes    Urinary retention POA: Unknown      Overview: IMO load March 2020    Stage 3b chronic kidney disease POA: Yes    Dementia associated with other underlying disease without behavioral disturbance ([facility]) POA: Yes    Recurrent falls POA: Yes    Closed compression fracture of L4 lumbar vertebra, initial encounter ([facility]) POA: Unknown    Esophageal dysphagia POA: Yes    Slow transit constipation POA: Yes  Resolved Problems:    UTI (urinary tract infection) POA: Yes      FOLLOW UP  Future Appointments   Date Time Provider Department Center   12/9/2021 10:00 AM Fransisco Delgado M.D. [address]     No follow-up provider specified.    MEDICATIONS ON DISCHARGE     Medication List      START taking these medications      Instructions   atorvastatin 20 MG Tabs  Start taking on: Gisella 15, 2021  Commonly known as: LIPITOR   1 tablet by Enteral Tube route every day.  Dose: 20 mg     bisacodyl 10 MG Supp  Commonly known as: DULCOLAX   Insert 1 Suppository into the rectum 1 time a day as needed (if magnesium hydroxide ineffective after 24 hours).  Dose: 10 mg     cyclobenzaprine 5 mg tablet  Commonly known as: Flexeril   1 tablet by Enteral Tube route every 8  hours as needed for Muscle Spasms (muscle pain).  Dose: 5 mg     enoxaparin 40 MG/0.4ML Soln inj  Start taking on: Gisella 15, 2021  Commonly known as: LOVENOX   Doctor's comments: Or until ambulatory > 150 feet  Inject 40 mg under the skin every day.  Dose: 40 mg     gabapentin 100 MG Caps  Commonly known as: NEURONTIN   1 capsule by Enteral Tube route 2 times a day.  Dose: 100 mg     magnesium hydroxide 400 MG/5ML Susp  Commonly known as: MILK OF MAGNESIA   30 mL by Enteral Tube route 1 time a day as needed (if polyethylene glycol ineffective after 24 hours).  Dose: 30 mL     morphine 15 MG tablet  Commonly known as: MS IR   0.5-1 Tablets by Enteral Tube route every 6 hours as needed for Severe Pain for up to 5 days.  Dose: 7.5-15 mg     omeprazole 2 mg/mL Susp  Commonly known as: FIRST-OMEPRAZOLE   20 mL by Enteral Tube route every day.  Dose: 40 mg     * oxybutynin 5 MG Tabs  Commonly known as: DITROPAN  Replaces: oxybutynin SR 5 MG Tb24   0.5 Tablets by Enteral Tube route every morning as needed (overactive bladder discomfort/urinary retention discomfort).  Dose: 2.5 mg     * oxybutynin 5 MG Tabs  Commonly known as: DITROPAN   1 tablet by Enteral Tube route at bedtime as needed (overactive bladder discomfort/urinary retention discomfort).  Dose: 5 mg     polyethylene glycol/lytes 17 g Pack  Commonly known as: MIRALAX   1 Packet by Enteral Tube route 1 time a day as needed (if sennosides and docusate ineffective after 24 hours).  Dose: 17 g     senna-docusate 8.6-50 MG Tabs  Commonly known as: PERICOLACE or SENOKOT S   2 Tablets by Enteral Tube route 2 times a day.  Dose: 2 tablet         * This list has 2 medication(s) that are the same as other medications prescribed for you. Read the directions carefully, and ask your doctor or other care provider to review them with you.            CHANGE how you take these medications      Instructions   ascorbic acid 1000 MG tablet  Start taking on: Gisella 15, 2021  What  changed: how to take this  Commonly known as: VITAMIN C   1 tablet by Enteral Tube route every day.  Dose: 1,000 mg     mirtazapine 7.5 MG tablet  What changed: how to take this  Commonly known as: Remeron   1 tablet by Enteral Tube route at bedtime.  Dose: 7.5 mg     * sertraline 50 MG Tabs  What changed: You were already taking a medication with the same name, and this prescription was added. Make sure you understand how and when to take each.  Commonly known as: Zoloft   1 tablet by Enteral Tube route at bedtime.  Dose: 50 mg     * sertraline 100 MG Tabs  Start taking on: Gisella 15, 2021  What changed: See the new instructions.  Commonly known as: Zoloft   1 tablet by Enteral Tube route every day.  Dose: 100 mg     vitamin D 1000 UNIT Tabs  Start taking on: Giselal 15, 2021  What changed: how to take this  Commonly known as: VITAMIND D3   1 tablet by Enteral Tube route every day.  Dose: 1,000 Units         * This list has 2 medication(s) that are the same as other medications prescribed for you. Read the directions carefully, and ask your doctor or other care provider to review them with you.            STOP taking these medications    CALCARB 600 PO     Cranberry-Vitamin C 20381-089 MG Caps     D-MANNOSE PO     famotidine 20 MG Tabs  Commonly known as: PEPCID     nitrofurantoin 100 MG Caps  Commonly known as: MACROBID     oxybutynin SR 5 MG Tb24  Commonly known as: DITROPAN-XL  Replaced by: oxybutynin 5 MG Tabs            Allergies  Allergies   Allergen Reactions   • Sulfamethoxazole      Does not remember reaction        DIET  Orders Placed This Encounter   Procedures   • Diet NPO     Standing Status:   Standing     Number of Occurrences:   1     Order Specific Question:   Restrict to:     Answer:   Strict [1]       ACTIVITY  As tolerated.  Weight bearing as tolerated    LINES, DRAINS, AND WOUNDS  This is an automated list. Peripheral IVs will be removed prior to discharge.  Peripheral IV 06/09/21 22 G  Posterior;Right Forearm (Active)   Site Assessment Clean;Dry;Intact 06/13/21 1919   Dressing Type Transparent;Occlusive 06/13/21 1919   Line Status Flushed;Scrubbed the hub prior to access;Saline locked;No blood return 06/13/21 1919   Dressing Status Clean;Dry;Intact 06/13/21 1919   Dressing Intervention N/A 06/13/21 1919   Dressing Change Due 06/19/21 06/13/21 1919   Infiltration Grading ([facility], CV) 0 06/13/21 1919   Phlebitis Scale ([facility] Only) 0 06/13/21 1919     Urethral Catheter 16 Fr. (Active)   Site Assessment Clean;Skin intact 06/13/21 1919   Collection Container Standard drainage bag 06/13/21 1919   Urinary Catheter Care Drainage Tube Extended;Drainage Bag Not Overfilled;Drainage Tubing Properly Secured;Drainage Bag Below Bladder Level and Not on Floor;Cath Care Done with Soap & Water 06/13/21 1919   Securement Method Securing device (Describe) 06/13/21 1919   Output (mL) 600 mL 06/14/21 0000      Wound 06/10/21 Incision Abdomen Bilateral 2x2 with abx ointment, 4x4 gauze and taped into place (Active)   Site Assessment HOLLIE 06/13/21 1919   Periwound Assessment HOLLIE 06/13/21 1919   Margins HOLLIE 06/13/21 1919   Dressing Options Split Gauze 06/13/21 1919   Dressing Status Dry;Clean;Intact 06/13/21 1919       Peripheral IV 06/09/21 22 G Posterior;Right Forearm (Active)   Site Assessment Clean;Dry;Intact 06/13/21 1919   Dressing Type Transparent;Occlusive 06/13/21 1919   Line Status Flushed;Scrubbed the hub prior to access;Saline locked;No blood return 06/13/21 1919   Dressing Status Clean;Dry;Intact 06/13/21 1919   Dressing Intervention N/A 06/13/21 1919   Dressing Change Due 06/19/21 06/13/21 1919   Infiltration Grading ([facility], CV) 0 06/13/21 1919   Phlebitis Scale ([facility] Only) 0 06/13/21 1919           Urethral Catheter 16 Fr. (Active)   Site Assessment Clean;Skin intact 06/13/21 1919   Collection Container Standard drainage bag 06/13/21 1919   Urinary Catheter Care Drainage Tube Extended;Drainage Bag Not  Overfilled;Drainage Tubing Properly Secured;Drainage Bag Below Bladder Level and Not on Floor;Cath Care Done with Soap & Water 06/13/21 1919   Securement Method Securing device (Describe) 06/13/21 1919   Output (mL) 600 mL 06/14/21 0000        MENTAL STATUS ON TRANSFER  Awake and alert.  At baseline.          CONSULTATIONS  GI  Neurosurgery    PROCEDURES  As above    LABORATORY  Lab Results   Component Value Date    SODIUM 134 (L) 06/14/2021    POTASSIUM 4.3 06/14/2021    CHLORIDE 97 06/14/2021    CO2 30 06/14/2021    GLUCOSE 120 (H) 06/14/2021    BUN 35 (H) 06/14/2021    CREATININE 0.98 06/14/2021        Lab Results   Component Value Date    WBC 10.4 06/14/2021    HEMOGLOBIN 13.8 06/14/2021    HEMATOCRIT 42.4 06/14/2021    PLATELETCT 226 06/14/2021        Total time of the discharge process exceeds 33 minutes.

## 2021-06-14 NOTE — CARE PLAN
Problem: Nutritional:  Goal: Nutrition support tolerated and meeting greater than 85% of estimated needs  Outcome: Met    Fibersource TF at full goal 45 ml/hr.

## 2021-06-14 NOTE — DISCHARGE PLANNING
Care Transition Team Assessment      Anticipated Discharge Disposition: [facility]    Action: Pt accepted at [facility]. Reviewed with POA grand daughter Lupe Alexandra over the phone and she requests pt transfer to [facility] because her niece works there. Reviewed IMM with Lupe and copy to chart.   Transportation paperwork faxed to [facility] ([facility] requesting [facility] transport at 3:15 PM.  Voalte texted BSN Dora above info and if pt on narcotics to request scripts from MD and place in transfer packet.  Chart copy and COBRA and dc summary packet to be placed at nurses station.     Barriers to Discharge:     Plan: transfer via [facility] at 3:15pm.           Information Source  Orientation Level: Disoriented to time, Disoriented to situation  Information Given By: Relative  Informant's Name: Lupe Alexandra-grandtr  Who is responsible for making decisions for patient? : Legal next of kin  Name(s) of Primary Decision Maker: Lupe Alexandra    Readmission Evaluation  Is this a readmission?: Yes - unplanned readmission  Why do you think you were readmitted?:  (fall)    Elopement Risk  Legal Hold: No  Ambulatory or Self Mobile in Wheelchair: Yes  Disoriented: No  Psychiatric Symptoms: None  History of Wandering: No  Elopement this Admit: No  Vocalizing Wanting to Leave: No  Displays Behaviors, Body Language Wanting to Leave: No-Not at Risk for Elopement  Elopement Risk: Not at Risk for Elopement    Interdisciplinary Discharge Planning  Does Admitting Nurse Feel This Could be a Complex Discharge?: Yes  Primary Care Physician: José Luis  Lives with - Patient's Self Care Capacity: Adult Children (DTR and LETTY, and a son, always someone home)  Patient or legal guardian wants to designate a caregiver: Yes  Caregiver name: Lupe Alexandra  Caregiver contact info: [phone]  ([facility]) Authorization for Release of Health Information has been completed: Yes  Support Systems: Children  Housing / Facility: Mobile Home  Mobility Issues: Yes  Prior  Services: Intermittent Physical Support for ADL Per Family  Patient Prefers to be Discharged to::  (home)  Assistance Needed: Yes  Durable Medical Equipment: Walker    Discharge Preparedness  What is your plan after discharge?: Skilled nursing facility  Prior Functional Level: Needs Assist with Activities of Daily Living    Functional Assesment  Prior Functional Level: Needs Assist with Activities of Daily Living         Vision / Hearing Impairment  Vision Impairment : No  Right Eye Vision: Impaired  Left Eye Vision: Impaired  Hearing Impairment : No         Advance Directive  Advance Directive?: DPOA for Health Care  Durable Power of  Name and Contact : Lupe Alexandra [phone]    Domestic Abuse  Have you ever been the victim of abuse or violence?: No  Physical Abuse or Sexual Abuse: No  Verbal Abuse or Emotional Abuse: No  Possible Abuse/Neglect Reported to:: Not Applicable         Discharge Risks or Barriers  Patient risk factors: Cognitive / sensory / physical deficit, Complex medical needs    Anticipated Discharge Information  Discharge Disposition: D/T to SNF with Medicare cert in anticipation of skilled care (03)  Discharge Address: [facility]

## 2021-06-14 NOTE — DISCHARGE INSTRUCTIONS
Discharge Instructions    Dischcrged to other by medical transportation with escort. Discharged via wheelchair, hospital escort: Yes.  Special equipment needed: Not Applicable    Be sure to schedule a follow-up appointment with your primary care doctor or any specialists as instructed.     Discharge Plan:   Diet Plan: Discussed  Activity Level: Discussed  Confirmed Follow up Appointment: Patient to Call and Schedule Appointment  Confirmed Symptoms Management: Discussed  Medication Reconciliation Updated: Yes    I understand that a diet low in cholesterol, fat, and sodium is recommended for good health. Unless I have been given specific instructions below for another diet, I accept this instruction as my diet prescription.   Other diet: NPO/Tube feeds     Special Instructions: None    · Is patient discharged on Warfarin / Coumadin?   No     Depression / Suicide Risk    As you are discharged from this [facility] facility, it is important to learn how to keep safe from harming yourself.    Recognize the warning signs:  · Abrupt changes in personality, positive or negative- including increase in energy   · Giving away possessions  · Change in eating patterns- significant weight changes-  positive or negative  · Change in sleeping patterns- unable to sleep or sleeping all the time   · Unwillingness or inability to communicate  · Depression  · Unusual sadness, discouragement and loneliness  · Talk of wanting to die  · Neglect of personal appearance   · Rebelliousness- reckless behavior  · Withdrawal from people/activities they love  · Confusion- inability to concentrate     If you or a loved one observes any of these behaviors or has concerns about self-harm, here's what you can do:  · Talk about it- your feelings and reasons for harming yourself  · Remove any means that you might use to hurt yourself (examples: pills, rope, extension cords, firearm)  · Get professional help from the community (Mental Health, Substance  Abuse, psychological counseling)  · Do not be alone:Call your Safe Contact- someone whom you trust who will be there for you.  · Call your local CRISIS HOTLINE 367-7784 or 684-483-9705  · Call your local Children's Mobile Crisis Response Team [place] (510) 311-6765 or www.[employer]  · Call the toll free National Suicide Prevention Hotlines   · National Suicide Prevention Lifeline 399-824-UBTM (2923)  · National Hope Line Network 800-SUICIDE (570-5407)

## 2021-06-14 NOTE — PROGRESS NOTES
Patient discharged with [facility] transport. Vitals stable. PIV removed. PEG tube flushed and CDI. Scripts printed, [place] to  and deliver to [facility]. Report called to [facility] admissions supervisor.

## 2021-06-14 NOTE — DISCHARGE PLANNING
Agency/Facility Name: [facility]   Spoke To: Myron   Outcome: Per Myron, granddaughter, unlisted on facesheet, had heard from an unknown source that Pt is D/C'ing today.  This granddaughter was under the impression that all family of this Pt had agreed to send Pt to [facility].      LSW notified     -1232  Per RN CM, family would like Pt to D/C to [facility]    Agency/Facility Name: [facility]  Outcome: Left vmail for admissions regarding bed avail     Agency/Facility Name: [facility]  Spoke To: Myron  Outcome: Per Myron, [facility] can take this Pt anytime today 06/14    RN CM notified     -1444  Agency/Facility Name: [facility]  Spoke To: Myron  Outcome: DPA informed Myron of 1515 transport today 06/14    -1524  Agency/Facility Name: [facility]   Spoke To: Slime   Outcome: Per Slime, there is no PASSR for this Pt    RN CM notified     Agency/Facility Name: [facility]  Outcome: Left vmail for admissions regarding PASSR

## 2021-06-14 NOTE — THERAPY
"Physical Therapy   Daily Treatment     Patient Name: Herminia Jones  Age:  87 y.o., Sex:  female  Medical Record #: [id number]  Today's Date: 6/14/2021     Precautions: Fall Risk, Spinal / Back Precautions , Lumbosacral Orthosis, PEG Tube    Assessment    Patient seen for follow up PT session. She is now to wear LSO when OOB 2/2 L4 compression fracture with radiculopathy down L LE.  Patient needing ModA for all bed mobility and transfer to chair. Patient limited by pain and needs cueing for sequencing and proper use of FWW.  She will need placement for further therapy prior to DC home.     Plan    Continue current treatment plan.    DC Equipment Recommendations: Unable to determine at this time  Discharge Recommendations: Recommend post-acute placement for additional physical therapy services prior to discharge home      Subjective    \"I can try to get up\"     Objective       06/14/21 1210   Precautions   Precautions Fall Risk;Spinal / Back Precautions ;Lumbosacral Orthosis;PEG Tube   Comments LSO when OOB    Pain 0 - 10 Group   Location Leg   Location Orientation Left   Pain Rating Scale (NPRS) 10   Therapist Pain Assessment Post Activity Pain Same as Prior to Activity;10   Cognition    Cognition / Consciousness X   Orientation Level Not Oriented to Year   Level of Consciousness Alert   Sequencing Impaired   Comments pleasant and cooperative, baseline dementia    Strength Lower Body   Lower Body Strength  X   Comments L LE limited by pain    Neuro-Muscular Treatments   Neuro-Muscular Treatments Facilitation;Tactile Cuing;Verbal Cuing;Postural Facilitation;Sequencing   Comments cueing for step progression from EOB to chair    Vision   Vision Comments baseline macular degeneration. Able to see shapes    Other Treatments   Other Treatments Provided Education on brace wear and care    Balance   Sitting Balance (Static) Fair   Sitting Balance (Dynamic) Fair   Standing Balance (Static) Fair -   Standing Balance " (Dynamic) Poor +   Weight Shift Sitting Fair   Weight Shift Standing Fair   Skilled Intervention Sequencing;Postural Facilitation;Tactile Cuing;Verbal Cuing   Comments cueing to use B UEs on FWW for support and pain control with OOB mobility    Gait Analysis   Gait Level Of Assist Moderate Assist   Assistive Device Front Wheel Walker   Distance (Feet) 3   # of Times Distance was Traveled 1   Deviation Shuffled Gait   Comments ModA for shuffle steps from EOB to chair with cueing for safety. Pain limiting   Bed Mobility    Supine to Sit Moderate Assist   Sit to Supine Moderate Assist   Scooting Moderate Assist   Rolling Moderate Assist to Rt.   Skilled Intervention Verbal Cuing;Tactile Cuing;Sequencing   Comments via log roll    Functional Mobility   Sit to Stand Minimal Assist   Bed, Chair, Wheelchair Transfer Moderate Assist   Transfer Method Stand Step   How much difficulty does the patient currently have...   Turning over in bed (including adjusting bedclothes, sheets and blankets)? 2   Sitting down on and standing up from a chair with arms (e.g., wheelchair, bedside commode, etc.) 2   Moving from lying on back to sitting on the side of the bed? 2   How much help from another person does the patient currently need...   Moving to and from a bed to a chair (including a wheelchair)? 2   Need to walk in a hospital room? 2   Climbing 3-5 steps with a railing? 1   6 clicks Mobility Score 11   Activity Tolerance   Sitting in Chair post session    Sitting Edge of Bed 6 min   Standing 4 min    Patient / Family Goals    Patient / Family Goal #1 to reduce pain    Short Term Goals    Short Term Goal # 1 Pt will perform bed mobility with supervision in 6 visits   Goal Outcome # 1 goal not met   Short Term Goal # 2 Pt will transfer with supervision in 6 viists to improve functional indep.    Goal Outcome # 2 Goal not met   Short Term Goal # 3 Pt will ambulate using FWW with min A in 6 visits to improve functional indep.    Goal  Outcome # 3 Goal not met   Anticipated Discharge Equipment and Recommendations   DC Equipment Recommendations Unable to determine at this time   Discharge Recommendations Recommend post-acute placement for additional physical therapy services prior to discharge home       Tabitha Alicia, PT, DPT, GCS

## 2021-08-18 ENCOUNTER — HOSPITAL ENCOUNTER (EMERGENCY)
Facility: MEDICAL CENTER | Age: 86
End: 2021-08-18
Attending: EMERGENCY MEDICINE
Payer: MEDICARE

## 2021-08-18 ENCOUNTER — APPOINTMENT (OUTPATIENT)
Dept: RADIOLOGY | Facility: MEDICAL CENTER | Age: 86
End: 2021-08-18
Attending: EMERGENCY MEDICINE
Payer: MEDICARE

## 2021-08-18 VITALS
SYSTOLIC BLOOD PRESSURE: 118 MMHG | HEIGHT: 65 IN | TEMPERATURE: 99.1 F | WEIGHT: 134 LBS | HEART RATE: 93 BPM | BODY MASS INDEX: 22.33 KG/M2 | OXYGEN SATURATION: 95 % | RESPIRATION RATE: 17 BRPM | DIASTOLIC BLOOD PRESSURE: 63 MMHG

## 2021-08-18 DIAGNOSIS — T85.528A DISLODGED GASTROSTOMY TUBE: ICD-10-CM

## 2021-08-18 PROCEDURE — 303761 HCHG FEEDING TUBE

## 2021-08-18 PROCEDURE — 49465 FLUORO EXAM OF G/COLON TUBE: CPT

## 2021-08-18 PROCEDURE — 700117 HCHG RX CONTRAST REV CODE 255: Performed by: EMERGENCY MEDICINE

## 2021-08-18 PROCEDURE — 43762 RPLC GTUBE NO REVJ TRC: CPT

## 2021-08-18 PROCEDURE — 99285 EMERGENCY DEPT VISIT HI MDM: CPT

## 2021-08-18 RX ADMIN — IOHEXOL 40 ML: 350 INJECTION, SOLUTION INTRAVENOUS at 04:49

## 2021-08-18 ASSESSMENT — FIBROSIS 4 INDEX: FIB4 SCORE: 1.83

## 2021-08-18 ASSESSMENT — LIFESTYLE VARIABLES
DO YOU DRINK ALCOHOL: NO
DOES PATIENT WANT TO STOP DRINKING: NO

## 2021-08-18 NOTE — DISCHARGE PLANNING
Medical Social Work    MSW received a call from bedside RN that pt is ready to return to [facility].  MSW contacted [facility] with [facility] who will accept pt back; receiving physician is Dr. Stevens room 610.  MSW faxed PCS and facesheet to [facility] and made follow up phone call to Jerome with [facility] to arrange transport for 0730.  COBRA and transfer packet complete and in pt's chart.  Bedside RN and transferring facility aware of transport time.

## 2021-08-18 NOTE — ED TRIAGE NOTES
Chief Complaint   Patient presents with   • Feeding Tube Problem     Pt unintentionally removed g-tube at approx 0200 this morning. Denies pain. No bleeding noted. Pt states she has had the g-tube approx 1mos. Per SNF, g-tube is 20fr. approx 3cm inside.

## 2021-08-18 NOTE — ED PROVIDER NOTES
ED Provider Note    Scribed for Lesley Harrell M.D. by Gloria Aly. 8/18/2021  2:52 AM    Means of arrival: EMS  History obtained from: patient  History limited by: none noted      CHIEF COMPLAINT  Chief Complaint   Patient presents with   • Feeding Tube Problem       HPI  Herminia Jones is a 87 y.o. female with history of dementia who presents from [facility] to the Emergency Department via EMS for G-tube complications. The patient reports she accidentally removed it around 2:00 AM because she did not know she had the tube in it. She also state she does not know why she has the G tube. She denies any pain or discharge from the tube site. She denies any other medical issues.     REVIEW OF SYSTEMS  Pertinent positive include G tube complications. Pertinent negative include abdominal pain or discharge from the wound.       PAST MEDICAL HISTORY   has a past medical history of Arthritis, Dental disorder, Diabetes (HCC), Glaucoma, Gynecological disorder, Heart burn, Macular degeneration, Psychiatric problem, Snoring, Urinary bladder disorder, Urinary incontinence, and Uterine prolapse without mention of vaginal wall prolapse (6/2/2017).    SOCIAL HISTORY  Social History     Tobacco Use   • Smoking status: Never Smoker   • Smokeless tobacco: Never Used   Vaping Use   • Vaping Use: Never used   Substance and Sexual Activity   • Alcohol use: No   • Drug use: No   • Sexual activity: Not Currently       SURGICAL HISTORY   has a past surgical history that includes abdominal hysterectomy total; lefort colpoclesis (6/2/2017); cystoscopy (6/2/2017); hip cannulated screw (Left, 12/1/2017); bladder sling female (N/A, 4/20/2018); anterior repair (N/A, 4/20/2018); pelviscopy (N/A, 4/20/2018); anterior and posterior repair (12/3/2019); upper gi endoscopy,biopsy (N/A, 6/10/2021); upper gi endoscopy,diagnosis (N/A, 6/10/2021); and place percut gastrostomy tube (N/A, 6/10/2021).    CURRENT MEDICATIONS  Home Medications    **Home  "medications have not yet been reviewed for this encounter**         ALLERGIES  Allergies   Allergen Reactions   • Sulfamethoxazole      Does not remember reaction        PHYSICAL EXAM   VITAL SIGNS: /57   Pulse 87   Temp 36.7 °C (98 °F) (Temporal)   Resp 18   Ht 1.651 m (5' 5\")   Wt 60.8 kg (134 lb)   SpO2 95%   BMI 22.30 kg/m²      Constitutional: Non-toxic appearing, elderly female,  Alert in no apparent distress.  HENT: Normocephalic, Atraumatic. Bilateral external ears normal. Nose normal.  Moist mucous membranes.  Oropharynx clear.  Eyes: Pupils are equal and reactive. Conjunctiva normal.   Neck: Supple, full range of motion  Heart: Regular rate and rhythm.  No murmurs.    Lungs: No respiratory distress, normal work of breathing. Lungs clear to auscultation bilaterally.  Abdomen G-tube displaced with insertion site exposed, No surrounding redness, No abdominal tenderness.   Musculoskeletal: Atraumatic. No obvious deformities noted.  No lower extremity edema.  Skin: Warm, Dry.  No erythema, No rash.   Neurologic: Alert and oriented x3. Moving all extremities spontaneously without focal deficits.  Psychiatric: Affect normal, Mood normal, Appears appropriate and not intoxicated.    PPE Note: I personally donned full PPE for all patient encounters during this visit, including being clean-shaven with an N95 respirator mask, gloves, gown, and goggles.     Scribe remained outside the patient's room and did not have any contact with the patient for the duration of patient encounter.     DIAGNOSTIC STUDIES      RADIOLOGY  Personally reviewed by geri SANTANA. TUBE INJECTION, ANY TYPE   Final Result      Contrast opacification of the stomach and duodenum.          PROCEDURES  Gastrostomy Tube Replacement Procedure Note    Indication: Spontaneous dislodgement    Procedure: The patient was placed in the supine position and the patient's gastric tube was replaced using a 20 Italian gastrostomy tube and the bulb was " "inflated using 10 cc of sterile water.  The placement was verified by x-ray with contrast injection.    The patient tolerated the procedure well.    Complications: None          ED COURSE  Vitals:    08/18/21 0243 08/18/21 0244   BP:  101/57   Pulse:  87   Resp:  18   Temp:  36.7 °C (98 °F)   TempSrc:  Temporal   SpO2:  95%   Weight: 60.8 kg (134 lb)    Height: 1.651 m (5' 5\")          Medications administered:  Medications   iohexol (OMNIPAQUE) 350 mg/mL (40 mL Per G Tube Given 8/18/21 0449)         Old records personally reviewed:  Review of the patient's records showed she was last admitted in June. She had a PEG tube place on June 10th by Dr. Long. She has a history of dementia and dysphagia.     2:52 AM Patient seen and examined at bedside. The patient presents with Gtube dislodgement. Ordered for DX-G.I. Tube Injection to evaluate.       MEDICAL DECISION MAKING  Patient with history of dementia and chronic esophageal issues who presents after accidentally dislodging her G-tube this evening.  She is alert with normal vital signs on arrival and no complaints.  Her abdominal exam is benign without concern for underlying surgical process such as obstruction, perforation, or infectious process.  G-tube was able to be replaced here in the emergency department.  Imaging was performed showing the tube in place with contrast going into the stomach and the small bowel.  Patient will be discharged back to her SNF this morning.  Patient understands plan of care and strict return precautions for changing or worsening symptoms.        DISPOSITION:  Patient will be discharged home in stable condition.    FOLLOW UP:  LETY Kulkarni  [address] [facility] [address]  [place] [id number] [phone]    Schedule an appointment as soon as possible for a visit       [facility], Emergency Dept  [address] [facility] [address] [phone]    If symptoms worsen      OUTPATIENT MEDICATIONS:  New " Prescriptions    No medications on file       IMPRESSION  (T85.528A) Dislodged gastrostomy tube    Results, diagnoses, and treatment options were discussed with the patient and/or family. Patient verbalized understanding of plan of care.       IGloria (Scribe), am scribing for, and in the presence of, Lesley Harrell M.D..    Electronically signed by: Gloria Aly (Scribe), 8/18/2021    I, Lesley Harrell M.D. personally performed the services described in this documentation, as scribed by Gloria Aly in my presence, and it is both accurate and complete. E    The note accurately reflects work and decisions made by me.  Lesley Harrell M.D.  8/18/2021  6:03 AM

## 2021-08-18 NOTE — ED NOTES
[facility] arrives for ptaient transportation back to care facility. Care handoff to [facility] team. Patient updated on pending dispo, family member previously updated on pending transfer back to [facility]. Attempting to call RN report again to receiving facility.

## 2021-08-18 NOTE — ED NOTES
Attempt to call report x 2. Transferred back to main line multiple times.  answering phone notified patient en route back and provided with information for RN to call for nurse to nurse report by this RN.

## 2021-08-18 NOTE — ED NOTES
This RN assumes care of patient. Granddaughter Lupe updated via phone. Patient awaiting transportation back to care facility. Resting on cart, remains on monitoring, lights dimmed for comfort. Patient visualized with easy, unlabored respirs in no distress. Call light within reach.

## 2021-10-31 RX ORDER — ATORVASTATIN CALCIUM 20 MG/1
20 TABLET, FILM COATED ORAL DAILY
Qty: 30 TABLET | OUTPATIENT
Start: 2021-10-31

## 2021-12-08 ENCOUNTER — TELEPHONE (OUTPATIENT)
Dept: MEDICAL GROUP | Facility: MEDICAL CENTER | Age: 86
End: 2021-12-08

## 2021-12-08 NOTE — TELEPHONE ENCOUNTER
ESTABLISHED PATIENT PRE-VISIT PLANNING     Patient was NOT contacted to complete PVP.     Note: Patient will not be contacted if there is no indication to call.     1.  Reviewed notes from the last few office visits within the medical group: Yes    2.  If any orders were placed at last visit or intended to be done for this visit (i.e. 6 mos follow-up), do we have Results/Consult Notes?         •  Labs - Labs ordered, completed on 06/21/2021 and results are in chart. Ordered by Dr. Delgado, However, patient has recent final resulted labs  Note: If patient appointment is for lab review and patient did not complete labs, check with provider if OK to reschedule patient until labs completed.       •  Imaging - Imaging was not ordered at last office visit. However, patient has recent final resulted imaging.          •  Referrals - Referral ordered, patient has NOT been seen.    -Opthalmology: Vinny Delgado      -Urology: [facility], Progress Notes by Provider DEMETRIA Barton for encounter date: 04/20/2021 is in patient's chart.    3. Is this appointment scheduled as a Hospital   Follow-Up? No    4.  Immunizations were updated in Epic using Reconcile Outside Information activity? Yes    5.  Patient is due for the following Health Maintenance Topics:   Health Maintenance Due   Topic Date Due   • IMM DTaP/Tdap/Td Vaccine (1 - Tdap) Never done   • A1C SCREENING  12/05/2021       6.  AHA (Pulse8) form printed for Provider? No, patient does not have any open alerts

## 2021-12-09 ENCOUNTER — APPOINTMENT (OUTPATIENT)
Dept: RADIOLOGY | Facility: MEDICAL CENTER | Age: 86
End: 2021-12-09
Attending: INTERNAL MEDICINE
Payer: MEDICARE

## 2021-12-09 DIAGNOSIS — R13.19 ESOPHAGEAL DYSPHAGIA: ICD-10-CM

## 2021-12-09 DIAGNOSIS — R13.11 ORAL PHASE DYSPHAGIA: ICD-10-CM

## 2021-12-09 DIAGNOSIS — R13.12 OROPHARYNGEAL DYSPHAGIA: ICD-10-CM

## 2021-12-09 PROCEDURE — 700117 HCHG RX CONTRAST REV CODE 255: Performed by: INTERNAL MEDICINE

## 2021-12-09 PROCEDURE — 74230 X-RAY XM SWLNG FUNCJ C+: CPT

## 2021-12-09 PROCEDURE — 92611 MOTION FLUOROSCOPY/SWALLOW: CPT

## 2021-12-09 RX ADMIN — BARIUM SULFATE 700 MG: 700 TABLET ORAL at 15:15

## 2021-12-09 NOTE — OP THERAPY EVALUATION
"[facility]  Speech-Language Pathology Department  Modified Barium Swallow Study Summary    Patient: Herminia Jones     Date of Evaluation: 12/9/21    Referring Provider:  Inga Jc DO    Fax: [phone]      Patient History/Reason for Referral: Pt was referred for MBS due to oropharyngeal dysphagia.     Patient joined with daughter.  Daughter was not able to provide history as \"just new caring for mom.\" Patient is a poor historian as well. Patient admitted to having brownie brought in by \"outside person.\"  Denied any deficits with intake despite NPO.  Patient is unsure if she was cleared by GI to consume PO due to esophageal blockage reported in esophagram in 6/9/21.  Daughter felt that the \"blockage\" was from lumbar spinal issues and was cleared to have foods.  Patient reported only having ice chips and nothing else by PO.  She is here wanting to eat and drink again.  Has had no deficits with managing saliva or swallowing.    Esophagram 6/9/21 completed with following \"IMPRESSION:     1.  Esophageal obstruction at the level of the GE junction. Etiology unknown.     2.  Large volume of retained ingested material within the esophagus.     3.  Presbyesophagus.      Past medical history not limited to:   arthritis, DM II, heart burn, snoring, dementia, and dysphagia with PEG placement 6/10/21, falls    Oral Mechanism Exam:   -Dentition: Edentulous upper (forgot), lower denture   -Labial:WFL  -Lingual: WFL  -Palatal Elevation: decreased  -Laryngeal Elevation: mild decrease  -Cough: strong  -Vocal Quality: Adequate intensity  - Circumlaryngeal Palpation: No pain or edema.    Procedure Results:  The examination was conducted with videofluoroscopy from a   Lateral and Anterior view.  The following textures were presented:   Pudding, Diced Fruit, Cookie, Thin Liquid and 13 mm pil     Structural Abnormalities: Query CP Bar    The following observations were made:   (WFL unless otherwise " noted)    Oral Phase Thin Liquids  Puree Mixed Solid Pill   Lip Closure        Tongue Control During Bolus Hold Loss to Pyriform x 1       Premature spillage into the valleculae        Premature spillage into the pyriform sinus   X     Bolus Preparation/ Mastication   X  extended X  extended    Bolus Transport/ Lingual Motion        Oral Residue after swallow   X  Collection     Initiation of Pharyngeal Swallow X  Pyriform          Other Observations:   Bolus loss due to decreased hearing acuity for directions.  Cleared residue independently.         Pharyngeal Phase Thin Liquids  Puree Mixed Solid Pill   Soft Palate Elevation X X X X X   Laryngeal Elevation        Anterior Hyoid Excursion        Epiglottic Inversion         Laryngeal Vestibular Closure        Pharyngeal Stripping Wave        Pharyngoesophageal Segment Opening (PES) X  Ret/retro X  ret      Tongue Base Retraction (BOT) and/or BOT Residue X  Collection X  Collection X  Collection     Residue in valleculae X  Collection  X  Collection     Residue in piriform sinus(es)        Residue on posterior pharyngeal wall (PPW)        Penetration X       Aspiration          Other Observations: Clear residue independently      Penetration Aspiration Scale:   Score of 1: Neither penetration nor aspiration  Score of 2-5: Penetration  Score of 6-8: Aspiration    1: Material does not enter airway  2: Material enters airway, but remains above vocal folds; Ejected from airway; no stasis  3: Material remains above vocal folds; visible stasis remains  4: Material contacts vocal folds, but is ejected; no stasis  5: Material contacts vocal folds, and is not ejected; visible stasis remains  6: Material passes glottis, but is ejected from airway; No visible subglottic stasis  7: Material passes glottis, but is not ejected from airway; visible subglottic stasis despite patient’s response  8: Material passes glottis, and is not ejected; visible subglottic stasis; Absent  patient response                            Esophageal Phase: Retention at and below UES with all viscosities.  Retrograde flow below UES with solids.  Retention of pill in distal esophagus.      Impressions:  Patient with deep penetration of thin liquids with large intake with use of straw and when bolus loss occurred, with no stasis.  No aspiration with all intake.  Small intake eliminated penetration.       Patient independently took additional swallows to clear oropharyngeal cavities.  Oropharyngeal residue varied from slight to mild with all intake.  Extended mastication was due to lack of dentition, not weakness.    Retention at UES was intermittent and was not observed to enter into pharynx.  Retrograde flow of solids was decreased with smaller intake.  This was mid to lower in esophagus and did not enter near UES. Alternating liquids and solids as well as smaller intake significantly decreased retention in esophagus. Suspected retention of pill in distal esophagus, but with poor visability due to inability to stand and scan to lower esophagus.       Oropharyngeal function with sight deficits in overall weakness, however suspect some age related changes and deconditioning.  When using strategies, she demonstrated tolerance of PO.  Patient appears safe to start full PO.  Recommend starting with easier viscosities and increasing weekly to soft regular foods as tolerates (per GI release), to build up adequate endurance to tolerate full PO without fatigue..         Recommendations:   Diet: Minced moist (IDDSI L 5)    Liquid: Thin (IDDSI L 0)          Medications:   Whole    Compensatory Strategies:   siting up at 90 degrees, remain sitting for 60 minutes, small bites/drinks, alternate liquids and solids, avoid problematic foods, and no talking.        Your patient may benefit from a referral for:       1. Consider assessment from GI for UES and esophageal dysfunction.        Thanks you for the referral.    For  further questions, please call [phone].       Ally Charlton M.S., CCC-SLP

## 2022-01-21 ENCOUNTER — PATIENT MESSAGE (OUTPATIENT)
Dept: HEALTH INFORMATION MANAGEMENT | Facility: OTHER | Age: 87
End: 2022-01-21
Payer: MEDICARE

## (undated) DEVICE — LACTATED RINGERS INJ 1000 ML - (14EA/CA 60CA/PF)

## (undated) DEVICE — NEPTUNE 4 PORT MANIFOLD - (20/PK)

## (undated) DEVICE — TUBING CLEARLINK DUO-VENT - C-FLO (48EA/CA)

## (undated) DEVICE — SENSOR SPO2 NEO LNCS ADHESIVE (20/BX) SEE USER NOTES

## (undated) DEVICE — CATHETER IV 20 GA X 1-1/4 ---SURG.& SDS ONLY--- (50EA/BX)

## (undated) DEVICE — NEEDLE INSUFFLATION FOR STEP - (12/BX)

## (undated) DEVICE — SUTURE GENERAL

## (undated) DEVICE — CANISTER SUCTION RIGID RED 1500CC (40EA/CA)

## (undated) DEVICE — KIT ANESTHESIA W/CIRCUIT & 3/LT BAG W/FILTER (20EA/CA)

## (undated) DEVICE — TOWEL STOP TIMEOUT SAFETY FLAG (40EA/CA)

## (undated) DEVICE — ELECTRODE DUAL RETURN W/ CORD - (50/PK)

## (undated) DEVICE — SET LEADWIRE 5 LEAD BEDSIDE DISPOSABLE ECG (1SET OF 5/EA)

## (undated) DEVICE — GLOVE BIOGEL SZ 8.5 SURGICAL PF LTX - (50PR/BX 4BX/CA)

## (undated) DEVICE — CATHETER FOLEY ROBINSON 16FR 16IN STRL (12EA/CA)

## (undated) DEVICE — PROTECTOR ULNA NERVE - (36PR/CA)

## (undated) DEVICE — ELECTRODE 850 FOAM ADHESIVE - HYDROGEL RADIOTRNSPRNT (50/PK)

## (undated) DEVICE — MASK ANESTHESIA ADULT  - (100/CA)

## (undated) DEVICE — TRAY SRGPRP PVP IOD WT PRP - (20/CA)

## (undated) DEVICE — WATER IRRIGATION STERILE 1000ML (12EA/CA)

## (undated) DEVICE — CLOSURE SKIN STRIP 1/2 X 4 IN - (STERI STRIP) (50/BX 4BX/CA)

## (undated) DEVICE — GLOVE BIOGEL INDICATOR SZ 8.5 SURGICAL PF LTX - (50/BX 4BX/CA)

## (undated) DEVICE — SET IRRIGATION CYSTOSCOPY TUBE L80 IN (20EA/CA)

## (undated) DEVICE — MASK AIRWAY SIZE 3 UNIQUE SILICON (10/BX)

## (undated) DEVICE — TRAY FOLEY CATHETER STATLOCK 16FR SURESTEP  (10EA/CA)

## (undated) DEVICE — SHEATH RO 4F 10CM (10EA/BX)

## (undated) DEVICE — HEAD HOLDER JUNIOR/ADULT

## (undated) DEVICE — SUTURE 2-0 VICRYL PLUS CT-2 - 27 INCH (36/BX)

## (undated) DEVICE — BANDAID SHEER STRIP 3/4 IN (100EA/BX 12BX/CA)

## (undated) DEVICE — CANISTER SUCTION 3000ML MECHANICAL FILTER AUTO SHUTOFF MEDI-VAC NONSTERILE LF DISP  (40EA/CA)

## (undated) DEVICE — DRESSING TRANSPARENT FILM TEGADERM 4 X 4.75" (50EA/BX)"

## (undated) DEVICE — SUCTION INSTRUMENT YANKAUER BULBOUS TIP W/O VENT (50EA/CA)

## (undated) DEVICE — Device

## (undated) DEVICE — SUTURE 0 VICRYL PLUS CT-2 - 27 INCH (36/BX)

## (undated) DEVICE — GLOVE SZ 6.5 BIOGEL PI MICRO - PF LF (50PR/BX)

## (undated) DEVICE — BLOCK BITE ENDOSCOPIC 2809 - (100/BX) INTERMEDIATE

## (undated) DEVICE — SUTURE 2-0 MONOCRYL CT-1

## (undated) DEVICE — GLOVE BIOGEL SZ 7.5 SURGICAL PF LTX - (50PR/BX 4BX/CA)

## (undated) DEVICE — FILM CASSETTE ENDO

## (undated) DEVICE — GOWN SURGEONS X-LARGE - DISP. (30/CA)

## (undated) DEVICE — DRAPE VAGINAL BIB W/ POUCH (10EA/CA)

## (undated) DEVICE — SODIUM CHL IRRIGATION 0.9% 1000ML (12EA/CA)

## (undated) DEVICE — GOWN SURGEONS LARGE - (32/CA)

## (undated) DEVICE — PACKING VAG 2 X-RAY (24EA/CS)

## (undated) DEVICE — KIT  I.V. START (100EA/CA)

## (undated) DEVICE — KIT ROOM DECONTAMINATION

## (undated) DEVICE — SLEEVE, VASO, THIGH, MED

## (undated) DEVICE — TROCAR STEP 11MM - (3/CA)

## (undated) DEVICE — BLADE SURGICAL #11 - (50/BX)

## (undated) DEVICE — PACK MAJOR ORTHO - (2EA/CA)

## (undated) DEVICE — CONTAINER, SPECIMEN, STERILE

## (undated) DEVICE — TROCAR STEP 5MM - (3/CA)

## (undated) DEVICE — GOWN WARMING STANDARD FLEX - (30/CA)

## (undated) DEVICE — PAD SANITARY 11IN MAXI IND WRAPPED  (12EA/PK 24PK/CA)

## (undated) DEVICE — SET SUCTION/IRRIGATION WITH DISPOSABLE TIP (6/CA )PART #0250-070-520 IS A SUB

## (undated) DEVICE — RINGDISP RETRACTOR LONESTAR

## (undated) DEVICE — SUTURE 4-0 VICRYL PLUS FS-2 - 27 INCH (36/BX)

## (undated) DEVICE — SET EXTENSION WITH 2 PORTS (48EA/CA) ***PART #2C8610 IS A SUBSTITUTE*****

## (undated) DEVICE — CHLORAPREP 26 ML APPLICATOR - ORANGE TINT(25/CA)

## (undated) DEVICE — KIT SURGIFLO W/OUT THROMBIN - (6EA/CA)

## (undated) DEVICE — FORCEP RADIAL JAW 4 STANDARD CAPACITY W/NEEDLE 240CM (40EA/BX)

## (undated) DEVICE — SUTURE 2-0 VICRYL CT-2  8 X 18 INCH (12EA/BX)

## (undated) DEVICE — BLADE SURGICAL #15 - (50/BX 3BX/CA)

## (undated) DEVICE — CHLORAPREP 3 ML APPLICATOR - (25/BX 4BX/CS 100/CS)

## (undated) DEVICE — SUTURE 0 VICRYL PLUS CT-1 - 36 INCH (36/BX)

## (undated) DEVICE — GLOVE BIOGEL SZ 6 PF LATEX - (50EA/BX 4BX/CA)

## (undated) DEVICE — TUBE CONNECTING SUCTION - CLEAR PLASTIC STERILE 72 IN (50EA/CA)

## (undated) DEVICE — SUTURE 0 VICRYL PLUS UR-6 - 27 INCH (36/BX)

## (undated) DEVICE — GOWN SURGICAL XX-LARGE - (28EA/CA) SIRUS NON REINFORCED

## (undated) DEVICE — ARMREST CRADLE FOAM - (2PR/PK 12PR/CA)

## (undated) DEVICE — DRAPE LARGE 3 QUARTER - (20/CA)

## (undated) DEVICE — BLADE SURGICAL #10 - (50/BX)

## (undated) DEVICE — TUBING SETDISPOS HIGH FLOW II - (10/BX)

## (undated) DEVICE — SUTURE 2-0 VICRYL PLUS CT-1 - 8 X 18 INCH(12/BX)

## (undated) DEVICE — LEAD SET 6 DISP. EKG NIHON KOHDEN (100EA/CA)

## (undated) DEVICE — SPONGE XRAY 8X4 STERL. 12PL - (10EA/TY 80TY/CA)

## (undated) DEVICE — SPONGE GAUZE NON-STERILE 4X4 - (2000/CA 10PK/CA)

## (undated) DEVICE — DRAPE SURGICAL U 77X120 - (10/CA)

## (undated) DEVICE — SPONGE GAUZESTER. 2X2 4-PL - (2/PK 50PK/BX 30BX/CS)

## (undated) DEVICE — GLOVE BIOGEL INDICATOR SZ 8 SURGICAL PF LTX - (50/BX 4BX/CA)

## (undated) DEVICE — DRAPE U ORTHOPEDIC - (10/BX)

## (undated) DEVICE — WATER IRRIG. STER 3000 ML - (4/CA)

## (undated) DEVICE — PACK LAPAROSCOPY - (1/CA)

## (undated) DEVICE — DRAPE C-ARM LARGE 41IN X 74 IN - (10/BX 2BX/CA)